# Patient Record
Sex: FEMALE | Race: BLACK OR AFRICAN AMERICAN | NOT HISPANIC OR LATINO | Employment: OTHER | URBAN - METROPOLITAN AREA
[De-identification: names, ages, dates, MRNs, and addresses within clinical notes are randomized per-mention and may not be internally consistent; named-entity substitution may affect disease eponyms.]

---

## 2023-01-23 PROBLEM — H25.813 COMBINED FORM OF AGE-RELATED CATARACT, BOTH EYES: Status: ACTIVE | Noted: 2023-01-23

## 2023-01-23 PROBLEM — E55.9 VITAMIN D DEFICIENCY: Status: ACTIVE | Noted: 2023-01-23

## 2023-01-23 PROBLEM — J30.9 ALLERGIC RHINITIS: Status: ACTIVE | Noted: 2023-01-23

## 2023-01-23 PROBLEM — N17.9 ACUTE KIDNEY INJURY SUPERIMPOSED ON CKD (CMS-HCC): Status: ACTIVE | Noted: 2023-01-23

## 2023-01-23 PROBLEM — R59.0 SUPRACLAVICULAR ADENOPATHY: Status: ACTIVE | Noted: 2023-01-23

## 2023-01-23 PROBLEM — H52.10 MYOPIA WITH PRESBYOPIA: Status: ACTIVE | Noted: 2023-01-23

## 2023-01-23 PROBLEM — N39.0 URINARY TRACT INFECTION: Status: ACTIVE | Noted: 2023-01-23

## 2023-01-23 PROBLEM — C77.0: Status: ACTIVE | Noted: 2023-01-23

## 2023-01-23 PROBLEM — J06.9 VIRAL URI WITH COUGH: Status: ACTIVE | Noted: 2023-01-23

## 2023-01-23 PROBLEM — R05.9 COUGH: Status: ACTIVE | Noted: 2023-01-23

## 2023-01-23 PROBLEM — J40 BRONCHITIS: Status: ACTIVE | Noted: 2023-01-23

## 2023-01-23 PROBLEM — H01.019 BLEPHARITIS, ULCERATIVE: Status: ACTIVE | Noted: 2023-01-23

## 2023-01-23 PROBLEM — F41.9 ANXIETY: Status: ACTIVE | Noted: 2023-01-23

## 2023-01-23 PROBLEM — R60.0 BILATERAL EDEMA OF LOWER EXTREMITY: Status: ACTIVE | Noted: 2023-01-23

## 2023-01-23 PROBLEM — R50.9 FEVER AND CHILLS: Status: ACTIVE | Noted: 2023-01-23

## 2023-01-23 PROBLEM — R42 DIZZINESS: Status: ACTIVE | Noted: 2023-01-23

## 2023-01-23 PROBLEM — R59.0 AXILLARY ADENOPATHY: Status: ACTIVE | Noted: 2023-01-23

## 2023-01-23 PROBLEM — R00.0 TACHYCARDIA: Status: ACTIVE | Noted: 2023-01-23

## 2023-01-23 PROBLEM — H52.4 MYOPIA WITH PRESBYOPIA: Status: ACTIVE | Noted: 2023-01-23

## 2023-01-23 PROBLEM — J11.1 FLU SYNDROME: Status: ACTIVE | Noted: 2023-01-23

## 2023-01-23 PROBLEM — H52.209 ASTIGMATISM: Status: ACTIVE | Noted: 2023-01-23

## 2023-01-23 PROBLEM — I51.89 DIASTOLIC DYSFUNCTION: Status: ACTIVE | Noted: 2023-01-23

## 2023-01-23 PROBLEM — T79.2XXA: Status: ACTIVE | Noted: 2023-01-23

## 2023-01-23 PROBLEM — U07.1 COVID-19: Status: ACTIVE | Noted: 2023-01-23

## 2023-01-23 PROBLEM — Z85.3 PERSONAL HISTORY OF BREAST CANCER: Status: ACTIVE | Noted: 2023-01-23

## 2023-01-23 PROBLEM — E66.09 EXOGENOUS OBESITY: Status: ACTIVE | Noted: 2023-01-23

## 2023-01-23 PROBLEM — H04.129 DRY EYE SYNDROME: Status: ACTIVE | Noted: 2023-01-23

## 2023-01-23 PROBLEM — K21.9 ESOPHAGEAL REFLUX: Status: ACTIVE | Noted: 2023-01-23

## 2023-01-23 PROBLEM — K80.20 GALLBLADDER STONE WITHOUT CHOLECYSTITIS OR OBSTRUCTION: Status: ACTIVE | Noted: 2023-01-23

## 2023-01-23 PROBLEM — N18.9 ACUTE KIDNEY INJURY SUPERIMPOSED ON CKD (CMS-HCC): Status: ACTIVE | Noted: 2023-01-23

## 2023-01-23 PROBLEM — C50.919 BREAST CANCER (MULTI): Status: ACTIVE | Noted: 2023-01-23

## 2023-01-23 PROBLEM — F32.A DEPRESSION: Status: ACTIVE | Noted: 2023-01-23

## 2023-01-23 PROBLEM — G89.29 CHRONIC PAIN: Status: ACTIVE | Noted: 2023-01-23

## 2023-01-23 PROBLEM — R35.0 URINARY FREQUENCY: Status: ACTIVE | Noted: 2023-01-23

## 2023-01-23 PROBLEM — M17.9 OSTEOARTHRITIS, KNEE: Status: ACTIVE | Noted: 2023-01-23

## 2023-01-23 PROBLEM — H35.369 RETINAL DRUSEN: Status: ACTIVE | Noted: 2023-01-23

## 2023-01-23 PROBLEM — M25.569 JOINT PAIN, KNEE: Status: ACTIVE | Noted: 2023-01-23

## 2023-01-23 PROBLEM — I10 HYPERTENSION: Status: ACTIVE | Noted: 2023-01-23

## 2023-01-23 PROBLEM — N18.2 CHRONIC KIDNEY DISEASE (CKD), STAGE II (MILD): Status: ACTIVE | Noted: 2023-01-23

## 2023-01-23 PROBLEM — R53.83 FATIGUE: Status: ACTIVE | Noted: 2023-01-23

## 2023-01-23 PROBLEM — I97.2 POSTMASTECTOMY LYMPHEDEMA SYNDROME: Status: ACTIVE | Noted: 2023-01-23

## 2023-01-23 RX ORDER — ANASTROZOLE 1 MG/1
1 TABLET ORAL DAILY
COMMUNITY
End: 2024-04-30 | Stop reason: SDUPTHER

## 2023-01-23 RX ORDER — ASPIRIN 81 MG/1
81 TABLET ORAL DAILY
COMMUNITY

## 2023-01-24 RX ORDER — CARVEDILOL 12.5 MG/1
1 TABLET ORAL 2 TIMES DAILY
COMMUNITY
Start: 2017-03-14 | End: 2023-08-29 | Stop reason: SDUPTHER

## 2023-01-24 RX ORDER — FLUTICASONE PROPIONATE 50 MCG
1 SPRAY, SUSPENSION (ML) NASAL 2 TIMES DAILY
COMMUNITY
Start: 2018-05-29 | End: 2023-08-29 | Stop reason: SDUPTHER

## 2023-01-24 RX ORDER — FUROSEMIDE 20 MG/1
20 TABLET ORAL DAILY
COMMUNITY
Start: 2020-10-15 | End: 2023-05-23 | Stop reason: SDUPTHER

## 2023-01-24 RX ORDER — GABAPENTIN 300 MG/1
1 CAPSULE ORAL 3 TIMES DAILY
COMMUNITY
Start: 2017-09-05 | End: 2023-08-29 | Stop reason: SDUPTHER

## 2023-01-24 RX ORDER — HYDRALAZINE HYDROCHLORIDE 100 MG/1
1 TABLET, FILM COATED ORAL 2 TIMES DAILY
COMMUNITY
Start: 2019-11-21 | End: 2023-08-29 | Stop reason: SDUPTHER

## 2023-03-07 ENCOUNTER — APPOINTMENT (OUTPATIENT)
Dept: PRIMARY CARE | Facility: CLINIC | Age: 83
End: 2023-03-07
Payer: COMMERCIAL

## 2023-03-07 DIAGNOSIS — I10 BENIGN ESSENTIAL HYPERTENSION: ICD-10-CM

## 2023-03-07 DIAGNOSIS — Z00.00 ENCOUNTER FOR ANNUAL PHYSICAL EXAM: ICD-10-CM

## 2023-03-07 DIAGNOSIS — Z00.00 ENCOUNTER FOR ANNUAL WELLNESS EXAM IN MEDICARE PATIENT: ICD-10-CM

## 2023-03-07 DIAGNOSIS — E03.9 HYPOTHYROIDISM, UNSPECIFIED TYPE: ICD-10-CM

## 2023-03-07 DIAGNOSIS — I10 PRIMARY HYPERTENSION: ICD-10-CM

## 2023-03-07 DIAGNOSIS — D50.9 IRON DEFICIENCY ANEMIA, UNSPECIFIED IRON DEFICIENCY ANEMIA TYPE: ICD-10-CM

## 2023-03-07 DIAGNOSIS — E78.00 ELEVATED LDL CHOLESTEROL LEVEL: ICD-10-CM

## 2023-03-07 LAB
BASOPHILS (10*3/UL) IN BLOOD BY AUTOMATED COUNT: 0.03 X10E9/L (ref 0–0.1)
BASOPHILS/100 LEUKOCYTES IN BLOOD BY AUTOMATED COUNT: 1 % (ref 0–2)
EOSINOPHILS (10*3/UL) IN BLOOD BY AUTOMATED COUNT: 0.02 X10E9/L (ref 0–0.4)
EOSINOPHILS/100 LEUKOCYTES IN BLOOD BY AUTOMATED COUNT: 0.7 % (ref 0–6)
ERYTHROCYTE DISTRIBUTION WIDTH (RATIO) BY AUTOMATED COUNT: 15 % (ref 11.5–14.5)
ERYTHROCYTE MEAN CORPUSCULAR HEMOGLOBIN CONCENTRATION (G/DL) BY AUTOMATED: 32.2 G/DL (ref 32–36)
ERYTHROCYTE MEAN CORPUSCULAR VOLUME (FL) BY AUTOMATED COUNT: 88 FL (ref 80–100)
ERYTHROCYTES (10*6/UL) IN BLOOD BY AUTOMATED COUNT: 3.79 X10E12/L (ref 4–5.2)
HEMATOCRIT (%) IN BLOOD BY AUTOMATED COUNT: 33.2 % (ref 36–46)
HEMOGLOBIN (G/DL) IN BLOOD: 10.7 G/DL (ref 12–16)
IMMATURE GRANULOCYTES/100 LEUKOCYTES IN BLOOD BY AUTOMATED COUNT: 0 % (ref 0–0.9)
LEUKOCYTES (10*3/UL) IN BLOOD BY AUTOMATED COUNT: 2.9 X10E9/L (ref 4.4–11.3)
LYMPHOCYTES (10*3/UL) IN BLOOD BY AUTOMATED COUNT: 1.06 X10E9/L (ref 0.8–3)
LYMPHOCYTES/100 LEUKOCYTES IN BLOOD BY AUTOMATED COUNT: 37.1 % (ref 13–44)
MONOCYTES (10*3/UL) IN BLOOD BY AUTOMATED COUNT: 0.35 X10E9/L (ref 0.05–0.8)
MONOCYTES/100 LEUKOCYTES IN BLOOD BY AUTOMATED COUNT: 12.2 % (ref 2–10)
NEUTROPHILS (10*3/UL) IN BLOOD BY AUTOMATED COUNT: 1.4 X10E9/L (ref 1.6–5.5)
NEUTROPHILS/100 LEUKOCYTES IN BLOOD BY AUTOMATED COUNT: 49 % (ref 40–80)
PLATELETS (10*3/UL) IN BLOOD AUTOMATED COUNT: 135 X10E9/L (ref 150–450)

## 2023-03-07 PROCEDURE — 80053 COMPREHEN METABOLIC PANEL: CPT

## 2023-03-07 PROCEDURE — 85027 COMPLETE CBC AUTOMATED: CPT

## 2023-03-07 PROCEDURE — 84443 ASSAY THYROID STIM HORMONE: CPT

## 2023-03-07 PROCEDURE — 80061 LIPID PANEL: CPT

## 2023-03-07 PROCEDURE — 82306 VITAMIN D 25 HYDROXY: CPT

## 2023-03-07 PROCEDURE — 36415 COLL VENOUS BLD VENIPUNCTURE: CPT | Performed by: INTERNAL MEDICINE

## 2023-03-08 LAB
ALANINE AMINOTRANSFERASE (SGPT) (U/L) IN SER/PLAS: 14 U/L (ref 7–45)
ALBUMIN (G/DL) IN SER/PLAS: 3.7 G/DL (ref 3.4–5)
ALKALINE PHOSPHATASE (U/L) IN SER/PLAS: 83 U/L (ref 33–136)
ANION GAP IN SER/PLAS: 12 MMOL/L (ref 10–20)
ASPARTATE AMINOTRANSFERASE (SGOT) (U/L) IN SER/PLAS: 18 U/L (ref 9–39)
BILIRUBIN TOTAL (MG/DL) IN SER/PLAS: 0.4 MG/DL (ref 0–1.2)
CALCIDIOL (25 OH VITAMIN D3) (NG/ML) IN SER/PLAS: 30 NG/ML
CALCIUM (MG/DL) IN SER/PLAS: 9.1 MG/DL (ref 8.6–10.6)
CARBON DIOXIDE, TOTAL (MMOL/L) IN SER/PLAS: 29 MMOL/L (ref 21–32)
CHLORIDE (MMOL/L) IN SER/PLAS: 106 MMOL/L (ref 98–107)
CHOLESTEROL (MG/DL) IN SER/PLAS: 191 MG/DL (ref 0–199)
CHOLESTEROL IN HDL (MG/DL) IN SER/PLAS: 73 MG/DL
CHOLESTEROL/HDL RATIO: 2.6
CREATININE (MG/DL) IN SER/PLAS: 1.08 MG/DL (ref 0.5–1.05)
ERYTHROCYTE DISTRIBUTION WIDTH (RATIO) BY AUTOMATED COUNT: 15.9 % (ref 11.5–14.5)
ERYTHROCYTE MEAN CORPUSCULAR HEMOGLOBIN CONCENTRATION (G/DL) BY AUTOMATED: 30.8 G/DL (ref 32–36)
ERYTHROCYTE MEAN CORPUSCULAR VOLUME (FL) BY AUTOMATED COUNT: 89 FL (ref 80–100)
ERYTHROCYTES (10*6/UL) IN BLOOD BY AUTOMATED COUNT: 3.9 X10E12/L (ref 4–5.2)
GFR FEMALE: 51 ML/MIN/1.73M2
GLUCOSE (MG/DL) IN SER/PLAS: 98 MG/DL (ref 74–99)
HEMATOCRIT (%) IN BLOOD BY AUTOMATED COUNT: 34.7 % (ref 36–46)
HEMOGLOBIN (G/DL) IN BLOOD: 10.7 G/DL (ref 12–16)
LDL: 104 MG/DL (ref 0–99)
LEUKOCYTES (10*3/UL) IN BLOOD BY AUTOMATED COUNT: 3 X10E9/L (ref 4.4–11.3)
NRBC (PER 100 WBCS) BY AUTOMATED COUNT: 0 /100 WBC (ref 0–0)
PLATELETS (10*3/UL) IN BLOOD AUTOMATED COUNT: 152 X10E9/L (ref 150–450)
POTASSIUM (MMOL/L) IN SER/PLAS: 4.1 MMOL/L (ref 3.5–5.3)
PROTEIN TOTAL: 6.5 G/DL (ref 6.4–8.2)
SODIUM (MMOL/L) IN SER/PLAS: 143 MMOL/L (ref 136–145)
THYROTROPIN (MIU/L) IN SER/PLAS BY DETECTION LIMIT <= 0.05 MIU/L: 3.43 MIU/L (ref 0.44–3.98)
TRIGLYCERIDE (MG/DL) IN SER/PLAS: 70 MG/DL (ref 0–149)
UREA NITROGEN (MG/DL) IN SER/PLAS: 16 MG/DL (ref 6–23)
VLDL: 14 MG/DL (ref 0–40)

## 2023-03-10 ENCOUNTER — OFFICE VISIT (OUTPATIENT)
Dept: PRIMARY CARE | Facility: CLINIC | Age: 83
End: 2023-03-10
Payer: COMMERCIAL

## 2023-03-10 DIAGNOSIS — I10 HYPERTENSION, UNSPECIFIED TYPE: ICD-10-CM

## 2023-03-10 DIAGNOSIS — E55.9 VITAMIN D DEFICIENCY: ICD-10-CM

## 2023-03-10 DIAGNOSIS — Z00.00 ENCOUNTER FOR ANNUAL WELLNESS VISIT (AWV) IN MEDICARE PATIENT: ICD-10-CM

## 2023-03-10 DIAGNOSIS — N18.9 ACUTE KIDNEY INJURY SUPERIMPOSED ON CKD (CMS-HCC): Primary | ICD-10-CM

## 2023-03-10 DIAGNOSIS — N17.9 ACUTE KIDNEY INJURY SUPERIMPOSED ON CKD (CMS-HCC): Primary | ICD-10-CM

## 2023-03-10 DIAGNOSIS — C50.919 MALIGNANT NEOPLASM OF FEMALE BREAST, UNSPECIFIED ESTROGEN RECEPTOR STATUS, UNSPECIFIED LATERALITY, UNSPECIFIED SITE OF BREAST (MULTI): ICD-10-CM

## 2023-03-10 PROBLEM — M17.9 OSTEOARTHRITIS OF KNEE: Status: ACTIVE | Noted: 2023-03-10

## 2023-03-10 PROBLEM — N18.2 CHRONIC KIDNEY DISEASE, STAGE II (MILD): Status: ACTIVE | Noted: 2023-03-10

## 2023-03-10 PROBLEM — D63.8 ANEMIA OF CHRONIC DISEASE: Status: ACTIVE | Noted: 2023-03-10

## 2023-03-10 PROBLEM — N39.0 UTI (URINARY TRACT INFECTION): Status: ACTIVE | Noted: 2023-03-10

## 2023-03-10 PROBLEM — I97.2 POSTMASTECTOMY LYMPHEDEMA SYNDROME OF LEFT UPPER EXTREMITY: Status: ACTIVE | Noted: 2023-03-10

## 2023-03-10 PROCEDURE — 99214 OFFICE O/P EST MOD 30 MIN: CPT | Performed by: INTERNAL MEDICINE

## 2023-03-10 PROCEDURE — 1159F MED LIST DOCD IN RCRD: CPT | Performed by: INTERNAL MEDICINE

## 2023-03-10 PROCEDURE — 93000 ELECTROCARDIOGRAM COMPLETE: CPT | Performed by: INTERNAL MEDICINE

## 2023-03-10 PROCEDURE — G0439 PPPS, SUBSEQ VISIT: HCPCS | Performed by: INTERNAL MEDICINE

## 2023-03-10 ASSESSMENT — ENCOUNTER SYMPTOMS
LOSS OF SENSATION IN FEET: 0
DEPRESSION: 0
OCCASIONAL FEELINGS OF UNSTEADINESS: 0

## 2023-03-10 NOTE — PROGRESS NOTES
Subjective :  Chief Complaint: Elizabeth Buckner is an 82 y.o. female here for an annual wellness visit and general medical care and f/u.     HPI:  HPI  Patient is here for her annual wellness. She is here for EKG and follow up on HTN, CKD, and anemia. Patient does not need refills.    Review of Systems   All other systems reviewed and are negative.    All systems reviewed and negative except for what was mentioned in the HPI    Objective   BP: 120/70    Physical Exam  Vitals reviewed.   Constitutional:       Appearance: Normal appearance.   HENT:      Head: Normocephalic and atraumatic.   Cardiovascular:      Rate and Rhythm: Normal rate and regular rhythm.   Pulmonary:      Effort: Pulmonary effort is normal.      Breath sounds: Normal breath sounds.   Abdominal:      General: Bowel sounds are normal.      Palpations: Abdomen is soft.   Musculoskeletal:      Cervical back: Neck supple.   Skin:     General: Skin is warm and dry.   Neurological:      General: No focal deficit present.      Mental Status: She is alert.   Psychiatric:         Mood and Affect: Mood normal.         Behavior: Behavior is cooperative.       Imaging:  No results found.     Labs reviewed:    Lab Results   Component Value Date    WBC 2.9 (L) 03/07/2023    HGB 10.7 (L) 03/07/2023    HCT 33.2 (L) 03/07/2023     (L) 03/07/2023    CHOL 191 03/07/2023    TRIG 70 03/07/2023    HDL 73.0 03/07/2023    ALT 14 03/07/2023    AST 18 03/07/2023     03/07/2023    K 4.1 03/07/2023     03/07/2023    CREATININE 1.08 (H) 03/07/2023    BUN 16 03/07/2023    CO2 29 03/07/2023    TSH 3.43 03/07/2023    INR 1.0 10/19/2022       Past Medical, Surgical, and Family History reviewed and updated in chart.    I have reviewed and reconciled the medication list with the patient today.   Current Outpatient Medications:     anastrozole (Arimidex) 1 mg tablet, Take 1 tablet (1 mg total) by mouth once daily.  Take 1 tablet once daily. Swallow whole with a drink of  water., Disp: , Rfl:     aspirin 81 mg EC tablet, Take 1 tablet (81 mg) by mouth once daily. Take 1 tablet daily, Disp: , Rfl:     carvedilol (Coreg) 12.5 mg tablet, Take 1 tablet (12.5 mg) by mouth in the morning and 1 tablet (12.5 mg) before bedtime. Take 1 tablet twice daily., Disp: , Rfl:     cholecalciferol, vitamin D3, (VITAMIN D3 ORAL), Take 25 mcg by mouth 1 (one) time each day. Take 1 tablet daily Vitamin D 25MCG (1000 UT) oral tablet, Disp: , Rfl:     FLAXSEED OIL ORAL, Take 1,200 mg by mouth in the morning, at noon, and at bedtime. Take 1 capsule 3 times daily, Disp: , Rfl:     fluticasone (Flonase) 50 mcg/actuation nasal spray, Administer 1 spray into each nostril in the morning and 1 spray before bedtime. Instill 1 spray in each nostril twice daily., Disp: , Rfl:     furosemide (Lasix) 20 mg tablet, Take 1 tablet (20 mg) by mouth once daily. Take 1 tablet by mouth every day, Disp: , Rfl:     gabapentin (Neurontin) 300 mg capsule, Take 1 capsule (300 mg) by mouth in the morning and 1 capsule (300 mg) in the evening and 1 capsule (300 mg) before bedtime. Take one capsule by mouth three times a day., Disp: , Rfl:     hydrALAZINE (Apresoline) 100 mg tablet, Take 1 tablet (100 mg) by mouth in the morning and 1 tablet (100 mg) before bedtime. Take 1 tablet by mouth twice per day., Disp: , Rfl:     palbociclib (Ibrance) 125 mg capsule, Take 1 capsule (125 mg total) by mouth once daily.  Stake 1 tablet daily. wallow whole., Disp: , Rfl:     TURMERIC ORAL, Take 400 mg by mouth 1 (one) time each day. Take 1 capsule daily, Disp: , Rfl:      List of current healthcare providers:  Patient Care Team:  Tova Espitia MD as PCP - General     HRA:                               Assessment/Plan :  Problem List Items Addressed This Visit          Circulatory    Hypertension     Continue medications.  Recheck BP.          Relevant Orders    ECG 12 lead       Genitourinary    Acute kidney injury superimposed on CKD  (CMS/HCC) - Primary     Improved.  Continue to monitor.             Endocrine/Metabolic    Vitamin D deficiency     Continue to monitor.            Other    Breast cancer (CMS/HCC)     Continue to follow up with oncology  Patient will receive testing (CT) as determined by oncology.          Other Visit Diagnoses       Encounter for annual wellness visit (AWV) in Medicare patient        Relevant Orders    ECG 12 lead          The following health maintenance schedule was reviewed with the patient and provided in printed form in the after visit summary:  Health Maintenance   Topic Date Due    Yearly Adult Physical  Never done    Zoster Vaccines (1 of 2) Never done    Pneumococcal Vaccine: 65+ Years (1 - PCV) 05/22/2004    TSH Level  03/07/2024    Lipid Panel  03/07/2028    Influenza Vaccine  Completed    Bone Density Scan  Completed    COVID-19 Vaccine  Completed    HIB Vaccines  Aged Out    Hepatitis B Vaccines  Aged Out    IPV Vaccines  Aged Out    Hepatitis A Vaccines  Aged Out    Meningococcal Vaccine  Aged Out    Rotavirus Vaccines  Aged Out    HPV Vaccines  Aged Out    DTaP/Tdap/Td Vaccines  Discontinued       Advance Care Planning   Discussed with patient. She has a POA and living will.           Orders Placed This Encounter   Procedures    ECG 12 lead       Continue current medications as listed  Follow up in 3 months for CBC, CMP.

## 2023-03-29 ENCOUNTER — OFFICE VISIT (OUTPATIENT)
Dept: PRIMARY CARE | Facility: CLINIC | Age: 83
End: 2023-03-29
Payer: COMMERCIAL

## 2023-03-29 VITALS
WEIGHT: 174 LBS | HEART RATE: 70 BPM | OXYGEN SATURATION: 100 % | SYSTOLIC BLOOD PRESSURE: 154 MMHG | BODY MASS INDEX: 34.16 KG/M2 | DIASTOLIC BLOOD PRESSURE: 78 MMHG | RESPIRATION RATE: 12 BRPM | HEIGHT: 60 IN

## 2023-03-29 DIAGNOSIS — J40 BRONCHITIS: ICD-10-CM

## 2023-03-29 DIAGNOSIS — K21.9 GASTROESOPHAGEAL REFLUX DISEASE WITHOUT ESOPHAGITIS: ICD-10-CM

## 2023-03-29 DIAGNOSIS — R35.0 URINARY FREQUENCY: ICD-10-CM

## 2023-03-29 DIAGNOSIS — N39.0 URINARY TRACT INFECTION WITHOUT HEMATURIA, SITE UNSPECIFIED: ICD-10-CM

## 2023-03-29 DIAGNOSIS — D63.8 ANEMIA OF CHRONIC DISEASE: Primary | ICD-10-CM

## 2023-03-29 DIAGNOSIS — N18.2 CHRONIC KIDNEY DISEASE (CKD), STAGE II (MILD): ICD-10-CM

## 2023-03-29 LAB
POC APPEARANCE, URINE: ABNORMAL
POC BILIRUBIN, URINE: NEGATIVE
POC BLOOD, URINE: ABNORMAL
POC COLOR, URINE: ABNORMAL
POC GLUCOSE, URINE: NEGATIVE MG/DL
POC KETONES, URINE: NEGATIVE MG/DL
POC LEUKOCYTES, URINE: ABNORMAL
POC NITRITE,URINE: NEGATIVE
POC PH, URINE: 6 PH
POC PROTEIN, URINE: NEGATIVE MG/DL
POC SPECIFIC GRAVITY, URINE: >=1.03
POC UROBILINOGEN, URINE: 0.2 EU/DL

## 2023-03-29 PROCEDURE — 3077F SYST BP >= 140 MM HG: CPT | Performed by: INTERNAL MEDICINE

## 2023-03-29 PROCEDURE — 99213 OFFICE O/P EST LOW 20 MIN: CPT | Performed by: INTERNAL MEDICINE

## 2023-03-29 PROCEDURE — 1159F MED LIST DOCD IN RCRD: CPT | Performed by: INTERNAL MEDICINE

## 2023-03-29 PROCEDURE — 3078F DIAST BP <80 MM HG: CPT | Performed by: INTERNAL MEDICINE

## 2023-03-29 PROCEDURE — 81002 URINALYSIS NONAUTO W/O SCOPE: CPT | Performed by: INTERNAL MEDICINE

## 2023-03-29 PROCEDURE — 1036F TOBACCO NON-USER: CPT | Performed by: INTERNAL MEDICINE

## 2023-03-29 RX ORDER — AZELASTINE HYDROCHLORIDE 0.5 MG/ML
2 SOLUTION/ DROPS OPHTHALMIC DAILY
COMMUNITY
Start: 2022-05-10

## 2023-03-29 ASSESSMENT — LIFESTYLE VARIABLES
HOW OFTEN DO YOU HAVE SIX OR MORE DRINKS ON ONE OCCASION: NEVER
SKIP TO QUESTIONS 9-10: 1
HOW MANY STANDARD DRINKS CONTAINING ALCOHOL DO YOU HAVE ON A TYPICAL DAY: PATIENT DOES NOT DRINK
AUDIT-C TOTAL SCORE: 0
HOW OFTEN DO YOU HAVE A DRINK CONTAINING ALCOHOL: NEVER

## 2023-03-29 ASSESSMENT — SOCIAL DETERMINANTS OF HEALTH (SDOH)
WITHIN THE LAST YEAR, HAVE TO BEEN RAPED OR FORCED TO HAVE ANY KIND OF SEXUAL ACTIVITY BY YOUR PARTNER OR EX-PARTNER?: NO
WITHIN THE LAST YEAR, HAVE YOU BEEN HUMILIATED OR EMOTIONALLY ABUSED IN OTHER WAYS BY YOUR PARTNER OR EX-PARTNER?: NO
WITHIN THE LAST YEAR, HAVE YOU BEEN KICKED, HIT, SLAPPED, OR OTHERWISE PHYSICALLY HURT BY YOUR PARTNER OR EX-PARTNER?: NO
WITHIN THE LAST YEAR, HAVE YOU BEEN AFRAID OF YOUR PARTNER OR EX-PARTNER?: NO

## 2023-03-29 ASSESSMENT — ENCOUNTER SYMPTOMS
DEPRESSION: 0
OCCASIONAL FEELINGS OF UNSTEADINESS: 0
LOSS OF SENSATION IN FEET: 0

## 2023-03-29 ASSESSMENT — PATIENT HEALTH QUESTIONNAIRE - PHQ9
SUM OF ALL RESPONSES TO PHQ9 QUESTIONS 1 & 2: 0
2. FEELING DOWN, DEPRESSED OR HOPELESS: NOT AT ALL
1. LITTLE INTEREST OR PLEASURE IN DOING THINGS: NOT AT ALL

## 2023-03-29 ASSESSMENT — PAIN SCALES - GENERAL: PAINLEVEL: 6

## 2023-03-29 NOTE — PROGRESS NOTES
Subjective   Chief complaint: Elizabeth Buckner is a 82 y.o. female who presents for Chills (Pt experiencing chills off and on throughout the day.), Urinary Frequency, and urinary burning.    HPI:  Patient presented with increased urinary frequency urgency in urination and burning in urination for a few days        Objective   /78 (BP Location: Right arm, Patient Position: Sitting, BP Cuff Size: Adult)   Pulse 70   Resp 12   Ht 1.524 m (5')   Wt 78.9 kg (174 lb)   SpO2 100%   BMI 33.98 kg/m²   Physical Exam  Vitals reviewed.   Constitutional:       Appearance: Normal appearance.   HENT:      Head: Normocephalic and atraumatic.   Cardiovascular:      Rate and Rhythm: Normal rate and regular rhythm.   Pulmonary:      Effort: Pulmonary effort is normal.      Breath sounds: Normal breath sounds.   Abdominal:      General: Bowel sounds are normal.      Palpations: Abdomen is soft.   Musculoskeletal:      Cervical back: Neck supple.   Skin:     General: Skin is warm and dry.   Neurological:      General: No focal deficit present.      Mental Status: She is alert.   Psychiatric:         Mood and Affect: Mood normal.         Behavior: Behavior is cooperative.         I have reviewed and reconciled the medication list with the patient today.   Current Outpatient Medications:     anastrozole (Arimidex) 1 mg tablet, Take 1 tablet (1 mg total) by mouth once daily.  Take 1 tablet once daily. Swallow whole with a drink of water., Disp: , Rfl:     aspirin 81 mg EC tablet, Take 1 tablet (81 mg) by mouth once daily. Take 1 tablet daily, Disp: , Rfl:     azelastine (Optivar) 0.05 % ophthalmic solution, Administer 2 drops into both eyes once daily., Disp: , Rfl:     carvedilol (Coreg) 12.5 mg tablet, Take 1 tablet (12.5 mg) by mouth in the morning and 1 tablet (12.5 mg) before bedtime. Take 1 tablet twice daily., Disp: , Rfl:     cholecalciferol, vitamin D3, (VITAMIN D3 ORAL), Take 25 mcg by mouth 1 (one) time each day. Take 1  tablet daily Vitamin D 25MCG (1000 UT) oral tablet, Disp: , Rfl:     FLAXSEED OIL ORAL, Take 1,200 mg by mouth in the morning, at noon, and at bedtime. Take 1 capsule 3 times daily, Disp: , Rfl:     fluticasone (Flonase) 50 mcg/actuation nasal spray, Administer 1 spray into each nostril in the morning and 1 spray before bedtime. Instill 1 spray in each nostril twice daily., Disp: , Rfl:     furosemide (Lasix) 20 mg tablet, Take 1 tablet (20 mg) by mouth once daily. Take 1 tablet by mouth every day, Disp: , Rfl:     gabapentin (Neurontin) 300 mg capsule, Take 1 capsule (300 mg) by mouth in the morning and 1 capsule (300 mg) in the evening and 1 capsule (300 mg) before bedtime. Take one capsule by mouth three times a day., Disp: , Rfl:     hydrALAZINE (Apresoline) 100 mg tablet, Take 1 tablet (100 mg) by mouth in the morning and 1 tablet (100 mg) before bedtime. Take 1 tablet by mouth twice per day., Disp: , Rfl:     palbociclib (Ibrance) 75 mg capsule, Take 1 capsule (75 mg total) by mouth once daily with a meal.  Swallow whole., Disp: , Rfl:     TURMERIC ORAL, Take 400 mg by mouth 1 (one) time each day. Take 1 capsule daily, Disp: , Rfl:     palbociclib (Ibrance) 125 mg capsule, Take 1 capsule (125 mg total) by mouth once daily.  Stake 1 tablet daily. wallow whole., Disp: , Rfl:      Imaging:  CT chest abdomen pelvis wo IV contrast    Result Date: 3/29/2023  Interpreted By:  GREGORIO FRANZ MD and GRETCHEN SUAREZ MD MRN: [id number] Patient Name: STEPHANIE GUTIERREZ  STUDY: CT CHEST ABDOMEN PELVIS WO CONTRAST;  3/27/2023 2:03 pm  INDICATION: F/U Metastatic breast cancer.  Per clinical notes: Patient has a history of bilateral breast cancer diagnosed in 1991 status post breast conserving surgery followed by radiation and tamoxifen. Experience in breast recurrence in 2012, status post completion mastectomy. IDC status post radiation 2018. Had biopsy-proven recurrence in the right supraclavicular lymph node in November 2021.  Initiated on anastrozole and palbociclib status post localized radiation therapy completed in April 2022.  COMPARISON: CT chest abdomen pelvis 10/26/2022, bone scan 10/26/2022, PET CT 01/13/2022, CT abdomen pelvis 02/08/2017, CT chest abdomen pelvis 02/08/2017  ACCESSION NUMBER(S): [id number]  ORDERING CLINICIAN: SORIN SWIFT  TECHNIQUE: Contiguous axial images of the chest , abdomen, and pelvis were obtained without contrast. Coronal and sagittal reformatted images were reconstructed from the axial data.  FINDINGS: CT CHEST:  MEDIASTINUM AND LYMPH NODES: Postsurgical changes of right axillary lymph node dissection. No evidence of new or enlarging intrathoracic lymph nodes identified.  VESSELS: Normal caliber aorta. Moderate aortic atherosclerosis. Dilated main pulmonary artery measuring 33 mm, with increased pulmonary artery to ascending aorta ratio, suggestive of pulmonary hypertension.  HEART:Normal size.No significant coronary artery calcifications. No significant pericardial effusion.  LUNG, AIRWAYS, PLEURA: The trachea and central bronchi are patent. Redemonstration of subpleural reticular opacities in the right middle lobe and left upper lobe anteriorly, most consistent with post radiation changes. Linear chronic scarring changes in the lingula and right middle lobe.  Stable 2 mm left upper lobe nodule on image 42 of series 202 when compared with 10/15/2021, suggesting benign etiology.  No suspicious pulmonary nodules identified. No consolidation, pulmonary edema, pleural effusion or pneumothorax.  CHEST WALL SOFT TISSUES: Postsurgical changes of bilateral total mastectomy.No thyroid parenchyma could be confidently seen within scanned lower neck. No significant abnormalities involving the esophagus. Post radiation changes of right supraclavicular region again noted without evidence of new soft tissue lesions or lymphadenopathy.  OSSEOUS STRUCTURES:No acute osseous abnormality. No suspicious osseous lesions.    CT ABDOMEN/PELVIS:   ABDOMINAL WALL: Small fat containing periumbilical hernia.  LIVER: Scattered calcifications throughout the hepatic parenchyma, likely sequelae of prior granulomatous disease. The liver is normal and unchanged in size.  BILE DUCTS: Note is again made of prominence of the CBD with diffuse distal tapering, most likely physiologic post cholecystectomy. The findings are unchanged compared to prior examination on 10/26/2022.  GALLBLADDER: Surgically absent.  SPLEEN: The spleen is normal and unchanged in size. No focal lesions.  PANCREAS: No significant abnormality.  ADRENALS: No significant abnormality.  KIDNEYS, URETERS numerous bilateral simple renal cysts again noted.No nephroureterolithiasis or hydroureteronephrosis.  REPRODUCTIVE ORGANS: The uterus is surgically absent.  VESSELS: Moderate vascular calcifications. No aneurysm. The IVC is normal in caliber.  RETROPERITONEUM/LYMPH NODES: No evidence of abdominopelvic lymphadenopathy by CT criteria.  BOWEL/MESENTERY/PERITONEUM/ BLADDER:: No significant abnormalities involving the stomach.Redemonstration of sigmoid diverticulosis with associated chronic thickening of the bowel wall. There is evidence of tethering of the distal sigmoid to the posterior bladder wall, with adjacent inflammatory changes of the omental fat, extending to the left ovary (series 201, image 156). The bladder wall is diffusely thickened with associated perivesical stranding. A small focus of air is seen within the anti dependent portion of the bladder (series 201, image 159). The findings are overall similar to prior examinations dating back to PET CT on 01/13/2022. No evidence of paracolic collections identified. The remainder of the bowel loops are normal in course and caliber. The appendix is normal.  No ascites, free air, or fluid collection.  MUSCULOSKELETAL: No acute osseous abnormality. No suspicious osseous lesions identified. Mild degenerative changes of the  thoracolumbar spine noted.      Breast cancer restaging scan. 1. Postsurgical changes of bilateral mastectomy, axillary dissection, and right supraclavicular chest wall radiation. No evidence of metastatic disease in the chest. 2. No evidence of metastatic disease in the abdomen or pelvis. 3. Sigmoid diverticulosis with tethering of the distal sigmoid colon to the posterior bladder wall noted. There is a focus of air within the anti dependent segment of the bladder, with associated diffuse bladder wall thickening and perivesical stranding, extending to the left ovary. 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 No evidence of paracolic fluid collections. 4.  Additional findings as above.   Yellow Alert: Inflammatory changes involving the bladder with tethering of the sigmoid colon to the bladder wall, most consistent with chronic colovesical fistula. The critical information above was relayed directly by me by the [employer] system to SORIN SWIFT on 3/28/2023.  I personally reviewed the images/study and I agree with the findings as stated. This study was interpreted at [place], [place].    ECG 12 lead    Result Date: 3/21/2023  Normal sinus rhythm.  NS ST T changes       Labs reviewed:    Lab Results   Component Value Date    WBC 2.9 (L) 03/07/2023    HGB 10.7 (L) 03/07/2023    HCT 33.2 (L) 03/07/2023     (L) 03/07/2023    CHOL 191 03/07/2023    TRIG 70 03/07/2023    HDL 73.0 03/07/2023    ALT 14 03/07/2023    AST 18 03/07/2023     03/07/2023    K 4.1 03/07/2023     03/07/2023    CREATININE 1.08 (H) 03/07/2023    BUN 16 03/07/2023    CO2 29 03/07/2023    TSH 3.43 03/07/2023    INR 1.0 10/19/2022       Assessment/Plan   Problem List Items Addressed This Visit          Digestive    Esophageal reflux       Genitourinary    Chronic kidney disease (CKD), stage II (mild)     Urinary tract infection     Levaquin 500 once a day for 5 days  Drink enough water.            Hematologic    Anemia of chronic disease - Primary       Other    Urinary frequency    Relevant Orders    POCT UA (nonautomated) manually resulted (Completed)       Continue current medications as listed  Follow up in a week if not better

## 2023-03-30 RX ORDER — LEVOFLOXACIN 500 MG/1
500 TABLET, FILM COATED ORAL DAILY
Qty: 5 TABLET | Refills: 0 | Status: SHIPPED | OUTPATIENT
Start: 2023-03-30 | End: 2023-04-04

## 2023-04-10 ENCOUNTER — OFFICE VISIT (OUTPATIENT)
Dept: PRIMARY CARE | Facility: CLINIC | Age: 83
End: 2023-04-10
Payer: COMMERCIAL

## 2023-04-10 VITALS
OXYGEN SATURATION: 99 % | HEART RATE: 79 BPM | SYSTOLIC BLOOD PRESSURE: 140 MMHG | WEIGHT: 175 LBS | DIASTOLIC BLOOD PRESSURE: 62 MMHG | RESPIRATION RATE: 12 BRPM | BODY MASS INDEX: 34.36 KG/M2 | HEIGHT: 60 IN

## 2023-04-10 DIAGNOSIS — N39.0 URINARY TRACT INFECTION WITHOUT HEMATURIA, SITE UNSPECIFIED: ICD-10-CM

## 2023-04-10 DIAGNOSIS — I15.9 SECONDARY HYPERTENSION: ICD-10-CM

## 2023-04-10 DIAGNOSIS — R53.83 FATIGUE, UNSPECIFIED TYPE: Primary | ICD-10-CM

## 2023-04-10 LAB
POC APPEARANCE, URINE: ABNORMAL
POC BILIRUBIN, URINE: NEGATIVE
POC BLOOD, URINE: ABNORMAL
POC COLOR, URINE: YELLOW
POC GLUCOSE, URINE: NEGATIVE MG/DL
POC KETONES, URINE: NEGATIVE MG/DL
POC LEUKOCYTES, URINE: ABNORMAL
POC NITRITE,URINE: POSITIVE
POC PH, URINE: 5 PH
POC PROTEIN, URINE: ABNORMAL MG/DL
POC SPECIFIC GRAVITY, URINE: 1.01
POC UROBILINOGEN, URINE: 0.2 EU/DL

## 2023-04-10 PROCEDURE — 1159F MED LIST DOCD IN RCRD: CPT | Performed by: INTERNAL MEDICINE

## 2023-04-10 PROCEDURE — 3077F SYST BP >= 140 MM HG: CPT | Performed by: INTERNAL MEDICINE

## 2023-04-10 PROCEDURE — 99214 OFFICE O/P EST MOD 30 MIN: CPT | Performed by: INTERNAL MEDICINE

## 2023-04-10 PROCEDURE — 87186 SC STD MICRODIL/AGAR DIL: CPT

## 2023-04-10 PROCEDURE — 81002 URINALYSIS NONAUTO W/O SCOPE: CPT | Performed by: INTERNAL MEDICINE

## 2023-04-10 PROCEDURE — 1036F TOBACCO NON-USER: CPT | Performed by: INTERNAL MEDICINE

## 2023-04-10 PROCEDURE — 3078F DIAST BP <80 MM HG: CPT | Performed by: INTERNAL MEDICINE

## 2023-04-10 PROCEDURE — 87086 URINE CULTURE/COLONY COUNT: CPT

## 2023-04-10 PROCEDURE — 87077 CULTURE AEROBIC IDENTIFY: CPT

## 2023-04-10 NOTE — ASSESSMENT & PLAN NOTE
Patient failed on the treatment and the urine still positive for blood and nitrate  I will get a urine cultures because patient been off antibiotic for a week  It depends on the culture we might have to have referral to urology for cystoscopy

## 2023-04-10 NOTE — PROGRESS NOTES
Subjective   Chief complaint: Elizabeth Buckner is a 82 y.o. female who presents for uti follow up (Pt is being seen for UTI follow up and weakness. ).    HPI:  Follow-up UTIPatient stopped the antibiotic because it made her feel sick she still having symptoms the UA still positive        Objective   /62   Pulse 79   Resp 12   Ht 1.524 m (5')   Wt 79.4 kg (175 lb)   SpO2 99%   BMI 34.18 kg/m²   Physical Exam  Vitals reviewed.   Constitutional:       Appearance: Normal appearance.   HENT:      Head: Normocephalic and atraumatic.   Cardiovascular:      Rate and Rhythm: Normal rate and regular rhythm.   Pulmonary:      Effort: Pulmonary effort is normal.      Breath sounds: Normal breath sounds.   Abdominal:      General: Bowel sounds are normal.      Palpations: Abdomen is soft.   Musculoskeletal:      Cervical back: Neck supple.   Skin:     General: Skin is warm and dry.   Neurological:      General: No focal deficit present.      Mental Status: She is alert.   Psychiatric:         Mood and Affect: Mood normal.         Behavior: Behavior is cooperative.         I have reviewed and reconciled the medication list with the patient today.   Current Outpatient Medications:     anastrozole (Arimidex) 1 mg tablet, Take 1 tablet (1 mg total) by mouth once daily.  Take 1 tablet once daily. Swallow whole with a drink of water., Disp: , Rfl:     aspirin 81 mg EC tablet, Take 1 tablet (81 mg) by mouth once daily. Take 1 tablet daily, Disp: , Rfl:     azelastine (Optivar) 0.05 % ophthalmic solution, Administer 2 drops into both eyes once daily., Disp: , Rfl:     carvedilol (Coreg) 12.5 mg tablet, Take 1 tablet (12.5 mg) by mouth in the morning and 1 tablet (12.5 mg) before bedtime. Take 1 tablet twice daily., Disp: , Rfl:     cholecalciferol, vitamin D3, (VITAMIN D3 ORAL), Take 25 mcg by mouth 1 (one) time each day. Take 1 tablet daily Vitamin D 25MCG (1000 UT) oral tablet, Disp: , Rfl:     FLAXSEED OIL ORAL, Take 1,200 mg by  mouth in the morning, at noon, and at bedtime. Take 1 capsule 3 times daily, Disp: , Rfl:     fluticasone (Flonase) 50 mcg/actuation nasal spray, Administer 1 spray into each nostril in the morning and 1 spray before bedtime. Instill 1 spray in each nostril twice daily., Disp: , Rfl:     furosemide (Lasix) 20 mg tablet, Take 1 tablet (20 mg) by mouth once daily. Take 1 tablet by mouth every day, Disp: , Rfl:     gabapentin (Neurontin) 300 mg capsule, Take 1 capsule (300 mg) by mouth in the morning and 1 capsule (300 mg) in the evening and 1 capsule (300 mg) before bedtime. Take one capsule by mouth three times a day., Disp: , Rfl:     hydrALAZINE (Apresoline) 100 mg tablet, Take 1 tablet (100 mg) by mouth in the morning and 1 tablet (100 mg) before bedtime. Take 1 tablet by mouth twice per day., Disp: , Rfl:     palbociclib (Ibrance) 125 mg capsule, Take 1 capsule (125 mg total) by mouth once daily.  Stake 1 tablet daily. wallow whole., Disp: , Rfl:     palbociclib (Ibrance) 75 mg capsule, Take 1 capsule (75 mg total) by mouth once daily with a meal.  Swallow whole., Disp: , Rfl:     TURMERIC ORAL, Take 400 mg by mouth 1 (one) time each day. Take 1 capsule daily, Disp: , Rfl:      Imaging:  CT chest abdomen pelvis wo IV contrast    Result Date: 3/29/2023  Interpreted By:  GREGORIO FRANZ MD and GRETCHEN SUAREZ MD MRN: [id number] Patient Name: STEPHANIE GUTIERREZ  STUDY: CT CHEST ABDOMEN PELVIS WO CONTRAST;  3/27/2023 2:03 pm  INDICATION: F/U Metastatic breast cancer.  Per clinical notes: Patient has a history of bilateral breast cancer diagnosed in 1991 status post breast conserving surgery followed by radiation and tamoxifen. Experience in breast recurrence in 2012, status post completion mastectomy. IDC status post radiation 2018. Had biopsy-proven recurrence in the right supraclavicular lymph node in November 2021. Initiated on anastrozole and palbociclib status post localized radiation therapy completed in April 2022.   COMPARISON: CT chest abdomen pelvis 10/26/2022, bone scan 10/26/2022, PET CT 01/13/2022, CT abdomen pelvis 02/08/2017, CT chest abdomen pelvis 02/08/2017  ACCESSION NUMBER(S): [id number]  ORDERING CLINICIAN: SORIN SWIFT  TECHNIQUE: Contiguous axial images of the chest , abdomen, and pelvis were obtained without contrast. Coronal and sagittal reformatted images were reconstructed from the axial data.  FINDINGS: CT CHEST:  MEDIASTINUM AND LYMPH NODES: Postsurgical changes of right axillary lymph node dissection. No evidence of new or enlarging intrathoracic lymph nodes identified.  VESSELS: Normal caliber aorta. Moderate aortic atherosclerosis. Dilated main pulmonary artery measuring 33 mm, with increased pulmonary artery to ascending aorta ratio, suggestive of pulmonary hypertension.  HEART:Normal size.No significant coronary artery calcifications. No significant pericardial effusion.  LUNG, AIRWAYS, PLEURA: The trachea and central bronchi are patent. Redemonstration of subpleural reticular opacities in the right middle lobe and left upper lobe anteriorly, most consistent with post radiation changes. Linear chronic scarring changes in the lingula and right middle lobe.  Stable 2 mm left upper lobe nodule on image 42 of series 202 when compared with 10/15/2021, suggesting benign etiology.  No suspicious pulmonary nodules identified. No consolidation, pulmonary edema, pleural effusion or pneumothorax.  CHEST WALL SOFT TISSUES: Postsurgical changes of bilateral total mastectomy.No thyroid parenchyma could be confidently seen within scanned lower neck. No significant abnormalities involving the esophagus. Post radiation changes of right supraclavicular region again noted without evidence of new soft tissue lesions or lymphadenopathy.  OSSEOUS STRUCTURES:No acute osseous abnormality. No suspicious osseous lesions.   CT ABDOMEN/PELVIS:   ABDOMINAL WALL: Small fat containing periumbilical hernia.  LIVER: Scattered  calcifications throughout the hepatic parenchyma, likely sequelae of prior granulomatous disease. The liver is normal and unchanged in size.  BILE DUCTS: Note is again made of prominence of the CBD with diffuse distal tapering, most likely physiologic post cholecystectomy. The findings are unchanged compared to prior examination on 10/26/2022.  GALLBLADDER: Surgically absent.  SPLEEN: The spleen is normal and unchanged in size. No focal lesions.  PANCREAS: No significant abnormality.  ADRENALS: No significant abnormality.  KIDNEYS, URETERS numerous bilateral simple renal cysts again noted.No nephroureterolithiasis or hydroureteronephrosis.  REPRODUCTIVE ORGANS: The uterus is surgically absent.  VESSELS: Moderate vascular calcifications. No aneurysm. The IVC is normal in caliber.  RETROPERITONEUM/LYMPH NODES: No evidence of abdominopelvic lymphadenopathy by CT criteria.  BOWEL/MESENTERY/PERITONEUM/ BLADDER:: No significant abnormalities involving the stomach.Redemonstration of sigmoid diverticulosis with associated chronic thickening of the bowel wall. There is evidence of tethering of the distal sigmoid to the posterior bladder wall, with adjacent inflammatory changes of the omental fat, extending to the left ovary (series 201, image 156). The bladder wall is diffusely thickened with associated perivesical stranding. A small focus of air is seen within the anti dependent portion of the bladder (series 201, image 159). The findings are overall similar to prior examinations dating back to PET CT on 01/13/2022. No evidence of paracolic collections identified. The remainder of the bowel loops are normal in course and caliber. The appendix is normal.  No ascites, free air, or fluid collection.  MUSCULOSKELETAL: No acute osseous abnormality. No suspicious osseous lesions identified. Mild degenerative changes of the thoracolumbar spine noted.      Breast cancer restaging scan. 1. Postsurgical changes of bilateral  mastectomy, axillary dissection, and right supraclavicular chest wall radiation. No evidence of metastatic disease in the chest. 2. No evidence of metastatic disease in the abdomen or pelvis. 3. Sigmoid diverticulosis with tethering of the distal sigmoid colon to the posterior bladder wall noted. There is a focus of air within the anti dependent segment of the bladder, with associated diffuse bladder wall thickening and perivesical stranding, extending to the left ovary. 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 No evidence of paracolic fluid collections. 4.  Additional findings as above.   Yellow Alert: Inflammatory changes involving the bladder with tethering of the sigmoid colon to the bladder wall, most consistent with chronic colovesical fistula. The critical information above was relayed directly by me by the Notifi system to SORIN SWIFT on 3/28/2023.  I personally reviewed the images/study and I agree with the findings as stated. This study was interpreted at [place], [place].    ECG 12 lead    Result Date: 3/21/2023  Normal sinus rhythm.  NS ST T changes       Labs reviewed:    Lab Results   Component Value Date    WBC 3.9 (L) 04/05/2023    HGB 10.0 (L) 04/05/2023    HCT 30.5 (L) 04/05/2023     04/05/2023    CHOL 191 03/07/2023    TRIG 70 03/07/2023    HDL 73.0 03/07/2023    ALT 11 04/05/2023    AST 18 04/05/2023     04/05/2023    K 3.9 04/05/2023     04/05/2023    CREATININE 1.05 04/05/2023    BUN 19 04/05/2023    CO2 26 04/05/2023    TSH 3.43 03/07/2023    INR 1.0 10/19/2022       Assessment/Plan   Problem List Items Addressed This Visit          Circulatory    Hypertension       Genitourinary    Urinary tract infection    Relevant Orders    POCT UA (nonautomated) manually resulted (Completed)       Other    Fatigue - Primary     B12 injection             Continue current medications as listed  Follow up in 1 week

## 2023-04-13 LAB — URINE CULTURE: ABNORMAL

## 2023-04-14 DIAGNOSIS — R35.0 URINARY FREQUENCY: ICD-10-CM

## 2023-04-15 RX ORDER — SULFAMETHOXAZOLE AND TRIMETHOPRIM 800; 160 MG/1; MG/1
1 TABLET ORAL 2 TIMES DAILY
Qty: 14 TABLET | Refills: 0 | Status: SHIPPED | OUTPATIENT
Start: 2023-04-15 | End: 2023-04-22

## 2023-05-17 ENCOUNTER — HOSPITAL ENCOUNTER (OUTPATIENT)
Dept: DATA CONVERSION | Facility: HOSPITAL | Age: 83
End: 2023-05-17
Attending: INTERNAL MEDICINE | Admitting: INTERNAL MEDICINE
Payer: COMMERCIAL

## 2023-05-17 DIAGNOSIS — Z85.42 PERSONAL HISTORY OF MALIGNANT NEOPLASM OF OTHER PARTS OF UTERUS: ICD-10-CM

## 2023-05-17 DIAGNOSIS — Z88.0 ALLERGY STATUS TO PENICILLIN: ICD-10-CM

## 2023-05-17 DIAGNOSIS — Z86.16 PERSONAL HISTORY OF COVID-19: ICD-10-CM

## 2023-05-17 DIAGNOSIS — E55.9 VITAMIN D DEFICIENCY, UNSPECIFIED: ICD-10-CM

## 2023-05-17 DIAGNOSIS — F32.A DEPRESSION, UNSPECIFIED: ICD-10-CM

## 2023-05-17 DIAGNOSIS — G62.0 DRUG-INDUCED POLYNEUROPATHY (MULTI): ICD-10-CM

## 2023-05-17 DIAGNOSIS — E66.9 OBESITY, UNSPECIFIED: ICD-10-CM

## 2023-05-17 DIAGNOSIS — R93.3 ABNORMAL FINDINGS ON DIAGNOSTIC IMAGING OF OTHER PARTS OF DIGESTIVE TRACT: ICD-10-CM

## 2023-05-17 DIAGNOSIS — M19.90 UNSPECIFIED OSTEOARTHRITIS, UNSPECIFIED SITE: ICD-10-CM

## 2023-05-17 DIAGNOSIS — Z79.82 LONG TERM (CURRENT) USE OF ASPIRIN: ICD-10-CM

## 2023-05-17 DIAGNOSIS — N32.1 VESICOINTESTINAL FISTULA: ICD-10-CM

## 2023-05-17 DIAGNOSIS — Z85.3 PERSONAL HISTORY OF MALIGNANT NEOPLASM OF BREAST: ICD-10-CM

## 2023-05-17 DIAGNOSIS — N18.9 CHRONIC KIDNEY DISEASE, UNSPECIFIED: ICD-10-CM

## 2023-05-17 DIAGNOSIS — K57.30 DIVERTICULOSIS OF LARGE INTESTINE WITHOUT PERFORATION OR ABSCESS WITHOUT BLEEDING: ICD-10-CM

## 2023-05-17 DIAGNOSIS — Z90.13 ACQUIRED ABSENCE OF BILATERAL BREASTS AND NIPPLES: ICD-10-CM

## 2023-05-17 DIAGNOSIS — I12.9 HYPERTENSIVE CHRONIC KIDNEY DISEASE WITH STAGE 1 THROUGH STAGE 4 CHRONIC KIDNEY DISEASE, OR UNSPECIFIED CHRONIC KIDNEY DISEASE: ICD-10-CM

## 2023-05-17 DIAGNOSIS — T45.1X5A ADVERSE EFFECT OF ANTINEOPLASTIC AND IMMUNOSUPPRESSIVE DRUGS, INITIAL ENCOUNTER: ICD-10-CM

## 2023-05-17 DIAGNOSIS — F41.9 ANXIETY DISORDER, UNSPECIFIED: ICD-10-CM

## 2023-05-17 DIAGNOSIS — K64.0 FIRST DEGREE HEMORRHOIDS: ICD-10-CM

## 2023-05-17 DIAGNOSIS — Z90.710 ACQUIRED ABSENCE OF BOTH CERVIX AND UTERUS: ICD-10-CM

## 2023-05-23 ENCOUNTER — TELEPHONE (OUTPATIENT)
Dept: PRIMARY CARE | Facility: CLINIC | Age: 83
End: 2023-05-23

## 2023-05-23 ENCOUNTER — OFFICE VISIT (OUTPATIENT)
Dept: PRIMARY CARE | Facility: CLINIC | Age: 83
End: 2023-05-23
Payer: COMMERCIAL

## 2023-05-23 VITALS
WEIGHT: 173 LBS | OXYGEN SATURATION: 97 % | RESPIRATION RATE: 12 BRPM | BODY MASS INDEX: 31.83 KG/M2 | HEART RATE: 82 BPM | SYSTOLIC BLOOD PRESSURE: 124 MMHG | DIASTOLIC BLOOD PRESSURE: 52 MMHG | HEIGHT: 62 IN

## 2023-05-23 DIAGNOSIS — I10 HYPERTENSION, UNSPECIFIED TYPE: ICD-10-CM

## 2023-05-23 DIAGNOSIS — I89.0 LYMPHEDEMA: Primary | ICD-10-CM

## 2023-05-23 DIAGNOSIS — C50.919 MALIGNANT NEOPLASM OF FEMALE BREAST, UNSPECIFIED ESTROGEN RECEPTOR STATUS, UNSPECIFIED LATERALITY, UNSPECIFIED SITE OF BREAST (MULTI): ICD-10-CM

## 2023-05-23 PROBLEM — R60.0 EDEMA OF RIGHT UPPER ARM: Status: ACTIVE | Noted: 2023-05-23

## 2023-05-23 PROCEDURE — 1036F TOBACCO NON-USER: CPT | Performed by: INTERNAL MEDICINE

## 2023-05-23 PROCEDURE — 99214 OFFICE O/P EST MOD 30 MIN: CPT | Performed by: INTERNAL MEDICINE

## 2023-05-23 PROCEDURE — 3078F DIAST BP <80 MM HG: CPT | Performed by: INTERNAL MEDICINE

## 2023-05-23 PROCEDURE — 3074F SYST BP LT 130 MM HG: CPT | Performed by: INTERNAL MEDICINE

## 2023-05-23 PROCEDURE — 1159F MED LIST DOCD IN RCRD: CPT | Performed by: INTERNAL MEDICINE

## 2023-05-23 RX ORDER — IBUPROFEN 200 MG
CAPSULE ORAL
Qty: 1 EACH | Refills: 0 | Status: SHIPPED | OUTPATIENT
Start: 2023-05-23 | End: 2024-04-30

## 2023-05-23 RX ORDER — FUROSEMIDE 20 MG/1
20 TABLET ORAL DAILY
Qty: 90 TABLET | Refills: 3 | Status: SHIPPED | OUTPATIENT
Start: 2023-05-23

## 2023-05-23 NOTE — ASSESSMENT & PLAN NOTE
Complicated with a lymphedema right arm will refer to the oncologyContinue to follow up with oncology  Patient will receive testing (CT) as determined by oncology.

## 2023-05-23 NOTE — PROGRESS NOTES
"Subjective   Chief complaint: Elizabeth Buckner is a 83 y.o. female who presents for Edema (Pt states for a week her right arm and hand is swollen, denies any pain. ).    HPI:  Patient presents with acute right arm swelling for one week. Denies injury, pain, paresthesias. States she hasn't taken Lasix in 3 weeks since she ran out. History of bilateral breast cancer with bilateral mastectomy. Also removed axillary lymph node.    Edema      Objective   /52   Pulse 82   Resp 12   Ht 1.575 m (5' 2\")   Wt 78.5 kg (173 lb)   SpO2 97%   BMI 31.64 kg/m²   Physical Exam  Vitals reviewed.   Constitutional:       Appearance: Normal appearance.   HENT:      Head: Normocephalic and atraumatic.   Cardiovascular:      Rate and Rhythm: Normal rate and regular rhythm.   Pulmonary:      Effort: Pulmonary effort is normal.      Breath sounds: Normal breath sounds.   Abdominal:      General: Bowel sounds are normal.      Palpations: Abdomen is soft.   Musculoskeletal:      Cervical back: Neck supple.      Right lower leg: Edema present.      Left lower leg: Edema present.      Comments: Non-pitting edema of right elbow and hand   Skin:     General: Skin is warm and dry.   Neurological:      General: No focal deficit present.      Mental Status: She is alert.   Psychiatric:         Mood and Affect: Mood normal.         Behavior: Behavior is cooperative.         I have reviewed and reconciled the medication list with the patient today.   Current Outpatient Medications:     anastrozole (Arimidex) 1 mg tablet, Take 1 tablet (1 mg total) by mouth once daily.  Take 1 tablet once daily. Swallow whole with a drink of water., Disp: , Rfl:     aspirin 81 mg EC tablet, Take 1 tablet (81 mg) by mouth once daily. Take 1 tablet daily, Disp: , Rfl:     azelastine (Optivar) 0.05 % ophthalmic solution, Administer 2 drops into both eyes once daily., Disp: , Rfl:     carvedilol (Coreg) 12.5 mg tablet, Take 1 tablet (12.5 mg) by mouth 2 times a day. " Take 1 tablet twice daily, Disp: , Rfl:     cholecalciferol, vitamin D3, (VITAMIN D3 ORAL), Take 25 mcg by mouth 1 (one) time each day. Take 1 tablet daily Vitamin D 25MCG (1000 UT) oral tablet, Disp: , Rfl:     FLAXSEED OIL ORAL, Take 1,200 mg by mouth in the morning, at noon, and at bedtime. Take 1 capsule 3 times daily, Disp: , Rfl:     fluticasone (Flonase) 50 mcg/actuation nasal spray, Administer 1 spray into each nostril 2 times a day. Instill 1 spray in each nostril twice daily, Disp: , Rfl:     gabapentin (Neurontin) 300 mg capsule, Take 1 capsule (300 mg) by mouth 3 times a day. Take one capsule by mouth three times a day, Disp: , Rfl:     hydrALAZINE (Apresoline) 100 mg tablet, Take 1 tablet (100 mg) by mouth 2 times a day. Take 1 tablet by mouth twice per day, Disp: , Rfl:     TURMERIC ORAL, Take 400 mg by mouth 1 (one) time each day. Take 1 capsule daily, Disp: , Rfl:     furosemide (Lasix) 20 mg tablet, Take 1 tablet (20 mg) by mouth once daily. Take 1 tablet by mouth every day, Disp: , Rfl:     palbociclib (Ibrance) 75 mg capsule, Take 1 capsule (75 mg total) by mouth once daily with a meal.  Swallow whole., Disp: , Rfl:      Imaging:  XR abdomen AP view    Result Date: 5/17/2023  Interpreted By:  SACHI NICOLE MD MRN: [id number] Patient Name: STEPHANIE GUTIERREZ  STUDY: ABDOMEN two views; 5/17/2023 11:29 am  INDICATION: post colonoscopy ; abdominal pain. Concern for perforation for c-arm; she should stay up .  COMPARISON: None.  ACCESSION NUMBER(S): [id number]  ORDERING CLINICIAN: KATHY WOODALL  FINDINGS: 2 AP views of the abdomen were obtained. Normal caliber distended loops of small bowel within right lower quadrant suggestive of an ileus type pattern. There is otherwise a nonobstructive bowel gas pattern. No evidence of free air is noted in these limited images.  Limited evaluation of the lung bases demonstrate no sizeable effusions or pneumothorax. No consolidations.  Degenerative changes of bilateral hip  joints.      1.  No evidence for visible pneumoperitoneum.       Labs reviewed:    Lab Results   Component Value Date    WBC 3.9 (L) 04/05/2023    HGB 10.0 (L) 04/05/2023    HCT 30.5 (L) 04/05/2023     04/05/2023    CHOL 191 03/07/2023    TRIG 70 03/07/2023    HDL 73.0 03/07/2023    ALT 11 04/05/2023    AST 18 04/05/2023     04/05/2023    K 3.9 04/05/2023     04/05/2023    CREATININE 1.05 04/05/2023    BUN 19 04/05/2023    CO2 26 04/05/2023    TSH 3.43 03/07/2023    INR 1.0 10/19/2022       Assessment/Plan   Problem List Items Addressed This Visit       Hypertension    Lymphedema - Primary     Suspect lymphedema due to history of axillary lymph node removal from bilateral mastectomy. Elevate arm when sleeping and wear compression sleeve. Do not let anyone draw blood from arm. Make appointment with oncologist within next 2 weeks.            Continue current medications as listed

## 2023-05-23 NOTE — ASSESSMENT & PLAN NOTE
Suspect lymphedema due to history of axillary lymph node removal from bilateral mastectomy. Elevate arm when sleeping and wear compression sleeve. Do not let anyone draw blood from arm. Make appointment with oncologist within next 2 weeks.

## 2023-06-15 ENCOUNTER — APPOINTMENT (OUTPATIENT)
Dept: PRIMARY CARE | Facility: CLINIC | Age: 83
End: 2023-06-15
Payer: COMMERCIAL

## 2023-06-28 LAB
ALANINE AMINOTRANSFERASE (SGPT) (U/L) IN SER/PLAS: 17 U/L (ref 7–45)
ALBUMIN (G/DL) IN SER/PLAS: 3.4 G/DL (ref 3.4–5)
ALKALINE PHOSPHATASE (U/L) IN SER/PLAS: 86 U/L (ref 33–136)
ANION GAP IN SER/PLAS: 10 MMOL/L (ref 10–20)
ASPARTATE AMINOTRANSFERASE (SGOT) (U/L) IN SER/PLAS: 25 U/L (ref 9–39)
BASOPHILS (10*3/UL) IN BLOOD BY AUTOMATED COUNT: 0.06 X10E9/L (ref 0–0.1)
BASOPHILS/100 LEUKOCYTES IN BLOOD BY AUTOMATED COUNT: 1.2 % (ref 0–2)
BILIRUBIN TOTAL (MG/DL) IN SER/PLAS: 0.3 MG/DL (ref 0–1.2)
CALCIUM (MG/DL) IN SER/PLAS: 8.8 MG/DL (ref 8.6–10.3)
CANCER AG 27-29 (U/ML) IN SER/PLAS: 8.4 U/ML (ref 0–38.6)
CARBON DIOXIDE, TOTAL (MMOL/L) IN SER/PLAS: 29 MMOL/L (ref 21–32)
CHLORIDE (MMOL/L) IN SER/PLAS: 102 MMOL/L (ref 98–107)
CREATININE (MG/DL) IN SER/PLAS: 1 MG/DL (ref 0.5–1.05)
EOSINOPHILS (10*3/UL) IN BLOOD BY AUTOMATED COUNT: 0.14 X10E9/L (ref 0–0.4)
EOSINOPHILS/100 LEUKOCYTES IN BLOOD BY AUTOMATED COUNT: 2.9 % (ref 0–6)
ERYTHROCYTE DISTRIBUTION WIDTH (RATIO) BY AUTOMATED COUNT: 14.1 % (ref 11.5–14.5)
ERYTHROCYTE MEAN CORPUSCULAR HEMOGLOBIN CONCENTRATION (G/DL) BY AUTOMATED: 32.4 G/DL (ref 32–36)
ERYTHROCYTE MEAN CORPUSCULAR VOLUME (FL) BY AUTOMATED COUNT: 92 FL (ref 80–100)
ERYTHROCYTES (10*6/UL) IN BLOOD BY AUTOMATED COUNT: 3.67 X10E12/L (ref 4–5.2)
GFR FEMALE: 56 ML/MIN/1.73M2
GLUCOSE (MG/DL) IN SER/PLAS: 76 MG/DL (ref 74–99)
HEMATOCRIT (%) IN BLOOD BY AUTOMATED COUNT: 33.9 % (ref 36–46)
HEMOGLOBIN (G/DL) IN BLOOD: 11 G/DL (ref 12–16)
IMMATURE GRANULOCYTES/100 LEUKOCYTES IN BLOOD BY AUTOMATED COUNT: 0.4 % (ref 0–0.9)
LEUKOCYTES (10*3/UL) IN BLOOD BY AUTOMATED COUNT: 4.8 X10E9/L (ref 4.4–11.3)
LYMPHOCYTES (10*3/UL) IN BLOOD BY AUTOMATED COUNT: 1.64 X10E9/L (ref 0.8–3)
LYMPHOCYTES/100 LEUKOCYTES IN BLOOD BY AUTOMATED COUNT: 34 % (ref 13–44)
MONOCYTES (10*3/UL) IN BLOOD BY AUTOMATED COUNT: 0.67 X10E9/L (ref 0.05–0.8)
MONOCYTES/100 LEUKOCYTES IN BLOOD BY AUTOMATED COUNT: 13.9 % (ref 2–10)
NEUTROPHILS (10*3/UL) IN BLOOD BY AUTOMATED COUNT: 2.29 X10E9/L (ref 1.6–5.5)
NEUTROPHILS/100 LEUKOCYTES IN BLOOD BY AUTOMATED COUNT: 47.6 % (ref 40–80)
PLATELETS (10*3/UL) IN BLOOD AUTOMATED COUNT: 222 X10E9/L (ref 150–450)
POTASSIUM (MMOL/L) IN SER/PLAS: 3.8 MMOL/L (ref 3.5–5.3)
PROTEIN TOTAL: 6.6 G/DL (ref 6.4–8.2)
SODIUM (MMOL/L) IN SER/PLAS: 137 MMOL/L (ref 136–145)
UREA NITROGEN (MG/DL) IN SER/PLAS: 17 MG/DL (ref 6–23)

## 2023-08-29 ENCOUNTER — OFFICE VISIT (OUTPATIENT)
Dept: PRIMARY CARE | Facility: CLINIC | Age: 83
End: 2023-08-29
Payer: COMMERCIAL

## 2023-08-29 VITALS
SYSTOLIC BLOOD PRESSURE: 204 MMHG | BODY MASS INDEX: 32.92 KG/M2 | DIASTOLIC BLOOD PRESSURE: 80 MMHG | OXYGEN SATURATION: 97 % | WEIGHT: 180 LBS | RESPIRATION RATE: 12 BRPM | HEART RATE: 80 BPM

## 2023-08-29 DIAGNOSIS — I15.9 SECONDARY HYPERTENSION: ICD-10-CM

## 2023-08-29 DIAGNOSIS — N18.9 ACUTE KIDNEY INJURY SUPERIMPOSED ON CKD (CMS-HCC): Primary | ICD-10-CM

## 2023-08-29 DIAGNOSIS — G89.4 CHRONIC PAIN SYNDROME: ICD-10-CM

## 2023-08-29 DIAGNOSIS — M17.9 OSTEOARTHRITIS OF KNEE, UNSPECIFIED LATERALITY, UNSPECIFIED OSTEOARTHRITIS TYPE: ICD-10-CM

## 2023-08-29 DIAGNOSIS — N17.9 ACUTE KIDNEY INJURY SUPERIMPOSED ON CKD (CMS-HCC): Primary | ICD-10-CM

## 2023-08-29 DIAGNOSIS — J30.9 ALLERGIC RHINITIS, UNSPECIFIED SEASONALITY, UNSPECIFIED TRIGGER: ICD-10-CM

## 2023-08-29 PROBLEM — R93.89 IMAGING ABNORMALITY: Status: ACTIVE | Noted: 2023-08-29

## 2023-08-29 PROBLEM — N32.1 COLOVESICAL FISTULA: Status: ACTIVE | Noted: 2023-08-29

## 2023-08-29 PROCEDURE — 1036F TOBACCO NON-USER: CPT | Performed by: INTERNAL MEDICINE

## 2023-08-29 PROCEDURE — 99214 OFFICE O/P EST MOD 30 MIN: CPT | Performed by: INTERNAL MEDICINE

## 2023-08-29 PROCEDURE — 1159F MED LIST DOCD IN RCRD: CPT | Performed by: INTERNAL MEDICINE

## 2023-08-29 PROCEDURE — 3077F SYST BP >= 140 MM HG: CPT | Performed by: INTERNAL MEDICINE

## 2023-08-29 PROCEDURE — 3079F DIAST BP 80-89 MM HG: CPT | Performed by: INTERNAL MEDICINE

## 2023-08-29 PROCEDURE — 1126F AMNT PAIN NOTED NONE PRSNT: CPT | Performed by: INTERNAL MEDICINE

## 2023-08-29 RX ORDER — GABAPENTIN 300 MG/1
300 CAPSULE ORAL 3 TIMES DAILY
Qty: 270 CAPSULE | Refills: 0 | Status: SHIPPED | OUTPATIENT
Start: 2023-08-29 | End: 2023-08-30 | Stop reason: SDUPTHER

## 2023-08-29 RX ORDER — CARVEDILOL 12.5 MG/1
12.5 TABLET ORAL 2 TIMES DAILY
Qty: 90 TABLET | Refills: 3 | Status: SHIPPED | OUTPATIENT
Start: 2023-08-29

## 2023-08-29 RX ORDER — FLUTICASONE PROPIONATE 50 MCG
1 SPRAY, SUSPENSION (ML) NASAL 2 TIMES DAILY
Qty: 16 G | Refills: 1 | Status: SHIPPED | OUTPATIENT
Start: 2023-08-29 | End: 2023-09-27

## 2023-08-29 RX ORDER — HYDRALAZINE HYDROCHLORIDE 100 MG/1
100 TABLET, FILM COATED ORAL 2 TIMES DAILY
Qty: 180 TABLET | Refills: 3 | Status: SHIPPED | OUTPATIENT
Start: 2023-08-29

## 2023-08-29 NOTE — PROGRESS NOTES
Subjective   Chief complaint: Elizabeth Buckner is a 83 y.o. female who presents for Med Refill (Pt is here today for medication refills. ).    HPI:  Follow-up hypertension  Follow-up low back pain patient in need to refill her gabapentin.    Med Refill        Objective   BP (!) 204/80   Pulse 80   Resp 12   Wt 81.6 kg (180 lb)   SpO2 97%   BMI 32.92 kg/m²   Physical Exam  Vitals reviewed.   Constitutional:       Appearance: Normal appearance.   HENT:      Head: Normocephalic and atraumatic.   Cardiovascular:      Rate and Rhythm: Normal rate and regular rhythm.   Pulmonary:      Effort: Pulmonary effort is normal.      Breath sounds: Normal breath sounds.   Abdominal:      General: Bowel sounds are normal.      Palpations: Abdomen is soft.   Musculoskeletal:      Cervical back: Neck supple.   Skin:     General: Skin is warm and dry.   Neurological:      General: No focal deficit present.      Mental Status: She is alert.   Psychiatric:         Mood and Affect: Mood normal.         Behavior: Behavior is cooperative.         I have reviewed and reconciled the medication list with the patient today.   Current Outpatient Medications:     anastrozole (Arimidex) 1 mg tablet, Take 1 tablet (1 mg total) by mouth once daily.  Take 1 tablet once daily. Swallow whole with a drink of water., Disp: , Rfl:     aspirin 81 mg EC tablet, Take 1 tablet (81 mg) by mouth once daily. Take 1 tablet daily, Disp: , Rfl:     azelastine (Optivar) 0.05 % ophthalmic solution, Administer 2 drops into both eyes once daily., Disp: , Rfl:     carvedilol (Coreg) 12.5 mg tablet, Take 1 tablet (12.5 mg) by mouth 2 times a day. Take 1 tablet twice daily, Disp: , Rfl:     cholecalciferol, vitamin D3, (VITAMIN D3 ORAL), Take 25 mcg by mouth 1 (one) time each day. Take 1 tablet daily Vitamin D 25MCG (1000 UT) oral tablet, Disp: , Rfl:     FLAXSEED OIL ORAL, Take 1,200 mg by mouth in the morning, at noon, and at bedtime. Take 1 capsule 3 times daily, Disp:  , Rfl:     fluticasone (Flonase) 50 mcg/actuation nasal spray, Administer 1 spray into each nostril 2 times a day. Instill 1 spray in each nostril twice daily, Disp: , Rfl:     furosemide (Lasix) 20 mg tablet, Take 1 tablet (20 mg) by mouth once daily. Take 1 tablet by mouth every day, Disp: 90 tablet, Rfl: 3    gabapentin (Neurontin) 300 mg capsule, Take 1 capsule (300 mg) by mouth 3 times a day. Take one capsule by mouth three times a day, Disp: , Rfl:     hydrALAZINE (Apresoline) 100 mg tablet, Take 1 tablet (100 mg) by mouth 2 times a day. Take 1 tablet by mouth twice per day, Disp: , Rfl:     palbociclib (Ibrance) 75 mg capsule, Take 1 capsule (75 mg total) by mouth once daily with a meal.  Swallow whole., Disp: , Rfl:     TURMERIC ORAL, Take 400 mg by mouth 1 (one) time each day. Take 1 capsule daily, Disp: , Rfl:     arm brace (Elbow Compression Sleeve) misc, Wear daily for lymphedema, Disp: 1 each, Rfl: 0     Imaging:  No results found.     Labs reviewed:    Lab Results   Component Value Date    WBC 4.8 06/28/2023    HGB 11.0 (L) 06/28/2023    HCT 33.9 (L) 06/28/2023     06/28/2023    CHOL 191 03/07/2023    TRIG 70 03/07/2023    HDL 73.0 03/07/2023    ALT 17 06/28/2023    AST 25 06/28/2023     06/28/2023    K 3.8 06/28/2023     06/28/2023    CREATININE 1.00 06/28/2023    BUN 17 06/28/2023    CO2 29 06/28/2023    TSH 3.43 03/07/2023    INR 1.0 10/19/2022       Assessment/Plan   Problem List Items Addressed This Visit       Acute kidney injury superimposed on CKD (CMS/HCC) - Primary     Improved.  Continue to monitor.          Allergic rhinitis     Flonase         Chronic pain     Refill the gabapentin         Relevant Orders    Disability Placard    Hypertension    Osteoarthritis of knee    Relevant Orders    Disability Placard       Continue current medications as listed  Follow up in 3 months for blood work

## 2023-08-30 DIAGNOSIS — G89.4 CHRONIC PAIN SYNDROME: ICD-10-CM

## 2023-09-05 RX ORDER — GABAPENTIN 300 MG/1
300 CAPSULE ORAL 3 TIMES DAILY
Qty: 270 CAPSULE | Refills: 0 | Status: SHIPPED | OUTPATIENT
Start: 2023-09-05

## 2023-09-07 VITALS
HEART RATE: 87 BPM | SYSTOLIC BLOOD PRESSURE: 173 MMHG | TEMPERATURE: 97.9 F | RESPIRATION RATE: 16 BRPM | DIASTOLIC BLOOD PRESSURE: 86 MMHG

## 2023-09-21 DIAGNOSIS — I97.2 POSTMASTECTOMY LYMPHEDEMA SYNDROME OF LEFT UPPER EXTREMITY: Primary | ICD-10-CM

## 2023-09-22 DIAGNOSIS — J30.9 ALLERGIC RHINITIS, UNSPECIFIED SEASONALITY, UNSPECIFIED TRIGGER: ICD-10-CM

## 2023-09-26 DIAGNOSIS — N18.9 CHRONIC RENAL IMPAIRMENT, UNSPECIFIED CKD STAGE: ICD-10-CM

## 2023-09-26 DIAGNOSIS — C77.0 METASTASIS TO SUPRACLAVICULAR LYMPH NODE (MULTI): Primary | ICD-10-CM

## 2023-09-27 RX ORDER — FLUTICASONE PROPIONATE 50 MCG
1 SPRAY, SUSPENSION (ML) NASAL 2 TIMES DAILY
Qty: 16 ML | Refills: 3 | Status: SHIPPED | OUTPATIENT
Start: 2023-09-27 | End: 2024-01-17

## 2023-09-29 DIAGNOSIS — I89.0 LYMPHEDEMA: Primary | ICD-10-CM

## 2023-09-30 NOTE — H&P
History of Present Illness:   History Present Illness:  Reason for surgery: History of diverticulitis, chronic  colovesical fistula   HPI:    Patient was Seen in GI clinic for further evaluation due to chronic colovesicular fistula    Allergies:        Allergies:  ·  ramipril : Angioedema  ·  penicillins : Facial Swelling    Home Medication Review:   Home Medications Reviewed: yes     Impression/Procedure:   ·  Impression and Planned Procedure: Colonoscopy       ERAS (Enhanced Recovery After Surgery):  ·  ERAS Patient: no       Vital Signs:  Temperature C: 36.6 degrees C   Temperature F: 97.8 degrees F   Heart Rate: 87 beats per minute   Respiratory Rate: 16 breath per minute   Blood Pressure Systolic: 173 mm/Hg   Blood Pressure Diastolic: 86 mm/Hg     Physical Exam by System:    Respiratory/Thorax: Patent airways, CTAB, normal  breath sounds with good chest expansion, thorax symmetric   Cardiovascular: Regular, rate and rhythm, no murmurs,  2+ equal pulses of the extremities, normal S 1and S 2     Consent:   COVID-19 Consent:  ·  COVID-19 Risk Consent Surgeon has reviewed key risks related to the risk of pari COVID-19 and if they contract COVID-19 what the risks are.       Electronic Signatures:  Angela Landin)  (Signed 17-May-2023 08:58)   Authored: History of Present Illness, Allergies, Home  Medication Review, Impression/Procedure, ERAS, Physical Exam, Consent, Note Completion      Last Updated: 17-May-2023 08:58 by Angela Landin)

## 2023-10-13 ENCOUNTER — TREATMENT (OUTPATIENT)
Dept: PHYSICAL THERAPY | Facility: CLINIC | Age: 83
End: 2023-10-13
Payer: COMMERCIAL

## 2023-10-13 DIAGNOSIS — I89.0 LYMPHEDEMA: ICD-10-CM

## 2023-10-13 PROCEDURE — 97140 MANUAL THERAPY 1/> REGIONS: CPT | Mod: GP

## 2023-10-13 PROCEDURE — 97530 THERAPEUTIC ACTIVITIES: CPT | Mod: GP

## 2023-10-13 ASSESSMENT — PAIN SCALES - GENERAL: PAINLEVEL_OUTOF10: 0 - NO PAIN

## 2023-10-13 ASSESSMENT — PAIN - FUNCTIONAL ASSESSMENT: PAIN_FUNCTIONAL_ASSESSMENT: 0-10

## 2023-10-13 NOTE — PROGRESS NOTES
Physical Therapy    Physical Therapy    Physical Therapy Treatment    Patient Name: Elizabeth Buckner  MRN: [id number]  Today's Date: 10/13/2023  Time Calculation  Start Time: 1415  Stop Time: 1510  Time Calculation (min): 55 min      Assessment:  PT Assessment  Assessment Comment: Pt becoming more independent with self-care strategies.  Continues to have mod edema in right UE despite compliance with elevation, exercises and compression over the last 4 weeks.  continue to recommend home pneumatic compression pump.    Plan:   Cont per POC  Current Problem  1. Lymphedema  Follow Up In Physical Therapy          Subjective   General   Pt states that she received sleeve and glove 4 days ago. Is not currently wearing but states that she has been wearing it during the day and it fits well.  Pt reports that she has been doing her exercise program and elevating her arm for at least 4 weeks.     Precautions  Precautions  Precautions Comment: lymphedema     Pain  Pain Assessment: 0-10  Pain Score: 0 - No pain    Objective     Arm Girth           8/7/2023               8/16/2023            8/18/2023       9/12/2023       9/26/2023       10/13/2023                        Right                     Right      Left          Right              Right               Right                   Right      hand         22.0cm                   22.7cm   19.0cm     22.1cm          21.3cm             20.7cm              21.4cm     wrist          22.4cm                   19.5cm   18.6cm     19.3cm          20.2cm             20.5cm              19.3cm     8cm           27.9cm                   26.6cm   25.3cm     26.9cm          28.0cm             26.2cm              26.0cm     16cm         34.2cm                   32.9cm   28.8cm     32.0cm          32.7cm             31.9cm              32.9cm     24cm         32.3cm                   34.5cm   32.3cm     31.9cm          33.5cm             31.8cm              31.5cm     32cm         43.1cm                    41.0cm   37.5cm     40.4cm          41.1cm             39.6cm              40.8cm     40cm         38.5cm                   36.7cm   33.3cm     37.2cm          41.2cm             37.4cm              38.5cm  Treatments:  Manual (40min)    Manual lymph drainage secondary right UE protocol; no axillary to axillary anastomoses due to h/o left breast cancer.      Therapeutic activities: (15min)    Reviewed self-care strategies including wearing sleeve during the day, eddie when using arm a lot. Also recommended that pt bandage at night to prevent increased edema.      Goals:  Active       PT Problem       PT Goal 1       Start:  09/21/23    Expected End:  12/20/23       Decrease girth measurement of right UE by 2cm or until plateau         PT Goal 2       Start:  09/21/23    Expected End:  12/20/23       Improve skin/tissue/decrease pitting of right UE         PT Goal 3       Start:  09/21/23    Expected End:  12/20/23       Pt able to raise R UE to 120deg in sitting for ease of ADLs         PT Goal 4       Start:  09/21/23    Expected End:  12/20/23       Knowledgeable and compliant with home program including lymphedemaanagement and exercises         PT Goal 5       Start:  09/21/23    Expected End:  12/20/23       Improve LLIS to <24/68

## 2023-10-16 ENCOUNTER — TELEPHONE (OUTPATIENT)
Dept: PRIMARY CARE | Facility: CLINIC | Age: 83
End: 2023-10-16

## 2023-10-16 NOTE — LETTER
Elizabeth Buckner  1940    10/16/2023    To Whom This May Concern:    My patient, Elizabeth Buckner, is unable to perform Jury Duty due to a mental or physical condition that renders the prospective juror unfit for jury service for the remainder of the jury year.    Should you have any questions please do not hesitate to call.    Thank you for your cooperation.    Sincerely,    Tova Espitia

## 2023-10-18 ENCOUNTER — TREATMENT (OUTPATIENT)
Dept: PHYSICAL THERAPY | Facility: CLINIC | Age: 83
End: 2023-10-18
Payer: COMMERCIAL

## 2023-10-18 DIAGNOSIS — I89.0 LYMPHEDEMA: ICD-10-CM

## 2023-10-18 PROCEDURE — 97530 THERAPEUTIC ACTIVITIES: CPT | Mod: GP

## 2023-10-18 PROCEDURE — 97110 THERAPEUTIC EXERCISES: CPT | Mod: GP

## 2023-10-18 ASSESSMENT — PAIN SCALES - GENERAL: PAINLEVEL_OUTOF10: 0 - NO PAIN

## 2023-10-18 ASSESSMENT — PAIN - FUNCTIONAL ASSESSMENT: PAIN_FUNCTIONAL_ASSESSMENT: 0-10

## 2023-10-18 NOTE — PROGRESS NOTES
Physical Therapy    Physical Therapy Treatment    Patient Name: Elizabeth Buckner  MRN: [id number]  Today's Date: 10/18/2023  Time Calculation  Start Time: 1100  Stop Time: 1145  Time Calculation (min): 45 min  Visit 17/24    Assessment:  PT Assessment  Rehab Prognosis: Good  Evaluation/Treatment Tolerance: Patient tolerated treatment well  Assessment Comment: Pt shows good understanding of lymphedema self-care strategies.  Has been able to returnn to bowling without increase in symptoms.    Plan:   Cont per POC  Current Problem  1. Lymphedema  Follow Up In Physical Therapy          Subjective   Pt arrives wearing compression sleeve and glove.  States it is comfortable.  Wears it during the day and has been bandaging at night.  Returned to bowling about one week ago; has been using a lighter ball than normal and arm has felt fine. Still waiting for MD to sign order for pump; pt states she may stop at his office today.  Pt declines MLD today but agreeable to there ex.   Precautions  Precautions  Precautions Comment: lymphedema     Pain  Pain Assessment: 0-10  Pain Score: 0 - No pain    Objective    Sleeve and gauntlet appears well-fitting    Outcome Measures:  Other Measures  Other Outcome Measures: LLIS 23/68    Treatments:  Therapeutic activities: (30 min)  Pt performed First Arm Routine and diaphragmatic breathing      OP EDUCATION:(10min)  Education  Individual(s) Educated: Patient  Education Provided: Lymphedema care, reviewed self-care strategies, return to bowling    Goals:  Active       PT Problem       PT Goal 1       Start:  09/21/23    Expected End:  12/20/23       Decrease girth measurement of right UE by 2cm or until plateau         PT Goal 2       Start:  09/21/23    Expected End:  12/20/23       Improve skin/tissue/decrease pitting of right UE         PT Goal 3       Start:  09/21/23    Expected End:  12/20/23       Pt able to raise R UE to 120deg in sitting for ease of ADLs         PT Goal 4       Start:   09/21/23    Expected End:  12/20/23       Knowledgeable and compliant with home program including lymphedemaanagement and exercises         PT Goal 5       Start:  09/21/23    Expected End:  12/20/23       Improve LLIS to <24/68

## 2023-10-20 ENCOUNTER — APPOINTMENT (OUTPATIENT)
Dept: PHYSICAL THERAPY | Facility: CLINIC | Age: 83
End: 2023-10-20
Payer: COMMERCIAL

## 2023-10-25 ENCOUNTER — TREATMENT (OUTPATIENT)
Dept: PHYSICAL THERAPY | Facility: CLINIC | Age: 83
End: 2023-10-25
Payer: COMMERCIAL

## 2023-10-25 DIAGNOSIS — I89.0 LYMPHEDEMA: ICD-10-CM

## 2023-10-25 PROCEDURE — 97530 THERAPEUTIC ACTIVITIES: CPT | Mod: GP

## 2023-10-25 ASSESSMENT — PAIN - FUNCTIONAL ASSESSMENT: PAIN_FUNCTIONAL_ASSESSMENT: 0-10

## 2023-10-25 ASSESSMENT — PAIN SCALES - GENERAL: PAINLEVEL_OUTOF10: 0 - NO PAIN

## 2023-10-25 NOTE — PROGRESS NOTES
Physical Therapy    Physical Therapy Treatment    Patient Name: Elizabeth Buckner  MRN: [id number]  Today's Date: 10/25/2023  Time Calculation  Start Time: 1100  Stop Time: 1155  Time Calculation (min): 55 min  Visit 14/24      Assessment:  PT Assessment  Assessment Comment: Pt demonstrates adequate MLD technique with frequent verbal cues.  .Recommend further instruction and review next visit.    Plan:   Cont per POC    Current Problem  1. Lymphedema  Follow Up In Physical Therapy    Follow Up In Physical Therapy          Subjective   Pt thinks right upper arm is bigger; states she has been wearing her compression sleeve and bandaging at night consistently    Precautions  Precautions Comment: lymphedema    Pain Assessment: 0-10  Pain Score: 0 - No pain    Objective   Arm Girth           8/7/2023               10/25/2023                              Right                      Right       hand         22.0cm                      21.1cm     wrist          22.4cm                      20.0cm     8cm           27.9cm                      27.7cm     16cm         34.2cm                      33.0cm     24cm         32.3cm                      37.1cm     32cm         43.1cm                      42.5cm     40cm         38.5cm                      39.3cm    Treatments:  Therapeutic activities:   (53min)    Pt instructed in manual lymph drainage to right UE; emphasis on proper technique, stroke    Education  Individual(s) Educated: Patient  Education Provided: Lymphedema careGoals:  Active       PT Problem       PT Goal 1       Start:  09/21/23    Expected End:  12/20/23       Decrease girth measurement of right UE by 2cm or until plateau         PT Goal 2       Start:  09/21/23    Expected End:  12/20/23       Improve skin/tissue/decrease pitting of right UE         PT Goal 3       Start:  09/21/23    Expected End:  12/20/23       Pt able to raise R UE to 120deg in sitting for ease of ADLs         PT Goal 4       Start:  09/21/23     Expected End:  12/20/23       Knowledgeable and compliant with home program including lymphedemaanagement and exercises         PT Goal 5       Start:  09/21/23    Expected End:  12/20/23       Improve LLIS to <24/68

## 2023-11-10 ENCOUNTER — HOSPITAL ENCOUNTER (OUTPATIENT)
Dept: RADIATION ONCOLOGY | Facility: CLINIC | Age: 83
Setting detail: RADIATION/ONCOLOGY SERIES
Discharge: HOME | End: 2023-11-10
Payer: COMMERCIAL

## 2023-11-10 VITALS
TEMPERATURE: 98.2 F | HEART RATE: 70 BPM | BODY MASS INDEX: 36.53 KG/M2 | OXYGEN SATURATION: 99 % | DIASTOLIC BLOOD PRESSURE: 80 MMHG | SYSTOLIC BLOOD PRESSURE: 160 MMHG | WEIGHT: 178.57 LBS

## 2023-11-10 DIAGNOSIS — C77.0 METASTASIS TO SUPRACLAVICULAR LYMPH NODE (MULTI): Primary | ICD-10-CM

## 2023-11-10 PROCEDURE — 99213 OFFICE O/P EST LOW 20 MIN: CPT | Performed by: NURSE PRACTITIONER

## 2023-11-10 ASSESSMENT — PAIN SCALES - GENERAL: PAINLEVEL: 8

## 2023-11-10 NOTE — PROGRESS NOTES
Radiation Oncology Follow-Up    Patient Name:  Elizabeth Buckner  MRN:  [id number]  :  1940    Referring Provider: No ref. provider found  Primary Care Provider: Tova Espitia MD  Care Team: Patient Care Team:  Tova Espitia MD as PCP - General    Date of Service: 11/10/2023     Cancer Staging:          Gynecologic - Uterine - Carcinomas         AJCC Edition: 7th, Diagnosis Date: 2008, Stage 1A/grade 2 endometrial cancer,              Breast         AJCC Edition: 7th (AJCC), Diagnosis Date: 2017, IIIA, T2 pN2a M0          Breast         AJCC Edition: 7th (AJCC), Diagnosis Date: 11-Dec-2012, UNK, T2 NX M0      Treatment Synopsis:    84 yo female with bilateral breast cancer.   Right breast cancer diagnosed in , status post breast conserving surgery followed by radiation and tamoxifen. She experienced an in-breast recurrence in , for which she underwent a completion mastectomy.    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 Tumor receptors were ER<1% MD 5% HER2 3+   Left supraclavicular fossa and axilla. Treatment dates: August 3, 2017, through 2017. Radiation treatment to the left supraclavicular fossa and axilla to a dose of 50 Gy/2 Gy per fraction/25  Radiation to the left chest wall and internal mammary jong chain.to a dose of 50 Gy/2 Gy per fraction/25 fractions. Herceptin through 2018.  2021 ER+/MD+/HER2- breast cancer biopsy proven recurrence right supraclavicular LN, Initiated on anastrazole and palbociclib.   Localized radiation therapy to the SCV/axilla consisting of a dose of 60Gy completed 2022    SUBJECTIVE  History of Present Illness:   Elizabeth Buckner is here today for routine radiation follow-up.  She denies any new concerns. She is currently on anastrazole. and Ibrance. She denies any side effects at this  time. She did have a dose reduction. No  swelling of left  arm or difficulty with ROM.  She does have lymphedema of right arm and wears a sleeve prn. No headaches, fever, chills, cough, SOB, chest  pain, N/V or bony pain.  She remains active and independent.     Review of Systems:    Review of Systems - Oncology    Performance Status:   The Karnofsky performance scale today is 90, Able to carry on normal activity; minor signs or symptoms of disease (ECOG equivalent 0).      OBJECTIVE    Current Outpatient Medications:     anastrozole (Arimidex) 1 mg tablet, Take 1 tablet (1 mg total) by mouth once daily.  Take 1 tablet once daily. Swallow whole with a drink of water., Disp: , Rfl:     arm brace (Elbow Compression Sleeve) misc, Wear daily for lymphedema, Disp: 1 each, Rfl: 0    aspirin 81 mg EC tablet, Take 1 tablet (81 mg) by mouth once daily. Take 1 tablet daily, Disp: , Rfl:     azelastine (Optivar) 0.05 % ophthalmic solution, Administer 2 drops into both eyes once daily., Disp: , Rfl:     carvedilol (Coreg) 12.5 mg tablet, Take 1 tablet (12.5 mg) by mouth 2 times a day. Take 1 tablet twice daily, Disp: 90 tablet, Rfl: 3    cholecalciferol, vitamin D3, (VITAMIN D3 ORAL), Take 25 mcg by mouth 1 (one) time each day. Take 1 tablet daily Vitamin D 25MCG (1000 UT) oral tablet, Disp: , Rfl:     FLAXSEED OIL ORAL, Take 1,200 mg by mouth in the morning, at noon, and at bedtime. Take 1 capsule 3 times daily, Disp: , Rfl:     fluticasone (Flonase) 50 mcg/actuation nasal spray, ADMINISTER 1 SPRAY INTO EACH NOSTRIL 2 TIMES A DAY. INSTILL 1 SPRAY IN EACH NOSTRIL TWICE DAILY, Disp: 16 mL, Rfl: 3    furosemide (Lasix) 20 mg tablet, Take 1 tablet (20 mg) by mouth once daily. Take 1 tablet by mouth every day, Disp: 90 tablet, Rfl: 3    gabapentin (Neurontin) 300 mg capsule, Take 1 capsule (300 mg) by mouth 3 times a day. Take one capsule by mouth three times a day, Disp: 270 capsule, Rfl: 0    hydrALAZINE (Apresoline) 100 mg tablet, Take 1 tablet (100 mg) by mouth 2  times a day. Take 1 tablet by mouth twice per day, Disp: 180 tablet, Rfl: 3    palbociclib (Ibrance) 75 mg capsule, Take 1 capsule (75 mg total) by mouth once daily with a meal.  Swallow whole., Disp: , Rfl:     TURMERIC ORAL, Take 400 mg by mouth 1 (one) time each day. Take 1 capsule daily, Disp: , Rfl:    Physical Exam:  Physical Exam  Constitutional:       Appearance: Normal appearance.   HENT:      Head: Normocephalic and atraumatic.      Nose: Nose normal.      Mouth/Throat:      Mouth: Mucous membranes are moist.      Pharynx: Oropharynx is clear.   Eyes:      Conjunctiva/sclera: Conjunctivae normal.      Pupils: Pupils are equal, round, and reactive to light.   Cardiovascular:      Rate and Rhythm: Normal rate.      Heart sounds: Normal heart sounds. No murmur heard.     No gallop.   Pulmonary:      Effort: Pulmonary effort is normal.      Breath sounds: Normal breath sounds.   Chest:   Breasts:     Right: Absent. No mass or skin change.      Left: Absent. No mass or skin change.      Comments: Bilateral surgical incisions well healed.  Abdominal:      Palpations: Abdomen is soft.   Musculoskeletal:         General: Normal range of motion.      Cervical back: Normal range of motion.      Comments: Right arm lymphedema extending to hand. Wearing a sleeve today.    Lymphadenopathy:      Cervical: No cervical adenopathy.      Upper Body:      Right upper body: No supraclavicular or axillary adenopathy.      Left upper body: No supraclavicular or axillary adenopathy.   Skin:     General: Skin is warm and dry.   Neurological:      General: No focal deficit present.      Mental Status: She is alert and oriented to person, place, and time.   Psychiatric:         Mood and Affect: Mood normal.         Behavior: Behavior normal.             RESULTS:  Narrative & Impression   Interpreted By:  GREGORIO FRANZ MD and GRETCHEN SUAREZ MD  MRN: [id number]  Patient Name: STEPHANIE GUTIERREZ     STUDY:  CT CHEST ABDOMEN PELVIS WO  CONTRAST;  3/27/2023 2:03 pm     INDICATION:  F/U Metastatic breast cancer.     Per clinical notes: Patient has a history of bilateral breast cancer  diagnosed in 1991 status post breast conserving surgery followed by  radiation and tamoxifen. Experience in breast recurrence in 2012,  status post completion mastectomy. IDC status post radiation 2018.  Had biopsy-proven recurrence in the right supraclavicular lymph node  in November 2021. Initiated on anastrozole and palbociclib status  post localized radiation therapy completed in April 2022.     COMPARISON:  CT chest abdomen pelvis 10/26/2022, bone scan 10/26/2022, PET CT  01/13/2022, CT abdomen pelvis 02/08/2017, CT chest abdomen pelvis  02/08/2017     ACCESSION NUMBER(S):  [id number]     ORDERING CLINICIAN:  SORIN SWIFT     TECHNIQUE:  Contiguous axial images of the chest , abdomen, and pelvis were  obtained without contrast. Coronal and sagittal reformatted images  were reconstructed from the axial data.     FINDINGS:  CT CHEST:     MEDIASTINUM AND LYMPH NODES: Postsurgical changes of right axillary  lymph node dissection. No evidence of new or enlarging intrathoracic  lymph nodes identified.     VESSELS: Normal caliber aorta. Moderate aortic atherosclerosis.  Dilated main pulmonary artery measuring 33 mm, with increased  pulmonary artery to ascending aorta ratio, suggestive of pulmonary  hypertension.     HEART:Normal size.No significant coronary artery calcifications. No  significant pericardial effusion.     LUNG, AIRWAYS, PLEURA: The trachea and central bronchi are patent.  Redemonstration of subpleural reticular opacities in the right middle  lobe and left upper lobe anteriorly, most consistent with post  radiation changes. Linear chronic scarring changes in the lingula and  right middle lobe.     Stable 2 mm left upper lobe nodule on image 42 of series 202 when  compared with 10/15/2021, suggesting benign etiology.     No suspicious pulmonary nodules  identified. No consolidation,  pulmonary edema, pleural effusion or pneumothorax.     CHEST WALL SOFT TISSUES:  Postsurgical changes of bilateral total mastectomy.No thyroid  parenchyma could be confidently seen within scanned lower neck. No  significant abnormalities involving the esophagus. Post radiation  changes of right supraclavicular region again noted without evidence  of new soft tissue lesions or lymphadenopathy.     OSSEOUS STRUCTURES:No acute osseous abnormality. No suspicious  osseous lesions.        CT ABDOMEN/PELVIS:        ABDOMINAL WALL: Small fat containing periumbilical hernia.     LIVER: Scattered calcifications throughout the hepatic parenchyma,  likely sequelae of prior granulomatous disease. The liver is normal  and unchanged in size.     BILE DUCTS: Note is again made of prominence of the CBD with diffuse  distal tapering, most likely physiologic post cholecystectomy. The  findings are unchanged compared to prior examination on 10/26/2022.     GALLBLADDER: Surgically absent.     SPLEEN: The spleen is normal and unchanged in size. No focal lesions.     PANCREAS: No significant abnormality.     ADRENALS: No significant abnormality.     KIDNEYS, URETERS numerous bilateral simple renal cysts again noted.No  nephroureterolithiasis or hydroureteronephrosis.     REPRODUCTIVE ORGANS: The uterus is surgically absent.     VESSELS: Moderate vascular calcifications. No aneurysm. The IVC is  normal in caliber.     RETROPERITONEUM/LYMPH NODES: No evidence of abdominopelvic  lymphadenopathy by CT criteria.     BOWEL/MESENTERY/PERITONEUM/ BLADDER:: No significant abnormalities  involving the stomach.Redemonstration of sigmoid diverticulosis with  associated chronic thickening of the bowel wall. There is evidence of  tethering of the distal sigmoid to the posterior bladder wall, with  adjacent inflammatory changes of the omental fat, extending to the  left ovary (series 201, image 156). The bladder wall is  diffusely  thickened with associated perivesical stranding. A small focus of air  is seen within the anti dependent portion of the bladder (series 201,  image 159). The findings are overall similar to prior examinations  dating back to PET CT on 01/13/2022. No evidence of paracolic  collections identified. The remainder of the bowel loops are normal  in course and caliber. The appendix is normal.     No ascites, free air, or fluid collection.     MUSCULOSKELETAL: No acute osseous abnormality. No suspicious osseous  lesions identified. Mild degenerative changes of the thoracolumbar  spine noted.     IMPRESSION:  Breast cancer restaging scan.  1. Postsurgical changes of bilateral mastectomy, axillary dissection,  and right supraclavicular chest wall radiation. No evidence of  metastatic disease in the chest.  2. No evidence of metastatic disease in the abdomen or pelvis.  3. Sigmoid diverticulosis with tethering of the distal sigmoid colon  to the posterior bladder wall noted. There is a focus of air within  the anti dependent segment of the bladder, with associated diffuse  bladder wall thickening and perivesical stranding, extending to the  left ovary. 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 No evidence of paracolic fluid collections.  4.  Additional findings as above.          ASSESSMENT/PLAN:  83 y.o. female with metastatic breast cancer right supraclavicular fossa s/p excision followed by radiation.  Doing well. She will continue Ibrance and anastrozole  and follow up with Dr. Dove.  Getting staging CT scan next month. Radiation follow up in 12 mo.  Call with any concerns.     Candace Sanchez CNP  [phone]

## 2023-11-17 ENCOUNTER — TREATMENT (OUTPATIENT)
Dept: PHYSICAL THERAPY | Facility: CLINIC | Age: 83
End: 2023-11-17
Payer: COMMERCIAL

## 2023-11-17 DIAGNOSIS — I89.0 LYMPHEDEMA: ICD-10-CM

## 2023-11-17 PROCEDURE — 97140 MANUAL THERAPY 1/> REGIONS: CPT | Mod: GP

## 2023-11-17 PROCEDURE — 97530 THERAPEUTIC ACTIVITIES: CPT | Mod: GP

## 2023-11-17 ASSESSMENT — PAIN - FUNCTIONAL ASSESSMENT: PAIN_FUNCTIONAL_ASSESSMENT: 0-10

## 2023-11-17 ASSESSMENT — PAIN SCALES - GENERAL: PAINLEVEL_OUTOF10: 0 - NO PAIN

## 2023-11-17 NOTE — PROGRESS NOTES
Physical Therapy    Physical Therapy Treatment    Patient Name: Elizabeth Buckner  MRN: [id number]  Today's Date: 11/17/2023  Time Calculation  Start Time: 1100  Stop Time: 1150  Time Calculation (min): 50 min  Visit 15/24    Assessment:  PT Assessment  Assessment Comment: Pt reports she is compliant with self-care strategies although right UE girth somewhat increased. Will continue to monitor and may return to bandaging if needed  Plan:   Cont per POC; pt to return inn 3-4 weeks    Current Problem  1. Lymphedema  Follow Up In Physical Therapy    Follow Up In Physical Therapy            Subjective   Pt arrives to dept with compression sleeve donned but no glove.  States she usually wears it but didn't have time to put it on this morning.  Pt reports she has been compliant with wearing sleeve and glove during the day and bandaging at night.  Has returned to bowling without issues.  Received her pump but has not used it yet.  Has questions on set-up;     Precautions  Precautions  Precautions Comment: lymphedema     Pain  Pain Assessment  Pain Assessment: 0-10  Pain Score: 0 - No pain    Objective   Arm Girth:  Right     Hand       21.7cm     Wrist       20.5cm     8cm         28.6cm     16cm      33.3cm     24cm      34.0cm     32cm      41.0cm     40cm      36.8cm     Treatments:  Therapeutic activities (15min)   -reviewed self-care strategies focusing on importance of wearing glove and exercise.    -pt brought pump with her but did not bring cord to plug in.  Demonstrated to pt how to don pump sleeve and connect to pump and how to start machine    Manual  (30min)    Manual lymph drainage secondary right UE protocol; no axillary to axillary anastomoses due to h/o left breast cancer.     Outpatient Education  Individual(s) Educated: Patient  Education Provided: Lymphedema care    Goals:  Active       PT Problem       PT Goal 1       Start:  09/21/23    Expected End:  12/20/23       Decrease girth measurement of right UE by 2cm  or until plateau         PT Goal 2       Start:  09/21/23    Expected End:  12/20/23       Improve skin/tissue/decrease pitting of right UE         PT Goal 3       Start:  09/21/23    Expected End:  12/20/23       Pt able to raise R UE to 120deg in sitting for ease of ADLs         PT Goal 4       Start:  09/21/23    Expected End:  12/20/23       Knowledgeable and compliant with home program including lymphedemaanagement and exercises         PT Goal 5       Start:  09/21/23    Expected End:  12/20/23       Improve LLIS to <24/68

## 2023-12-01 ENCOUNTER — LAB (OUTPATIENT)
Dept: LAB | Facility: LAB | Age: 83
End: 2023-12-01
Payer: COMMERCIAL

## 2023-12-01 DIAGNOSIS — E55.9 VITAMIN D DEFICIENCY: ICD-10-CM

## 2023-12-01 DIAGNOSIS — Z00.00 ENCOUNTER FOR ANNUAL WELLNESS VISIT (AWV) IN MEDICARE PATIENT: ICD-10-CM

## 2023-12-01 DIAGNOSIS — I10 BENIGN ESSENTIAL HYPERTENSION: ICD-10-CM

## 2023-12-01 DIAGNOSIS — N18.2 CHRONIC KIDNEY DISEASE (CKD), STAGE II (MILD): ICD-10-CM

## 2023-12-01 DIAGNOSIS — D63.8 ANEMIA OF CHRONIC DISEASE: ICD-10-CM

## 2023-12-01 LAB
25(OH)D3 SERPL-MCNC: 33 NG/ML (ref 30–100)
ALBUMIN SERPL BCP-MCNC: 3.6 G/DL (ref 3.4–5)
ALP SERPL-CCNC: 95 U/L (ref 33–136)
ALT SERPL W P-5'-P-CCNC: 15 U/L (ref 7–45)
ANION GAP SERPL CALC-SCNC: 11 MMOL/L (ref 10–20)
AST SERPL W P-5'-P-CCNC: 16 U/L (ref 9–39)
BILIRUB SERPL-MCNC: 0.4 MG/DL (ref 0–1.2)
BUN SERPL-MCNC: 20 MG/DL (ref 6–23)
CALCIUM SERPL-MCNC: 9.1 MG/DL (ref 8.6–10.6)
CHLORIDE SERPL-SCNC: 107 MMOL/L (ref 98–107)
CHOLEST SERPL-MCNC: 176 MG/DL (ref 0–199)
CHOLESTEROL/HDL RATIO: 2.6
CO2 SERPL-SCNC: 29 MMOL/L (ref 21–32)
CREAT SERPL-MCNC: 1.41 MG/DL (ref 0.5–1.05)
ERYTHROCYTE [DISTWIDTH] IN BLOOD BY AUTOMATED COUNT: 15.6 % (ref 11.5–14.5)
GFR SERPL CREATININE-BSD FRML MDRD: 37 ML/MIN/1.73M*2
GLUCOSE SERPL-MCNC: 95 MG/DL (ref 74–99)
HCT VFR BLD AUTO: 30.2 % (ref 36–46)
HDLC SERPL-MCNC: 66.5 MG/DL
HGB BLD-MCNC: 9.9 G/DL (ref 12–16)
LDLC SERPL CALC-MCNC: 95 MG/DL
MCH RBC QN AUTO: 31.3 PG (ref 26–34)
MCHC RBC AUTO-ENTMCNC: 32.8 G/DL (ref 32–36)
MCV RBC AUTO: 96 FL (ref 80–100)
NON HDL CHOLESTEROL: 110 MG/DL (ref 0–149)
NRBC BLD-RTO: 0 /100 WBCS (ref 0–0)
PLATELET # BLD AUTO: 208 X10*3/UL (ref 150–450)
POTASSIUM SERPL-SCNC: 4.7 MMOL/L (ref 3.5–5.3)
PROT SERPL-MCNC: 6.3 G/DL (ref 6.4–8.2)
RBC # BLD AUTO: 3.16 X10*6/UL (ref 4–5.2)
SODIUM SERPL-SCNC: 142 MMOL/L (ref 136–145)
TRIGL SERPL-MCNC: 75 MG/DL (ref 0–149)
TSH SERPL-ACNC: 3.97 MIU/L (ref 0.44–3.98)
VLDL: 15 MG/DL (ref 0–40)
WBC # BLD AUTO: 3.3 X10*3/UL (ref 4.4–11.3)

## 2023-12-01 PROCEDURE — 36415 COLL VENOUS BLD VENIPUNCTURE: CPT

## 2023-12-01 PROCEDURE — 84443 ASSAY THYROID STIM HORMONE: CPT

## 2023-12-01 PROCEDURE — 85027 COMPLETE CBC AUTOMATED: CPT

## 2023-12-01 PROCEDURE — 80053 COMPREHEN METABOLIC PANEL: CPT

## 2023-12-01 PROCEDURE — 80061 LIPID PANEL: CPT

## 2023-12-01 PROCEDURE — 82306 VITAMIN D 25 HYDROXY: CPT

## 2023-12-07 ENCOUNTER — OFFICE VISIT (OUTPATIENT)
Dept: PRIMARY CARE | Facility: CLINIC | Age: 83
End: 2023-12-07
Payer: COMMERCIAL

## 2023-12-07 VITALS
BODY MASS INDEX: 36.21 KG/M2 | HEART RATE: 95 BPM | SYSTOLIC BLOOD PRESSURE: 147 MMHG | RESPIRATION RATE: 12 BRPM | WEIGHT: 177 LBS | OXYGEN SATURATION: 98 % | DIASTOLIC BLOOD PRESSURE: 72 MMHG

## 2023-12-07 DIAGNOSIS — I89.0 LYMPHEDEMA: ICD-10-CM

## 2023-12-07 DIAGNOSIS — I10 PRIMARY HYPERTENSION: ICD-10-CM

## 2023-12-07 DIAGNOSIS — E55.9 VITAMIN D DEFICIENCY: Primary | ICD-10-CM

## 2023-12-07 DIAGNOSIS — N18.2 CHRONIC KIDNEY DISEASE (CKD), STAGE II (MILD): ICD-10-CM

## 2023-12-07 DIAGNOSIS — Z85.3 PERSONAL HISTORY OF BREAST CANCER: ICD-10-CM

## 2023-12-07 DIAGNOSIS — M17.0 PRIMARY OSTEOARTHRITIS OF BOTH KNEES: ICD-10-CM

## 2023-12-07 DIAGNOSIS — K21.00 GASTROESOPHAGEAL REFLUX DISEASE WITH ESOPHAGITIS WITHOUT HEMORRHAGE: ICD-10-CM

## 2023-12-07 DIAGNOSIS — Z00.00 ENCOUNTER FOR ANNUAL WELLNESS VISIT (AWV) IN MEDICARE PATIENT: ICD-10-CM

## 2023-12-07 DIAGNOSIS — E78.49 OTHER HYPERLIPIDEMIA: ICD-10-CM

## 2023-12-07 LAB
POC APPEARANCE, URINE: CLEAR
POC BILIRUBIN, URINE: NEGATIVE
POC BLOOD, URINE: NEGATIVE
POC COLOR, URINE: YELLOW
POC GLUCOSE, URINE: NEGATIVE MG/DL
POC KETONES, URINE: NEGATIVE MG/DL
POC LEUKOCYTES, URINE: NEGATIVE
POC NITRITE,URINE: NEGATIVE
POC PH, URINE: 6 PH
POC PROTEIN, URINE: NEGATIVE MG/DL
POC SPECIFIC GRAVITY, URINE: 1.01
POC UROBILINOGEN, URINE: 0.2 EU/DL

## 2023-12-07 PROCEDURE — 81002 URINALYSIS NONAUTO W/O SCOPE: CPT | Performed by: INTERNAL MEDICINE

## 2023-12-07 PROCEDURE — 1157F ADVNC CARE PLAN IN RCRD: CPT | Performed by: INTERNAL MEDICINE

## 2023-12-07 PROCEDURE — 1170F FXNL STATUS ASSESSED: CPT | Performed by: INTERNAL MEDICINE

## 2023-12-07 PROCEDURE — 93000 ELECTROCARDIOGRAM COMPLETE: CPT | Performed by: INTERNAL MEDICINE

## 2023-12-07 PROCEDURE — 1125F AMNT PAIN NOTED PAIN PRSNT: CPT | Performed by: INTERNAL MEDICINE

## 2023-12-07 PROCEDURE — 1159F MED LIST DOCD IN RCRD: CPT | Performed by: INTERNAL MEDICINE

## 2023-12-07 PROCEDURE — 1036F TOBACCO NON-USER: CPT | Performed by: INTERNAL MEDICINE

## 2023-12-07 PROCEDURE — 99214 OFFICE O/P EST MOD 30 MIN: CPT | Performed by: INTERNAL MEDICINE

## 2023-12-07 PROCEDURE — 3077F SYST BP >= 140 MM HG: CPT | Performed by: INTERNAL MEDICINE

## 2023-12-07 PROCEDURE — 3078F DIAST BP <80 MM HG: CPT | Performed by: INTERNAL MEDICINE

## 2023-12-07 ASSESSMENT — PATIENT HEALTH QUESTIONNAIRE - PHQ9
SUM OF ALL RESPONSES TO PHQ9 QUESTIONS 1 AND 2: 0
1. LITTLE INTEREST OR PLEASURE IN DOING THINGS: NOT AT ALL
2. FEELING DOWN, DEPRESSED OR HOPELESS: NOT AT ALL

## 2023-12-07 ASSESSMENT — ACTIVITIES OF DAILY LIVING (ADL)
DRESSING: INDEPENDENT
BATHING: INDEPENDENT

## 2023-12-07 ASSESSMENT — ENCOUNTER SYMPTOMS
DEPRESSION: 0
LOSS OF SENSATION IN FEET: 0
OCCASIONAL FEELINGS OF UNSTEADINESS: 0

## 2023-12-07 NOTE — PROGRESS NOTES
ANNUAL WELLNESS VISIT    Subjective :  Chief Complaint: Elizabeth Buckner is an 83 y.o. female here for an annual wellness visit and general medical care and f/u.     HPI:  Annual wellness exam  Hypertension  Hyperlipidemia  Breast cancer  Anemia  Chronic kidney disease patient has been doing well no complaints        Objective   /72   Pulse 95   Resp 12   Wt 80.3 kg (177 lb)   SpO2 98%   BMI 36.21 kg/m²     Physical Exam  Vitals reviewed.   Constitutional:       Appearance: Normal appearance.   HENT:      Head: Normocephalic and atraumatic.   Cardiovascular:      Rate and Rhythm: Normal rate and regular rhythm.   Pulmonary:      Effort: Pulmonary effort is normal.      Breath sounds: Normal breath sounds.   Abdominal:      General: Bowel sounds are normal.      Palpations: Abdomen is soft.   Musculoskeletal:      Cervical back: Neck supple.   Skin:     General: Skin is warm and dry.   Neurological:      General: No focal deficit present.      Mental Status: She is alert.   Psychiatric:         Mood and Affect: Mood normal.         Behavior: Behavior is cooperative.         Imaging:  No results found.     Labs reviewed:    Lab Results   Component Value Date    WBC 3.3 (L) 12/01/2023    HGB 9.9 (L) 12/01/2023    HCT 30.2 (L) 12/01/2023     12/01/2023    CHOL 176 12/01/2023    TRIG 75 12/01/2023    HDL 66.5 12/01/2023    ALT 15 12/01/2023    AST 16 12/01/2023     12/01/2023    K 4.7 12/01/2023     12/01/2023    CREATININE 1.41 (H) 12/01/2023    BUN 20 12/01/2023    CO2 29 12/01/2023    TSH 3.97 12/01/2023    INR 1.0 10/19/2022       Past Medical, Surgical, and Family History reviewed and updated in chart.    I have reviewed and reconciled the medication list with the patient today.   Current Outpatient Medications:     anastrozole (Arimidex) 1 mg tablet, Take 1 tablet (1 mg total) by mouth once daily.  Take 1 tablet once daily. Swallow whole with a drink of water., Disp: , Rfl:     arm brace  (Elbow Compression Sleeve) misc, Wear daily for lymphedema, Disp: 1 each, Rfl: 0    aspirin 81 mg EC tablet, Take 1 tablet (81 mg) by mouth once daily. Take 1 tablet daily, Disp: , Rfl:     azelastine (Optivar) 0.05 % ophthalmic solution, Administer 2 drops into both eyes once daily., Disp: , Rfl:     carvedilol (Coreg) 12.5 mg tablet, Take 1 tablet (12.5 mg) by mouth 2 times a day. Take 1 tablet twice daily, Disp: 90 tablet, Rfl: 3    cholecalciferol, vitamin D3, (VITAMIN D3 ORAL), Take 25 mcg by mouth 1 (one) time each day. Take 1 tablet daily Vitamin D 25MCG (1000 UT) oral tablet, Disp: , Rfl:     FLAXSEED OIL ORAL, Take 1,200 mg by mouth in the morning, at noon, and at bedtime. Take 1 capsule 3 times daily, Disp: , Rfl:     fluticasone (Flonase) 50 mcg/actuation nasal spray, ADMINISTER 1 SPRAY INTO EACH NOSTRIL 2 TIMES A DAY. INSTILL 1 SPRAY IN EACH NOSTRIL TWICE DAILY, Disp: 16 mL, Rfl: 3    furosemide (Lasix) 20 mg tablet, Take 1 tablet (20 mg) by mouth once daily. Take 1 tablet by mouth every day, Disp: 90 tablet, Rfl: 3    gabapentin (Neurontin) 300 mg capsule, Take 1 capsule (300 mg) by mouth 3 times a day. Take one capsule by mouth three times a day, Disp: 270 capsule, Rfl: 0    hydrALAZINE (Apresoline) 100 mg tablet, Take 1 tablet (100 mg) by mouth 2 times a day. Take 1 tablet by mouth twice per day, Disp: 180 tablet, Rfl: 3    palbociclib (Ibrance) 75 mg capsule, Take 1 capsule (75 mg total) by mouth once daily with a meal.  Swallow whole., Disp: , Rfl:     TURMERIC ORAL, Take 400 mg by mouth 1 (one) time each day. Take 1 capsule daily, Disp: , Rfl:      List of current healthcare providers:  Patient Care Team:  Tova Espitia MD as PCP - General     HRA:  Over the past 2 weeks, how often have you been bothered by any of the following problems?  Little interest or pleasure in doing things: Not at all  Feeling down, depressed, or hopeless: Not at all  Patient Health Questionnaire-2 Score: 0    Guru  Fall Risk  One or more falls in the last year? No  How many Times?    Was the patient injured in the fall?    Has trouble stepping onto curb? No  Advised to use a cane or walker to get around safely? No  Often has to rush to toilet? No  Feels unsteady when walking? No  Has lost some feeling in feet? No  Often feels sad or depressed? No  Steadies self on furniture while walking at home? No  Takes medicine that makes them feel lightheaded or more tired than usual? No  Worried about Falling? No  Takes medicine to sleep or improve mood? No  Needs to push with hands when rising from a chair? No            Falls Home Safety Risk Factors  Home Safety Risk Factors: None    Functional Ability/Level of Safety  Cognitive Impairment Observed: No cognitive impairment observed    Patient Self Assessment of Health Status  Patient Self Assessment: Good    Nutrition and Exercise  Current Diet: Well Balanced Diet  Adequate Fluid Intake: Yes  Caffeine: Yes  Exercise Frequency: Regularly    ADL Screening  Hearing - Right Ear: Functional  Hearing - Left Ear: Functional  Bathing: Independent  Dressing: Independent  Walks in Home: Independent         Assessment/Plan :  Problem List Items Addressed This Visit       Chronic kidney disease (CKD), stage II (mild)     Stable we will continue to monitor         Esophageal reflux    Hypertension     Continue hydralazine and Coreg blood pressure seems under control         Osteoarthritis, knee    Personal history of breast cancer     Up with oncology         Vitamin D deficiency - Primary     Continue to monitor.  Vitamin D supplement         Lymphedema     Impression sleeve         Encounter for annual wellness visit (AWV) in Medicare patient    Relevant Orders    POCT UA (nonautomated) manually resulted (Completed)    ECG 12 lead (Clinic Performed)    Other hyperlipidemia     Low-fat diet  Flaxseed          The following health maintenance schedule was reviewed with the patient and provided in  printed form in the after visit summary:  Health Maintenance   Topic Date Due    Influenza Vaccine (1) 06/30/2024 (Originally 9/1/2023)    Yearly Adult Physical  03/24/2024    TSH Level  12/01/2024    Lipid Panel  12/01/2028    Bone Density Scan  Completed    HIB Vaccines  Aged Out    Hepatitis B Vaccines  Aged Out    IPV Vaccines  Aged Out    Hepatitis A Vaccines  Aged Out    Meningococcal Vaccine  Aged Out    Rotavirus Vaccines  Aged Out    HPV Vaccines  Aged Out    DTaP/Tdap/Td Vaccines  Discontinued    Pneumococcal Vaccine: 65+ Years  Discontinued    Zoster Vaccines  Discontinued    COVID-19 Vaccine  Discontinued    Irritable Bowel Syndrome  Discontinued       Advance Care Planning   Living well             Orders Placed This Encounter   Procedures    POCT UA (nonautomated) manually resulted     Order Specific Question:   Release result to [facility]     Answer:   Immediate [1]    ECG 12 lead (Clinic Performed)       Continue current medications as listed  Follow up in 4 months check a CBC and a CMP and a lipid

## 2023-12-12 ENCOUNTER — HOSPITAL ENCOUNTER (OUTPATIENT)
Dept: RADIOLOGY | Facility: HOSPITAL | Age: 83
Discharge: HOME | End: 2023-12-12
Payer: COMMERCIAL

## 2023-12-12 DIAGNOSIS — N18.9 CHRONIC RENAL IMPAIRMENT, UNSPECIFIED CKD STAGE: ICD-10-CM

## 2023-12-12 DIAGNOSIS — C77.0 METASTASIS TO SUPRACLAVICULAR LYMPH NODE (MULTI): ICD-10-CM

## 2023-12-12 PROCEDURE — 71250 CT THORAX DX C-: CPT | Performed by: RADIOLOGY

## 2023-12-12 PROCEDURE — 74176 CT ABD & PELVIS W/O CONTRAST: CPT | Performed by: RADIOLOGY

## 2023-12-12 PROCEDURE — 71250 CT THORAX DX C-: CPT

## 2023-12-20 ENCOUNTER — TELEPHONE (OUTPATIENT)
Dept: HEMATOLOGY/ONCOLOGY | Facility: CLINIC | Age: 83
End: 2023-12-20

## 2023-12-20 ENCOUNTER — TELEPHONE (OUTPATIENT)
Dept: ADMISSION | Facility: HOSPITAL | Age: 83
End: 2023-12-20
Payer: COMMERCIAL

## 2023-12-20 DIAGNOSIS — C50.919 MALIGNANT NEOPLASM OF BREAST IN FEMALE, ESTROGEN RECEPTOR POSITIVE, UNSPECIFIED LATERALITY, UNSPECIFIED SITE OF BREAST (MULTI): Primary | ICD-10-CM

## 2023-12-20 DIAGNOSIS — Z17.0 MALIGNANT NEOPLASM OF BREAST IN FEMALE, ESTROGEN RECEPTOR POSITIVE, UNSPECIFIED LATERALITY, UNSPECIFIED SITE OF BREAST (MULTI): Primary | ICD-10-CM

## 2023-12-20 NOTE — TELEPHONE ENCOUNTER
I left a VM for the pt letting her know the refill was sent. I advised that she should call [facility] to schedule delivery.

## 2023-12-20 NOTE — TELEPHONE ENCOUNTER
Pt called stating that [facility] won't refill her Ibrance until they hear from our office. It looks like it just needs refilled. She said she is out of meds. Message sent to the team.

## 2023-12-21 ENCOUNTER — TELEPHONE (OUTPATIENT)
Dept: ADMISSION | Facility: HOSPITAL | Age: 83
End: 2023-12-21
Payer: COMMERCIAL

## 2023-12-21 ENCOUNTER — TELEPHONE (OUTPATIENT)
Dept: HEMATOLOGY/ONCOLOGY | Facility: CLINIC | Age: 83
End: 2023-12-21

## 2023-12-21 DIAGNOSIS — Z17.0 MALIGNANT NEOPLASM OF BREAST IN FEMALE, ESTROGEN RECEPTOR POSITIVE, UNSPECIFIED LATERALITY, UNSPECIFIED SITE OF BREAST (MULTI): ICD-10-CM

## 2023-12-21 DIAGNOSIS — C50.919 MALIGNANT NEOPLASM OF BREAST IN FEMALE, ESTROGEN RECEPTOR POSITIVE, UNSPECIFIED LATERALITY, UNSPECIFIED SITE OF BREAST (MULTI): ICD-10-CM

## 2023-12-22 ENCOUNTER — TREATMENT (OUTPATIENT)
Dept: PHYSICAL THERAPY | Facility: CLINIC | Age: 83
End: 2023-12-22
Payer: COMMERCIAL

## 2023-12-22 DIAGNOSIS — I89.0 LYMPHEDEMA: ICD-10-CM

## 2023-12-22 DIAGNOSIS — C50.919 MALIGNANT NEOPLASM OF BREAST IN FEMALE, ESTROGEN RECEPTOR POSITIVE, UNSPECIFIED LATERALITY, UNSPECIFIED SITE OF BREAST (MULTI): ICD-10-CM

## 2023-12-22 DIAGNOSIS — Z17.0 MALIGNANT NEOPLASM OF BREAST IN FEMALE, ESTROGEN RECEPTOR POSITIVE, UNSPECIFIED LATERALITY, UNSPECIFIED SITE OF BREAST (MULTI): ICD-10-CM

## 2023-12-22 PROCEDURE — 97530 THERAPEUTIC ACTIVITIES: CPT | Mod: GP

## 2023-12-22 ASSESSMENT — PAIN SCALES - GENERAL: PAINLEVEL_OUTOF10: 0 - NO PAIN

## 2023-12-22 ASSESSMENT — PAIN - FUNCTIONAL ASSESSMENT: PAIN_FUNCTIONAL_ASSESSMENT: 0-10

## 2023-12-22 NOTE — PROGRESS NOTES
Physical Therapy    Physical Therapy Treatment/Discharge Summary    Patient Name: Elizabeth Buckner  MRN: [id number]  Today's Date: 12/22/2023  Time Calculation  Start Time: 1145  Stop Time: 1210  Time Calculation (min): 25 min      Assessment:  PT Assessment  Assessment Comment: Pt demonstrates independence in lymphedema self-management strategies and is comfortable with discharge at this time  Plan:   Discharge to self-management phase    Current Problem  1. Lymphedema  Follow Up In Physical Therapy        Subjective    Pt states that she wear compression sleeve and glove daily.  At night, she uses the pump for an hour and then bandages arm; can bandage arm independently now.  Pt has returned to all household and recreational activities without diffculty.    Precautions  Precautions Comment: lymphedema    Pain Assessment  Pain Assessment: 0-10  Pain Score: 0 - No pain    Objective   Right Arm Girth:      Hand    21.9cm    Wrist    19.0cm    8cm      26.6cm    16cm    32.9cm    24cm    33.0cm    32cm    39.9cm    40cm    37.1cm    Treatments:  Therapeutic activities (25min)  -objective measures  -reviewed self-care/self-management strategies    OP EDUCATION:  Outpatient Education  Individual(s) Educated: Patient  Education Provided: Lymphedema care, POC    Goals:  Resolved       PT Problem       PT Goal 1 (Adequate for Discharge)       Start:  09/21/23    Expected End:  12/20/23    Resolved:  12/22/23    Decrease girth measurement of right UE by 2cm or until plateau         PT Goal 2 (Adequate for Discharge)       Start:  09/21/23    Expected End:  12/20/23    Resolved:  12/22/23    Improve skin/tissue/decrease pitting of right UE         PT Goal 3 (Adequate for Discharge)       Start:  09/21/23    Expected End:  12/20/23    Resolved:  12/22/23    Pt able to raise R UE to 120deg in sitting for ease of ADLs         PT Goal 4 (Adequate for Discharge)       Start:  09/21/23    Expected End:  12/20/23    Resolved:  12/22/23     Knowledgeable and compliant with home program including lymphedemaanagement and exercises         PT Goal 5 (Adequate for Discharge)       Start:  09/21/23    Expected End:  12/20/23    Resolved:  12/22/23    Improve LLIS to <24/68

## 2024-01-12 ENCOUNTER — TELEPHONE (OUTPATIENT)
Dept: ADMISSION | Facility: HOSPITAL | Age: 84
End: 2024-01-12
Payer: COMMERCIAL

## 2024-01-12 DIAGNOSIS — J30.9 ALLERGIC RHINITIS, UNSPECIFIED SEASONALITY, UNSPECIFIED TRIGGER: ICD-10-CM

## 2024-01-12 DIAGNOSIS — C50.919 MALIGNANT NEOPLASM OF BREAST IN FEMALE, ESTROGEN RECEPTOR POSITIVE, UNSPECIFIED LATERALITY, UNSPECIFIED SITE OF BREAST (MULTI): ICD-10-CM

## 2024-01-12 DIAGNOSIS — Z17.0 MALIGNANT NEOPLASM OF BREAST IN FEMALE, ESTROGEN RECEPTOR POSITIVE, UNSPECIFIED LATERALITY, UNSPECIFIED SITE OF BREAST (MULTI): ICD-10-CM

## 2024-01-12 NOTE — TELEPHONE ENCOUNTER
Elizabeth received a letter from [employer] stating that her Ibrance may no longer be covered without a preferred alternate medication being trialed first; the preferred medication is Kisqali. She is asking what she needs to do in regards to this letter.   Including social work.

## 2024-01-17 RX ORDER — FLUTICASONE PROPIONATE 50 MCG
1 SPRAY, SUSPENSION (ML) NASAL 2 TIMES DAILY
Qty: 48 ML | Refills: 1 | Status: SHIPPED | OUTPATIENT
Start: 2024-01-17

## 2024-01-24 ENCOUNTER — APPOINTMENT (OUTPATIENT)
Dept: HEMATOLOGY/ONCOLOGY | Facility: CLINIC | Age: 84
End: 2024-01-24
Payer: COMMERCIAL

## 2024-01-31 ENCOUNTER — OFFICE VISIT (OUTPATIENT)
Dept: HEMATOLOGY/ONCOLOGY | Facility: CLINIC | Age: 84
End: 2024-01-31
Payer: COMMERCIAL

## 2024-01-31 VITALS
WEIGHT: 174.94 LBS | BODY MASS INDEX: 35.79 KG/M2 | DIASTOLIC BLOOD PRESSURE: 79 MMHG | SYSTOLIC BLOOD PRESSURE: 162 MMHG | TEMPERATURE: 98.8 F | HEART RATE: 78 BPM

## 2024-01-31 DIAGNOSIS — G89.3 NEOPLASM RELATED PAIN: ICD-10-CM

## 2024-01-31 DIAGNOSIS — C77.0 METASTASIS TO SUPRACLAVICULAR LYMPH NODE (MULTI): Primary | ICD-10-CM

## 2024-01-31 PROCEDURE — 3077F SYST BP >= 140 MM HG: CPT | Performed by: INTERNAL MEDICINE

## 2024-01-31 PROCEDURE — 1159F MED LIST DOCD IN RCRD: CPT | Performed by: INTERNAL MEDICINE

## 2024-01-31 PROCEDURE — 1157F ADVNC CARE PLAN IN RCRD: CPT | Performed by: INTERNAL MEDICINE

## 2024-01-31 PROCEDURE — 99213 OFFICE O/P EST LOW 20 MIN: CPT | Performed by: INTERNAL MEDICINE

## 2024-01-31 PROCEDURE — 1036F TOBACCO NON-USER: CPT | Performed by: INTERNAL MEDICINE

## 2024-01-31 PROCEDURE — 1126F AMNT PAIN NOTED NONE PRSNT: CPT | Performed by: INTERNAL MEDICINE

## 2024-01-31 PROCEDURE — 1160F RVW MEDS BY RX/DR IN RCRD: CPT | Performed by: INTERNAL MEDICINE

## 2024-01-31 PROCEDURE — 3078F DIAST BP <80 MM HG: CPT | Performed by: INTERNAL MEDICINE

## 2024-01-31 RX ORDER — TRAMADOL HYDROCHLORIDE 50 MG/1
50 TABLET ORAL EVERY 6 HOURS PRN
Qty: 60 TABLET | Refills: 0 | Status: SHIPPED | OUTPATIENT
Start: 2024-01-31 | End: 2024-03-31

## 2024-01-31 ASSESSMENT — PAIN SCALES - GENERAL: PAINLEVEL: 0-NO PAIN

## 2024-02-01 ASSESSMENT — ENCOUNTER SYMPTOMS
ABDOMINAL DISTENTION: 0
SLEEP DISTURBANCE: 0
CHILLS: 0
ADENOPATHY: 0
BACK PAIN: 0
CONSTIPATION: 0
NUMBNESS: 0
RESPIRATORY NEGATIVE: 1
FATIGUE: 0
DIZZINESS: 0
DIARRHEA: 0
FEVER: 0
EYE PROBLEMS: 0
NAUSEA: 0
HEADACHES: 0
MYALGIAS: 0
SCLERAL ICTERUS: 0
HEMATURIA: 0
SEIZURES: 0
EXTREMITY WEAKNESS: 0
CONSTITUTIONAL NEGATIVE: 1
HEMOPTYSIS: 0
NERVOUS/ANXIOUS: 0
PALPITATIONS: 0
SHORTNESS OF BREATH: 0
DEPRESSION: 0
DIAPHORESIS: 0
DYSURIA: 0
BLOOD IN STOOL: 0
FREQUENCY: 0
LEG SWELLING: 0
CHEST TIGHTNESS: 0
DIFFICULTY URINATING: 0
BRUISES/BLEEDS EASILY: 0
ABDOMINAL PAIN: 0
COUGH: 0
CONFUSION: 0
WHEEZING: 0
EYES NEGATIVE: 1
HOT FLASHES: 0
APPETITE CHANGE: 0
CARDIOVASCULAR NEGATIVE: 1
ARTHRALGIAS: 0

## 2024-02-01 NOTE — PROGRESS NOTES
Breast Medical Oncology Clinic  Location: [facility]      BREAST CANCER DIAGNOSIS  Breast cancer (CMS/HCC), Clinical: Stage IV (cN1, cM1, ER+, NM+, HER2+)       ONCOLOGIC HISTORY  Right breast cancer diagnosed in 1991, status post breast conserving surgery followed by radiation and tamoxifen. She experienced an in-breast recurrence in 2012, for which she underwent a completion mastectomy.    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 Tumor receptors were ER<1% NM 5% HER2 3+   Left supraclavicular fossa and axilla. Treatment dates: August 3, 2017, through September 7, 2017. Radiation treatment to the left supraclavicular fossa and axilla to a dose of 50 Gy/2 Gy per fraction/25  Radiation to the left chest wall and internal mammary jong chain.to a dose of 50 Gy/2 Gy per fraction/25 fractions. Herceptin through February 2018.  November 2021 ER+/NM+/HER2- breast cancer biopsy proven recurrence right supraclavicular LN, Initiated on anastrazole and palbociclib. Localized radiation therapy completion 4/2022       CURRENT THERAPY  Anastrazole- palbociclib since ~2021     HISTORY OF PRESENT ILLNESS    Elizabeth Buckner is a 83 y.o. woman here for follow-up              Review of Systems   Constitutional: Negative.  Negative for appetite change, chills, diaphoresis, fatigue and fever.   HENT:  Negative.  Negative for hearing loss and lump/mass.    Eyes: Negative.  Negative for eye problems and icterus.   Respiratory: Negative.  Negative for chest tightness, cough, hemoptysis, shortness of breath and wheezing.    Cardiovascular: Negative.  Negative for chest pain, leg swelling and palpitations.   Gastrointestinal:  Negative for abdominal distention, abdominal pain, blood in stool, constipation, diarrhea and nausea.   Endocrine: Negative for hot flashes.   Genitourinary:  Negative for bladder incontinence, difficulty urinating,  dyspareunia, dysuria, frequency and hematuria.    Musculoskeletal:  Negative for arthralgias, back pain, gait problem and myalgias.   Neurological:  Negative for dizziness, extremity weakness, gait problem, headaches, numbness and seizures.   Hematological:  Negative for adenopathy. Does not bruise/bleed easily.   Psychiatric/Behavioral:  Negative for confusion, depression and sleep disturbance. The patient is not nervous/anxious.    All other systems reviewed and are negative.        Past Medical History:  has a past medical history of Acute frontal sinusitis, unspecified (11/21/2018), Chronic kidney disease, stage 2 (mild) (12/20/2018), Combined forms of age-related cataract, left eye (04/12/2019), Combined forms of age-related cataract, right eye (04/12/2019), 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 (04/12/2019), Dry eye syndrome of right lacrimal gland (04/12/2019), Encounter for allergy testing, Encounter for general adult medical examination without abnormal findings (07/24/2020), Encounter for general adult medical examination without abnormal findings (07/19/2021), Encounter for general adult medical examination without abnormal findings (03/06/2018), Other obesity due to excess calories (10/15/2020), Pain in unspecified knee (03/09/2018), Personal history of other diseases of the digestive system (07/10/2018), Personal history of other diseases of the female genital tract (09/08/2016), Personal history of other diseases of the female genital tract, Personal history of other diseases of the nervous system and sense organs (04/12/2019), Personal history of other diseases of the respiratory system (01/02/2020), and Personal history of other diseases of urinary system (01/22/2021).  Surgical History:   has a past surgical history that includes Hysterectomy (03/29/2013); Breast surgery (03/29/2013); and US guided mediastinum percutaneous biopsy (9/22/2021).  Social History:   reports that she has quit smoking.  Her smoking use included cigarettes. She has never used smokeless tobacco. She reports that she does not drink alcohol and does not use drugs.  Family History:    Family History   Problem Relation Name Age of Onset    Cancer Other          Family History    Lung disease Other          Family History     Family Oncology History:  Cancer-related family history includes Cancer in an other family member.      OBJECTIVE    VS / Pain:  /79 (BP Location: Left arm, Patient Position: Sitting, BP Cuff Size: Large adult)   Pulse 78   Temp 37.1 °C (98.8 °F) (Skin)   Wt 79.3 kg (174 lb 15 oz)   BMI 35.79 kg/m²   BSA: 1.81 meters squared   Pain Scale: 3    Performance Status:  The ECOG performance scale today is ECO- Ambulatory and  capable of all selfcare; unable to carry out work activities.  Up and about > 50% of waking hrs.    Physical Exam  Constitutional:       General: She is not in acute distress.     Appearance: She is not ill-appearing, toxic-appearing or diaphoretic.   HENT:      Nose: No congestion or rhinorrhea.      Mouth/Throat:      Pharynx: No posterior oropharyngeal erythema.   Cardiovascular:      Rate and Rhythm: Normal rate and regular rhythm.      Pulses: Normal pulses.      Heart sounds: Normal heart sounds. No murmur heard.     No gallop.   Pulmonary:      Breath sounds: Normal breath sounds.   Abdominal:      General: There is no distension.      Palpations: There is no mass.      Tenderness: There is no abdominal tenderness.   Musculoskeletal:         General: No swelling.      Cervical back: No rigidity.      Right lower leg: No edema.      Left lower leg: No edema.   Lymphadenopathy:      Cervical: No cervical adenopathy.   Skin:     General: Skin is warm.      Coloration: Skin is not cyanotic.      Findings: No bruising, ecchymosis or erythema.   Neurological:      General: No focal deficit present.      Mental Status: She is oriented to person, place, and time.      Cranial Nerves:  "Cranial nerves 2-12 are intact.      Motor: Motor function is intact.   Psychiatric:         Attention and Perception: Attention and perception normal.         Mood and Affect: Mood and affect normal.         Behavior: Behavior normal.         Thought Content: Thought content normal.         Judgment: Judgment normal.             Diagnostic Results   === 12/12/23 ===    CT CHEST ABDOMEN PELVIS WO CONTRAST    - Impression -  No significant change in the appearance of the chest, abdomen, and  pelvis when compared to the previous study of 03/27/2023. There are  unchanged findings as detailed above including evidence of a chronic  colovesical fistula.    MACRO:  None.    Signed by: Jose Antonio Baum 12/13/2023 10:38 AM  Dictation workstation:   [id number]   No results found for this or any previous visit from the past 365 days.     No images are attached to the encounter.    LABORATORY/PATHOLOGY DATA    No visits with results within 6 Week(s) from this visit.   Latest known visit with results is:   Office Visit on 12/07/2023   Component Date Value Ref Range Status    POC Color, Urine 12/07/2023 Yellow  Straw, Yellow, Light-Yellow Final    POC Appearance, Urine 12/07/2023 Clear  Clear Final    POC Glucose, Urine 12/07/2023 NEGATIVE  NEGATIVE mg/dl Final    POC Bilirubin, Urine 12/07/2023 NEGATIVE  NEGATIVE Final    POC Ketones, Urine 12/07/2023 NEGATIVE  NEGATIVE mg/dl Final    POC Specific Gravity, Urine 12/07/2023 1.010  1.005 - 1.035 Final    POC Blood, Urine 12/07/2023 NEGATIVE  NEGATIVE Final    POC PH, Urine 12/07/2023 6.0  No Reference Range Established PH Final    POC Protein, Urine 12/07/2023 NEGATIVE  NEGATIVE, 30 (1+) mg/dl Final    POC Urobilinogen, Urine 12/07/2023 0.2  0.2, 1.0 EU/DL Final    Poc Nitrite, Urine 12/07/2023 NEGATIVE  NEGATIVE Final    POC Leukocytes, Urine 12/07/2023 NEGATIVE  NEGATIVE Final      No results found for: \"LABCA2\"          IMPRESSION/PLAN      1. Recurrent ER+/HER2- breast cancer with " isolated supraclav recurrence.  Continue present therapy. Performing CT scans on an every 9-12 month basis.     2. Bone health.    DEXA 5/17/22 with NORMAL bone density. Monitor. Repeat May 2024-ish      We discussed the clinical significance of diagnosis, goals of care and treatment plan in detail.     I personally spent over half of a total 30  minutes face to face with the patient in counseling and discussion and/or coordination of care as described above.         -------------------------------------------------------------------------------------------------------------------------------  William Dove MD  Director of Breast Cancer Medical Oncology Research Program   [facility]  Professor of Medicine  [facility] [address]  [place] [id number]  Phone: [phone]  [email or web address]

## 2024-03-10 DIAGNOSIS — Z17.0 MALIGNANT NEOPLASM OF BREAST IN FEMALE, ESTROGEN RECEPTOR POSITIVE, UNSPECIFIED LATERALITY, UNSPECIFIED SITE OF BREAST (MULTI): ICD-10-CM

## 2024-03-10 DIAGNOSIS — C50.919 MALIGNANT NEOPLASM OF BREAST IN FEMALE, ESTROGEN RECEPTOR POSITIVE, UNSPECIFIED LATERALITY, UNSPECIFIED SITE OF BREAST (MULTI): ICD-10-CM

## 2024-03-15 ENCOUNTER — TELEPHONE (OUTPATIENT)
Dept: HEMATOLOGY/ONCOLOGY | Facility: HOSPITAL | Age: 84
End: 2024-03-15
Payer: COMMERCIAL

## 2024-03-15 NOTE — TELEPHONE ENCOUNTER
[facility] called to clarify whether pt's Ibrance prescription should now be filled as capsules?  Pt's previous prescriptions have been written for tablets.

## 2024-03-21 ENCOUNTER — OFFICE VISIT (OUTPATIENT)
Dept: PRIMARY CARE | Facility: CLINIC | Age: 84
End: 2024-03-21
Payer: COMMERCIAL

## 2024-03-21 VITALS
DIASTOLIC BLOOD PRESSURE: 81 MMHG | HEART RATE: 73 BPM | OXYGEN SATURATION: 99 % | BODY MASS INDEX: 34.78 KG/M2 | RESPIRATION RATE: 12 BRPM | WEIGHT: 170 LBS | SYSTOLIC BLOOD PRESSURE: 150 MMHG

## 2024-03-21 DIAGNOSIS — E78.49 OTHER HYPERLIPIDEMIA: ICD-10-CM

## 2024-03-21 DIAGNOSIS — G89.29 OTHER CHRONIC PAIN: Primary | ICD-10-CM

## 2024-03-21 PROBLEM — Z86.16 HISTORY OF SEVERE ACUTE RESPIRATORY SYNDROME CORONAVIRUS 2 (SARS-COV-2) DISEASE: Status: ACTIVE | Noted: 2024-03-21

## 2024-03-21 PROBLEM — G62.0 DRUG-INDUCED POLYNEUROPATHY (MULTI): Status: ACTIVE | Noted: 2024-03-21

## 2024-03-21 PROBLEM — H25.819 COMBINED FORMS OF AGE-RELATED CATARACT: Status: ACTIVE | Noted: 2019-04-12

## 2024-03-21 PROBLEM — K64.0 GRADE I HEMORRHOIDS: Status: ACTIVE | Noted: 2024-03-21

## 2024-03-21 PROBLEM — T50.905A ADVERSE EFFECT OF DRUG: Status: ACTIVE | Noted: 2024-03-21

## 2024-03-21 PROBLEM — K57.30 DIVERTICULOSIS OF LARGE INTESTINE: Status: ACTIVE | Noted: 2022-10-26

## 2024-03-21 PROBLEM — Z90.13 ACQUIRED ABSENCE OF BOTH BREASTS: Status: ACTIVE | Noted: 2022-10-26

## 2024-03-21 PROCEDURE — 99214 OFFICE O/P EST MOD 30 MIN: CPT | Performed by: INTERNAL MEDICINE

## 2024-03-21 PROCEDURE — 1160F RVW MEDS BY RX/DR IN RCRD: CPT | Performed by: INTERNAL MEDICINE

## 2024-03-21 PROCEDURE — 1159F MED LIST DOCD IN RCRD: CPT | Performed by: INTERNAL MEDICINE

## 2024-03-21 PROCEDURE — 1036F TOBACCO NON-USER: CPT | Performed by: INTERNAL MEDICINE

## 2024-03-21 PROCEDURE — 1157F ADVNC CARE PLAN IN RCRD: CPT | Performed by: INTERNAL MEDICINE

## 2024-03-21 PROCEDURE — 3079F DIAST BP 80-89 MM HG: CPT | Performed by: INTERNAL MEDICINE

## 2024-03-21 PROCEDURE — 3077F SYST BP >= 140 MM HG: CPT | Performed by: INTERNAL MEDICINE

## 2024-03-21 RX ORDER — GABAPENTIN 300 MG/1
300 CAPSULE ORAL 3 TIMES DAILY
Qty: 90 CAPSULE | Refills: 2 | Status: SHIPPED | OUTPATIENT
Start: 2024-03-21 | End: 2024-09-17

## 2024-03-21 NOTE — PROGRESS NOTES
Subjective   Chief complaint: Elizabeth Buckner is a 83 y.o. female who presents for Med Refill (Pt is here for medication refills. ).    HPI:  Med Refill      The patient is requesting refill of her gabapentin. The patient is doing well and no complaints.    Objective   /81   Pulse 73   Resp 12   Wt 77.1 kg (170 lb)   SpO2 99%   BMI 34.78 kg/m²   Physical Exam  Constitutional:       General: She is not in acute distress.     Appearance: Normal appearance. She is not diaphoretic.   Cardiovascular:      Rate and Rhythm: Normal rate and regular rhythm.      Pulses: Normal pulses.      Heart sounds: Normal heart sounds. No murmur heard.  Pulmonary:      Effort: Pulmonary effort is normal. No respiratory distress.      Breath sounds: Normal breath sounds. No wheezing, rhonchi or rales.   Abdominal:      General: Bowel sounds are normal.      Palpations: Abdomen is soft.      Tenderness: There is no abdominal tenderness.   Musculoskeletal:         General: Normal range of motion.      Right lower leg: No edema.      Left lower leg: No edema.   Skin:     General: Skin is warm and dry.      Capillary Refill: Capillary refill takes less than 2 seconds.   Neurological:      General: No focal deficit present.      Mental Status: She is alert and oriented to person, place, and time. Mental status is at baseline.         I have reviewed and reconciled the medication list with the patient today.   Current Outpatient Medications:     anastrozole (Arimidex) 1 mg tablet, Take 1 tablet (1 mg total) by mouth once daily.  Take 1 tablet once daily. Swallow whole with a drink of water., Disp: , Rfl:     arm brace (Elbow Compression Sleeve) misc, Wear daily for lymphedema, Disp: 1 each, Rfl: 0    aspirin 81 mg EC tablet, Take 1 tablet (81 mg) by mouth once daily. Take 1 tablet daily, Disp: , Rfl:     azelastine (Optivar) 0.05 % ophthalmic solution, Administer 2 drops into both eyes once daily., Disp: , Rfl:     carvedilol (Coreg) 12.5  mg tablet, Take 1 tablet (12.5 mg) by mouth 2 times a day. Take 1 tablet twice daily, Disp: 90 tablet, Rfl: 3    cholecalciferol, vitamin D3, (VITAMIN D3 ORAL), Take 25 mcg by mouth 1 (one) time each day. Take 1 tablet daily Vitamin D 25MCG (1000 UT) oral tablet, Disp: , Rfl:     FLAXSEED OIL ORAL, Take 1,200 mg by mouth in the morning, at noon, and at bedtime. Take 1 capsule 3 times daily, Disp: , Rfl:     fluticasone (Flonase) 50 mcg/actuation nasal spray, ADMINISTER 1 SPRAY INTO EACH NOSTRIL 2 TIMES A DAY. INSTILL 1 SPRAY IN EACH NOSTRIL TWICE DAILY, Disp: 48 mL, Rfl: 1    furosemide (Lasix) 20 mg tablet, Take 1 tablet (20 mg) by mouth once daily. Take 1 tablet by mouth every day, Disp: 90 tablet, Rfl: 3    gabapentin (Neurontin) 300 mg capsule, Take 1 capsule (300 mg) by mouth 3 times a day. Take one capsule by mouth three times a day, Disp: 270 capsule, Rfl: 0    hydrALAZINE (Apresoline) 100 mg tablet, Take 1 tablet (100 mg) by mouth 2 times a day. Take 1 tablet by mouth twice per day, Disp: 180 tablet, Rfl: 3    palbociclib (Ibrance) 75 mg capsule, Take 1 capsule (75 mg total) by mouth once daily with breakfast.  Take for 21 days then take 7 days off. Swallow whole., Disp: 21 capsule, Rfl: 5    traMADol (Ultram) 50 mg tablet, Take 1 tablet (50 mg) by mouth every 6 hours if needed for severe pain (7 - 10)., Disp: 60 tablet, Rfl: 0    TURMERIC ORAL, Take 400 mg by mouth 1 (one) time each day. Take 1 capsule daily, Disp: , Rfl:      Imaging:  No results found.     Labs reviewed:    Lab Results   Component Value Date    WBC 3.3 (L) 12/01/2023    HGB 9.9 (L) 12/01/2023    HCT 30.2 (L) 12/01/2023     12/01/2023    CHOL 176 12/01/2023    TRIG 75 12/01/2023    HDL 66.5 12/01/2023    ALT 15 12/01/2023    AST 16 12/01/2023     12/01/2023    K 4.7 12/01/2023     12/01/2023    CREATININE 1.41 (H) 12/01/2023    BUN 20 12/01/2023    CO2 29 12/01/2023    TSH 3.97 12/01/2023    INR 1.0 10/19/2022        Assessment/Plan   Problem List Items Addressed This Visit          Cardiac and Vasculature    Other hyperlipidemia     Will order lipid panel to be drawn with next set of bloodwork April 30th.             Neuro    Chronic pain - Primary     Refill gabapentin 300 mg TID.             Continue current medications as listed  Follow up in May to discuss bloodwork. The patient has scheduled bloodwork with her oncologist on April 30. Will add lipid panel to her orders to discuss lipid, CBC, and CMP.

## 2024-04-30 ENCOUNTER — LAB (OUTPATIENT)
Dept: LAB | Facility: LAB | Age: 84
End: 2024-04-30
Payer: COMMERCIAL

## 2024-04-30 ENCOUNTER — OFFICE VISIT (OUTPATIENT)
Dept: HEMATOLOGY/ONCOLOGY | Facility: CLINIC | Age: 84
End: 2024-04-30
Payer: COMMERCIAL

## 2024-04-30 VITALS
BODY MASS INDEX: 34.23 KG/M2 | HEART RATE: 80 BPM | WEIGHT: 167.33 LBS | DIASTOLIC BLOOD PRESSURE: 52 MMHG | OXYGEN SATURATION: 97 % | SYSTOLIC BLOOD PRESSURE: 164 MMHG | TEMPERATURE: 98.1 F

## 2024-04-30 DIAGNOSIS — Z79.811 AROMATASE INHIBITOR USE: ICD-10-CM

## 2024-04-30 DIAGNOSIS — Z51.11 ENCOUNTER FOR ANTINEOPLASTIC CHEMOTHERAPY: ICD-10-CM

## 2024-04-30 DIAGNOSIS — C77.0 METASTASIS TO SUPRACLAVICULAR LYMPH NODE (MULTI): ICD-10-CM

## 2024-04-30 DIAGNOSIS — C50.919 MALIGNANT NEOPLASM OF FEMALE BREAST, UNSPECIFIED ESTROGEN RECEPTOR STATUS, UNSPECIFIED LATERALITY, UNSPECIFIED SITE OF BREAST (MULTI): Primary | ICD-10-CM

## 2024-04-30 LAB
ALBUMIN SERPL BCP-MCNC: 3.6 G/DL (ref 3.4–5)
ALP SERPL-CCNC: 84 U/L (ref 33–136)
ALT SERPL W P-5'-P-CCNC: 16 U/L (ref 7–45)
ANION GAP SERPL CALC-SCNC: 12 MMOL/L (ref 10–20)
AST SERPL W P-5'-P-CCNC: 24 U/L (ref 9–39)
BASOPHILS # BLD AUTO: 0.04 X10*3/UL (ref 0–0.1)
BASOPHILS NFR BLD AUTO: 1.1 %
BILIRUB SERPL-MCNC: 0.5 MG/DL (ref 0–1.2)
BUN SERPL-MCNC: 26 MG/DL (ref 6–23)
CALCIUM SERPL-MCNC: 8.9 MG/DL (ref 8.6–10.3)
CANCER AG27-29 SERPL-ACNC: 18.3 U/ML (ref 0–38.6)
CHLORIDE SERPL-SCNC: 103 MMOL/L (ref 98–107)
CO2 SERPL-SCNC: 29 MMOL/L (ref 21–32)
CREAT SERPL-MCNC: 1.37 MG/DL (ref 0.5–1.05)
EGFRCR SERPLBLD CKD-EPI 2021: 38 ML/MIN/1.73M*2
EOSINOPHIL # BLD AUTO: 0.1 X10*3/UL (ref 0–0.4)
EOSINOPHIL NFR BLD AUTO: 2.8 %
ERYTHROCYTE [DISTWIDTH] IN BLOOD BY AUTOMATED COUNT: 14.9 % (ref 11.5–14.5)
GLUCOSE SERPL-MCNC: 81 MG/DL (ref 74–99)
HCT VFR BLD AUTO: 31.9 % (ref 36–46)
HGB BLD-MCNC: 10.8 G/DL (ref 12–16)
IMM GRANULOCYTES # BLD AUTO: 0.01 X10*3/UL (ref 0–0.5)
IMM GRANULOCYTES NFR BLD AUTO: 0.3 % (ref 0–0.9)
LYMPHOCYTES # BLD AUTO: 1.52 X10*3/UL (ref 0.8–3)
LYMPHOCYTES NFR BLD AUTO: 42.8 %
MCH RBC QN AUTO: 31.2 PG (ref 26–34)
MCHC RBC AUTO-ENTMCNC: 33.9 G/DL (ref 32–36)
MCV RBC AUTO: 92 FL (ref 80–100)
MONOCYTES # BLD AUTO: 0.39 X10*3/UL (ref 0.05–0.8)
MONOCYTES NFR BLD AUTO: 11 %
NEUTROPHILS # BLD AUTO: 1.49 X10*3/UL (ref 1.6–5.5)
NEUTROPHILS NFR BLD AUTO: 42 %
NRBC BLD-RTO: 0 /100 WBCS (ref 0–0)
PLATELET # BLD AUTO: 218 X10*3/UL (ref 150–450)
POTASSIUM SERPL-SCNC: 4.4 MMOL/L (ref 3.5–5.3)
PROT SERPL-MCNC: 6.5 G/DL (ref 6.4–8.2)
RBC # BLD AUTO: 3.46 X10*6/UL (ref 4–5.2)
SODIUM SERPL-SCNC: 140 MMOL/L (ref 136–145)
WBC # BLD AUTO: 3.6 X10*3/UL (ref 4.4–11.3)

## 2024-04-30 PROCEDURE — 1160F RVW MEDS BY RX/DR IN RCRD: CPT | Performed by: NURSE PRACTITIONER

## 2024-04-30 PROCEDURE — 1126F AMNT PAIN NOTED NONE PRSNT: CPT | Performed by: NURSE PRACTITIONER

## 2024-04-30 PROCEDURE — 85025 COMPLETE CBC W/AUTO DIFF WBC: CPT

## 2024-04-30 PROCEDURE — 99214 OFFICE O/P EST MOD 30 MIN: CPT | Performed by: NURSE PRACTITIONER

## 2024-04-30 PROCEDURE — 86300 IMMUNOASSAY TUMOR CA 15-3: CPT

## 2024-04-30 PROCEDURE — 80053 COMPREHEN METABOLIC PANEL: CPT

## 2024-04-30 PROCEDURE — 36415 COLL VENOUS BLD VENIPUNCTURE: CPT

## 2024-04-30 PROCEDURE — 3077F SYST BP >= 140 MM HG: CPT | Performed by: NURSE PRACTITIONER

## 2024-04-30 PROCEDURE — 3078F DIAST BP <80 MM HG: CPT | Performed by: NURSE PRACTITIONER

## 2024-04-30 PROCEDURE — 1159F MED LIST DOCD IN RCRD: CPT | Performed by: NURSE PRACTITIONER

## 2024-04-30 PROCEDURE — 1157F ADVNC CARE PLAN IN RCRD: CPT | Performed by: NURSE PRACTITIONER

## 2024-04-30 RX ORDER — ANASTROZOLE 1 MG/1
1 TABLET ORAL DAILY
Qty: 90 TABLET | Refills: 3 | Status: SHIPPED | OUTPATIENT
Start: 2024-04-30 | End: 2025-04-30

## 2024-04-30 RX ORDER — IBUPROFEN 200 MG
CAPSULE ORAL
Qty: 5 EACH | Refills: 2 | Status: SHIPPED | OUTPATIENT
Start: 2024-04-30

## 2024-04-30 ASSESSMENT — PAIN SCALES - GENERAL: PAINLEVEL: 0-NO PAIN

## 2024-04-30 NOTE — PATIENT INSTRUCTIONS
Dr William Dove / Nayeli Alcaraz APRN, CNP follow up in 3 months. Please continue daily anastrozole and 21 day cycle of ibrance. Labs prior to next apt.     Repeat bone density testing Summer 2024- please schedule    Planned repeat Restaging CT for IV breast cancer between Sept- Dec 2024.    Please call [phone] with any questions or concerns prior to your next office visit.

## 2024-04-30 NOTE — PROGRESS NOTES
Breast Medical Oncology Clinic  Location: [facility]      BREAST CANCER DIAGNOSIS  Breast cancer (Multi), Clinical: Stage IV (cN1, cM1, ER+, SC+, HER2+)       ONCOLOGIC HISTORY  Right breast cancer diagnosed in 1991, status post breast conserving surgery followed by radiation and tamoxifen. She experienced an in-breast recurrence in 2012, for which she underwent a completion mastectomy.    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 Tumor receptors were ER<1% SC 5% HER2 3+   Left supraclavicular fossa and axilla. Treatment dates: August 3, 2017, through September 7, 2017. Radiation treatment to the left supraclavicular fossa and axilla to a dose of 50 Gy/2 Gy per fraction/25  Radiation to the left chest wall and internal mammary jong chain.to a dose of 50 Gy/2 Gy per fraction/25 fractions. Herceptin through February 2018.  November 2021 ER+/SC+/HER2- breast cancer biopsy proven recurrence right supraclavicular LN, Initiated on anastrazole and palbociclib. Localized radiation therapy completion 4/2022       CURRENT THERAPY  Anastrazole- palbociclib since ~2021     HISTORY OF PRESENT ILLNESS    Elizabeth Buckner is a 83 y.o. woman here for IV breast cancer follow up. She continues compliant on daily anastrozole and 21 day cycle Ibrance- she is starting a new cycle of ibrance on 5/2/24. She has no new progression concerns. Reports the sudden loss of her son to MI in Feb 2024.     She reports increase in arthritic pains in her knees. She is taking gabapentin and tylenol as needed.     She has lost some weight - about 4-5 lbs. She reports continued crummy appetite. She continues with baseline issues with constipation.    She reports baseline right arm lymphedema that is stable. She is requesting a Prescription for a new sleeve.     She denies any chest pain or breathing issues, sob or cough.      She denies any vision changes or  headache issues, dizziness or loss of balance, no new neuropathy or falls     She denies any skin lesions or masses, oral sores lesions or infections    She denies any issues with sleep or fatigue.       Review of Systems   ROS 14 points performed, See HPI for exceptions        Past Medical History:  has a past medical history of Acute frontal sinusitis, unspecified (11/21/2018), Chronic kidney disease, stage 2 (mild) (12/20/2018), Combined forms of age-related cataract, left eye (04/12/2019), Combined forms of age-related cataract, right eye (04/12/2019), 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 (04/12/2019), Dry eye syndrome of right lacrimal gland (04/12/2019), Encounter for allergy testing, Encounter for general adult medical examination without abnormal findings (07/24/2020), Encounter for general adult medical examination without abnormal findings (07/19/2021), Encounter for general adult medical examination without abnormal findings (03/06/2018), Other obesity due to excess calories (10/15/2020), Pain in unspecified knee (03/09/2018), Personal history of other diseases of the digestive system (07/10/2018), Personal history of other diseases of the female genital tract (09/08/2016), Personal history of other diseases of the female genital tract, Personal history of other diseases of the nervous system and sense organs (04/12/2019), Personal history of other diseases of the respiratory system (01/02/2020), and Personal history of other diseases of urinary system (01/22/2021).  Surgical History:   has a past surgical history that includes Hysterectomy (03/29/2013); Breast surgery (03/29/2013); and US guided mediastinum percutaneous biopsy (9/22/2021).  Social History:   reports that she has quit smoking. Her smoking use included cigarettes. She has never used smokeless tobacco. She reports that she does not drink alcohol and does not use drugs.  Family History:    Family History   Problem Relation Name Age of  Onset    Cancer Other          Family History    Lung disease Other          Family History     Family Oncology History:  Cancer-related family history includes Cancer in an other family member.      OBJECTIVE    VS / Pain:  /52   Pulse 80   Temp 36.7 °C (98.1 °F)   Wt 75.9 kg (167 lb 5.3 oz)   SpO2 97%   BMI 34.23 kg/m²   BSA: 1.77 meters squared   Pain Scale: 7    Performance Status:  The ECOG performance scale today is ECO- Ambulatory and  capable of all selfcare; unable to carry out work activities.  Up and about > 50% of waking hrs.      Physical Exam   Constitutional: Well developed, awake/alert/oriented x4, no distress, alert and cooperative  EYES: Sclera clear  ENMT: mucous membranes moist, no apparent injury, no lesions seen  Head/Neck: Neck supple, no apparent injury, thyroid without mass or tenderness, No JVD, trachea midline, no bruits  Respiratory / Thoracic: Patent airways, clear to all lobes, normal breath sounds with good chest expansion, thorax symmetric.  Cardiovascular: Regular, rate and rhythm, no murmurs, 2+ equal pulses of the extremities, normal auscultated S 1and S 2  GI: Nondistended, soft, non-tender, no rebound tenderness or guarding, no masses palpable, no organomegaly, +BS, no bruits  Musculoskeletal: ROM intact, no joint swelling, normal strength, no spinal tenderness  Extremities: normal extremities, no cyanosis edema, contusions or wounds, no clubbing  Neurological: alert and oriented x4, intact senses, motor, response and reflexes, normal strength  Breast: Right mastectomy with right latissimus dorsi reconstruction, Left mastectomy. No palpable masses or lesions   Lymphatic: No cervical, supraclavicular, infraclavicular or axillary lymphadenopathy  Psychological: Appropriate and talkative mood and behavior  Skin: Warm and dry, no lesions, no rashes, no jaundice          Diagnostic Results   === 23 ===    CT CHEST ABDOMEN PELVIS WO CONTRAST    - Impression -  No  significant change in the appearance of the chest, abdomen, and  pelvis when compared to the previous study of 03/27/2023. There are  unchanged findings as detailed above including evidence of a chronic  colovesical fistula.    MACRO:  None.    Signed by: Jose Antonio Baum 12/13/2023 10:38 AM  Dictation workstation:   [id number]   No results found for this or any previous visit from the past 365 days.     No images are attached to the encounter.    LABORATORY/PATHOLOGY DATA- pending CBC, CMP and Ca 27.29 today  Lab Results   Component Value Date    WBC 3.3 (L) 12/01/2023    HGB 9.9 (L) 12/01/2023    HCT 30.2 (L) 12/01/2023    MCV 96 12/01/2023     12/01/2023          Chemistry    Lab Results   Component Value Date/Time     12/01/2023 0843    K 4.7 12/01/2023 0843     12/01/2023 0843    CO2 29 12/01/2023 0843    BUN 20 12/01/2023 0843    CREATININE 1.41 (H) 12/01/2023 0843    Lab Results   Component Value Date/Time    CALCIUM 9.1 12/01/2023 0843    ALKPHOS 95 12/01/2023 0843    AST 16 12/01/2023 0843    ALT 15 12/01/2023 0843    BILITOT 0.4 12/01/2023 0843             IMPRESSION/PLAN      1. Recurrent ER+/HER2- breast cancer with isolated supraclav recurrence.  Continue present therapy. Performing CT scans on an every 9-12 month recall without new concerning sx between Sept- Dec 2204.      2. Bone health.    DEXA 5/17/22 with NORMAL bone density. Monitor. Repeat Summer 2204- ordered today.        We discussed the clinical significance of diagnosis, goals of care and treatment plan in detail.     At least 25 minutes of direct consultation was spent with the patient today reviewing her cancer care plan, educating and answering questions regarding ongoing follow up, greater than 50% in counseling and coordination of care          -------------------------------------------------------------------------------------------------------------------------------  Nayeli BIGGS, JOSELO, HARVEYP-C  [facility]  Division of Medical Oncology- Breast   Collaborating Physician Dr. William Dove   Team Nurse Partners Gilda Ruiz [place] [address]  Phone: [phone]  Fax: [phone]  Available via Secure Chat    Confidential Peer Review Document-  Privilege  Privileged Pursuant to [place] Revised Code Section 2305.24, .25, .251 & .252

## 2024-05-07 ENCOUNTER — APPOINTMENT (OUTPATIENT)
Dept: PRIMARY CARE | Facility: CLINIC | Age: 84
End: 2024-05-07
Payer: COMMERCIAL

## 2024-06-13 ENCOUNTER — APPOINTMENT (OUTPATIENT)
Dept: PRIMARY CARE | Facility: CLINIC | Age: 84
End: 2024-06-13
Payer: COMMERCIAL

## 2024-06-13 DIAGNOSIS — E03.9 HYPOTHYROIDISM, UNSPECIFIED TYPE: ICD-10-CM

## 2024-06-13 DIAGNOSIS — N18.2 CHRONIC KIDNEY DISEASE (CKD), STAGE II (MILD): ICD-10-CM

## 2024-06-13 DIAGNOSIS — E78.00 ELEVATED LDL CHOLESTEROL LEVEL: ICD-10-CM

## 2024-06-13 DIAGNOSIS — E78.49 OTHER HYPERLIPIDEMIA: ICD-10-CM

## 2024-06-13 DIAGNOSIS — I10 PRIMARY HYPERTENSION: ICD-10-CM

## 2024-06-13 DIAGNOSIS — I10 BENIGN ESSENTIAL HYPERTENSION: ICD-10-CM

## 2024-06-13 DIAGNOSIS — D50.9 IRON DEFICIENCY ANEMIA, UNSPECIFIED IRON DEFICIENCY ANEMIA TYPE: ICD-10-CM

## 2024-06-13 LAB
ALBUMIN SERPL BCP-MCNC: 3.6 G/DL (ref 3.4–5)
ALP SERPL-CCNC: 86 U/L (ref 33–136)
ALT SERPL W P-5'-P-CCNC: 16 U/L (ref 7–45)
ANION GAP SERPL CALC-SCNC: 12 MMOL/L (ref 10–20)
AST SERPL W P-5'-P-CCNC: 19 U/L (ref 9–39)
BILIRUB SERPL-MCNC: 0.4 MG/DL (ref 0–1.2)
BUN SERPL-MCNC: 14 MG/DL (ref 6–23)
CALCIUM SERPL-MCNC: 9.2 MG/DL (ref 8.6–10.6)
CHLORIDE SERPL-SCNC: 105 MMOL/L (ref 98–107)
CHOLEST SERPL-MCNC: 174 MG/DL (ref 0–199)
CHOLESTEROL/HDL RATIO: 2.3
CO2 SERPL-SCNC: 28 MMOL/L (ref 21–32)
CREAT SERPL-MCNC: 1.23 MG/DL (ref 0.5–1.05)
EGFRCR SERPLBLD CKD-EPI 2021: 43 ML/MIN/1.73M*2
ERYTHROCYTE [DISTWIDTH] IN BLOOD BY AUTOMATED COUNT: 15.6 % (ref 11.5–14.5)
GLUCOSE SERPL-MCNC: 91 MG/DL (ref 74–99)
HCT VFR BLD AUTO: 31.7 % (ref 36–46)
HDLC SERPL-MCNC: 75.7 MG/DL
HGB BLD-MCNC: 10.1 G/DL (ref 12–16)
LDLC SERPL CALC-MCNC: 87 MG/DL
MCH RBC QN AUTO: 30.9 PG (ref 26–34)
MCHC RBC AUTO-ENTMCNC: 31.9 G/DL (ref 32–36)
MCV RBC AUTO: 97 FL (ref 80–100)
NON HDL CHOLESTEROL: 98 MG/DL (ref 0–149)
NRBC BLD-RTO: 0 /100 WBCS (ref 0–0)
PLATELET # BLD AUTO: 146 X10*3/UL (ref 150–450)
POTASSIUM SERPL-SCNC: 4.3 MMOL/L (ref 3.5–5.3)
PROT SERPL-MCNC: 6.1 G/DL (ref 6.4–8.2)
RBC # BLD AUTO: 3.27 X10*6/UL (ref 4–5.2)
SODIUM SERPL-SCNC: 141 MMOL/L (ref 136–145)
TRIGL SERPL-MCNC: 55 MG/DL (ref 0–149)
VLDL: 11 MG/DL (ref 0–40)
WBC # BLD AUTO: 2.6 X10*3/UL (ref 4.4–11.3)

## 2024-06-13 PROCEDURE — 80061 LIPID PANEL: CPT

## 2024-06-13 PROCEDURE — 80053 COMPREHEN METABOLIC PANEL: CPT

## 2024-06-13 PROCEDURE — 85027 COMPLETE CBC AUTOMATED: CPT

## 2024-06-13 PROCEDURE — 36415 COLL VENOUS BLD VENIPUNCTURE: CPT

## 2024-06-18 ENCOUNTER — APPOINTMENT (OUTPATIENT)
Dept: PRIMARY CARE | Facility: CLINIC | Age: 84
End: 2024-06-18
Payer: COMMERCIAL

## 2024-06-18 VITALS
SYSTOLIC BLOOD PRESSURE: 140 MMHG | WEIGHT: 170 LBS | HEART RATE: 86 BPM | DIASTOLIC BLOOD PRESSURE: 64 MMHG | RESPIRATION RATE: 12 BRPM | BODY MASS INDEX: 34.78 KG/M2 | OXYGEN SATURATION: 98 %

## 2024-06-18 DIAGNOSIS — M25.569 ARTHRALGIA OF KNEE, UNSPECIFIED LATERALITY: ICD-10-CM

## 2024-06-18 DIAGNOSIS — I97.2 POSTMASTECTOMY LYMPHEDEMA SYNDROME: ICD-10-CM

## 2024-06-18 DIAGNOSIS — Z85.3 PERSONAL HISTORY OF BREAST CANCER: Primary | ICD-10-CM

## 2024-06-18 PROCEDURE — 1157F ADVNC CARE PLAN IN RCRD: CPT | Performed by: INTERNAL MEDICINE

## 2024-06-18 PROCEDURE — 99214 OFFICE O/P EST MOD 30 MIN: CPT | Performed by: INTERNAL MEDICINE

## 2024-06-18 PROCEDURE — 3078F DIAST BP <80 MM HG: CPT | Performed by: INTERNAL MEDICINE

## 2024-06-18 PROCEDURE — 3077F SYST BP >= 140 MM HG: CPT | Performed by: INTERNAL MEDICINE

## 2024-06-18 PROCEDURE — 1159F MED LIST DOCD IN RCRD: CPT | Performed by: INTERNAL MEDICINE

## 2024-06-18 PROCEDURE — 1036F TOBACCO NON-USER: CPT | Performed by: INTERNAL MEDICINE

## 2024-06-18 RX ORDER — DICLOFENAC SODIUM 10 MG/G
4 GEL TOPICAL 4 TIMES DAILY
Qty: 100 G | Refills: 2 | Status: SHIPPED | OUTPATIENT
Start: 2024-06-18

## 2024-06-18 NOTE — PROGRESS NOTES
Subjective   Chief complaint: Elizabeth Buckner is a 84 y.o. female who presents for Follow-up (Pt is here for blood work follow up. ).    HPI:  HPI  Patient comes to the clinic today to review and discuss lab results.    Objective   /64   Pulse 86   Resp 12   Wt 77.1 kg (170 lb)   SpO2 98%   BMI 34.78 kg/m²   Physical Exam  Vitals reviewed.   Constitutional:       General: She is not in acute distress.     Appearance: Normal appearance. She is not toxic-appearing.   Cardiovascular:      Rate and Rhythm: Normal rate and regular rhythm.      Pulses: Normal pulses.      Heart sounds: Normal heart sounds.   Pulmonary:      Effort: Pulmonary effort is normal.      Breath sounds: Normal breath sounds.   Musculoskeletal:      Cervical back: Normal range of motion.   Skin:     General: Skin is warm and dry.   Neurological:      General: No focal deficit present.      Mental Status: She is alert and oriented to person, place, and time.   Psychiatric:         Mood and Affect: Mood normal.         Behavior: Behavior normal.         Thought Content: Thought content normal.         I have reviewed and reconciled the medication list with the patient today.   Current Outpatient Medications:     anastrozole (Arimidex) 1 mg tablet, Take 1 tablet (1 mg total) by mouth once daily.  Take 1 tablet once daily. Swallow whole with a drink of water., Disp: 90 tablet, Rfl: 3    arm brace (Elbow Compression Sleeve) misc, Lymphedema Sleeve  and Gauntlet eval and fit 20-30 mmHG for right  arm, Disp: 5 each, Rfl: 2    aspirin 81 mg EC tablet, Take 1 tablet (81 mg) by mouth once daily. Take 1 tablet daily, Disp: , Rfl:     azelastine (Optivar) 0.05 % ophthalmic solution, Administer 2 drops into both eyes once daily., Disp: , Rfl:     carvedilol (Coreg) 12.5 mg tablet, Take 1 tablet (12.5 mg) by mouth 2 times a day. Take 1 tablet twice daily, Disp: 90 tablet, Rfl: 3    cholecalciferol, vitamin D3, (VITAMIN D3 ORAL), Take 25 mcg by mouth 1  (one) time each day. Take 1 tablet daily Vitamin D 25MCG (1000 UT) oral tablet, Disp: , Rfl:     FLAXSEED OIL ORAL, Take 1,200 mg by mouth in the morning, at noon, and at bedtime. Take 1 capsule 3 times daily, Disp: , Rfl:     fluticasone (Flonase) 50 mcg/actuation nasal spray, ADMINISTER 1 SPRAY INTO EACH NOSTRIL 2 TIMES A DAY. INSTILL 1 SPRAY IN EACH NOSTRIL TWICE DAILY, Disp: 48 mL, Rfl: 1    furosemide (Lasix) 20 mg tablet, Take 1 tablet (20 mg) by mouth once daily. Take 1 tablet by mouth every day, Disp: 90 tablet, Rfl: 3    gabapentin (Neurontin) 300 mg capsule, Take 1 capsule (300 mg) by mouth 3 times a day. Take one capsule by mouth three times a day, Disp: 270 capsule, Rfl: 0    gabapentin (Neurontin) 300 mg capsule, Take 1 capsule (300 mg) by mouth 3 times a day., Disp: 90 capsule, Rfl: 2    hydrALAZINE (Apresoline) 100 mg tablet, Take 1 tablet (100 mg) by mouth 2 times a day. Take 1 tablet by mouth twice per day, Disp: 180 tablet, Rfl: 3    palbociclib (Ibrance) 75 mg capsule, Take 1 capsule (75 mg total) by mouth once daily with breakfast.  Take for 21 days then take 7 days off. Swallow whole., Disp: 21 capsule, Rfl: 5    TURMERIC ORAL, Take 400 mg by mouth 1 (one) time each day. Take 1 capsule daily, Disp: , Rfl:      Imaging:  No results found.     Labs reviewed:    Lab Results   Component Value Date    WBC 2.6 (L) 06/13/2024    HGB 10.1 (L) 06/13/2024    HCT 31.7 (L) 06/13/2024     (L) 06/13/2024    CHOL 174 06/13/2024    TRIG 55 06/13/2024    HDL 75.7 06/13/2024    ALT 16 06/13/2024    AST 19 06/13/2024     06/13/2024    K 4.3 06/13/2024     06/13/2024    CREATININE 1.23 (H) 06/13/2024    BUN 14 06/13/2024    CO2 28 06/13/2024    TSH 3.97 12/01/2023    INR 1.0 10/19/2022       Assessment/Plan   Problem List Items Addressed This Visit       Joint pain, knee     Voltaren gel as needed for pain  Discussed possible Kenalog knee injections in future visit         Personal history of  breast cancer - Primary     Continue to follow up with oncology  Anastrozole   Ibrance         Postmastectomy lymphedema syndrome     Well controlled            Continue current medications as listed  Follow up in 3mo

## 2024-06-19 DIAGNOSIS — C77.0 METASTASIS TO SUPRACLAVICULAR LYMPH NODE (MULTI): ICD-10-CM

## 2024-06-19 DIAGNOSIS — C50.919 MALIGNANT NEOPLASM OF FEMALE BREAST, UNSPECIFIED ESTROGEN RECEPTOR STATUS, UNSPECIFIED LATERALITY, UNSPECIFIED SITE OF BREAST (MULTI): ICD-10-CM

## 2024-06-19 DIAGNOSIS — G89.29 OTHER CHRONIC PAIN: Primary | ICD-10-CM

## 2024-06-19 RX ORDER — TRAMADOL HYDROCHLORIDE 50 MG/1
50 TABLET ORAL EVERY 6 HOURS PRN
Qty: 60 TABLET | Refills: 0 | Status: SHIPPED | OUTPATIENT
Start: 2024-06-19 | End: 2024-08-18

## 2024-07-17 ENCOUNTER — HOSPITAL ENCOUNTER (OUTPATIENT)
Dept: RADIOLOGY | Facility: CLINIC | Age: 84
Discharge: HOME | End: 2024-07-17
Payer: COMMERCIAL

## 2024-07-17 DIAGNOSIS — C50.919 MALIGNANT NEOPLASM OF FEMALE BREAST, UNSPECIFIED ESTROGEN RECEPTOR STATUS, UNSPECIFIED LATERALITY, UNSPECIFIED SITE OF BREAST (MULTI): ICD-10-CM

## 2024-07-17 DIAGNOSIS — Z51.11 ENCOUNTER FOR ANTINEOPLASTIC CHEMOTHERAPY: ICD-10-CM

## 2024-07-17 DIAGNOSIS — Z79.811 AROMATASE INHIBITOR USE: ICD-10-CM

## 2024-07-17 DIAGNOSIS — C77.0 METASTASIS TO SUPRACLAVICULAR LYMPH NODE (MULTI): ICD-10-CM

## 2024-07-17 PROCEDURE — 77080 DXA BONE DENSITY AXIAL: CPT

## 2024-07-17 PROCEDURE — 77080 DXA BONE DENSITY AXIAL: CPT | Performed by: RADIOLOGY

## 2024-07-17 ASSESSMENT — LIFESTYLE VARIABLES
CURRENT_SMOKER: N
3_OR_MORE_DRINKS_PER_DAY: N

## 2024-07-18 ENCOUNTER — TELEPHONE (OUTPATIENT)
Dept: HEMATOLOGY/ONCOLOGY | Facility: HOSPITAL | Age: 84
End: 2024-07-18
Payer: COMMERCIAL

## 2024-07-30 ENCOUNTER — OFFICE VISIT (OUTPATIENT)
Dept: HEMATOLOGY/ONCOLOGY | Facility: CLINIC | Age: 84
End: 2024-07-30
Payer: COMMERCIAL

## 2024-07-30 VITALS
RESPIRATION RATE: 18 BRPM | HEART RATE: 86 BPM | TEMPERATURE: 98.1 F | DIASTOLIC BLOOD PRESSURE: 82 MMHG | BODY MASS INDEX: 34.5 KG/M2 | SYSTOLIC BLOOD PRESSURE: 178 MMHG | OXYGEN SATURATION: 97 % | WEIGHT: 168.65 LBS

## 2024-07-30 DIAGNOSIS — I97.2 POSTMASTECTOMY LYMPHEDEMA SYNDROME: ICD-10-CM

## 2024-07-30 DIAGNOSIS — N18.9 CHRONIC KIDNEY DISEASE, UNSPECIFIED CKD STAGE: ICD-10-CM

## 2024-07-30 DIAGNOSIS — Z17.0 MALIGNANT NEOPLASM OF BREAST IN FEMALE, ESTROGEN RECEPTOR POSITIVE, UNSPECIFIED LATERALITY, UNSPECIFIED SITE OF BREAST (MULTI): ICD-10-CM

## 2024-07-30 DIAGNOSIS — G89.29 OTHER CHRONIC PAIN: ICD-10-CM

## 2024-07-30 DIAGNOSIS — C50.919 MALIGNANT NEOPLASM OF FEMALE BREAST, UNSPECIFIED ESTROGEN RECEPTOR STATUS, UNSPECIFIED LATERALITY, UNSPECIFIED SITE OF BREAST (MULTI): Primary | ICD-10-CM

## 2024-07-30 DIAGNOSIS — C77.0 METASTASIS TO SUPRACLAVICULAR LYMPH NODE (MULTI): ICD-10-CM

## 2024-07-30 DIAGNOSIS — Z79.811 AROMATASE INHIBITOR USE: ICD-10-CM

## 2024-07-30 DIAGNOSIS — Z51.11 ENCOUNTER FOR ANTINEOPLASTIC CHEMOTHERAPY: ICD-10-CM

## 2024-07-30 DIAGNOSIS — C50.919 MALIGNANT NEOPLASM OF BREAST IN FEMALE, ESTROGEN RECEPTOR POSITIVE, UNSPECIFIED LATERALITY, UNSPECIFIED SITE OF BREAST (MULTI): ICD-10-CM

## 2024-07-30 PROCEDURE — 99214 OFFICE O/P EST MOD 30 MIN: CPT | Performed by: NURSE PRACTITIONER

## 2024-07-30 PROCEDURE — 1125F AMNT PAIN NOTED PAIN PRSNT: CPT | Performed by: NURSE PRACTITIONER

## 2024-07-30 PROCEDURE — 3079F DIAST BP 80-89 MM HG: CPT | Performed by: NURSE PRACTITIONER

## 2024-07-30 PROCEDURE — 1159F MED LIST DOCD IN RCRD: CPT | Performed by: NURSE PRACTITIONER

## 2024-07-30 PROCEDURE — 1160F RVW MEDS BY RX/DR IN RCRD: CPT | Performed by: NURSE PRACTITIONER

## 2024-07-30 PROCEDURE — 1157F ADVNC CARE PLAN IN RCRD: CPT | Performed by: NURSE PRACTITIONER

## 2024-07-30 PROCEDURE — 1036F TOBACCO NON-USER: CPT | Performed by: NURSE PRACTITIONER

## 2024-07-30 PROCEDURE — 3077F SYST BP >= 140 MM HG: CPT | Performed by: NURSE PRACTITIONER

## 2024-07-30 RX ORDER — TRAMADOL HYDROCHLORIDE 50 MG/1
50 TABLET ORAL EVERY 6 HOURS PRN
Qty: 60 TABLET | Refills: 1 | Status: SHIPPED | OUTPATIENT
Start: 2024-07-30 | End: 2024-09-28

## 2024-07-30 ASSESSMENT — PAIN SCALES - GENERAL: PAINLEVEL: 5

## 2024-07-30 ASSESSMENT — ENCOUNTER SYMPTOMS
LOSS OF SENSATION IN FEET: 0
OCCASIONAL FEELINGS OF UNSTEADINESS: 0
DEPRESSION: 0

## 2024-07-30 NOTE — PROGRESS NOTES
Breast Medical Oncology Clinic  Location: [facility]      BREAST CANCER DIAGNOSIS  Breast cancer (Multi), Clinical: Stage IV (cN1, cM1, ER+, AZ+, HER2+)       ONCOLOGIC HISTORY  Right breast cancer diagnosed in 1991, status post breast conserving surgery followed by radiation and tamoxifen. She experienced an in-breast recurrence in 2012, for which she underwent a completion mastectomy.    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 Tumor receptors were ER<1% AZ 5% HER2 3+   Left supraclavicular fossa and axilla. Treatment dates: August 3, 2017, through September 7, 2017. Radiation treatment to the left supraclavicular fossa and axilla to a dose of 50 Gy/2 Gy per fraction/25  Radiation to the left chest wall and internal mammary jong chain.to a dose of 50 Gy/2 Gy per fraction/25 fractions. Herceptin through February 2018.  November 2021 ER+/AZ+/HER2- breast cancer biopsy proven recurrence right supraclavicular LN, Initiated on anastrazole and palbociclib. Localized radiation therapy completion 4/2022       CURRENT THERAPY  Anastrazole- palbociclib since ~2021     HISTORY OF PRESENT ILLNESS    Elizabeth Buckner is a 84 y.o. woman here for IV breast cancer follow up. She continues compliant on daily anastrozole and 21 day cycle Ibrance- she has started a new cycle on 7/26. She has no new progression concerns.    She reports stable arthritic pains in her knees. She is taking gabapentin and tylenol as needed and has added pineapple juice which she has read will help with inflammation.     She reports appetite is stable. She is using daily MVI.  She continues with baseline issues with constipation. She reports normal urination.    She reports baseline right arm lymphedema that is stable. She continues to use the lymphedema pump a few days per week    She denies any chest pain or breathing issues, sob or cough.      She denies any  vision changes or headache issues, dizziness or loss of balance, no new neuropathy or falls     She denies any skin lesions or masses, oral sores lesions or infections    She denies any issues with sleep, baseline fatigue.       Review of Systems   ROS 14 points performed, See HPI for exceptions        Past Medical History:  has a past medical history of Acute frontal sinusitis, unspecified (11/21/2018), Chronic kidney disease, stage 2 (mild) (12/20/2018), Combined forms of age-related cataract, left eye (04/12/2019), Combined forms of age-related cataract, right eye (04/12/2019), 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 (04/12/2019), Dry eye syndrome of right lacrimal gland (04/12/2019), Encounter for allergy testing, Encounter for general adult medical examination without abnormal findings (07/24/2020), Encounter for general adult medical examination without abnormal findings (07/19/2021), Encounter for general adult medical examination without abnormal findings (03/06/2018), Other obesity due to excess calories (10/15/2020), Pain in unspecified knee (03/09/2018), Personal history of other diseases of the digestive system (07/10/2018), Personal history of other diseases of the female genital tract (09/08/2016), Personal history of other diseases of the female genital tract, Personal history of other diseases of the nervous system and sense organs (04/12/2019), Personal history of other diseases of the respiratory system (01/02/2020), and Personal history of other diseases of urinary system (01/22/2021).  Surgical History:   has a past surgical history that includes Hysterectomy (03/29/2013); Breast surgery (03/29/2013); and US guided mediastinum percutaneous biopsy (9/22/2021).  Social History:   reports that she has quit smoking. Her smoking use included cigarettes. She has never used smokeless tobacco. She reports that she does not drink alcohol and does not use drugs.  Family History:    Family History   Problem  Relation Name Age of Onset    Cancer Other          Family History    Lung disease Other          Family History     Family Oncology History:  Cancer-related family history includes Cancer in an other family member.      OBJECTIVE    VS / Pain:  /82   Pulse 86   Temp 36.7 °C (98.1 °F)   Resp 18   Wt 76.5 kg (168 lb 10.4 oz)   SpO2 97%   BMI 34.50 kg/m²   BSA: There is no height or weight on file to calculate BSA.   Pain Scale: 5    Performance Status:  The ECOG performance scale today is ECO      Physical Exam   Constitutional: Well developed, awake/alert/oriented x4, no distress, alert and cooperative  EYES: Sclera clear  ENMT: mucous membranes moist, no apparent injury, no lesions seen  Head/Neck: Neck supple, no apparent injury, thyroid without mass or tenderness, No JVD, trachea midline, no bruits  Respiratory / Thoracic: Patent airways, clear to all lobes, normal breath sounds with good chest expansion, thorax symmetric.  Cardiovascular: Regular, rate and rhythm, no murmurs, 2+ equal pulses of the extremities, normal auscultated S 1and S 2  GI: Nondistended, soft, non-tender, no rebound tenderness or guarding, no masses palpable, no organomegaly, +BS, no bruits  Musculoskeletal: ROM intact, no joint swelling, normal strength, no spinal tenderness  Extremities: normal extremities, no cyanosis edema, contusions or wounds, no clubbing  Neurological: alert and oriented x4, intact senses, motor, response and reflexes, normal strength  Breast: Right mastectomy with right latissimus dorsi reconstruction, Left mastectomy. No palpable masses or lesions   Lymphatic: No cervical, supraclavicular, infraclavicular or axillary lymphadenopathy  Psychological: Appropriate and talkative mood and behavior  Skin: Warm and dry, no lesions, no rashes, no jaundice      Diagnostic Results   === 23 ===    CT CHEST ABDOMEN PELVIS WO CONTRAST    - Impression -  No significant change in the appearance of the chest,  abdomen, and  pelvis when compared to the previous study of 03/27/2023. There are  unchanged findings as detailed above including evidence of a chronic  colovesical fistula.    MACRO:  None.    Signed by: Jose Antonio Baum 12/13/2023 10:38 AM  Dictation workstation:   [id number]   No results found for this or any previous visit from the past 365 days.     No images are attached to the encounter.    LABORATORY/PATHOLOGY DATA-   Lab Results   Component Value Date    WBC 2.6 (L) 06/13/2024    HGB 10.1 (L) 06/13/2024    HCT 31.7 (L) 06/13/2024    MCV 97 06/13/2024     (L) 06/13/2024          Chemistry    Lab Results   Component Value Date/Time     06/13/2024 0842    K 4.3 06/13/2024 0842     06/13/2024 0842    CO2 28 06/13/2024 0842    BUN 14 06/13/2024 0842    CREATININE 1.23 (H) 06/13/2024 0842    Lab Results   Component Value Date/Time    CALCIUM 9.2 06/13/2024 0842    ALKPHOS 86 06/13/2024 0842    AST 19 06/13/2024 0842    ALT 16 06/13/2024 0842    BILITOT 0.4 06/13/2024 0842          Latest Reference Range & Units 04/30/24 14:21   CA 27.29 0.0 - 38.6 U/mL 18.3         IMPRESSION/PLAN      1. Recurrent ER+/HER2- breast cancer with isolated supraclav recurrence.  Continue present therapy. Performing CT scans non contrast given CKD 11/1/24 with 9-12 month recall. We would do scan sooner with increased symptoms      2. Bone health.    DEXA 5/17/22 with NORMAL bone density, repeated June 2024 with continued normal results.  .        We discussed the clinical significance of diagnosis, goals of care and treatment plan in detail.     At least 25 minutes of direct consultation was spent with the patient today reviewing her cancer care plan, educating and answering questions regarding ongoing follow up, greater than 50% in counseling and coordination of care          -------------------------------------------------------------------------------------------------------------------------------  Nayeli R  Kieran MSN, APRN, FNP-C  [facility]  Division of Medical Oncology- Breast   Collaborating Physician Dr. William Dove   Team Nurse Partners Gilda Ruiz, Kaylyn Gregory and Maria Elena Prieto  [place] [id number]  Phone: [phone]  Fax: [phone]  Available via Secure Chat    Confidential Peer Review Document-  Privilege  Privileged Pursuant to [place] Revised Code Section 2305.24, .25, .251 & .252

## 2024-07-30 NOTE — PATIENT INSTRUCTIONS
Dr William Dove follow up 11/6/24 with CT scan Please continue daily anastrozole and 21 day cycle of ibrance.      Repeat bone density testing July 2024 with normal results     Planned repeat [facility] CT for IV breast cancer and labs on 11/1/24     Please call [phone] with any questions or concerns prior to your next office visit.

## 2024-09-03 ENCOUNTER — TELEPHONE (OUTPATIENT)
Dept: HEMATOLOGY/ONCOLOGY | Facility: CLINIC | Age: 84
End: 2024-09-03
Payer: COMMERCIAL

## 2024-09-04 DIAGNOSIS — C50.919 MALIGNANT NEOPLASM OF BREAST IN FEMALE, ESTROGEN RECEPTOR POSITIVE, UNSPECIFIED LATERALITY, UNSPECIFIED SITE OF BREAST (MULTI): ICD-10-CM

## 2024-09-04 DIAGNOSIS — Z17.0 MALIGNANT NEOPLASM OF BREAST IN FEMALE, ESTROGEN RECEPTOR POSITIVE, UNSPECIFIED LATERALITY, UNSPECIFIED SITE OF BREAST (MULTI): ICD-10-CM

## 2024-09-04 NOTE — TELEPHONE ENCOUNTER
New rx needed for Ibrance. Please indicate if [facility] or substitutes allow with a signature. [facility] has to be specified. Also states that previous rx was missing a date on fax sent. Please resubmit for new rx. Fax or electronic. Fax# [phone] or # [phone].

## 2024-09-10 DIAGNOSIS — C77.0 METASTASIS TO SUPRACLAVICULAR LYMPH NODE (MULTI): ICD-10-CM

## 2024-09-10 DIAGNOSIS — C50.919 MALIGNANT NEOPLASM OF FEMALE BREAST, UNSPECIFIED ESTROGEN RECEPTOR STATUS, UNSPECIFIED LATERALITY, UNSPECIFIED SITE OF BREAST (MULTI): Primary | ICD-10-CM

## 2024-09-10 NOTE — TELEPHONE ENCOUNTER
Elizabeth calls today to state that [facility] Ibrance [facility] needs to be resent as it was sent in as capsules and should be pills.    Message sent to team.

## 2024-09-17 ENCOUNTER — APPOINTMENT (OUTPATIENT)
Dept: PRIMARY CARE | Facility: CLINIC | Age: 84
End: 2024-09-17
Payer: COMMERCIAL

## 2024-09-17 ENCOUNTER — TELEPHONE (OUTPATIENT)
Dept: ADMISSION | Facility: HOSPITAL | Age: 84
End: 2024-09-17

## 2024-09-17 NOTE — TELEPHONE ENCOUNTER
"[facility] pharmacy requesting clarification of ibrance script.   Script reads \"swallow whole\", no cycle/dosing directions.   Please return call to clarify:   [phone] with reference # [id number]  "

## 2024-09-19 ENCOUNTER — APPOINTMENT (OUTPATIENT)
Dept: PRIMARY CARE | Facility: CLINIC | Age: 84
End: 2024-09-19
Payer: COMMERCIAL

## 2024-11-01 ENCOUNTER — HOSPITAL ENCOUNTER (OUTPATIENT)
Dept: RADIOLOGY | Facility: HOSPITAL | Age: 84
Discharge: HOME | End: 2024-11-01
Payer: COMMERCIAL

## 2024-11-01 ENCOUNTER — LAB (OUTPATIENT)
Dept: LAB | Facility: LAB | Age: 84
End: 2024-11-01
Payer: COMMERCIAL

## 2024-11-01 DIAGNOSIS — C50.919 MALIGNANT NEOPLASM OF BREAST IN FEMALE, ESTROGEN RECEPTOR POSITIVE, UNSPECIFIED LATERALITY, UNSPECIFIED SITE OF BREAST: ICD-10-CM

## 2024-11-01 DIAGNOSIS — C50.919 MALIGNANT NEOPLASM OF FEMALE BREAST, UNSPECIFIED ESTROGEN RECEPTOR STATUS, UNSPECIFIED LATERALITY, UNSPECIFIED SITE OF BREAST: ICD-10-CM

## 2024-11-01 DIAGNOSIS — Z79.811 AROMATASE INHIBITOR USE: ICD-10-CM

## 2024-11-01 DIAGNOSIS — I97.2 POSTMASTECTOMY LYMPHEDEMA SYNDROME: ICD-10-CM

## 2024-11-01 DIAGNOSIS — C77.0 METASTASIS TO SUPRACLAVICULAR LYMPH NODE (MULTI): ICD-10-CM

## 2024-11-01 DIAGNOSIS — Z51.11 ENCOUNTER FOR ANTINEOPLASTIC CHEMOTHERAPY: ICD-10-CM

## 2024-11-01 DIAGNOSIS — Z17.0 MALIGNANT NEOPLASM OF BREAST IN FEMALE, ESTROGEN RECEPTOR POSITIVE, UNSPECIFIED LATERALITY, UNSPECIFIED SITE OF BREAST: ICD-10-CM

## 2024-11-01 DIAGNOSIS — N18.9 CHRONIC KIDNEY DISEASE, UNSPECIFIED CKD STAGE: ICD-10-CM

## 2024-11-01 LAB
ALBUMIN SERPL BCP-MCNC: 3.7 G/DL (ref 3.4–5)
ALP SERPL-CCNC: 98 U/L (ref 33–136)
ALT SERPL W P-5'-P-CCNC: 22 U/L (ref 7–45)
ANION GAP SERPL CALC-SCNC: 9 MMOL/L (ref 10–20)
AST SERPL W P-5'-P-CCNC: 28 U/L (ref 9–39)
BASOPHILS # BLD AUTO: 0.07 X10*3/UL (ref 0–0.1)
BASOPHILS NFR BLD AUTO: 1.7 %
BILIRUB SERPL-MCNC: 0.6 MG/DL (ref 0–1.2)
BUN SERPL-MCNC: 22 MG/DL (ref 6–23)
CALCIUM SERPL-MCNC: 9.2 MG/DL (ref 8.6–10.3)
CANCER AG27-29 SERPL-ACNC: 10.5 U/ML (ref 0–38.6)
CHLORIDE SERPL-SCNC: 104 MMOL/L (ref 98–107)
CO2 SERPL-SCNC: 29 MMOL/L (ref 21–32)
CREAT SERPL-MCNC: 1.15 MG/DL (ref 0.5–1.05)
EGFRCR SERPLBLD CKD-EPI 2021: 47 ML/MIN/1.73M*2
EOSINOPHIL # BLD AUTO: 0.1 X10*3/UL (ref 0–0.4)
EOSINOPHIL NFR BLD AUTO: 2.5 %
ERYTHROCYTE [DISTWIDTH] IN BLOOD BY AUTOMATED COUNT: 15.3 % (ref 11.5–14.5)
GLUCOSE SERPL-MCNC: 91 MG/DL (ref 74–99)
HCT VFR BLD AUTO: 33.9 % (ref 36–46)
HGB BLD-MCNC: 11.2 G/DL (ref 12–16)
IMM GRANULOCYTES # BLD AUTO: 0.01 X10*3/UL (ref 0–0.5)
IMM GRANULOCYTES NFR BLD AUTO: 0.2 % (ref 0–0.9)
LYMPHOCYTES # BLD AUTO: 1.64 X10*3/UL (ref 0.8–3)
LYMPHOCYTES NFR BLD AUTO: 40.5 %
MCH RBC QN AUTO: 31 PG (ref 26–34)
MCHC RBC AUTO-ENTMCNC: 33 G/DL (ref 32–36)
MCV RBC AUTO: 94 FL (ref 80–100)
MONOCYTES # BLD AUTO: 0.38 X10*3/UL (ref 0.05–0.8)
MONOCYTES NFR BLD AUTO: 9.4 %
NEUTROPHILS # BLD AUTO: 1.85 X10*3/UL (ref 1.6–5.5)
NEUTROPHILS NFR BLD AUTO: 45.7 %
NRBC BLD-RTO: 0 /100 WBCS (ref 0–0)
PLATELET # BLD AUTO: 254 X10*3/UL (ref 150–450)
POTASSIUM SERPL-SCNC: 4.4 MMOL/L (ref 3.5–5.3)
PROT SERPL-MCNC: 6.4 G/DL (ref 6.4–8.2)
RBC # BLD AUTO: 3.61 X10*6/UL (ref 4–5.2)
SODIUM SERPL-SCNC: 138 MMOL/L (ref 136–145)
WBC # BLD AUTO: 4.1 X10*3/UL (ref 4.4–11.3)

## 2024-11-01 PROCEDURE — 36415 COLL VENOUS BLD VENIPUNCTURE: CPT

## 2024-11-01 PROCEDURE — 86300 IMMUNOASSAY TUMOR CA 15-3: CPT

## 2024-11-01 PROCEDURE — 74176 CT ABD & PELVIS W/O CONTRAST: CPT

## 2024-11-01 PROCEDURE — 85025 COMPLETE CBC W/AUTO DIFF WBC: CPT

## 2024-11-01 PROCEDURE — 80053 COMPREHEN METABOLIC PANEL: CPT

## 2024-11-06 ENCOUNTER — OFFICE VISIT (OUTPATIENT)
Dept: HEMATOLOGY/ONCOLOGY | Facility: CLINIC | Age: 84
End: 2024-11-06
Payer: COMMERCIAL

## 2024-11-06 ENCOUNTER — TELEPHONE (OUTPATIENT)
Dept: HEMATOLOGY/ONCOLOGY | Facility: HOSPITAL | Age: 84
End: 2024-11-06
Payer: COMMERCIAL

## 2024-11-06 DIAGNOSIS — C50.919 MALIGNANT NEOPLASM OF FEMALE BREAST, UNSPECIFIED ESTROGEN RECEPTOR STATUS, UNSPECIFIED LATERALITY, UNSPECIFIED SITE OF BREAST: Primary | ICD-10-CM

## 2024-11-06 NOTE — TELEPHONE ENCOUNTER
Attempted to call patient to go over CT scan results but phone kept ringing and unable to leave voicemail message

## 2024-11-08 ENCOUNTER — LAB (OUTPATIENT)
Dept: LAB | Facility: CLINIC | Age: 84
End: 2024-11-08
Payer: COMMERCIAL

## 2024-11-08 ENCOUNTER — HOSPITAL ENCOUNTER (OUTPATIENT)
Dept: RADIATION ONCOLOGY | Facility: CLINIC | Age: 84
Setting detail: RADIATION/ONCOLOGY SERIES
Discharge: HOME | End: 2024-11-08
Payer: COMMERCIAL

## 2024-11-08 ENCOUNTER — TELEPHONE (OUTPATIENT)
Dept: HEMATOLOGY/ONCOLOGY | Facility: HOSPITAL | Age: 84
End: 2024-11-08
Payer: COMMERCIAL

## 2024-11-08 VITALS
BODY MASS INDEX: 34.23 KG/M2 | OXYGEN SATURATION: 97 % | HEART RATE: 82 BPM | TEMPERATURE: 97.5 F | WEIGHT: 167.33 LBS | RESPIRATION RATE: 18 BRPM | DIASTOLIC BLOOD PRESSURE: 77 MMHG | SYSTOLIC BLOOD PRESSURE: 144 MMHG

## 2024-11-08 DIAGNOSIS — Z17.0 MALIGNANT NEOPLASM OF BREAST IN FEMALE, ESTROGEN RECEPTOR POSITIVE, UNSPECIFIED LATERALITY, UNSPECIFIED SITE OF BREAST: ICD-10-CM

## 2024-11-08 DIAGNOSIS — Z51.11 ENCOUNTER FOR ANTINEOPLASTIC CHEMOTHERAPY: ICD-10-CM

## 2024-11-08 DIAGNOSIS — I97.2 POSTMASTECTOMY LYMPHEDEMA SYNDROME: ICD-10-CM

## 2024-11-08 DIAGNOSIS — C50.919 MALIGNANT NEOPLASM OF BREAST IN FEMALE, ESTROGEN RECEPTOR POSITIVE, UNSPECIFIED LATERALITY, UNSPECIFIED SITE OF BREAST: ICD-10-CM

## 2024-11-08 DIAGNOSIS — C77.0 METASTASIS TO SUPRACLAVICULAR LYMPH NODE (MULTI): ICD-10-CM

## 2024-11-08 DIAGNOSIS — Z79.811 AROMATASE INHIBITOR USE: ICD-10-CM

## 2024-11-08 DIAGNOSIS — C50.919 MALIGNANT NEOPLASM OF FEMALE BREAST, UNSPECIFIED ESTROGEN RECEPTOR STATUS, UNSPECIFIED LATERALITY, UNSPECIFIED SITE OF BREAST: ICD-10-CM

## 2024-11-08 LAB
CREAT SERPL-MCNC: 1.5 MG/DL (ref 0.6–1.3)
GFR SERPL CREATININE-BSD FRML MDRD: 34 ML/MIN/1.73M*2

## 2024-11-08 PROCEDURE — 99213 OFFICE O/P EST LOW 20 MIN: CPT | Performed by: NURSE PRACTITIONER

## 2024-11-08 PROCEDURE — 36415 COLL VENOUS BLD VENIPUNCTURE: CPT

## 2024-11-08 PROCEDURE — 82565 ASSAY OF CREATININE: CPT

## 2024-11-08 ASSESSMENT — PAIN SCALES - GENERAL: PAINLEVEL_OUTOF10: 8

## 2024-11-08 NOTE — PROGRESS NOTES
Radiation Oncology Follow-Up    Patient Name:  Elizabeth Buckner  MRN:  [id number]  :  1940    Referring Provider: Huma Sanchez, APR*  Primary Care Provider: Tova Espitia MD  Care Team: Patient Care Team:  Tova Espitia MD as PCP - General  [facility] PCP - [facility]  William Dove MD as Consulting Physician (Hematology and Oncology)    Date of Service: 2024     Cancer Staging:          Gynecologic - Uterine - Carcinomas         AJCC Edition: 7th, Diagnosis Date: 2008, Stage 1A/grade 2 endometrial cancer,              Breast         AJCC Edition: 7th (AJCC), Diagnosis Date: 2017, IIIA, T2 pN2a M0          Breast         AJCC Edition: 7th (AJCC), Diagnosis Date: 11-Dec-2012, UNK, T2 NX M0      Treatment Synopsis:    84 yo female with bilateral breast cancer.   Right breast cancer diagnosed in , status post breast conserving surgery followed by radiation and tamoxifen. She experienced an in-breast recurrence in , for which she underwent a completion mastectomy.    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 Tumor receptors were ER<1% [place] 5% HER2 3+   Left supraclavicular fossa and axilla. Treatment dates: August 3, 2017, through 2017. Radiation treatment to the left supraclavicular fossa and axilla to a dose of 50 Gy/2 Gy per fraction/25  Radiation to the left chest wall and internal mammary jong chain.to a dose of 50 Gy/2 Gy per fraction/25 fractions. Herceptin through 2018.  2021 ER+/[place]+/HER2- breast cancer biopsy proven recurrence right supraclavicular LN, Initiated on anastrazole and palbociclib.   Localized radiation therapy to the SCV/axilla consisting of a dose of 60Gy completed 2022    SUBJECTIVE  History of Present Illness:   Elizabeth Buckner is here today for routine radiation follow-up.  She denies any new concerns. She is  currently on anastrazole. and Ibrance. She denies any side effects at this  time. She did have a dose reduction. No swelling of left  arm or difficulty with ROM.  She does have lymphedema of right arm and wears a sleeve/gauntlet and uses lymphedema pump a few times a week.  Her  is recovering from a recent stroke so she has been busy with his care. No headaches, fever, chills, cough, SOB, chest  pain, N/V or bony pain.  CT of CAP done on 11/1 with new and worsening right iliac chain lymphadenopathy. 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 Has spoken with Dr. oDve and getting [facility] blood work.     Review of Systems:    Review of Systems   All other systems reviewed and are negative.    Performance Status:   The Karnofsky performance scale today is 90, Able to carry on normal activity; minor signs or symptoms of disease (ECOG equivalent 0).      OBJECTIVE    Current Outpatient Medications:     anastrozole (Arimidex) 1 mg tablet, Take 1 tablet (1 mg total) by mouth once daily.  Take 1 tablet once daily. Swallow whole with a drink of water., Disp: 90 tablet, Rfl: 3    arm brace (Elbow Compression Sleeve) misc, Lymphedema Sleeve  and Gauntlet eval and fit 20-30 mmHG for right  arm, Disp: 5 each, Rfl: 2    aspirin 81 mg EC tablet, Take 1 tablet (81 mg) by mouth once daily. Take 1 tablet daily, Disp: , Rfl:     azelastine (Optivar) 0.05 % ophthalmic solution, Administer 2 drops into both eyes once daily., Disp: , Rfl:     carvedilol (Coreg) 12.5 mg tablet, Take 1 tablet (12.5 mg) by mouth 2 times a day. Take 1 tablet twice daily, Disp: 90 tablet, Rfl: 3    cholecalciferol, vitamin D3, (VITAMIN D3 ORAL), Take 25 mcg by mouth 1 (one) time each day. Take 1 tablet daily Vitamin D 25MCG (1000 UT) oral tablet, Disp: , Rfl:     diclofenac sodium (Voltaren Arthritis Pain) 1 % gel, Apply 4.5 inches (4 g) topically 4 times a day., Disp: 100 g, Rfl: 2    FLAXSEED OIL ORAL, Take 1,200 mg by mouth in the morning, at noon,  and at bedtime. Take 1 capsule 3 times daily, Disp: , Rfl:     fluticasone (Flonase) 50 mcg/actuation nasal spray, ADMINISTER 1 SPRAY INTO EACH NOSTRIL 2 TIMES A DAY. INSTILL 1 SPRAY IN EACH NOSTRIL TWICE DAILY, Disp: 48 mL, Rfl: 1    furosemide (Lasix) 20 mg tablet, Take 1 tablet (20 mg) by mouth once daily. Take 1 tablet by mouth every day, Disp: 90 tablet, Rfl: 3    gabapentin (Neurontin) 300 mg capsule, Take 1 capsule (300 mg) by mouth 3 times a day. Take one capsule by mouth three times a day, Disp: 270 capsule, Rfl: 0    gabapentin (Neurontin) 300 mg capsule, Take 1 capsule (300 mg) by mouth 3 times a day., Disp: 90 capsule, Rfl: 2    hydrALAZINE (Apresoline) 100 mg tablet, Take 1 tablet (100 mg) by mouth 2 times a day. Take 1 tablet by mouth twice per day, Disp: 180 tablet, Rfl: 3    palbociclib (Ibrance) 75 mg tablet, Swallow whole., Disp: 21 tablet, Rfl: 5    TURMERIC ORAL, Take 400 mg by mouth 1 (one) time each day. Take 1 capsule daily, Disp: , Rfl:      Physical Exam  Constitutional:       Appearance: Normal appearance.   HENT:      Head: Normocephalic and atraumatic.      Nose: Nose normal.      Mouth/Throat:      Mouth: Mucous membranes are moist.      Pharynx: Oropharynx is clear.   Eyes:      Conjunctiva/sclera: Conjunctivae normal.      Pupils: Pupils are equal, round, and reactive to light.   Cardiovascular:      Rate and Rhythm: Normal rate.      Heart sounds: Normal heart sounds. No murmur heard.     No gallop.   Pulmonary:      Effort: Pulmonary effort is normal.      Breath sounds: Normal breath sounds.   Chest:   Breasts:     Right: Absent. No mass or skin change.      Left: Absent. No mass or skin change.      Comments: Bilateral surgical incisions well healed.  Abdominal:      Palpations: Abdomen is soft.   Musculoskeletal:         General: Normal range of motion.      Cervical back: Normal range of motion.      Comments: Right arm lymphedema extending to hand. Wearing a sleeve today.     Lymphadenopathy:      Cervical: No cervical adenopathy.      Upper Body:      Right upper body: No supraclavicular or axillary adenopathy.      Left upper body: No supraclavicular or axillary adenopathy.   Skin:     General: Skin is warm and dry.   Neurological:      General: No focal deficit present.      Mental Status: She is alert and oriented to person, place, and time.   Psychiatric:         Mood and Affect: Mood normal.         Behavior: Behavior normal.           RESULTS:  CT chest abdomen pelvis wo IV contrast    Result Date: 11/4/2024  Interpreted By:  Humberto Whitaker and Maltbie Grace STUDY: CT CHEST ABDOMEN PELVIS WO CONTRAST;  11/1/2024 1:46 pm   INDICATION: Signs/Symptoms:IV breast cancer restaging scans, non contrast due to CKD.   ,C50.919 Malignant neoplasm of unspecified site of unspecified female breast,Z51.11 Encounter for antineoplastic chemotherapy,Z79.811 Long term (current) use of aromatase inhibitors,C77.0 Secondary and unspecified malignant neoplasm of lymph nodes of head, face and neck,C50.919 Malignant neoplasm of unspecified site of unspecified female breast,Z17.0 Estrogen receptor positive status (ER+),N18.9 Chronic kidney disease, unspecified     COMPARISON: CT chest abdomen pelvis 12/12/2023   ACCESSION NUMBER(S): [id number]   ORDERING CLINICIAN: SORIN SWIFT   TECHNIQUE: CT of the chest, abdomen and pelvis was performed. Contiguous axial images were obtained at 3 mm slice thickness through the chest, abdomen and pelvis. Coronal and sagittal reconstructions at 3 mm slice thickness were performed.  No intravenous or oral contrast agents were administered.   FINDINGS: Please note that the study is limited without intravenous contrast.   CHEST:   LUNG/PLEURA/LARGE AIRWAYS: No consolidation, pleural effusion or pneumothorax. Stable subpleural reticulations along the anterior bilateral upper lobes, consistent with postradiation changes. Stable right lower lobe granuloma. No  concerning lung nodule. Trachea and right and left main bronchi are patent.   VESSELS: Aorta and pulmonary arteries are normal caliber. Moderate atherosclerotic changes of the aorta are identified. Mild coronary artery calcifications are present.   HEART: The heart is normal in size. No pericardial effusion   MEDIASTINUM AND CHAO: No mediastinal, hilar or axillary lymphadenopathy is present. 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 The esophagus is normal.   CHEST WALL AND LOWER NECK: Stable postsurgical changes of bilateral mastectomy. The visualized thyroid gland appears within normal limits.   ABDOMEN:   LIVER: The liver is normal in size without evidence of focal liver lesions. Scattered granulomas noted.   BILE DUCTS: No biliary dilatation.   GALLBLADDER: Cholecystectomy   PANCREAS: The pancreas appears unremarkable without evidence of ductal dilatation or masses.   SPLEEN: The spleen is normal in size.   ADRENAL GLANDS: No adrenal nodule or thickening.   KIDNEYS AND URETERS: The kidneys are normal in size. Stable subcentimeter hypoattenuating renal lesions, incompletely characterized without intravenous contrast. No hydroureteronephrosis or nephroureterolithiasis is identified.   PELVIS:   BLADDER: Air is again noted within the nondependent portion of the bladder. Bladder wall thickening and stranding is again seen. There is no definite fat plane between the lateral wall of the bladder and sigmoid colon as seen on series 201, image 156, similar to prior exam, raising concern for a chronic colovesicular fistula.   REPRODUCTIVE ORGANS: No pelvic masses.   BOWEL: The stomach is unremarkable. The small and large bowel are normal in caliber and demonstrate no wall thickening. Normal appendix. Prominent sigmoid diverticulosis. There is no definite fat plane between the lateral wall of the bladder and sigmoid colon as seen on series 201, image 156, this is similar in appearance as compared to  prior exam.   VESSELS: Atherosclerotic calcifications of the aortoiliac arteries. The IVC appears normal.   PERITONEUM/RETROPERITONEUM/LYMPH NODES: New 34 x 25 mm right common iliac lymph node on series 201, image 134. Interval increase in size of a 31 x 27 mm right pelvic sidewall/right internal iliac lymph node on series 201, image 149, previously 22 x 18 mm. New right external iliac 23 x 12 mm lymph node on series 201, image 154. Additional subcentimeter aortocaval and para-aortic lymph nodes are noted, which bears attention on future examinations.   No ascites or fluid collection.   BONE AND SOFT TISSUE: No suspicious osseous lesions are identified. No soft tissue masses in the abdominal wall.       IDC breast cancer restaging scan, in comparison to prior CT from December 2023, within limitations of a noncontrast exam: 1. New and worsening right iliac chain lymphadenopathy. Further evaluation with PET-CT and tissue diagnosis is recommended to rule out 2nd malignancy. 2. 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 3. Additional stable chronic incidental findings as detailed above.   I personally reviewed the images/study and I agree with the findings as stated by June Goldberg MD. This study was interpreted at [place], [place].   MACRO: None   Signed by: Humberto Whitaker 11/4/2024 8:11 PM Dictation workstation:   [id number]      ASSESSMENT/PLAN:  84 y.o. female with metastatic breast cancer right supraclavicular fossa s/p excision followed by radiation.  Recent staging CT with likely progression.  [facility] labs today and will follow up with Dr. Dove. Radiation follow up now on prn basis.  Call with any concerns.     Candace Sanchez CNP  [phone]

## 2024-11-08 NOTE — TELEPHONE ENCOUNTER
I called patient and we went over CT scan results. Plan is for her to go to lab today at [facility] to get blood tempus done

## 2024-11-13 ENCOUNTER — ONCOLOGY MEDICATION OUTREACH (OUTPATIENT)
Dept: HEMATOLOGY/ONCOLOGY | Facility: CLINIC | Age: 84
End: 2024-11-13
Payer: COMMERCIAL

## 2024-11-13 ENCOUNTER — OFFICE VISIT (OUTPATIENT)
Dept: HEMATOLOGY/ONCOLOGY | Facility: CLINIC | Age: 84
End: 2024-11-13
Payer: COMMERCIAL

## 2024-11-13 VITALS
HEART RATE: 80 BPM | TEMPERATURE: 98.1 F | BODY MASS INDEX: 33.83 KG/M2 | DIASTOLIC BLOOD PRESSURE: 80 MMHG | SYSTOLIC BLOOD PRESSURE: 180 MMHG | WEIGHT: 165.34 LBS

## 2024-11-13 DIAGNOSIS — Z51.11 ENCOUNTER FOR ANTINEOPLASTIC CHEMOTHERAPY: ICD-10-CM

## 2024-11-13 DIAGNOSIS — Z17.0 MALIGNANT NEOPLASM OF BREAST IN FEMALE, ESTROGEN RECEPTOR POSITIVE, UNSPECIFIED LATERALITY, UNSPECIFIED SITE OF BREAST: ICD-10-CM

## 2024-11-13 DIAGNOSIS — Z79.811 AROMATASE INHIBITOR USE: ICD-10-CM

## 2024-11-13 DIAGNOSIS — C77.0 METASTASIS TO SUPRACLAVICULAR LYMPH NODE (MULTI): ICD-10-CM

## 2024-11-13 DIAGNOSIS — I97.2 POSTMASTECTOMY LYMPHEDEMA SYNDROME: ICD-10-CM

## 2024-11-13 DIAGNOSIS — C50.919 MALIGNANT NEOPLASM OF BREAST IN FEMALE, ESTROGEN RECEPTOR POSITIVE, UNSPECIFIED LATERALITY, UNSPECIFIED SITE OF BREAST: ICD-10-CM

## 2024-11-13 DIAGNOSIS — C50.919 MALIGNANT NEOPLASM OF FEMALE BREAST, UNSPECIFIED ESTROGEN RECEPTOR STATUS, UNSPECIFIED LATERALITY, UNSPECIFIED SITE OF BREAST: ICD-10-CM

## 2024-11-13 PROCEDURE — 99417 PROLNG OP E/M EACH 15 MIN: CPT | Performed by: INTERNAL MEDICINE

## 2024-11-13 PROCEDURE — 1126F AMNT PAIN NOTED NONE PRSNT: CPT | Performed by: INTERNAL MEDICINE

## 2024-11-13 PROCEDURE — 1159F MED LIST DOCD IN RCRD: CPT | Performed by: INTERNAL MEDICINE

## 2024-11-13 PROCEDURE — 3079F DIAST BP 80-89 MM HG: CPT | Performed by: INTERNAL MEDICINE

## 2024-11-13 PROCEDURE — 99214 OFFICE O/P EST MOD 30 MIN: CPT | Performed by: INTERNAL MEDICINE

## 2024-11-13 PROCEDURE — 1160F RVW MEDS BY RX/DR IN RCRD: CPT | Performed by: INTERNAL MEDICINE

## 2024-11-13 PROCEDURE — 3077F SYST BP >= 140 MM HG: CPT | Performed by: INTERNAL MEDICINE

## 2024-11-13 PROCEDURE — 1157F ADVNC CARE PLAN IN RCRD: CPT | Performed by: INTERNAL MEDICINE

## 2024-11-13 ASSESSMENT — PAIN SCALES - GENERAL: PAINLEVEL_OUTOF10: 0-NO PAIN

## 2024-11-13 NOTE — PATIENT INSTRUCTIONS
Please call us at [phone] option 5 then option 2 with any questions or concerns       Follow-up with Dr Dove in about 8 weeks     Dr Dove will call you with the blood mutation test results. If they are negative, we might need to get a biopsy.

## 2024-11-14 ENCOUNTER — OFFICE VISIT (OUTPATIENT)
Dept: PRIMARY CARE | Facility: CLINIC | Age: 84
End: 2024-11-14
Payer: COMMERCIAL

## 2024-11-14 VITALS
RESPIRATION RATE: 12 BRPM | BODY MASS INDEX: 33.35 KG/M2 | DIASTOLIC BLOOD PRESSURE: 79 MMHG | SYSTOLIC BLOOD PRESSURE: 157 MMHG | OXYGEN SATURATION: 93 % | HEART RATE: 88 BPM | WEIGHT: 163 LBS

## 2024-11-14 DIAGNOSIS — R30.0 BURNING WITH URINATION: ICD-10-CM

## 2024-11-14 DIAGNOSIS — N30.00 ACUTE CYSTITIS WITHOUT HEMATURIA: Primary | ICD-10-CM

## 2024-11-14 LAB
POC APPEARANCE, URINE: CLEAR
POC BILIRUBIN, URINE: NEGATIVE
POC BLOOD, URINE: ABNORMAL
POC COLOR, URINE: ABNORMAL
POC GLUCOSE, URINE: NEGATIVE MG/DL
POC KETONES, URINE: NEGATIVE MG/DL
POC LEUKOCYTES, URINE: ABNORMAL
POC NITRITE,URINE: NEGATIVE
POC PH, URINE: 5 PH
POC PROTEIN, URINE: NEGATIVE MG/DL
POC SPECIFIC GRAVITY, URINE: 1.01
POC UROBILINOGEN, URINE: 0.2 EU/DL

## 2024-11-14 PROCEDURE — 3078F DIAST BP <80 MM HG: CPT | Performed by: INTERNAL MEDICINE

## 2024-11-14 PROCEDURE — 99214 OFFICE O/P EST MOD 30 MIN: CPT | Performed by: INTERNAL MEDICINE

## 2024-11-14 PROCEDURE — 3077F SYST BP >= 140 MM HG: CPT | Performed by: INTERNAL MEDICINE

## 2024-11-14 PROCEDURE — 81002 URINALYSIS NONAUTO W/O SCOPE: CPT | Performed by: INTERNAL MEDICINE

## 2024-11-14 PROCEDURE — 1157F ADVNC CARE PLAN IN RCRD: CPT | Performed by: INTERNAL MEDICINE

## 2024-11-14 RX ORDER — LEVOFLOXACIN 500 MG/1
500 TABLET, FILM COATED ORAL DAILY
Qty: 7 TABLET | Refills: 0 | Status: SHIPPED | OUTPATIENT
Start: 2024-11-14 | End: 2024-11-21

## 2024-11-14 ASSESSMENT — ENCOUNTER SYMPTOMS
NUMBNESS: 0
HEMATURIA: 0
BRUISES/BLEEDS EASILY: 0
CONSTIPATION: 0
HEADACHES: 0
DIAPHORESIS: 0
ABDOMINAL PAIN: 0
SEIZURES: 0
FEVER: 0
CHILLS: 0
FATIGUE: 0
DIFFICULTY URINATING: 0
NERVOUS/ANXIOUS: 0
RESPIRATORY NEGATIVE: 1
CARDIOVASCULAR NEGATIVE: 1
EYES NEGATIVE: 1
HOT FLASHES: 0
WHEEZING: 0
NAUSEA: 0
SLEEP DISTURBANCE: 0
PALPITATIONS: 0
MYALGIAS: 0
APPETITE CHANGE: 0
DYSURIA: 0
ADENOPATHY: 0
ABDOMINAL DISTENTION: 0
EYE PROBLEMS: 0
COUGH: 0
DIARRHEA: 0
SHORTNESS OF BREATH: 0
CHEST TIGHTNESS: 0
BLOOD IN STOOL: 0
CONSTITUTIONAL NEGATIVE: 1
DIZZINESS: 0
SCLERAL ICTERUS: 0
EXTREMITY WEAKNESS: 0
CONFUSION: 0
BACK PAIN: 0
ARTHRALGIAS: 0
HEMOPTYSIS: 0
LEG SWELLING: 0
FREQUENCY: 0
DEPRESSION: 0

## 2024-11-14 NOTE — ASSESSMENT & PLAN NOTE
Frequency, burning with urination for a few days  Trace blood and leukocytes on UA    Levaquin 500 once a day for 7 days  Encouraged pt to drink enough water, start vitamin C supplement, cranberry tablet

## 2024-11-14 NOTE — PROGRESS NOTES
Breast Medical Oncology Clinic  Location: [facility]      BREAST CANCER DIAGNOSIS  Breast cancer (Multi), Clinical: Stage IV (cN1, cM1, ER+, AL+, HER2+)       ONCOLOGIC HISTORY  Right breast cancer diagnosed in 1991, status post breast conserving surgery followed by radiation and tamoxifen. She experienced an in-breast recurrence in 2012, for which she underwent a completion mastectomy.    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 Tumor receptors were ER<1% [place] 5% HER2 3+   Left supraclavicular fossa and axilla. Treatment dates: August 3, 2017, through September 7, 2017. Radiation treatment to the left supraclavicular fossa and axilla to a dose of 50 Gy/2 Gy per fraction/25  Radiation to the left chest wall and internal mammary jong chain.to a dose of 50 Gy/2 Gy per fraction/25 fractions. Herceptin through February 2018.  November 2021 ER+/AL+/HER2- breast cancer biopsy proven recurrence right supraclavicular LN, Initiated on anastrazole and palbociclib. Localized radiation therapy completion 4/2022       CURRENT THERAPY  Anastrazole- palbociclib since ~2021     HISTORY OF PRESENT ILLNESS    Elizabeth Buckner is a 84 y.o. woman here for follow-up. Recent CT shows some potential progression with increased pelvic nodes. But she is asymptomatic.              Review of Systems   Constitutional: Negative.  Negative for appetite change, chills, diaphoresis, fatigue and fever.   HENT:  Negative.  Negative for hearing loss and lump/mass.    Eyes: Negative.  Negative for eye problems and icterus.   Respiratory: Negative.  Negative for chest tightness, cough, hemoptysis, shortness of breath and wheezing.    Cardiovascular: Negative.  Negative for chest pain, leg swelling and palpitations.   Gastrointestinal:  Negative for abdominal distention, abdominal pain, blood in stool, constipation, diarrhea and nausea.   Endocrine: Negative  for hot flashes.   Genitourinary:  Negative for bladder incontinence, difficulty urinating, dyspareunia, dysuria, frequency and hematuria.    Musculoskeletal:  Negative for arthralgias, back pain, gait problem and myalgias.   Neurological:  Negative for dizziness, extremity weakness, gait problem, headaches, numbness and seizures.   Hematological:  Negative for adenopathy. Does not bruise/bleed easily.   Psychiatric/Behavioral:  Negative for confusion, depression and sleep disturbance. The patient is not nervous/anxious.    All other systems reviewed and are negative.        Past Medical History:  has a past medical history of Acute frontal sinusitis, unspecified (11/21/2018), Chronic kidney disease, stage 2 (mild) (12/20/2018), Combined forms of age-related cataract, left eye (04/12/2019), Combined forms of age-related cataract, right eye (04/12/2019), 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 (04/12/2019), Dry eye syndrome of right lacrimal gland (04/12/2019), Encounter for allergy testing, Encounter for general adult medical examination without abnormal findings (07/24/2020), Encounter for general adult medical examination without abnormal findings (07/19/2021), Encounter for general adult medical examination without abnormal findings (03/06/2018), Other obesity due to excess calories (10/15/2020), Pain in unspecified knee (03/09/2018), Personal history of other diseases of the digestive system (07/10/2018), Personal history of other diseases of the female genital tract (09/08/2016), Personal history of other diseases of the female genital tract, Personal history of other diseases of the nervous system and sense organs (04/12/2019), Personal history of other diseases of the respiratory system (01/02/2020), and Personal history of other diseases of urinary system (01/22/2021).  Surgical History:   has a past surgical history that includes Hysterectomy (03/29/2013); Breast surgery (03/29/2013); and US guided  mediastinum percutaneous biopsy (2021).  Social History:   reports that she has quit smoking. Her smoking use included cigarettes. She has never used smokeless tobacco. She reports that she does not drink alcohol and does not use drugs.  Family History:    Family History   Problem Relation Name Age of Onset    Cancer Other          Family History    Lung disease Other          Family History     Family Oncology History:  Cancer-related family history includes Cancer in an other family member.      OBJECTIVE    VS / Pain:  /80 (BP Location: Left arm, Patient Position: Sitting, BP Cuff Size: Small adult)   Pulse 80   Temp 36.7 °C (98.1 °F) (Temporal)   Wt 75 kg (165 lb 5.5 oz)   BMI 33.83 kg/m²   BSA: 1.76 meters squared   Pain Scale: 3    Performance Status:  The ECOG performance scale today is ECO- Ambulatory and  capable of all selfcare; unable to carry out work activities.  Up and about > 50% of waking hrs.    Physical Exam  Constitutional:       General: She is not in acute distress.     Appearance: She is not ill-appearing, toxic-appearing or diaphoretic.   HENT:      Nose: No congestion or rhinorrhea.      Mouth/Throat:      Pharynx: No posterior oropharyngeal erythema.   Cardiovascular:      Rate and Rhythm: Normal rate and regular rhythm.      Pulses: Normal pulses.      Heart sounds: Normal heart sounds. No murmur heard.     No gallop.   Pulmonary:      Breath sounds: Normal breath sounds.   Abdominal:      General: There is no distension.      Palpations: There is no mass.      Tenderness: There is no abdominal tenderness.   Musculoskeletal:         General: No swelling.      Cervical back: No rigidity.      Right lower leg: No edema.      Left lower leg: No edema.   Lymphadenopathy:      Cervical: No cervical adenopathy.   Skin:     General: Skin is warm.      Coloration: Skin is not cyanotic.      Findings: No bruising, ecchymosis or erythema.   Neurological:      General: No focal  deficit present.      Mental Status: She is oriented to person, place, and time.      Cranial Nerves: Cranial nerves 2-12 are intact.      Motor: Motor function is intact.   Psychiatric:         Attention and Perception: Attention and perception normal.         Mood and Affect: Mood and affect normal.         Behavior: Behavior normal.         Thought Content: Thought content normal.         Judgment: Judgment normal.             Diagnostic Results   === 11/01/24 ===    CT CHEST ABDOMEN PELVIS WO CONTRAST    - Impression -  IDC breast cancer restaging scan, in comparison to prior CT from  December 2023, within limitations of a noncontrast exam:  1. New and worsening right iliac chain lymphadenopathy. Further  evaluation with PET-CT and tissue diagnosis is recommended to rule  out 2nd malignancy.  2. 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  3. Additional stable chronic incidental findings as detailed above.    I personally reviewed the images/study and I agree with the findings  as stated by June Goldberg MD. This study was interpreted at  [place], [place].    MACRO:  None    Signed by: Humberto Whitaker 11/4/2024 8:11 PM  Dictation workstation:   [id number]       LABORATORY/PATHOLOGY DATA    Lab on 11/08/2024   Component Date Value Ref Range Status    POCT Creatinine 11/08/2024 1.50 (H)  0.60 - 1.30 mg/dL Final    POCT eGFR 11/08/2024 34 (L)  >=60 mL/min/1.73m*2 Final   Lab on 11/01/2024   Component Date Value Ref Range Status    Glucose 11/01/2024 91  74 - 99 mg/dL Final    Sodium 11/01/2024 138  136 - 145 mmol/L Final    Potassium 11/01/2024 4.4  3.5 - 5.3 mmol/L Final    Chloride 11/01/2024 104  98 - 107 mmol/L Final    Bicarbonate 11/01/2024 29  21 - 32 mmol/L Final    Anion Gap 11/01/2024 9 (L)  10 - 20 mmol/L Final    Urea Nitrogen 11/01/2024 22  6 - 23 mg/dL Final    Creatinine 11/01/2024 1.15 (H)  0.50 - 1.05 mg/dL Final    eGFR 11/01/2024 47  (L)  >60 mL/min/1.73m*2 Final    Calcium 11/01/2024 9.2  8.6 - 10.3 mg/dL Final    Albumin 11/01/2024 3.7  3.4 - 5.0 g/dL Final    Alkaline Phosphatase 11/01/2024 98  33 - 136 U/L Final    Total Protein 11/01/2024 6.4  6.4 - 8.2 g/dL Final    AST 11/01/2024 28  9 - 39 U/L Final    Bilirubin, Total 11/01/2024 0.6  0.0 - 1.2 mg/dL Final    ALT 11/01/2024 22  7 - 45 U/L Final    WBC 11/01/2024 4.1 (L)  4.4 - 11.3 x10*3/uL Final    nRBC 11/01/2024 0.0  0.0 - 0.0 /100 WBCs Final    RBC 11/01/2024 3.61 (L)  4.00 - 5.20 x10*6/uL Final    Hemoglobin 11/01/2024 11.2 (L)  12.0 - 16.0 g/dL Final    Hematocrit 11/01/2024 33.9 (L)  36.0 - 46.0 % Final    MCV 11/01/2024 94  80 - 100 fL Final    MCH 11/01/2024 31.0  26.0 - 34.0 pg Final    MCHC 11/01/2024 33.0  32.0 - 36.0 g/dL Final    RDW 11/01/2024 15.3 (H)  11.5 - 14.5 % Final    Platelets 11/01/2024 254  150 - 450 x10*3/uL Final    Neutrophils % 11/01/2024 45.7  40.0 - 80.0 % Final    Immature Granulocytes %, Automated 11/01/2024 0.2  0.0 - 0.9 % Final    Lymphocytes % 11/01/2024 40.5  13.0 - 44.0 % Final    Monocytes % 11/01/2024 9.4  2.0 - 10.0 % Final    Eosinophils % 11/01/2024 2.5  0.0 - 6.0 % Final    Basophils % 11/01/2024 1.7  0.0 - 2.0 % Final    Neutrophils Absolute 11/01/2024 1.85  1.60 - 5.50 x10*3/uL Final    Immature Granulocytes Absolute, Au* 11/01/2024 0.01  0.00 - 0.50 x10*3/uL Final    Lymphocytes Absolute 11/01/2024 1.64  0.80 - 3.00 x10*3/uL Final    Monocytes Absolute 11/01/2024 0.38  0.05 - 0.80 x10*3/uL Final    Eosinophils Absolute 11/01/2024 0.10  0.00 - 0.40 x10*3/uL Final    Basophils Absolute 11/01/2024 0.07  0.00 - 0.10 x10*3/uL Final    CA 27.29 11/01/2024 10.5  0.0 - 38.6 U/mL Final      Lab Results   Component Value Date    LABCA2 10.5 11/01/2024    LABCA2 18.3 04/30/2024             IMPRESSION/PLAN      1. Recurrent ER+/HER2- breast cancer with isolated supraclav recurrence.  Continue present therapy. For now. She is asymptomatic.  Nodes are  too difficult to biopsy so plan will likely be consider estradiol PET to see if these nodes are ER+ and active. We have ordered blood tempus. If it shows ESR1 mutation will change therapy likely to elacestrant.  Follow-up in about 2-3 months.     2. Bone health.    DEXA 5/17/22 with NORMAL bone density. Monitor. Repeat May 2024-bambi      We discussed the clinical significance of diagnosis, goals of care and treatment plan in detail.     I personally spent over half of a total 30  minutes face to face with the patient in counseling and discussion and/or coordination of care as described above.         -------------------------------------------------------------------------------------------------------------------------------  William Dove MD  Director of Breast Cancer Medical Oncology Research Program   [facility] [place]  Professor of Medicine  [facility] [address]  [place] [id number]  Phone: [phone]  [email or web address]

## 2024-11-14 NOTE — PROGRESS NOTES
Subjective   Chief complaint: Elizabeth Buckner is a 84 y.o. female who presents for Dizziness (.  Patient complains of having burning with urination, dizziness).    HPI:  85 yo female presents for suspected UTI  Pmhx: HLD, HTN, vit D deficiency    Objective   /79   Pulse 88   Resp 12   Wt 73.9 kg (163 lb)   SpO2 93%   BMI 33.35 kg/m²   Physical Exam  Constitutional:       Appearance: Normal appearance.   HENT:      Head: Normocephalic and atraumatic.   Cardiovascular:      Rate and Rhythm: Normal rate and regular rhythm.      Heart sounds: Normal heart sounds.   Pulmonary:      Effort: Pulmonary effort is normal.      Breath sounds: Normal breath sounds.   Musculoskeletal:      Cervical back: Normal range of motion and neck supple.   Skin:     General: Skin is warm and dry.   Neurological:      General: No focal deficit present.      Mental Status: She is alert and oriented to person, place, and time.   Psychiatric:         Mood and Affect: Mood normal.         Behavior: Behavior normal.         Thought Content: Thought content normal.         Judgment: Judgment normal.         I have reviewed and reconciled the medication list with the patient today.   Current Outpatient Medications:     anastrozole (Arimidex) 1 mg tablet, Take 1 tablet (1 mg total) by mouth once daily.  Take 1 tablet once daily. Swallow whole with a drink of water., Disp: 90 tablet, Rfl: 3    arm brace (Elbow Compression Sleeve) misc, Lymphedema Sleeve  and Gauntlet eval and fit 20-30 mmHG for right  arm, Disp: 5 each, Rfl: 2    aspirin 81 mg EC tablet, Take 1 tablet (81 mg) by mouth once daily. Take 1 tablet daily, Disp: , Rfl:     azelastine (Optivar) 0.05 % ophthalmic solution, Administer 2 drops into both eyes once daily., Disp: , Rfl:     carvedilol (Coreg) 12.5 mg tablet, Take 1 tablet (12.5 mg) by mouth 2 times a day. Take 1 tablet twice daily, Disp: 90 tablet, Rfl: 3    cholecalciferol, vitamin D3, (VITAMIN D3 ORAL), Take 25 mcg by  mouth 1 (one) time each day. Take 1 tablet daily Vitamin D 25MCG (1000 UT) oral tablet, Disp: , Rfl:     diclofenac sodium (Voltaren Arthritis Pain) 1 % gel, Apply 4.5 inches (4 g) topically 4 times a day., Disp: 100 g, Rfl: 2    FLAXSEED OIL ORAL, Take 1,200 mg by mouth in the morning, at noon, and at bedtime. Take 1 capsule 3 times daily, Disp: , Rfl:     fluticasone (Flonase) 50 mcg/actuation nasal spray, ADMINISTER 1 SPRAY INTO EACH NOSTRIL 2 TIMES A DAY. INSTILL 1 SPRAY IN EACH NOSTRIL TWICE DAILY, Disp: 48 mL, Rfl: 1    furosemide (Lasix) 20 mg tablet, Take 1 tablet (20 mg) by mouth once daily. Take 1 tablet by mouth every day, Disp: 90 tablet, Rfl: 3    gabapentin (Neurontin) 300 mg capsule, Take 1 capsule (300 mg) by mouth 3 times a day. Take one capsule by mouth three times a day, Disp: 270 capsule, Rfl: 0    gabapentin (Neurontin) 300 mg capsule, Take 1 capsule (300 mg) by mouth 3 times a day., Disp: 90 capsule, Rfl: 2    hydrALAZINE (Apresoline) 100 mg tablet, Take 1 tablet (100 mg) by mouth 2 times a day. Take 1 tablet by mouth twice per day, Disp: 180 tablet, Rfl: 3    palbociclib (Ibrance) 75 mg tablet, Swallow whole., Disp: 21 tablet, Rfl: 5    TURMERIC ORAL, Take 400 mg by mouth 1 (one) time each day. Take 1 capsule daily, Disp: , Rfl:      Imaging:  CT chest abdomen pelvis wo IV contrast    Result Date: 11/4/2024  Interpreted By:  Humberto Whitaker and Maltbie Grace STUDY: CT CHEST ABDOMEN PELVIS WO CONTRAST;  11/1/2024 1:46 pm   INDICATION: Signs/Symptoms:IV breast cancer restaging scans, non contrast due to CKD.   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 Contiguous axial images were obtained at 3 mm slice thickness through the chest, abdomen and pelvis. Coronal and sagittal reconstructions at 3 mm slice thickness were performed.  No intravenous or oral contrast agents were administered.   FINDINGS: Please note that the study is limited without intravenous contrast.   CHEST:   LUNG/PLEURA/LARGE AIRWAYS: No consolidation, pleural effusion or pneumothorax. Stable subpleural reticulations along the anterior bilateral upper lobes, consistent with postradiation changes. Stable right lower lobe granuloma. No concerning lung nodule. Trachea and right and left main bronchi are patent.   VESSELS: Aorta and pulmonary arteries are normal caliber. Moderate atherosclerotic changes of the aorta are identified. Mild coronary artery calcifications are present.   HEART: The heart is normal in size. No pericardial effusion   MEDIASTINUM AND CHAO: No mediastinal, hilar or axillary lymphadenopathy is present. 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 The esophagus is normal.   CHEST WALL AND LOWER NECK: Stable postsurgical changes of bilateral mastectomy. The visualized thyroid gland appears within normal limits.   ABDOMEN:   LIVER: The liver is normal in size without evidence of focal liver lesions. Scattered granulomas noted.   BILE DUCTS: No biliary dilatation.   GALLBLADDER: Cholecystectomy   PANCREAS: The pancreas appears unremarkable without evidence of ductal dilatation or masses.   SPLEEN: The spleen is normal in size.   ADRENAL GLANDS: No adrenal nodule or thickening.   KIDNEYS AND URETERS: The kidneys are normal in size. Stable subcentimeter hypoattenuating renal lesions, incompletely characterized without intravenous  contrast. No hydroureteronephrosis or nephroureterolithiasis is identified.   PELVIS:   BLADDER: Air is again noted within the nondependent portion of the bladder. Bladder wall thickening and stranding is again seen. There is no definite fat plane between the lateral wall of the bladder and sigmoid colon as seen on series 201, image 156, similar to prior exam, raising concern for a chronic colovesicular fistula.   REPRODUCTIVE ORGANS: No pelvic masses.   BOWEL: The stomach is unremarkable. The small and large bowel are normal in caliber and demonstrate no wall thickening. Normal appendix. Prominent sigmoid diverticulosis. There is no definite fat plane between the lateral wall of the bladder and sigmoid colon as seen on series 201, image 156, this is similar in appearance as compared to prior exam.   VESSELS: Atherosclerotic calcifications of the aortoiliac arteries. The IVC appears normal.   PERITONEUM/RETROPERITONEUM/LYMPH NODES: New 34 x 25 mm right common iliac lymph node on series 201, image 134. Interval increase in size of a 31 x 27 mm right pelvic sidewall/right internal iliac lymph node on series 201, image 149, previously 22 x 18 mm. New right external iliac 23 x 12 mm lymph node on series 201, image 154. Additional subcentimeter aortocaval and para-aortic lymph nodes are noted, which bears attention on future examinations.   No ascites or fluid collection.   BONE AND SOFT TISSUE: No suspicious osseous lesions are identified. No soft tissue masses in the abdominal wall.       IDC breast cancer restaging scan, in comparison to prior CT from December 2023, within limitations of a noncontrast exam: 1. New and worsening right iliac chain lymphadenopathy. Further evaluation with PET-CT and tissue diagnosis is recommended to rule out 2nd malignancy. 2. 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 3. Additional stable chronic incidental findings as detailed above.   I personally reviewed the  images/study and I agree with the findings as stated by June Goldberg MD. This study was interpreted at [facility], [place], [place].   MACRO: None   Signed by: Humberto Whitaker 11/4/2024 8:11 PM Dictation workstation:   [id number]       Labs reviewed:    Lab Results   Component Value Date    WBC 4.1 (L) 11/01/2024    HGB 11.2 (L) 11/01/2024    HCT 33.9 (L) 11/01/2024     11/01/2024    CHOL 174 06/13/2024    TRIG 55 06/13/2024    HDL 75.7 06/13/2024    ALT 22 11/01/2024    AST 28 11/01/2024     11/01/2024    K 4.4 11/01/2024     11/01/2024    CREATININE 1.15 (H) 11/01/2024    BUN 22 11/01/2024    CO2 29 11/01/2024    TSH 3.97 12/01/2023    INR 1.0 10/19/2022       Assessment/Plan   Problem List Items Addressed This Visit       Urinary tract infection - Primary     Frequency, burning with urination for a few days  Trace blood and leukocytes on UA    Levaquin 500 once a day for 7 days  Encouraged pt to drink enough water, start vitamin C supplement, cranberry tablet          Other Visit Diagnoses       Burning with urination        Relevant Orders    POCT UA (nonautomated) manually resulted (Completed)            Continue current medications as listed  Follow up in December for BROOKE

## 2024-11-19 LAB
DNA RANGE(S) EXAMINED NAR: NORMAL
GENE DIS ANL INTERP-IMP: NORMAL
GENE DIS ASSESSED: NORMAL
REASON FOR STUDY: NORMAL
TEMPUS BLOOD TUMOR MUTATIONAL BURDEN NOTE: NORMAL
TEMPUS GENOMIC NOTE: NORMAL
TEMPUS LCA: NORMAL
TEMPUS MSI NOTE: NORMAL
TEMPUS PORTAL: NORMAL
TEMPUS TREATMENT IMPLICATIONS NOTE: NORMAL

## 2024-11-22 ENCOUNTER — APPOINTMENT (OUTPATIENT)
Dept: PRIMARY CARE | Facility: CLINIC | Age: 84
End: 2024-11-22
Payer: COMMERCIAL

## 2024-11-22 DIAGNOSIS — D50.9 IRON DEFICIENCY ANEMIA, UNSPECIFIED IRON DEFICIENCY ANEMIA TYPE: ICD-10-CM

## 2024-11-22 DIAGNOSIS — N18.2 CHRONIC KIDNEY DISEASE (CKD), STAGE II (MILD): ICD-10-CM

## 2024-11-22 DIAGNOSIS — I10 BENIGN ESSENTIAL HYPERTENSION: ICD-10-CM

## 2024-11-22 DIAGNOSIS — E03.9 HYPOTHYROIDISM, UNSPECIFIED TYPE: ICD-10-CM

## 2024-11-22 DIAGNOSIS — Z00.00 ENCOUNTER FOR ANNUAL WELLNESS VISIT (AWV) IN MEDICARE PATIENT: ICD-10-CM

## 2024-11-22 DIAGNOSIS — I10 PRIMARY HYPERTENSION: ICD-10-CM

## 2024-11-22 DIAGNOSIS — E78.00 ELEVATED LDL CHOLESTEROL LEVEL: ICD-10-CM

## 2024-11-22 DIAGNOSIS — E78.49 OTHER HYPERLIPIDEMIA: ICD-10-CM

## 2024-11-22 DIAGNOSIS — E55.9 VITAMIN D DEFICIENCY: ICD-10-CM

## 2024-11-22 PROCEDURE — 82306 VITAMIN D 25 HYDROXY: CPT

## 2024-11-22 PROCEDURE — 80053 COMPREHEN METABOLIC PANEL: CPT

## 2024-11-22 PROCEDURE — 85027 COMPLETE CBC AUTOMATED: CPT

## 2024-11-22 PROCEDURE — 80061 LIPID PANEL: CPT

## 2024-11-22 PROCEDURE — 84443 ASSAY THYROID STIM HORMONE: CPT

## 2024-11-23 LAB
25(OH)D3 SERPL-MCNC: 31 NG/ML (ref 30–100)
ALBUMIN SERPL BCP-MCNC: 3.6 G/DL (ref 3.4–5)
ALP SERPL-CCNC: 81 U/L (ref 33–136)
ALT SERPL W P-5'-P-CCNC: 15 U/L (ref 7–45)
ANION GAP SERPL CALC-SCNC: 10 MMOL/L (ref 10–20)
AST SERPL W P-5'-P-CCNC: 20 U/L (ref 9–39)
BILIRUB SERPL-MCNC: 0.4 MG/DL (ref 0–1.2)
BUN SERPL-MCNC: 23 MG/DL (ref 6–23)
CALCIUM SERPL-MCNC: 8.8 MG/DL (ref 8.6–10.6)
CHLORIDE SERPL-SCNC: 106 MMOL/L (ref 98–107)
CHOLEST SERPL-MCNC: 173 MG/DL (ref 0–199)
CHOLESTEROL/HDL RATIO: 2.6
CO2 SERPL-SCNC: 30 MMOL/L (ref 21–32)
CREAT SERPL-MCNC: 1.18 MG/DL (ref 0.5–1.05)
EGFRCR SERPLBLD CKD-EPI 2021: 45 ML/MIN/1.73M*2
ERYTHROCYTE [DISTWIDTH] IN BLOOD BY AUTOMATED COUNT: 15.6 % (ref 11.5–14.5)
GLUCOSE SERPL-MCNC: 71 MG/DL (ref 74–99)
HCT VFR BLD AUTO: 29.7 % (ref 36–46)
HDLC SERPL-MCNC: 66.1 MG/DL
HGB BLD-MCNC: 9.6 G/DL (ref 12–16)
LDLC SERPL CALC-MCNC: 91 MG/DL
MCH RBC QN AUTO: 31.9 PG (ref 26–34)
MCHC RBC AUTO-ENTMCNC: 32.3 G/DL (ref 32–36)
MCV RBC AUTO: 99 FL (ref 80–100)
NON HDL CHOLESTEROL: 107 MG/DL (ref 0–149)
NRBC BLD-RTO: 0 /100 WBCS (ref 0–0)
PLATELET # BLD AUTO: 146 X10*3/UL (ref 150–450)
POTASSIUM SERPL-SCNC: 4.6 MMOL/L (ref 3.5–5.3)
PROT SERPL-MCNC: 5.9 G/DL (ref 6.4–8.2)
RBC # BLD AUTO: 3.01 X10*6/UL (ref 4–5.2)
SODIUM SERPL-SCNC: 141 MMOL/L (ref 136–145)
TRIGL SERPL-MCNC: 78 MG/DL (ref 0–149)
TSH SERPL-ACNC: 3.15 MIU/L (ref 0.44–3.98)
VLDL: 16 MG/DL (ref 0–40)
WBC # BLD AUTO: 2.2 X10*3/UL (ref 4.4–11.3)

## 2024-11-25 DIAGNOSIS — I15.9 SECONDARY HYPERTENSION: ICD-10-CM

## 2024-11-25 RX ORDER — CARVEDILOL 12.5 MG/1
12.5 TABLET ORAL 2 TIMES DAILY
Qty: 90 TABLET | Refills: 3 | Status: SHIPPED | OUTPATIENT
Start: 2024-11-25

## 2024-11-25 RX ORDER — HYDRALAZINE HYDROCHLORIDE 100 MG/1
100 TABLET, FILM COATED ORAL 2 TIMES DAILY
Qty: 180 TABLET | Refills: 3 | Status: SHIPPED | OUTPATIENT
Start: 2024-11-25

## 2024-11-29 ENCOUNTER — APPOINTMENT (OUTPATIENT)
Dept: PRIMARY CARE | Facility: CLINIC | Age: 84
End: 2024-11-29
Payer: COMMERCIAL

## 2024-12-06 ENCOUNTER — APPOINTMENT (OUTPATIENT)
Dept: PRIMARY CARE | Facility: CLINIC | Age: 84
End: 2024-12-06
Payer: COMMERCIAL

## 2024-12-06 ENCOUNTER — TELEPHONE (OUTPATIENT)
Dept: PRIMARY CARE | Facility: CLINIC | Age: 84
End: 2024-12-06

## 2024-12-06 VITALS
HEART RATE: 73 BPM | RESPIRATION RATE: 12 BRPM | SYSTOLIC BLOOD PRESSURE: 141 MMHG | BODY MASS INDEX: 33.35 KG/M2 | DIASTOLIC BLOOD PRESSURE: 68 MMHG | OXYGEN SATURATION: 97 % | WEIGHT: 163 LBS

## 2024-12-06 DIAGNOSIS — I10 HYPERTENSION: Primary | ICD-10-CM

## 2024-12-06 DIAGNOSIS — J30.9 ALLERGIC RHINITIS, UNSPECIFIED SEASONALITY, UNSPECIFIED TRIGGER: ICD-10-CM

## 2024-12-06 DIAGNOSIS — G89.29 OTHER CHRONIC PAIN: ICD-10-CM

## 2024-12-06 DIAGNOSIS — C77.0 METASTASIS TO SUPRACLAVICULAR LYMPH NODE (MULTI): ICD-10-CM

## 2024-12-06 DIAGNOSIS — Z00.00 ENCOUNTER FOR ANNUAL WELLNESS VISIT (AWV) IN MEDICARE PATIENT: ICD-10-CM

## 2024-12-06 DIAGNOSIS — D63.8 ANEMIA OF CHRONIC DISEASE: Primary | ICD-10-CM

## 2024-12-06 DIAGNOSIS — M17.0 PRIMARY OSTEOARTHRITIS OF BOTH KNEES: ICD-10-CM

## 2024-12-06 DIAGNOSIS — C50.919 MALIGNANT NEOPLASM OF FEMALE BREAST, UNSPECIFIED ESTROGEN RECEPTOR STATUS, UNSPECIFIED LATERALITY, UNSPECIFIED SITE OF BREAST: ICD-10-CM

## 2024-12-06 DIAGNOSIS — I10 HYPERTENSION, UNSPECIFIED TYPE: ICD-10-CM

## 2024-12-06 LAB
POC APPEARANCE, URINE: CLEAR
POC BILIRUBIN, URINE: NEGATIVE
POC BLOOD, URINE: NEGATIVE
POC COLOR, URINE: YELLOW
POC GLUCOSE, URINE: NEGATIVE MG/DL
POC KETONES, URINE: NEGATIVE MG/DL
POC LEUKOCYTES, URINE: NEGATIVE
POC NITRITE,URINE: NEGATIVE
POC PH, URINE: 5 PH
POC PROTEIN, URINE: NEGATIVE MG/DL
POC SPECIFIC GRAVITY, URINE: 1.02
POC UROBILINOGEN, URINE: 0.2 EU/DL

## 2024-12-06 RX ORDER — ANASTROZOLE 1 MG/1
1 TABLET ORAL DAILY
Qty: 90 TABLET | Refills: 3 | Status: SHIPPED | OUTPATIENT
Start: 2024-12-06 | End: 2025-12-06

## 2024-12-06 RX ORDER — FUROSEMIDE 20 MG/1
20 TABLET ORAL DAILY
Qty: 90 TABLET | Refills: 3 | Status: SHIPPED | OUTPATIENT
Start: 2024-12-06

## 2024-12-06 RX ORDER — FLUTICASONE PROPIONATE 50 MCG
1 SPRAY, SUSPENSION (ML) NASAL 2 TIMES DAILY
Qty: 48 ML | Refills: 1 | Status: SHIPPED | OUTPATIENT
Start: 2024-12-06

## 2024-12-06 RX ORDER — GABAPENTIN 300 MG/1
300 CAPSULE ORAL 3 TIMES DAILY
Qty: 90 CAPSULE | Refills: 2 | Status: SHIPPED | OUTPATIENT
Start: 2024-12-06 | End: 2025-06-04

## 2024-12-06 ASSESSMENT — PATIENT HEALTH QUESTIONNAIRE - PHQ9
1. LITTLE INTEREST OR PLEASURE IN DOING THINGS: NOT AT ALL
2. FEELING DOWN, DEPRESSED OR HOPELESS: NOT AT ALL
SUM OF ALL RESPONSES TO PHQ9 QUESTIONS 1 AND 2: 0

## 2024-12-06 ASSESSMENT — ENCOUNTER SYMPTOMS
LOSS OF SENSATION IN FEET: 0
OCCASIONAL FEELINGS OF UNSTEADINESS: 0
DEPRESSION: 0

## 2024-12-06 NOTE — PROGRESS NOTES
ANNUAL WELLNESS VISIT    Subjective :  Chief Complaint: Elizabeth Buckner is an 84 y.o. female here for an annual wellness visit and general medical care and f/u.     HPI:  Patient presents today for an annual physical and bilateral knee pain. Patient has no other complaints at this time and says she is doing well.        Objective   /68   Pulse 73   Resp 12   Wt 73.9 kg (163 lb)   SpO2 97%   BMI 33.35 kg/m²     Physical Exam  Constitutional:       Appearance: Normal appearance.   HENT:      Head: Normocephalic and atraumatic.      Mouth/Throat:      Mouth: Mucous membranes are moist.      Pharynx: Oropharynx is clear.   Eyes:      Extraocular Movements: Extraocular movements intact.      Pupils: Pupils are equal, round, and reactive to light.   Cardiovascular:      Rate and Rhythm: Normal rate and regular rhythm.   Pulmonary:      Effort: Pulmonary effort is normal.      Breath sounds: Normal breath sounds.   Abdominal:      General: Abdomen is flat.      Palpations: Abdomen is soft.   Musculoskeletal:         General: Normal range of motion.      Cervical back: Normal range of motion and neck supple.      Comments: Bilateral knee pain   Skin:     General: Skin is warm and dry.      Capillary Refill: Capillary refill takes less than 2 seconds.   Neurological:      General: No focal deficit present.      Mental Status: She is alert and oriented to person, place, and time.         Imaging:  No results found.     Labs reviewed:    Lab Results   Component Value Date    WBC 2.2 (L) 11/22/2024    HGB 9.6 (L) 11/22/2024    HCT 29.7 (L) 11/22/2024     (L) 11/22/2024    CHOL 173 11/22/2024    TRIG 78 11/22/2024    HDL 66.1 11/22/2024    ALT 15 11/22/2024    AST 20 11/22/2024     11/22/2024    K 4.6 11/22/2024     11/22/2024    CREATININE 1.18 (H) 11/22/2024    BUN 23 11/22/2024    CO2 30 11/22/2024    TSH 3.15 11/22/2024    INR 1.0 10/19/2022       Past Medical, Surgical, and Family History reviewed  and updated in chart.    I have reviewed and reconciled the medication list with the patient today.   Current Outpatient Medications:     anastrozole (Arimidex) 1 mg tablet, Take 1 tablet (1 mg total) by mouth once daily.  Take 1 tablet once daily. Swallow whole with a drink of water., Disp: 90 tablet, Rfl: 3    arm brace (Elbow Compression Sleeve) misc, Lymphedema Sleeve  and Gauntlet eval and fit 20-30 mmHG for right  arm, Disp: 5 each, Rfl: 2    aspirin 81 mg EC tablet, Take 1 tablet (81 mg) by mouth once daily. Take 1 tablet daily, Disp: , Rfl:     azelastine (Optivar) 0.05 % ophthalmic solution, Administer 2 drops into both eyes once daily., Disp: , Rfl:     carvedilol (Coreg) 12.5 mg tablet, Take 1 tablet (12.5 mg) by mouth 2 times a day. Take 1 tablet twice daily, Disp: 90 tablet, Rfl: 3    cholecalciferol, vitamin D3, (VITAMIN D3 ORAL), Take 25 mcg by mouth 1 (one) time each day. Take 1 tablet daily Vitamin D 25MCG (1000 UT) oral tablet, Disp: , Rfl:     diclofenac sodium (Voltaren Arthritis Pain) 1 % gel, Apply 4.5 inches (4 g) topically 4 times a day., Disp: 100 g, Rfl: 2    FLAXSEED OIL ORAL, Take 1,200 mg by mouth in the morning, at noon, and at bedtime. Take 1 capsule 3 times daily, Disp: , Rfl:     fluticasone (Flonase) 50 mcg/actuation nasal spray, ADMINISTER 1 SPRAY INTO EACH NOSTRIL 2 TIMES A DAY. INSTILL 1 SPRAY IN EACH NOSTRIL TWICE DAILY, Disp: 48 mL, Rfl: 1    furosemide (Lasix) 20 mg tablet, Take 1 tablet (20 mg) by mouth once daily. Take 1 tablet by mouth every day, Disp: 90 tablet, Rfl: 3    gabapentin (Neurontin) 300 mg capsule, Take 1 capsule (300 mg) by mouth 3 times a day. Take one capsule by mouth three times a day, Disp: 270 capsule, Rfl: 0    gabapentin (Neurontin) 300 mg capsule, Take 1 capsule (300 mg) by mouth 3 times a day., Disp: 90 capsule, Rfl: 2    hydrALAZINE (Apresoline) 100 mg tablet, Take 1 tablet (100 mg) by mouth 2 times a day. Take 1 tablet by mouth twice per day, Disp:  180 tablet, Rfl: 3    palbociclib (Ibrance) 75 mg tablet, Swallow whole., Disp: 21 tablet, Rfl: 5    TURMERIC ORAL, Take 400 mg by mouth 1 (one) time each day. Take 1 capsule daily, Disp: , Rfl:      List of current healthcare providers:  Patient Care Team:  Tova Espitia MD as PCP - General  [facility]  William Dove MD as Consulting Physician (Hematology and Oncology)     HRA:  Over the past 2 weeks, how often have you been bothered by any of the following problems?  Little interest or pleasure in doing things: Not at all  Feeling down, depressed, or hopeless: Not at all  Patient Health Questionnaire-2 Score: 0    Steadi Fall Risk  One or more falls in the last year? No  How many Times?    Was the patient injured in the fall?    Has trouble stepping onto curb? No  Advised to use a cane or walker to get around safely? No  Often has to rush to toilet? No  Feels unsteady when walking? No  Has lost some feeling in feet? No  Often feels sad or depressed? No  Steadies self on furniture while walking at home? No  Takes medicine that makes them feel lightheaded or more tired than usual? No  Worried about Falling? No  Takes medicine to sleep or improve mood? No  Needs to push with hands when rising from a chair? No                                          Assessment/Plan :  Problem List Items Addressed This Visit       Allergic rhinitis    Breast cancer (Multi)     Complicated with a lymphedema right arm. Patient to continue normal course of medication. Will follow up with radiology per oncology.         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  Will continue to follow with oncology.  Patient is stable.         Encounter for  annual wellness visit (AWV) in Medicare patient    Relevant Orders    POCT UA (nonautomated) manually resulted (Completed)    ECG 12 lead (Clinic Performed)     The following health maintenance schedule was reviewed with the patient and provided in printed form in the after visit summary:  Health Maintenance   Topic Date Due    CKD: Urine Protein Screening  Never done    RSV High Risk: (Elderly (60+) or Pregnant Population) (1 - 1-dose 75+ series) Never done    Influenza Vaccine (1) 09/01/2024    TSH Level  11/22/2025    Yearly Adult Physical  12/07/2025    Lipid Panel  11/22/2029    Bone Density Scan  Completed    HIB Vaccines  Aged Out    Hepatitis B Vaccines  Aged Out    IPV Vaccines  Aged Out    Hepatitis A Vaccines  Aged Out    Meningococcal Vaccine  Aged Out    Rotavirus Vaccines  Aged Out    HPV Vaccines  Aged Out    DTaP/Tdap/Td Vaccines  Discontinued    Pneumococcal Vaccine: 65+ Years  Discontinued    Zoster Vaccines  Discontinued    COVID-19 Vaccine  Discontinued    Irritable Bowel Syndrome  Discontinued       Advance Care Planning   Patient reports that she currently has a advanced care plan.             Orders Placed This Encounter   Procedures    POCT UA (nonautomated) manually resulted     Order Specific Question:   Release result to [employer]     Answer:   Immediate [1]    ECG 12 lead (Clinic Performed)     Order Specific Question:   Reason for Exam:     Answer:   physical       Continue current medications as listed  Follow up in 3 months.

## 2024-12-06 NOTE — ASSESSMENT & PLAN NOTE
Complicated with a lymphedema right arm. Patient to continue normal course of medication. Will follow up with radiology per oncology.

## 2024-12-10 DIAGNOSIS — C50.919 MALIGNANT NEOPLASM OF FEMALE BREAST, UNSPECIFIED ESTROGEN RECEPTOR STATUS, UNSPECIFIED LATERALITY, UNSPECIFIED SITE OF BREAST: ICD-10-CM

## 2024-12-10 DIAGNOSIS — I10 HYPERTENSION, UNSPECIFIED TYPE: ICD-10-CM

## 2024-12-10 DIAGNOSIS — G89.29 OTHER CHRONIC PAIN: ICD-10-CM

## 2024-12-10 DIAGNOSIS — J30.9 ALLERGIC RHINITIS, UNSPECIFIED SEASONALITY, UNSPECIFIED TRIGGER: ICD-10-CM

## 2024-12-10 DIAGNOSIS — C77.0 METASTASIS TO SUPRACLAVICULAR LYMPH NODE (MULTI): ICD-10-CM

## 2024-12-10 RX ORDER — FLUTICASONE PROPIONATE 50 MCG
1 SPRAY, SUSPENSION (ML) NASAL 2 TIMES DAILY
Qty: 48 ML | Refills: 1 | Status: SHIPPED | OUTPATIENT
Start: 2024-12-10

## 2024-12-10 RX ORDER — ANASTROZOLE 1 MG/1
1 TABLET ORAL DAILY
Qty: 90 TABLET | Refills: 3 | Status: SHIPPED | OUTPATIENT
Start: 2024-12-10 | End: 2025-12-10

## 2024-12-10 RX ORDER — FUROSEMIDE 20 MG/1
20 TABLET ORAL DAILY
Qty: 90 TABLET | Refills: 3 | Status: SHIPPED | OUTPATIENT
Start: 2024-12-10

## 2024-12-10 RX ORDER — GABAPENTIN 300 MG/1
300 CAPSULE ORAL 3 TIMES DAILY
Qty: 90 CAPSULE | Refills: 2 | Status: SHIPPED | OUTPATIENT
Start: 2024-12-10 | End: 2025-06-08

## 2025-01-21 ENCOUNTER — PATIENT OUTREACH (OUTPATIENT)
Dept: HEMATOLOGY/ONCOLOGY | Facility: CLINIC | Age: 85
End: 2025-01-21
Payer: COMMERCIAL

## 2025-01-22 ENCOUNTER — OFFICE VISIT (OUTPATIENT)
Dept: HEMATOLOGY/ONCOLOGY | Facility: CLINIC | Age: 85
End: 2025-01-22
Payer: COMMERCIAL

## 2025-01-22 VITALS
HEART RATE: 82 BPM | TEMPERATURE: 97.6 F | DIASTOLIC BLOOD PRESSURE: 62 MMHG | SYSTOLIC BLOOD PRESSURE: 140 MMHG | WEIGHT: 162.26 LBS | BODY MASS INDEX: 33.2 KG/M2 | OXYGEN SATURATION: 100 %

## 2025-01-22 DIAGNOSIS — C50.919 MALIGNANT NEOPLASM OF BREAST IN FEMALE, ESTROGEN RECEPTOR POSITIVE, UNSPECIFIED LATERALITY, UNSPECIFIED SITE OF BREAST: ICD-10-CM

## 2025-01-22 DIAGNOSIS — Z79.811 AROMATASE INHIBITOR USE: ICD-10-CM

## 2025-01-22 DIAGNOSIS — Z17.0 MALIGNANT NEOPLASM OF BREAST IN FEMALE, ESTROGEN RECEPTOR POSITIVE, UNSPECIFIED LATERALITY, UNSPECIFIED SITE OF BREAST: ICD-10-CM

## 2025-01-22 DIAGNOSIS — Z51.11 ENCOUNTER FOR ANTINEOPLASTIC CHEMOTHERAPY: ICD-10-CM

## 2025-01-22 DIAGNOSIS — C50.919 MALIGNANT NEOPLASM OF FEMALE BREAST, UNSPECIFIED ESTROGEN RECEPTOR STATUS, UNSPECIFIED LATERALITY, UNSPECIFIED SITE OF BREAST: ICD-10-CM

## 2025-01-22 DIAGNOSIS — I97.2 POSTMASTECTOMY LYMPHEDEMA SYNDROME: ICD-10-CM

## 2025-01-22 DIAGNOSIS — C77.0 METASTASIS TO SUPRACLAVICULAR LYMPH NODE (MULTI): Primary | ICD-10-CM

## 2025-01-22 PROCEDURE — 99214 OFFICE O/P EST MOD 30 MIN: CPT | Performed by: INTERNAL MEDICINE

## 2025-01-22 PROCEDURE — 3078F DIAST BP <80 MM HG: CPT | Performed by: INTERNAL MEDICINE

## 2025-01-22 PROCEDURE — 1126F AMNT PAIN NOTED NONE PRSNT: CPT | Performed by: INTERNAL MEDICINE

## 2025-01-22 PROCEDURE — 1159F MED LIST DOCD IN RCRD: CPT | Performed by: INTERNAL MEDICINE

## 2025-01-22 PROCEDURE — 3077F SYST BP >= 140 MM HG: CPT | Performed by: INTERNAL MEDICINE

## 2025-01-22 PROCEDURE — 1157F ADVNC CARE PLAN IN RCRD: CPT | Performed by: INTERNAL MEDICINE

## 2025-01-22 PROCEDURE — 1160F RVW MEDS BY RX/DR IN RCRD: CPT | Performed by: INTERNAL MEDICINE

## 2025-01-22 ASSESSMENT — PAIN SCALES - GENERAL: PAINLEVEL_OUTOF10: 0-NO PAIN

## 2025-01-22 NOTE — PATIENT INSTRUCTIONS
Please call us at [phone] option 5 then option 2 with any questions or concerns     Will organize a PET/CT scan if ok with you  Take a look at the report  see what you would like to do  Would consider faslodex injections and stop the pills you are currently on for breast cancer

## 2025-01-25 ASSESSMENT — ENCOUNTER SYMPTOMS
CARDIOVASCULAR NEGATIVE: 1
NERVOUS/ANXIOUS: 0
HEADACHES: 0
SLEEP DISTURBANCE: 0
BRUISES/BLEEDS EASILY: 0
MYALGIAS: 0
ADENOPATHY: 0
DYSURIA: 0
ABDOMINAL DISTENTION: 0
EYES NEGATIVE: 1
CHEST TIGHTNESS: 0
FEVER: 0
BLOOD IN STOOL: 0
CHILLS: 0
SHORTNESS OF BREATH: 0
COUGH: 0
APPETITE CHANGE: 0
WHEEZING: 0
BACK PAIN: 0
CONSTIPATION: 0
HEMATURIA: 0
CONFUSION: 0
ARTHRALGIAS: 0
DEPRESSION: 0
FREQUENCY: 0
RESPIRATORY NEGATIVE: 1
SCLERAL ICTERUS: 0
CONSTITUTIONAL NEGATIVE: 1
SEIZURES: 0
FATIGUE: 0
ABDOMINAL PAIN: 0
EYE PROBLEMS: 0
DIARRHEA: 0
LEG SWELLING: 0
HEMOPTYSIS: 0
NUMBNESS: 0
NAUSEA: 0
DIFFICULTY URINATING: 0
DIAPHORESIS: 0
EXTREMITY WEAKNESS: 0
PALPITATIONS: 0
HOT FLASHES: 0
DIZZINESS: 0

## 2025-01-25 NOTE — PROGRESS NOTES
Breast Medical Oncology Clinic  Location: [facility]      BREAST CANCER DIAGNOSIS  Breast cancer (Multi), Clinical: Stage IV (cN1, cM1, ER+, OH+, HER2+)       ONCOLOGIC HISTORY  Right breast cancer diagnosed in 1991, status post breast conserving surgery followed by radiation and tamoxifen. She experienced an in-breast recurrence in 2012, for which she underwent a completion mastectomy.    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 Tumor receptors were ER<1% OH 5% HER2 3+   Left supraclavicular fossa and axilla. Treatment dates: August 3, 2017, through September 7, 2017. Radiation treatment to the left supraclavicular fossa and axilla to a dose of 50 Gy/2 Gy per fraction/25  Radiation to the left chest wall and internal mammary jong chain.to a dose of 50 Gy/2 Gy per fraction/25 fractions. Herceptin through February 2018.  November 2021 ER+/OH+/HER2- breast cancer biopsy proven recurrence right supraclavicular LN, Initiated on anastrazole and palbociclib. Localized radiation therapy completion 4/2022       CURRENT THERAPY  Anastrazole- palbociclib since ~2021     HISTORY OF PRESENT ILLNESS    Elizabeth Buckner is a 84 y.o. woman here for follow-up. Last CT showed some potential progression with increased pelvic nodes. But she remains asymptomatic. Pelvic nodes are interior and would be difficult to biopsy.               Review of Systems   Constitutional: Negative.  Negative for appetite change, chills, diaphoresis, fatigue and fever.   HENT:  Negative.  Negative for hearing loss and lump/mass.    Eyes: Negative.  Negative for eye problems and icterus.   Respiratory: Negative.  Negative for chest tightness, cough, hemoptysis, shortness of breath and wheezing.    Cardiovascular: Negative.  Negative for chest pain, leg swelling and palpitations.   Gastrointestinal:  Negative for abdominal distention, abdominal pain, blood in  stool, constipation, diarrhea and nausea.   Endocrine: Negative for hot flashes.   Genitourinary:  Negative for bladder incontinence, difficulty urinating, dyspareunia, dysuria, frequency and hematuria.    Musculoskeletal:  Negative for arthralgias, back pain, gait problem and myalgias.   Neurological:  Negative for dizziness, extremity weakness, gait problem, headaches, numbness and seizures.   Hematological:  Negative for adenopathy. Does not bruise/bleed easily.   Psychiatric/Behavioral:  Negative for confusion, depression and sleep disturbance. The patient is not nervous/anxious.    All other systems reviewed and are negative.        Past Medical History:  has a past medical history of Acute frontal sinusitis, unspecified (11/21/2018), Chronic kidney disease, stage 2 (mild) (12/20/2018), Combined forms of age-related cataract, left eye (04/12/2019), Combined forms of age-related cataract, right eye (04/12/2019), 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 (04/12/2019), Dry eye syndrome of right lacrimal gland (04/12/2019), Encounter for allergy testing, Encounter for general adult medical examination without abnormal findings (07/24/2020), Encounter for general adult medical examination without abnormal findings (07/19/2021), Encounter for general adult medical examination without abnormal findings (03/06/2018), Other obesity due to excess calories (10/15/2020), Pain in unspecified knee (03/09/2018), Personal history of other diseases of the digestive system (07/10/2018), Personal history of other diseases of the female genital tract (09/08/2016), Personal history of other diseases of the female genital tract, Personal history of other diseases of the nervous system and sense organs (04/12/2019), Personal history of other diseases of the respiratory system (01/02/2020), and Personal history of other diseases of urinary system (01/22/2021).  Surgical History:   has a past surgical history that includes Hysterectomy  (2013); Breast surgery (2013); and US guided mediastinum percutaneous biopsy (2021).  Social History:   reports that she has quit smoking. Her smoking use included cigarettes. She has never used smokeless tobacco. She reports that she does not drink alcohol and does not use drugs.  Family History:    Family History   Problem Relation Name Age of Onset    Cancer Other          Family History    Lung disease Other          Family History     Family Oncology History:  Cancer-related family history includes Cancer in an other family member.      OBJECTIVE    VS / Pain:  /62 Comment: MANUAL  Pulse 82   Temp 36.4 °C (97.6 °F) (Temporal)   Wt 73.6 kg (162 lb 4.1 oz)   SpO2 100%   BMI 33.20 kg/m²   BSA: 1.74 meters squared   Pain Scale: 3    Performance Status:  The ECOG performance scale today is ECO- Ambulatory and  capable of all selfcare; unable to carry out work activities.  Up and about > 50% of waking hrs.    Physical Exam  Constitutional:       General: She is not in acute distress.     Appearance: She is not ill-appearing, toxic-appearing or diaphoretic.   HENT:      Nose: No congestion or rhinorrhea.      Mouth/Throat:      Pharynx: No posterior oropharyngeal erythema.   Cardiovascular:      Rate and Rhythm: Normal rate and regular rhythm.      Pulses: Normal pulses.      Heart sounds: Normal heart sounds. No murmur heard.     No gallop.   Pulmonary:      Breath sounds: Normal breath sounds.   Abdominal:      General: There is no distension.      Palpations: There is no mass.      Tenderness: There is no abdominal tenderness.   Musculoskeletal:         General: No swelling.      Cervical back: No rigidity.      Right lower leg: No edema.      Left lower leg: No edema.   Lymphadenopathy:      Cervical: No cervical adenopathy.   Skin:     General: Skin is warm.      Coloration: Skin is not cyanotic.      Findings: No bruising, ecchymosis or erythema.   Neurological:      General: No  focal deficit present.      Mental Status: She is oriented to person, place, and time.      Cranial Nerves: Cranial nerves 2-12 are intact.      Motor: Motor function is intact.   Psychiatric:         Attention and Perception: Attention and perception normal.         Mood and Affect: Mood and affect normal.         Behavior: Behavior normal.         Thought Content: Thought content normal.         Judgment: Judgment normal.             Diagnostic Results   === 11/01/24 ===    CT CHEST ABDOMEN PELVIS WO CONTRAST    - Impression -  [facility] breast cancer restaging scan, in comparison to prior CT from  December 2023, within limitations of a noncontrast exam:  1. New and worsening right iliac chain lymphadenopathy. Further  evaluation with PET-CT and tissue diagnosis is recommended to rule  out 2nd malignancy.  2. 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  3. Additional stable chronic incidental findings as detailed above.    I personally reviewed the images/study and I agree with the findings  as stated by June Goldberg MD. This study was interpreted at  [place], [place].    MACRO:  None    Signed by: Humberto Whitaker 11/4/2024 8:11 PM  Dictation workstation:   [id number]       LABORATORY/PATHOLOGY DATA    No visits with results within 6 Week(s) from this visit.   Latest known visit with results is:   Office Visit on 12/06/2024   Component Date Value Ref Range Status    POC Color, Urine 12/06/2024 Yellow  Straw, Yellow, Light-Yellow Final    POC Appearance, Urine 12/06/2024 Clear  Clear Final    POC Glucose, Urine 12/06/2024 NEGATIVE  NEGATIVE mg/dl Final    POC Bilirubin, Urine 12/06/2024 NEGATIVE  NEGATIVE Final    POC Ketones, Urine 12/06/2024 NEGATIVE  NEGATIVE mg/dl Final    POC Specific Gravity, Urine 12/06/2024 1.020  1.005 - 1.035 Final    POC Blood, Urine 12/06/2024 NEGATIVE  NEGATIVE Final    POC PH, Urine 12/06/2024 5.0  No Reference Range  Established PH Final    POC Protein, Urine 12/06/2024 NEGATIVE  NEGATIVE, 30 (1+) mg/dl Final    POC Urobilinogen, Urine 12/06/2024 0.2  0.2, 1.0 EU/DL Final    Poc Nitrite, Urine 12/06/2024 NEGATIVE  NEGATIVE Final    POC Leukocytes, Urine 12/06/2024 NEGATIVE  NEGATIVE Final      Lab Results   Component Value Date    LABCA2 10.5 11/01/2024    LABCA2 18.3 04/30/2024         ORDERING CLINICIAN:  SORIN SWIFT      TECHNIQUE:  DEXA BONE DENSITY      FINDINGS:  SPINE L1-L4  Bone Mineral Density: 1.148  T-Score 0.9  Z-Score Not reported  Bone Mineral Density change vs baseline:  2.4  Bone Mineral Density change vs previous: 2.4      LEFT FEMUR -TOTAL  Bone Mineral Density: 1.019  T-Score 0.6   Z-Score  Not reported  Bone Mineral Density change vs baseline: -8.8  Bone Mineral Density change vs previous: -8.8      LEFT FEMUR -NECK  Bone Mineral Density: 0.948  T-Score 0.9  Z-Score Not reported  Bone Mineral Density change vs baseline: -10.4  Bone Mineral Density change vs previous: -10.4          World Health Organization (WHO) criteria for post-menopausal,   Women:  Normal:         T-score at or above -1 SD  Osteopenia:   T-score between -1 and -2.5 SD  Osteoporosis: T-score at or below -2.5 SD          10-year Fracture Risk:  Major Osteoporotic Fracture  Not reported  Hip Fracture                        Not reported      Note:  If no FRAX score is reported, it is because:  Some T-score for Spine Total or Hip Total or Femoral Neck at or below  -2.5      This exam was performed at [facility] on a [employer] [facility] Dexa Unit.      IMPRESSION:  DEXA:  According to World Health Organization criteria,  classification is normal.      Followup recommended in 2 years or sooner as clinically warranted.      All images and detailed analysis are available on the  Radiology  PACS.    IMPRESSION/PLAN      1. Recurrent ER+/HER2- breast cancer with isolated supraclav recurrence.  Continue present therapy  for now. She is asymptomatic.  Nodes are too difficult to biopsy so plan is an estradiol PET to see if these nodes are ER+ and active. We have ordered blood tempus. If it shows ESR1 mutation will change therapy likely to elacestrant.  Patient expresses frustration in changing her current regimen and because she feels well, she isn't inclined to get a PET/CT scan. She's concerned about the cost, which I understand, but we discussed that this should be covered by her insurance and if there is a signifcant copay we could see if social work could help. But without scheduling we won't know what out of pocket cost would be.     2. Bone health.    DEXA  2024 remains NORMAL bone density. Monitor. Repeat in 2 yrs.      We discussed the clinical significance of diagnosis, goals of care and treatment plan in detail. She will let us know what she wants to do.     I personally spent over half of a total 30  minutes face to face with the patient in counseling and discussion and/or coordination of care as described above.         -------------------------------------------------------------------------------------------------------------------------------  William Dove MD  Director of Breast Cancer Medical Oncology Research Program   [facility]  Professor of Medicine  [facility]  [address] [place] [address] [address], [address]  [place] [id number]  Phone: [phone]  [email or web address]\"".[facility]       No

## 2025-02-18 ENCOUNTER — HOSPITAL ENCOUNTER (OUTPATIENT)
Dept: RADIOLOGY | Facility: HOSPITAL | Age: 85
Discharge: HOME | End: 2025-02-18
Payer: COMMERCIAL

## 2025-02-18 DIAGNOSIS — R59.1 GENERALIZED ENLARGED LYMPH NODES: ICD-10-CM

## 2025-02-18 PROCEDURE — 71250 CT THORAX DX C-: CPT

## 2025-02-18 PROCEDURE — 70490 CT SOFT TISSUE NECK W/O DYE: CPT

## 2025-02-18 PROCEDURE — 70450 CT HEAD/BRAIN W/O DYE: CPT

## 2025-03-01 ENCOUNTER — OFFICE VISIT (OUTPATIENT)
Dept: URGENT CARE | Age: 85
End: 2025-03-01
Payer: COMMERCIAL

## 2025-03-01 VITALS
TEMPERATURE: 98.4 F | SYSTOLIC BLOOD PRESSURE: 165 MMHG | OXYGEN SATURATION: 98 % | DIASTOLIC BLOOD PRESSURE: 69 MMHG | HEART RATE: 90 BPM | RESPIRATION RATE: 16 BRPM

## 2025-03-01 DIAGNOSIS — N39.0 URINARY TRACT INFECTION WITHOUT HEMATURIA, SITE UNSPECIFIED: Primary | ICD-10-CM

## 2025-03-01 LAB
POC APPEARANCE, URINE: ABNORMAL
POC BILIRUBIN, URINE: NEGATIVE
POC BLOOD, URINE: NEGATIVE
POC COLOR, URINE: YELLOW
POC GLUCOSE, URINE: NEGATIVE MG/DL
POC KETONES, URINE: ABNORMAL MG/DL
POC LEUKOCYTES, URINE: ABNORMAL
POC NITRITE,URINE: NEGATIVE
POC PH, URINE: 6.5 PH
POC PROTEIN, URINE: ABNORMAL MG/DL
POC SPECIFIC GRAVITY, URINE: 1.01
POC UROBILINOGEN, URINE: 1 EU/DL

## 2025-03-01 RX ORDER — NITROFURANTOIN 25; 75 MG/1; MG/1
100 CAPSULE ORAL 2 TIMES DAILY
Qty: 14 CAPSULE | Refills: 0 | Status: SHIPPED | OUTPATIENT
Start: 2025-03-01 | End: 2025-03-08

## 2025-03-01 ASSESSMENT — ENCOUNTER SYMPTOMS
CHILLS: 0
FREQUENCY: 1
FEVER: 0
DYSURIA: 1

## 2025-03-01 NOTE — PROGRESS NOTES
Subjective   Patient ID: Elizabeth Buckner is a 84 y.o. female. They present today with a chief complaint of Urinary Problem, UTI, and Urinary Frequency (Pt presents with what she believes is a UTI symptoms include urinary frequency, chills and burning, Started a couple of days ago she said. ).    History of Present Illness  present today with a chief complaint of Urinary Problem, UTI, and Urinary Frequency (Pt presents with what she believes is a UTI symptoms include urinary frequency and burning, Started a couple of days ago . Denies nausea, vomiting, back pain, fever or chills    Past Medical History  Allergies as of 03/01/2025 - Reviewed 03/01/2025   Allergen Reaction Noted    Penicillins Swelling 01/23/2023    Ramipril Angioedema 01/23/2023       (Not in a hospital admission)       Past Medical History:   Diagnosis Date    Acute frontal sinusitis, unspecified 11/21/2018    Acute frontal sinusitis    Chronic kidney disease, stage 2 (mild) 12/20/2018    Chronic kidney disease, stage II (mild)    Combined forms of age-related cataract, left eye 04/12/2019    Combined forms of age-related cataract of left eye    Combined forms of age-related cataract, right eye 04/12/2019    Combined forms of age-related cataract of right eye    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 04/12/2019    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    Dry eye syndrome of right lacrimal gland 04/12/2019    Dry eye syndrome of right lacrimal gland    Encounter for allergy testing     Encounter for allergy testing    Encounter for general adult medical examination without abnormal findings 07/24/2020    Encounter for annual health examination    Encounter for general adult medical examination without abnormal findings 07/19/2021    Encounter for annual health examination    Encounter for general adult medical examination without abnormal findings 03/06/2018    Encounter for annual health examination    Other obesity due to excess calories 10/15/2020     Exogenous obesity    Pain in unspecified knee 03/09/2018    Joint pain, knee    Personal history of other diseases of the digestive system 07/10/2018    History of gastroesophageal reflux (GERD)    Personal history of other diseases of the female genital tract 09/08/2016    History of vaginitis    Personal history of other diseases of the female genital tract     History of pelvic inflammatory disease    Personal history of other diseases of the nervous system and sense organs 04/12/2019    History of myopia    Personal history of other diseases of the respiratory system 01/02/2020    History of acute bronchitis    Personal history of other diseases of urinary system 01/22/2021    History of chronic kidney disease       Past Surgical History:   Procedure Laterality Date    BREAST SURGERY  03/29/2013    Breast Surgery    HYSTERECTOMY  03/29/2013    Hysterectomy    US GUIDED MEDIASTINUM PERCUTANEOUS BIOPSY  9/22/2021    US GUIDED MEDIASTINUM PERCUTANEOUS BIOPSY 9/22/2021 [facility]        reports that she has quit smoking. Her smoking use included cigarettes. She has never used smokeless tobacco. She reports that she does not drink alcohol and does not use drugs.    Review of Systems  Review of Systems   Constitutional:  Negative for chills and fever.   Genitourinary:  Positive for dysuria, frequency and urgency.   All other systems reviewed and are negative.                                 Objective    Vitals:    03/01/25 1307   BP: 165/69   BP Location: Left arm   Patient Position: Sitting   Pulse: 90   Resp: 16   Temp: 36.9 °C (98.4 °F)   SpO2: 98%     No LMP recorded (lmp unknown). Patient is postmenopausal.      Physical Exam  Vitals reviewed.   General: Vitals Noted. No distress. Normocephalic.  Cardiovascular:     Heart sounds: Normal heart sounds, S1 normal and S2 normal. No murmur heard.     No friction rub.   Pulmonary:      Effort: Pulmonary effort is normal.      Breath sounds: Normal breath  sounds and air entry. Lungs clear to auscultation bilaterally    Pharynx and tonsils are not hyperemic, and without exudate.   Neck: Supple with no adenopathy.  Lymphadenopathy:   No cervical adenopathy on palpation  Lower Body: No right inguinal adenopathy. No left inguinal adenopathy.   Abdominal:      Palpations: There is no hepatomegaly, splenomegaly or mass. Abdomen is soft, non-tender, and non-distended. No suprapubic tenderness. No CVA tenderness.   Skin:     Comments: no rash   Neurological:      Cranial Nerves: Cranial nerves 2-12 are intact.      Sensory: No sensory deficit.      Motor: Motor function is intact.      Deep Tendon Reflexes: Reflexes are normal and symmetric.         Procedures    Point of Care Test & Imaging Results from this visit  Results for orders placed or performed in visit on 03/01/25   POCT UA Automated manually resulted   Result Value Ref Range    POC Color, Urine Yellow Straw, Yellow, Light-Yellow    POC Appearance, Urine Cloudy (A) Clear    POC Glucose, Urine NEGATIVE NEGATIVE mg/dl    POC Bilirubin, Urine NEGATIVE NEGATIVE    POC Ketones, Urine 15 (1+) (A) NEGATIVE mg/dl    POC Specific Gravity, Urine 1.015 1.005 - 1.035    POC Blood, Urine NEGATIVE NEGATIVE    POC PH, Urine 6.5 No Reference Range Established PH    POC Protein, Urine TRACE (A) NEGATIVE mg/dl    POC Urobilinogen, Urine 1.0 0.2, 1.0 EU/DL    Poc Nitrite, Urine NEGATIVE NEGATIVE    POC Leukocytes, Urine TRACE (A) NEGATIVE      No results found.    Diagnostic study results (if any) were reviewed by DANETTE Larios.    Assessment/Plan   Allergies, medications, history, and pertinent labs/EKGs/Imaging reviewed by DANETTE Larios.     Medical Decision Making  On exam patient is non toxic, in no distress. Testing for UA in the office is positive(low count of leuk,  UC sent out. On examination as above, no concerns for appendicitis given presentation of  symptoms and examination. Normal bowel sounds, Lungs  are clean on auscultation, normal heart sounds.   Macrobid as prescribed today,  Will adjust treatment once cultures return, as needed.  Will be discharged with instructions to increase fluid intake, use of OTC for symptoms relief, avoid NSIADS. Advised to follow up with PCP for further management. Advised to return if symptoms worsen and needs to be reevaluated. Reviewed with patient signs and symptoms significant to present to ED. Patient verbalized understanding and agreed with the plan.       Orders and Diagnoses  Diagnoses and all orders for this visit:  Urinary tract infection without hematuria, site unspecified  -     POCT UA Automated manually resulted  -     Urine Culture  -     nitrofurantoin, macrocrystal-monohydrate, (Macrobid) 100 mg capsule; Take 1 capsule (100 mg) by mouth 2 times a day for 7 days.      Medical Admin Record      Patient disposition: Home    Electronically signed by DANETTE Larios  1:36 PM

## 2025-03-04 DIAGNOSIS — C50.919 MALIGNANT NEOPLASM OF FEMALE BREAST, UNSPECIFIED ESTROGEN RECEPTOR STATUS, UNSPECIFIED LATERALITY, UNSPECIFIED SITE OF BREAST: ICD-10-CM

## 2025-03-04 DIAGNOSIS — C77.0 METASTASIS TO SUPRACLAVICULAR LYMPH NODE (MULTI): ICD-10-CM

## 2025-03-04 LAB — BACTERIA UR CULT: ABNORMAL

## 2025-03-05 ENCOUNTER — OFFICE VISIT (OUTPATIENT)
Dept: HEMATOLOGY/ONCOLOGY | Facility: CLINIC | Age: 85
End: 2025-03-05
Payer: COMMERCIAL

## 2025-03-05 ENCOUNTER — LAB (OUTPATIENT)
Dept: LAB | Facility: CLINIC | Age: 85
End: 2025-03-05
Payer: COMMERCIAL

## 2025-03-05 VITALS
TEMPERATURE: 97 F | DIASTOLIC BLOOD PRESSURE: 70 MMHG | HEART RATE: 69 BPM | BODY MASS INDEX: 31.1 KG/M2 | WEIGHT: 152 LBS | SYSTOLIC BLOOD PRESSURE: 159 MMHG | OXYGEN SATURATION: 98 %

## 2025-03-05 DIAGNOSIS — R59.0 CERVICAL LYMPHADENOPATHY: ICD-10-CM

## 2025-03-05 DIAGNOSIS — C77.0 METASTASIS TO SUPRACLAVICULAR LYMPH NODE (MULTI): ICD-10-CM

## 2025-03-05 DIAGNOSIS — N18.2 CHRONIC KIDNEY DISEASE (CKD), STAGE II (MILD): ICD-10-CM

## 2025-03-05 DIAGNOSIS — Z79.811 AROMATASE INHIBITOR USE: ICD-10-CM

## 2025-03-05 DIAGNOSIS — C50.919 MALIGNANT NEOPLASM OF FEMALE BREAST, UNSPECIFIED ESTROGEN RECEPTOR STATUS, UNSPECIFIED LATERALITY, UNSPECIFIED SITE OF BREAST: ICD-10-CM

## 2025-03-05 DIAGNOSIS — R59.0 SUPRACLAVICULAR ADENOPATHY: ICD-10-CM

## 2025-03-05 DIAGNOSIS — I97.2 POSTMASTECTOMY LYMPHEDEMA SYNDROME OF LEFT UPPER EXTREMITY: ICD-10-CM

## 2025-03-05 DIAGNOSIS — Z51.11 ENCOUNTER FOR ANTINEOPLASTIC CHEMOTHERAPY: ICD-10-CM

## 2025-03-05 LAB
ALBUMIN SERPL BCP-MCNC: 3.7 G/DL (ref 3.4–5)
ALP SERPL-CCNC: 60 U/L (ref 33–136)
ALT SERPL W P-5'-P-CCNC: 14 U/L (ref 7–45)
ANION GAP SERPL CALC-SCNC: 15 MMOL/L (ref 10–20)
AST SERPL W P-5'-P-CCNC: 31 U/L (ref 9–39)
BASOPHILS # BLD AUTO: 0.04 X10*3/UL (ref 0–0.1)
BASOPHILS NFR BLD AUTO: 0.5 %
BILIRUB SERPL-MCNC: 1 MG/DL (ref 0–1.2)
BUN SERPL-MCNC: 24 MG/DL (ref 6–23)
CALCIUM SERPL-MCNC: 12.3 MG/DL (ref 8.6–10.6)
CANCER AG27-29 SERPL-ACNC: 14.1 U/ML (ref 0–38.6)
CHLORIDE SERPL-SCNC: 97 MMOL/L (ref 98–107)
CO2 SERPL-SCNC: 27 MMOL/L (ref 21–32)
CREAT SERPL-MCNC: 1.28 MG/DL (ref 0.5–1.05)
EGFRCR SERPLBLD CKD-EPI 2021: 41 ML/MIN/1.73M*2
EOSINOPHIL # BLD AUTO: 0.17 X10*3/UL (ref 0–0.4)
EOSINOPHIL NFR BLD AUTO: 2.3 %
ERYTHROCYTE [DISTWIDTH] IN BLOOD BY AUTOMATED COUNT: 13.3 % (ref 11.5–14.5)
GLUCOSE SERPL-MCNC: 88 MG/DL (ref 74–99)
HCT VFR BLD AUTO: 34 % (ref 36–46)
HGB BLD-MCNC: 11.2 G/DL (ref 12–16)
IMM GRANULOCYTES # BLD AUTO: 0.02 X10*3/UL (ref 0–0.5)
IMM GRANULOCYTES NFR BLD AUTO: 0.3 % (ref 0–0.9)
LYMPHOCYTES # BLD AUTO: 1.26 X10*3/UL (ref 0.8–3)
LYMPHOCYTES NFR BLD AUTO: 17.1 %
MCH RBC QN AUTO: 30.4 PG (ref 26–34)
MCHC RBC AUTO-ENTMCNC: 32.9 G/DL (ref 32–36)
MCV RBC AUTO: 92 FL (ref 80–100)
MONOCYTES # BLD AUTO: 0.92 X10*3/UL (ref 0.05–0.8)
MONOCYTES NFR BLD AUTO: 12.4 %
NEUTROPHILS # BLD AUTO: 4.98 X10*3/UL (ref 1.6–5.5)
NEUTROPHILS NFR BLD AUTO: 67.4 %
NRBC BLD-RTO: ABNORMAL /100{WBCS}
PLATELET # BLD AUTO: 181 X10*3/UL (ref 150–450)
POTASSIUM SERPL-SCNC: 3.7 MMOL/L (ref 3.5–5.3)
PROT SERPL-MCNC: 7.2 G/DL (ref 6.4–8.2)
RBC # BLD AUTO: 3.69 X10*6/UL (ref 4–5.2)
SODIUM SERPL-SCNC: 135 MMOL/L (ref 136–145)
WBC # BLD AUTO: 7.4 X10*3/UL (ref 4.4–11.3)

## 2025-03-05 PROCEDURE — 1160F RVW MEDS BY RX/DR IN RCRD: CPT | Performed by: NURSE PRACTITIONER

## 2025-03-05 PROCEDURE — 36415 COLL VENOUS BLD VENIPUNCTURE: CPT

## 2025-03-05 PROCEDURE — 1036F TOBACCO NON-USER: CPT | Performed by: NURSE PRACTITIONER

## 2025-03-05 PROCEDURE — 99215 OFFICE O/P EST HI 40 MIN: CPT | Performed by: NURSE PRACTITIONER

## 2025-03-05 PROCEDURE — 1159F MED LIST DOCD IN RCRD: CPT | Performed by: NURSE PRACTITIONER

## 2025-03-05 PROCEDURE — 86300 IMMUNOASSAY TUMOR CA 15-3: CPT

## 2025-03-05 PROCEDURE — 1157F ADVNC CARE PLAN IN RCRD: CPT | Performed by: NURSE PRACTITIONER

## 2025-03-05 PROCEDURE — 3078F DIAST BP <80 MM HG: CPT | Performed by: NURSE PRACTITIONER

## 2025-03-05 PROCEDURE — 3077F SYST BP >= 140 MM HG: CPT | Performed by: NURSE PRACTITIONER

## 2025-03-05 PROCEDURE — 85025 COMPLETE CBC W/AUTO DIFF WBC: CPT

## 2025-03-05 PROCEDURE — 1126F AMNT PAIN NOTED NONE PRSNT: CPT | Performed by: NURSE PRACTITIONER

## 2025-03-05 PROCEDURE — 80053 COMPREHEN METABOLIC PANEL: CPT

## 2025-03-05 ASSESSMENT — PAIN SCALES - GENERAL: PAINLEVEL_OUTOF10: 0-NO PAIN

## 2025-03-05 ASSESSMENT — PATIENT HEALTH QUESTIONNAIRE - PHQ9
2. FEELING DOWN, DEPRESSED OR HOPELESS: NOT AT ALL
1. LITTLE INTEREST OR PLEASURE IN DOING THINGS: NOT AT ALL
SUM OF ALL RESPONSES TO PHQ9 QUESTIONS 1 & 2: 0

## 2025-03-05 NOTE — PATIENT INSTRUCTIONS
Please completed blood work that was ordered in Jan 2025 by Dr Dove. Complete blood counts, metabolic panel and tumor marker + genomic testing which will help determine best next therapy as previous scan Nov 2024 is showing worsening of metastatic disease.     Please complete estrogen sensitive PET scan- I will get  involved to help with potential costs from this test.    Follow up in 4-6 weeks     Please complete Antibiotics for UTI with adequate hydration and greek yogurt to help with diarrhea     Please call [phone] with any questions or concerns prior to your next office visit.

## 2025-03-05 NOTE — PROGRESS NOTES
Breast Medical Oncology Clinic  Location: [facility]      BREAST CANCER DIAGNOSIS  Breast cancer (Multi), Clinical: Stage IV (cN1, cM1, ER+, MT+, HER2+)       ONCOLOGIC HISTORY  Right breast cancer diagnosed in 1991, status post breast conserving surgery followed by radiation and tamoxifen. She experienced an in-breast recurrence in 2012, for which she underwent a completion mastectomy.    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 Tumor receptors were ER<1% MT 5% HER2 3+   Left supraclavicular fossa and axilla. Treatment dates: August 3, 2017, through September 7, 2017. Radiation treatment to the left supraclavicular fossa and axilla to a dose of 50 Gy/2 Gy per fraction/25  Radiation to the left chest wall and internal mammary jong chain.to a dose of 50 Gy/2 Gy per fraction/25 fractions. Herceptin through February 2018.  November 2021 ER+/MT+/HER2- breast cancer biopsy proven recurrence right supraclavicular LN, Initiated on anastrazole and palbociclib. Localized radiation therapy completion 4/2022       CURRENT THERAPY  Anastrazole- palbociclib since ~2021     HISTORY OF PRESENT ILLNESS    Elizabeth Buckner is a 84 y.o. woman here for IV breast cancer follow-up. Today I have reviewed with the patient I will be conducting a clinical physical breast exam. Patient has declined a second medical professional today during the exam as a chapperone. She is accompanied today by her 2 nieces.      She is concerned as she blacked out at home and sustained a fall in her bathroom - she was diagnosed prior to the fall on 2/28/25 with a UTI and was started on a 7 day course of antibiotics. After her fall she stopped the antibiotics as she was afraid the antibiotics made her feel worse. She has some bruises on her face but denies any other pain or headaches issues. She did not seek medical attention during the time of the fall. She  denies any vision changes or broken teeth.    She has been compliant on daily anastrozole and finished her ibrance this week and is now on off week.     She has been feeling poorly. She is scared her cancer has gotten worse. She is concerned about swollen lymph nodes along the left side of her neck and by her left clavicle     Last CT Nov 2024 showed some potential progression with increased pelvic nodes however she has not completed ordered PET scan as requested by Dr William Dove.      She reports stable arthritic pains in her knees. She is taking gabapentin and tylenol as needed and has added pineapple juice which she has read will help with inflammation.      She reports appetite is stable. She has had about 10 lbs of unprovoked weight loss. She is intermittently using daily MVI.  She continues with baseline issues with constipation, however UTI abx have caused loosed stools. With recent UTI,  she was experiencing burning prior to starting UTI.     She reports baseline right arm lymphedema that is stable. She has not been using lymphedema pump.      She denies any chest pain or breathing issues, sob or cough. She reports some increase in ankle swelling Right > than Left     She denies any vision changes or headache issues, no new neuropathy.      She denies any skin lesions or masses, oral sores lesions or infections     She denies any issues with sleep but increase fatigue.     Review of Systems  ROS 14 points performed, See HPI for exceptions      Past Medical History:  has a past medical history of Acute frontal sinusitis, unspecified (11/21/2018), Chronic kidney disease, stage 2 (mild) (12/20/2018), Combined forms of age-related cataract, left eye (04/12/2019), Combined forms of age-related cataract, right eye (04/12/2019), 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 (04/12/2019), Dry eye syndrome of right lacrimal gland (04/12/2019), Encounter for allergy testing, Encounter for general adult medical  examination without abnormal findings (2020), Encounter for general adult medical examination without abnormal findings (2021), Encounter for general adult medical examination without abnormal findings (2018), Other obesity due to excess calories (10/15/2020), Pain in unspecified knee (2018), Personal history of other diseases of the digestive system (07/10/2018), Personal history of other diseases of the female genital tract (2016), Personal history of other diseases of the female genital tract, Personal history of other diseases of the nervous system and sense organs (2019), Personal history of other diseases of the respiratory system (2020), and Personal history of other diseases of urinary system (2021).  Surgical History:   has a past surgical history that includes Hysterectomy (2013); Breast surgery (2013); and US guided mediastinum percutaneous biopsy (2021).  Social History:   reports that she has quit smoking. Her smoking use included cigarettes. She has never used smokeless tobacco. She reports that she does not drink alcohol and does not use drugs.  Family History:    Family History   Problem Relation Name Age of Onset    Cancer Other          Family History    Lung disease Other          Family History     Family Oncology History:  Cancer-related family history includes Cancer in an other family member.      OBJECTIVE    VS / Pain:  /70 (BP Location: Right arm, Patient Position: Sitting, BP Cuff Size: Small adult)   Pulse 69   Temp 36.1 °C (97 °F) (Temporal)   Wt 68.9 kg (152 lb)   LMP  (LMP Unknown)   SpO2 98%   BMI 31.10 kg/m²   BSA: 1.69 meters squared   Pain Scale: 3    Performance Status:  The ECOG performance scale today is ECO- Ambulatory and  capable of all selfcare; unable to carry out work activities.  Up and about > 50% of waking hrs.      Physical Exam  Constitutional: bruises left side of face,  awake/alert/oriented x4, no distress, alert and cooperative  EYES: Sclera clear  ENMT: mucous membranes moist, no apparent injury, no lesions seen  Head/Neck: Neck supple, no apparent injury, thyroid without mass or tenderness, No JVD, trachea midline, no bruits  Respiratory / Thoracic: Patent airways, clear to all lobes, normal breath sounds with good chest expansion, thorax symmetric.  Cardiovascular: Regular, rate and rhythm, no murmurs, 2+ equal pulses of the extremities, normal auscultated S 1and S 2  GI: Nondistended, soft, non-tender, no rebound tenderness or guarding, no masses palpable, no organomegaly, +BS, no bruits  Musculoskeletal: ROM intact, no joint swelling, normal strength, no spinal tenderness. No evidence of left orbital fracture  Extremities: normal extremities, no cyanosis edema, contusions or wounds, no clubbing  Neurological: alert and oriented x4, intact senses, motor, response and reflexes, normal strength  Breast: Right mastectomy with right latissimus dorsi reconstruction, Left mastectomy. No palpable masses or lesions   Lymphatic: palpable left cervical and supraclavicular lymph nodes on exam. No palpable right cervical, supraclavicular, infraclavicular or  bilateral axillary lymphadenopathy  Psychological: Appropriate and talkative mood and behavior  Skin: Warm and dry, no lesions, no rashes, no jaundice          Diagnostic Results   === 11/01/24 ===    CT CHEST ABDOMEN PELVIS WO CONTRAST    - Impression -  [facility] breast cancer restaging scan, in comparison to prior CT from  December 2023, within limitations of a noncontrast exam:  1. New and worsening right iliac chain lymphadenopathy. Further  evaluation with PET-CT and tissue diagnosis is recommended to rule  out 2nd malignancy.  2. 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  3. Additional stable chronic incidental findings as detailed above.    I personally reviewed the images/study and I agree with the findings  as  stated by June Goldberg MD. This study was interpreted at  [facility], [place], [place].    MACRO:  None    Signed by: Humberto Whitaker 11/4/2024 8:11 PM  Dictation workstation:   [id number]       === 02/18/25 ===    CT SOFT TISSUE NECK WO IV CONTRAST    - Impression -  There are surgical clips noted within the right supraclavicular  region. There is nonspecific ill-defined intermediate attenuation  noted surrounding the surgical clips with adjacent skin thickening  which while nonspecific may be postsurgical in etiology.    There are scattered nonspecific abnormally enlarged lymph nodes noted  within the neck. Specifically, there are enlarged level 1B  submandibular nodes bilaterally with the largest on the left  measuring approximately 23 mm in greatest axial dimension and the  largest on the right measuring approximately 20 mm in greatest axial  dimension. There is an enlarged left superior clavicular lymph node  measuring approximately 18 mm in greatest coronal dimension. There is  an adjacent smaller but abnormally enlarged 13 mm left  supraclavicular lymph node.    There are nonspecific prominent nodular foci of intermediate  attenuation noted within and/or along the margins of the parotid  glands bilaterally which while nonspecific raises the possibility of  additional scattered prominent parotid and/or periparotid lymph nodes  with the largest on the left measuring approximately 17 mm in  greatest axial dimension and the largest on the right measuring  approximately 13 mm in greatest axial dimension.    There is multilevel cervical spondylosis with the most pronounced  posterior osteophytic spurring noted at the C5/6 level.    Please see the dedicated report of the CT of the chest for details of  findings within the chest.    MACRO:  None.    Signed by: Jefferson Rosales 2/18/2025 12:59 PM  Dictation workstation:   [id number]      Narrative & Impression   Interpreted By:  Barney  Carroll,   STUDY:  CT CHEST WO IV CONTRAST;  2/18/2025 12:38 pm      INDICATION:  83 y/o   F with  Signs/Symptoms:ENLARGED LYMPH NODES.  ,R59.1 Generalized enlarged lymph nodes      LIMITATIONS:  Imaging of the mediastinum and chao is limited without the use of IV  contrast..      ACCESSION NUMBER(S):  [id number]      ORDERING CLINICIAN:  DANE VALE      TECHNIQUE:  Noncontrast Thin-section images were obtained  from the thoracic  inlet down through the diaphragm. Sagittal and coronal reconstruction  images were generated. Mediastinal, lung, bone, and liver windows  were reviewed.      COMPARISON:  Most recent prior is from 11/01/2024.      FINDINGS:  CHEST WALL/BASE OF THE NECK:  Bilateral mastectomy.  No thyromegaly or thyroid mass.      LUNGS/ PLEURA/ AND TRACHEA:  There is presumed post radiation pleural and parenchymal scarring in  the mid anterior left upper lobe and mid anterolateral right upper  lobe. There are multiple new pleural and also parenchymal nodular  lesions in both lungs. Examples include a right apical kim mm  nodule on lung window image 24, a pleural-based anterior left upper  lobe nodule measuring 16 x 14 mm on image 38, a 16 x 12 mm  pleural-based nodule in the anterolateral right upper lobe on image  44, and multiple ground-glass nodules throughout the right lung  measuring 10 mm or less (see image 59), and a 9 mm posterior left  lower lobe ground-glass nodule on axial image 75. No pleural effusion.  No pneumothorax.  The trachea was grossly intact.      MEDIASTINUM/CHAO:  No suspicious mediastinal, hilar, or axillary mass or adenopathy in  this unenhanced exam. Stable surgical clips throughout the right  axilla. Mild cardiomegaly.  No pericardial effusion.  No thoracic aortic aneurysm. Mild mural calcifications throughout the  thoracic aorta.      BONES:  No destructive lytic or blastic bone lesion. Mild scattered  multilevel midthoracic spine disc space narrowing with  endplate  osteophytosis.      UPPER ABDOMEN:  Multiple scattered calcified hepatic granulomas. Previous  cholecystectomy. Small bilateral renal cysts in the upper pole of  each kidney. Mild-to-moderate stool in the imaged upper colon.  Scattered hepatic flexure colonic diverticulosis. Otherwise, the  imaged upper abdomen was grossly intact.      IMPRESSION:  Previous bilateral mastectomy. Presumed mild post radiation scarring  anteriorly in each upper lobe subjacent to each mastectomy site.      Development of multiple scattered bilateral lung pleural and  parenchymal densities consistent with new metastatic lesions.      Stable right axillary surgical clips. No suspicious thoracic  adenopathy in this unenhanced exam.      Mild cardiomegaly.      Calcified hepatic granulomas. Previous cholecystectomy.      Bilateral upper pole renal cysts.      Hepatic flexure colonic diverticulosis..      MACRO:  None      Signed by: Carroll Corley 2/18/2025 2:05 PM       LABORATORY/PATHOLOGY DATA  === 07/17/24 ===    DEXA BONE DENSITY    - Impression -  DEXA:  According to World Health Organization criteria,  classification is normal.    FINDINGS:  SPINE L1-L4  Bone Mineral Density: 1.148  T-Score 0.9  Z-Score Not reported  Bone Mineral Density change vs baseline:  2.4  Bone Mineral Density change vs previous: 2.4      LEFT FEMUR -TOTAL  Bone Mineral Density: 1.019  T-Score 0.6   Z-Score  Not reported  Bone Mineral Density change vs baseline: -8.8  Bone Mineral Density change vs previous: -8.8      LEFT FEMUR -NECK  Bone Mineral Density: 0.948  T-Score 0.9  Z-Score Not reported  Bone Mineral Density change vs baseline: -10.4  Bone Mineral Density change vs previous: -10.4    Followup recommended in 2 years or sooner as clinically warranted.    All images and detailed analysis are available on the  Radiology  PACS.    MACRO:  None    Signed by: Carroll Corley 7/17/2024 2:59 PM  Dictation workstation:   [id number]  Lab Results    Component Value Date    WBC 2.2 (L) 11/22/2024    HGB 9.6 (L) 11/22/2024    HCT 29.7 (L) 11/22/2024    MCV 99 11/22/2024     (L) 11/22/2024       Chemistry    Lab Results   Component Value Date/Time     11/22/2024 0910    K 4.6 11/22/2024 0910     11/22/2024 0910    CO2 30 11/22/2024 0910    BUN 23 11/22/2024 0910    CREATININE 1.18 (H) 11/22/2024 0910    Lab Results   Component Value Date/Time    CALCIUM 8.8 11/22/2024 0910    ALKPHOS 81 11/22/2024 0910    AST 20 11/22/2024 0910    ALT 15 11/22/2024 0910    BILITOT 0.4 11/22/2024 0910             Lab Results   Component Value Date    LABCA2 10.5 11/01/2024    LABCA2 18.3 04/30/2024     IMPRESSION/PLAN      1. Recurrent ER+/HER2- breast cancer with isolated supraclav recurrence.    Continue present therapy for now however multiple palpable Lymph Nodes along left side of neck and left supraclavicular. Is accompanied today by family and pt is now agreeable to complete updated labs with repeat Tempus genomic testing + [facility] PET. Lengthy discussion regarding scans Nov 2024 showing PD and with additional work up will determine best next therapy as previously discussed with Dr William Dove. With blood tempus, if it shows ESR1 mutation will change therapy likely to elacestrant. Also previously discussed faslodex th last Dr Dove apt.  Continued frustration in changing her current regimen and additional testing due to cost but is agreeable today to proceed with testing as advised by myself and Dr Dove.  She understands we will loop in SW with cost issues.    RTC in 4-6 weeks, she understands we will be in touch with testing results.      2. Bone health.    DEXA  2024 remains NORMAL bone density. Monitor. Repeat in 2 yrs.      At least 45 minutes of direct consultation was spent with the patient today reviewing her cancer care plan, educating and answering questions regarding ongoing follow up, greater than 50% in counseling and coordination  of care      Thank you for the opportunity to be involved your care.   We discussed the clinical significance of diagnosis, goals of care and treatment plan in detail.   Please do not hesitate to reach out with any questions.       Nayeli Alcaraz MSN, APRN, FNP-C  [facility]  Division of Medical Oncology- Breast   Collaborating Physician Dr. William Dove   [facility] Breast Disease Team   [place] [id number]  Phone: [phone]  Fax: [phone]  Available via Secure Chat    Confidential Peer Review Document-  Privilege  Privileged Pursuant to [place] Revised Code Section 2305.24, .25, .251 & .252       -------------------------------------------------------------------------------------------------------------------------------  William Dove MD  Director of Breast Cancer Medical Oncology Research Program   [facility]  Professor of Medicine  [facility]  [address] [place] [address] [address], [address]  [place] [address]  Phone: [phone]  [email or web address]

## 2025-03-06 ENCOUNTER — TELEPHONE (OUTPATIENT)
Dept: HEMATOLOGY/ONCOLOGY | Facility: HOSPITAL | Age: 85
End: 2025-03-06
Payer: COMMERCIAL

## 2025-03-06 DIAGNOSIS — E83.52 HYPERCALCEMIA OF MALIGNANCY: ICD-10-CM

## 2025-03-06 DIAGNOSIS — C50.919 MALIGNANT NEOPLASM OF FEMALE BREAST, UNSPECIFIED ESTROGEN RECEPTOR STATUS, UNSPECIFIED LATERALITY, UNSPECIFIED SITE OF BREAST: Primary | ICD-10-CM

## 2025-03-06 RX ORDER — EPINEPHRINE 0.3 MG/.3ML
0.3 INJECTION SUBCUTANEOUS EVERY 5 MIN PRN
OUTPATIENT
Start: 2025-03-09

## 2025-03-06 RX ORDER — ALBUTEROL SULFATE 0.83 MG/ML
3 SOLUTION RESPIRATORY (INHALATION) AS NEEDED
OUTPATIENT
Start: 2025-03-09

## 2025-03-06 RX ORDER — DIPHENHYDRAMINE HYDROCHLORIDE 50 MG/ML
50 INJECTION INTRAMUSCULAR; INTRAVENOUS AS NEEDED
OUTPATIENT
Start: 2025-03-09

## 2025-03-06 RX ORDER — FAMOTIDINE 10 MG/ML
20 INJECTION, SOLUTION INTRAVENOUS ONCE AS NEEDED
OUTPATIENT
Start: 2025-03-09

## 2025-03-06 NOTE — TELEPHONE ENCOUNTER
Call to patient to review labs- she did complete labs and Tempus yesterday but did not schedule the PET scan.    We have reviewed elevated calcium- per my discussion with Dr Dove will have patient start Xgeva every 12 weeks- will have her complete first shot next week with IVF's as well.    Additionally we will have scheduling help with PET being scheduled.     She denies any new or concerning dental issues.     Questions answered

## 2025-03-07 DIAGNOSIS — C50.919 MALIGNANT NEOPLASM OF FEMALE BREAST, UNSPECIFIED ESTROGEN RECEPTOR STATUS, UNSPECIFIED LATERALITY, UNSPECIFIED SITE OF BREAST: ICD-10-CM

## 2025-03-07 DIAGNOSIS — C50.919 MALIGNANT NEOPLASM OF FEMALE BREAST, UNSPECIFIED ESTROGEN RECEPTOR STATUS, UNSPECIFIED LATERALITY, UNSPECIFIED SITE OF BREAST: Primary | ICD-10-CM

## 2025-03-07 DIAGNOSIS — E83.52 HYPERCALCEMIA: ICD-10-CM

## 2025-03-07 DIAGNOSIS — N18.9 ACUTE KIDNEY INJURY SUPERIMPOSED ON CKD (CMS-HCC): ICD-10-CM

## 2025-03-07 DIAGNOSIS — N17.9 ACUTE KIDNEY INJURY SUPERIMPOSED ON CKD (CMS-HCC): ICD-10-CM

## 2025-03-07 NOTE — TELEPHONE ENCOUNTER
Call to patient to review apts and adequate water intake and reasons to go to the ED- severe fatigue, confusion and severe weakness. She is agreeable and will call with questions

## 2025-03-07 NOTE — PROGRESS NOTES
Call to patient Cousin Luh to review plan of care. Per patient and family I have added 2 cousins in her chart as emergency contact Crow Davies and Cherelle Cosme

## 2025-03-09 ENCOUNTER — HOSPITAL ENCOUNTER (EMERGENCY)
Facility: HOSPITAL | Age: 85
Discharge: LEFT BEFORE DISCHARGE | End: 2025-03-09
Payer: COMMERCIAL

## 2025-03-09 VITALS
BODY MASS INDEX: 29.45 KG/M2 | SYSTOLIC BLOOD PRESSURE: 163 MMHG | OXYGEN SATURATION: 100 % | WEIGHT: 150 LBS | RESPIRATION RATE: 19 BRPM | HEIGHT: 60 IN | TEMPERATURE: 99.7 F | HEART RATE: 102 BPM | DIASTOLIC BLOOD PRESSURE: 75 MMHG

## 2025-03-09 PROCEDURE — 4500999001 HC ED NO CHARGE

## 2025-03-09 PROCEDURE — 99281 EMR DPT VST MAYX REQ PHY/QHP: CPT

## 2025-03-09 ASSESSMENT — PAIN DESCRIPTION - LOCATION: LOCATION: GROIN

## 2025-03-09 ASSESSMENT — PAIN DESCRIPTION - PROGRESSION: CLINICAL_PROGRESSION: NOT CHANGED

## 2025-03-09 ASSESSMENT — COLUMBIA-SUICIDE SEVERITY RATING SCALE - C-SSRS
2. HAVE YOU ACTUALLY HAD ANY THOUGHTS OF KILLING YOURSELF?: NO
1. IN THE PAST MONTH, HAVE YOU WISHED YOU WERE DEAD OR WISHED YOU COULD GO TO SLEEP AND NOT WAKE UP?: NO
6. HAVE YOU EVER DONE ANYTHING, STARTED TO DO ANYTHING, OR PREPARED TO DO ANYTHING TO END YOUR LIFE?: NO

## 2025-03-09 ASSESSMENT — PAIN DESCRIPTION - DESCRIPTORS: DESCRIPTORS: NAGGING

## 2025-03-09 ASSESSMENT — PAIN - FUNCTIONAL ASSESSMENT: PAIN_FUNCTIONAL_ASSESSMENT: 0-10

## 2025-03-09 ASSESSMENT — PAIN SCALES - GENERAL: PAINLEVEL_OUTOF10: 3

## 2025-03-09 ASSESSMENT — PAIN DESCRIPTION - FREQUENCY: FREQUENCY: INTERMITTENT

## 2025-03-09 NOTE — ED TRIAGE NOTES
Pt came in for groin pain and fatigue. Pt was diagnosed with a UTI a few days ago, but has been unable to keep up with her antibiotics. Pt has AMS, fatigue, and decreased appetite.

## 2025-03-10 ENCOUNTER — APPOINTMENT (OUTPATIENT)
Dept: CARDIOLOGY | Facility: HOSPITAL | Age: 85
DRG: 299 | End: 2025-03-10
Payer: MEDICARE

## 2025-03-10 ENCOUNTER — INFUSION (OUTPATIENT)
Dept: HEMATOLOGY/ONCOLOGY | Facility: CLINIC | Age: 85
End: 2025-03-10
Payer: COMMERCIAL

## 2025-03-10 ENCOUNTER — APPOINTMENT (OUTPATIENT)
Dept: RADIOLOGY | Facility: HOSPITAL | Age: 85
DRG: 299 | End: 2025-03-10
Payer: MEDICARE

## 2025-03-10 ENCOUNTER — HOSPITAL ENCOUNTER (INPATIENT)
Facility: HOSPITAL | Age: 85
Discharge: HOME | DRG: 299 | End: 2025-03-10
Attending: EMERGENCY MEDICINE | Admitting: HOSPITALIST
Payer: MEDICARE

## 2025-03-10 ENCOUNTER — TELEPHONE (OUTPATIENT)
Dept: HEMATOLOGY/ONCOLOGY | Facility: HOSPITAL | Age: 85
End: 2025-03-10
Payer: COMMERCIAL

## 2025-03-10 VITALS
BODY MASS INDEX: 27.81 KG/M2 | SYSTOLIC BLOOD PRESSURE: 152 MMHG | DIASTOLIC BLOOD PRESSURE: 84 MMHG | HEART RATE: 106 BPM | TEMPERATURE: 99.1 F | OXYGEN SATURATION: 98 % | RESPIRATION RATE: 18 BRPM | WEIGHT: 142.42 LBS

## 2025-03-10 DIAGNOSIS — K92.0 HEMATEMESIS, UNSPECIFIED WHETHER NAUSEA PRESENT: ICD-10-CM

## 2025-03-10 DIAGNOSIS — I82.4Z9 DEEP VEIN THROMBOSIS (DVT) OF DISTAL VEIN OF LOWER EXTREMITY, UNSPECIFIED CHRONICITY, UNSPECIFIED LATERALITY: ICD-10-CM

## 2025-03-10 DIAGNOSIS — C77.0 METASTASIS TO SUPRACLAVICULAR LYMPH NODE (MULTI): Primary | ICD-10-CM

## 2025-03-10 DIAGNOSIS — E83.52 HYPERCALCEMIA: ICD-10-CM

## 2025-03-10 DIAGNOSIS — M79.89 SWELLING OF RIGHT UPPER EXTREMITY: ICD-10-CM

## 2025-03-10 DIAGNOSIS — R57.8 HEMORRHAGIC SHOCK (MULTI): ICD-10-CM

## 2025-03-10 DIAGNOSIS — E83.52 HYPERCALCEMIA OF MALIGNANCY: ICD-10-CM

## 2025-03-10 DIAGNOSIS — K27.9 PUD (PEPTIC ULCER DISEASE): ICD-10-CM

## 2025-03-10 DIAGNOSIS — K92.0 HEMATEMESIS OF FRESH BLOOD: ICD-10-CM

## 2025-03-10 DIAGNOSIS — M79.601 PAIN IN RIGHT ARM: ICD-10-CM

## 2025-03-10 DIAGNOSIS — C78.7 METASTASIS TO LIVER (MULTI): ICD-10-CM

## 2025-03-10 DIAGNOSIS — K92.1 MELENA: ICD-10-CM

## 2025-03-10 DIAGNOSIS — C50.919 MALIGNANT NEOPLASM OF FEMALE BREAST, UNSPECIFIED ESTROGEN RECEPTOR STATUS, UNSPECIFIED LATERALITY, UNSPECIFIED SITE OF BREAST: ICD-10-CM

## 2025-03-10 DIAGNOSIS — R53.1 GENERAL WEAKNESS: Primary | ICD-10-CM

## 2025-03-10 DIAGNOSIS — R41.82 ALTERED MENTAL STATUS, UNSPECIFIED ALTERED MENTAL STATUS TYPE: ICD-10-CM

## 2025-03-10 DIAGNOSIS — I82.411 ACUTE DEEP VEIN THROMBOSIS (DVT) OF FEMORAL VEIN OF RIGHT LOWER EXTREMITY: ICD-10-CM

## 2025-03-10 DIAGNOSIS — C77.0 METASTASIS TO SUPRACLAVICULAR LYMPH NODE (MULTI): ICD-10-CM

## 2025-03-10 DIAGNOSIS — Z95.828 S/P INSERTION OF IVC (INFERIOR VENA CAVAL) FILTER: ICD-10-CM

## 2025-03-10 DIAGNOSIS — K25.4 GASTROINTESTINAL HEMORRHAGE ASSOCIATED WITH GASTRIC ULCER: ICD-10-CM

## 2025-03-10 DIAGNOSIS — G89.29 OTHER CHRONIC PAIN: ICD-10-CM

## 2025-03-10 LAB
ALBUMIN SERPL BCP-MCNC: 3.3 G/DL (ref 3.4–5)
ALBUMIN SERPL BCP-MCNC: 3.5 G/DL (ref 3.4–5)
ALP SERPL-CCNC: 53 U/L (ref 33–136)
ALP SERPL-CCNC: 54 U/L (ref 33–136)
ALT SERPL W P-5'-P-CCNC: 9 U/L (ref 7–45)
ALT SERPL W P-5'-P-CCNC: 9 U/L (ref 7–45)
ANION GAP SERPL CALC-SCNC: 13 MMOL/L (ref 10–20)
ANION GAP SERPL CALC-SCNC: 14 MMOL/L (ref 10–20)
ANION GAP SERPL CALC-SCNC: 9 MMOL/L (ref 10–20)
AST SERPL W P-5'-P-CCNC: 18 U/L (ref 9–39)
AST SERPL W P-5'-P-CCNC: 20 U/L (ref 9–39)
BASOPHILS # BLD AUTO: 0.01 X10*3/UL (ref 0–0.1)
BASOPHILS # BLD AUTO: 0.03 X10*3/UL (ref 0–0.1)
BASOPHILS NFR BLD AUTO: 0.1 %
BASOPHILS NFR BLD AUTO: 0.2 %
BILIRUB DIRECT SERPL-MCNC: 0.4 MG/DL (ref 0–0.3)
BILIRUB SERPL-MCNC: 1 MG/DL (ref 0–1.2)
BILIRUB SERPL-MCNC: 1 MG/DL (ref 0–1.2)
BUN SERPL-MCNC: 31 MG/DL (ref 6–23)
BUN SERPL-MCNC: 32 MG/DL (ref 6–23)
BUN SERPL-MCNC: 32 MG/DL (ref 6–23)
CA-I BLD-SCNC: 1.64 MMOL/L (ref 1.1–1.33)
CA-I BLD-SCNC: 1.67 MMOL/L (ref 1.1–1.33)
CALCIUM SERPL-MCNC: 12.6 MG/DL (ref 8.6–10.6)
CALCIUM SERPL-MCNC: 12.8 MG/DL (ref 8.6–10.3)
CHLORIDE SERPL-SCNC: 96 MMOL/L (ref 98–107)
CHLORIDE SERPL-SCNC: 99 MMOL/L (ref 98–107)
CHLORIDE SERPL-SCNC: 99 MMOL/L (ref 98–107)
CO2 SERPL-SCNC: 25 MMOL/L (ref 21–32)
CO2 SERPL-SCNC: 27 MMOL/L (ref 21–32)
CO2 SERPL-SCNC: 27 MMOL/L (ref 21–32)
CREAT SERPL-MCNC: 1.29 MG/DL (ref 0.5–1.05)
CREAT SERPL-MCNC: 1.38 MG/DL (ref 0.5–1.05)
CREAT SERPL-MCNC: 1.6 MG/DL (ref 0.6–1.3)
EGFRCR SERPLBLD CKD-EPI 2021: 38 ML/MIN/1.73M*2
EGFRCR SERPLBLD CKD-EPI 2021: 41 ML/MIN/1.73M*2
EOSINOPHIL # BLD AUTO: 0 X10*3/UL (ref 0–0.4)
EOSINOPHIL # BLD AUTO: 0 X10*3/UL (ref 0–0.4)
EOSINOPHIL NFR BLD AUTO: 0 %
EOSINOPHIL NFR BLD AUTO: 0 %
ERYTHROCYTE [DISTWIDTH] IN BLOOD BY AUTOMATED COUNT: 12.6 % (ref 11.5–14.5)
ERYTHROCYTE [DISTWIDTH] IN BLOOD BY AUTOMATED COUNT: 13.2 % (ref 11.5–14.5)
FLUAV RNA RESP QL NAA+PROBE: NOT DETECTED
FLUBV RNA RESP QL NAA+PROBE: NOT DETECTED
GFR SERPL CREATININE-BSD FRML MDRD: 31 ML/MIN/1.73M*2
GLUCOSE SERPL-MCNC: 79 MG/DL (ref 74–99)
GLUCOSE SERPL-MCNC: 85 MG/DL (ref 74–99)
GLUCOSE SERPL-MCNC: 95 MG/DL (ref 74–99)
HCT VFR BLD AUTO: 31.3 % (ref 36–46)
HCT VFR BLD AUTO: 32.5 % (ref 36–46)
HGB BLD-MCNC: 10.6 G/DL (ref 12–16)
HGB BLD-MCNC: 10.7 G/DL (ref 12–16)
IMM GRANULOCYTES # BLD AUTO: 0.08 X10*3/UL (ref 0–0.5)
IMM GRANULOCYTES # BLD AUTO: 0.18 X10*3/UL (ref 0–0.5)
IMM GRANULOCYTES NFR BLD AUTO: 0.5 % (ref 0–0.9)
IMM GRANULOCYTES NFR BLD AUTO: 1.1 % (ref 0–0.9)
LYMPHOCYTES # BLD AUTO: 0.97 X10*3/UL (ref 0.8–3)
LYMPHOCYTES # BLD AUTO: 1.26 X10*3/UL (ref 0.8–3)
LYMPHOCYTES NFR BLD AUTO: 6.2 %
LYMPHOCYTES NFR BLD AUTO: 7.8 %
MAGNESIUM SERPL-MCNC: 1.41 MG/DL (ref 1.6–2.4)
MCH RBC QN AUTO: 29.8 PG (ref 26–34)
MCH RBC QN AUTO: 30.1 PG (ref 26–34)
MCHC RBC AUTO-ENTMCNC: 32.9 G/DL (ref 32–36)
MCHC RBC AUTO-ENTMCNC: 33.9 G/DL (ref 32–36)
MCV RBC AUTO: 89 FL (ref 80–100)
MCV RBC AUTO: 91 FL (ref 80–100)
MONOCYTES # BLD AUTO: 1.71 X10*3/UL (ref 0.05–0.8)
MONOCYTES # BLD AUTO: 1.87 X10*3/UL (ref 0.05–0.8)
MONOCYTES NFR BLD AUTO: 11 %
MONOCYTES NFR BLD AUTO: 11.6 %
NEUTROPHILS # BLD AUTO: 12.75 X10*3/UL (ref 1.6–5.5)
NEUTROPHILS # BLD AUTO: 12.8 X10*3/UL (ref 1.6–5.5)
NEUTROPHILS NFR BLD AUTO: 79.3 %
NEUTROPHILS NFR BLD AUTO: 82.2 %
NRBC BLD-RTO: 0 /100 WBCS (ref 0–0)
NRBC BLD-RTO: ABNORMAL /100{WBCS}
PLATELET # BLD AUTO: 172 X10*3/UL (ref 150–450)
PLATELET # BLD AUTO: 174 X10*3/UL (ref 150–450)
POTASSIUM SERPL-SCNC: 4 MMOL/L (ref 3.5–5.3)
POTASSIUM SERPL-SCNC: 4.1 MMOL/L (ref 3.5–5.3)
POTASSIUM SERPL-SCNC: 4.2 MMOL/L (ref 3.5–5.3)
PROT SERPL-MCNC: 6.7 G/DL (ref 6.4–8.2)
PROT SERPL-MCNC: 7.4 G/DL (ref 6.4–8.2)
RBC # BLD AUTO: 3.52 X10*6/UL (ref 4–5.2)
RBC # BLD AUTO: 3.59 X10*6/UL (ref 4–5.2)
SARS-COV-2 RNA RESP QL NAA+PROBE: NOT DETECTED
SODIUM SERPL-SCNC: 131 MMOL/L (ref 136–145)
SODIUM SERPL-SCNC: 133 MMOL/L (ref 136–145)
SODIUM SERPL-SCNC: 133 MMOL/L (ref 136–145)
TSH SERPL-ACNC: 2.1 MIU/L (ref 0.44–3.98)
WBC # BLD AUTO: 15.6 X10*3/UL (ref 4.4–11.3)
WBC # BLD AUTO: 16.1 X10*3/UL (ref 4.4–11.3)

## 2025-03-10 PROCEDURE — 36415 COLL VENOUS BLD VENIPUNCTURE: CPT | Performed by: EMERGENCY MEDICINE

## 2025-03-10 PROCEDURE — 2500000004 HC RX 250 GENERAL PHARMACY W/ HCPCS (ALT 636 FOR OP/ED): Performed by: NURSE PRACTITIONER

## 2025-03-10 PROCEDURE — 2500000004 HC RX 250 GENERAL PHARMACY W/ HCPCS (ALT 636 FOR OP/ED): Mod: JZ | Performed by: NURSE PRACTITIONER

## 2025-03-10 PROCEDURE — 93005 ELECTROCARDIOGRAM TRACING: CPT

## 2025-03-10 PROCEDURE — 84075 ASSAY ALKALINE PHOSPHATASE: CPT

## 2025-03-10 PROCEDURE — 96366 THER/PROPH/DIAG IV INF ADDON: CPT

## 2025-03-10 PROCEDURE — 80053 COMPREHEN METABOLIC PANEL: CPT | Performed by: EMERGENCY MEDICINE

## 2025-03-10 PROCEDURE — 96365 THER/PROPH/DIAG IV INF INIT: CPT

## 2025-03-10 PROCEDURE — 74176 CT ABD & PELVIS W/O CONTRAST: CPT

## 2025-03-10 PROCEDURE — 71046 X-RAY EXAM CHEST 2 VIEWS: CPT

## 2025-03-10 PROCEDURE — 70450 CT HEAD/BRAIN W/O DYE: CPT

## 2025-03-10 PROCEDURE — 96361 HYDRATE IV INFUSION ADD-ON: CPT

## 2025-03-10 PROCEDURE — 87636 SARSCOV2 & INF A&B AMP PRB: CPT | Performed by: EMERGENCY MEDICINE

## 2025-03-10 PROCEDURE — 83735 ASSAY OF MAGNESIUM: CPT | Performed by: EMERGENCY MEDICINE

## 2025-03-10 PROCEDURE — 85730 THROMBOPLASTIN TIME PARTIAL: CPT | Performed by: EMERGENCY MEDICINE

## 2025-03-10 PROCEDURE — 96360 HYDRATION IV INFUSION INIT: CPT | Mod: INF

## 2025-03-10 PROCEDURE — 93970 EXTREMITY STUDY: CPT

## 2025-03-10 PROCEDURE — 82330 ASSAY OF CALCIUM: CPT | Performed by: EMERGENCY MEDICINE

## 2025-03-10 PROCEDURE — 84443 ASSAY THYROID STIM HORMONE: CPT | Performed by: EMERGENCY MEDICINE

## 2025-03-10 PROCEDURE — 93971 EXTREMITY STUDY: CPT | Performed by: RADIOLOGY

## 2025-03-10 PROCEDURE — 99285 EMERGENCY DEPT VISIT HI MDM: CPT | Mod: 25 | Performed by: EMERGENCY MEDICINE

## 2025-03-10 PROCEDURE — 85025 COMPLETE CBC W/AUTO DIFF WBC: CPT

## 2025-03-10 PROCEDURE — 96361 HYDRATE IV INFUSION ADD-ON: CPT | Mod: INF

## 2025-03-10 PROCEDURE — 71046 X-RAY EXAM CHEST 2 VIEWS: CPT | Mod: FOREIGN READ | Performed by: RADIOLOGY

## 2025-03-10 PROCEDURE — 74176 CT ABD & PELVIS W/O CONTRAST: CPT | Performed by: RADIOLOGY

## 2025-03-10 PROCEDURE — 80047 BASIC METABLC PNL IONIZED CA: CPT

## 2025-03-10 PROCEDURE — 2500000004 HC RX 250 GENERAL PHARMACY W/ HCPCS (ALT 636 FOR OP/ED): Performed by: EMERGENCY MEDICINE

## 2025-03-10 PROCEDURE — 82248 BILIRUBIN DIRECT: CPT

## 2025-03-10 PROCEDURE — 96372 THER/PROPH/DIAG INJ SC/IM: CPT | Mod: 59

## 2025-03-10 PROCEDURE — 93971 EXTREMITY STUDY: CPT

## 2025-03-10 PROCEDURE — 70450 CT HEAD/BRAIN W/O DYE: CPT | Performed by: RADIOLOGY

## 2025-03-10 PROCEDURE — 85025 COMPLETE CBC W/AUTO DIFF WBC: CPT | Performed by: EMERGENCY MEDICINE

## 2025-03-10 PROCEDURE — 85610 PROTHROMBIN TIME: CPT | Performed by: EMERGENCY MEDICINE

## 2025-03-10 RX ORDER — DIPHENHYDRAMINE HYDROCHLORIDE 50 MG/ML
50 INJECTION INTRAMUSCULAR; INTRAVENOUS AS NEEDED
OUTPATIENT
Start: 2025-06-01

## 2025-03-10 RX ORDER — FAMOTIDINE 10 MG/ML
20 INJECTION, SOLUTION INTRAVENOUS ONCE AS NEEDED
Status: DISCONTINUED | OUTPATIENT
Start: 2025-03-10 | End: 2025-03-10 | Stop reason: HOSPADM

## 2025-03-10 RX ORDER — MAGNESIUM SULFATE HEPTAHYDRATE 40 MG/ML
2 INJECTION, SOLUTION INTRAVENOUS ONCE
Status: COMPLETED | OUTPATIENT
Start: 2025-03-10 | End: 2025-03-11

## 2025-03-10 RX ORDER — ALBUTEROL SULFATE 0.83 MG/ML
3 SOLUTION RESPIRATORY (INHALATION) AS NEEDED
OUTPATIENT
Start: 2025-06-01

## 2025-03-10 RX ORDER — VANCOMYCIN HYDROCHLORIDE 1 G/20ML
INJECTION, POWDER, LYOPHILIZED, FOR SOLUTION INTRAVENOUS DAILY PRN
Status: DISCONTINUED | OUTPATIENT
Start: 2025-03-10 | End: 2025-03-11

## 2025-03-10 RX ORDER — ALBUTEROL SULFATE 0.83 MG/ML
3 SOLUTION RESPIRATORY (INHALATION) AS NEEDED
Status: DISCONTINUED | OUTPATIENT
Start: 2025-03-10 | End: 2025-03-10 | Stop reason: HOSPADM

## 2025-03-10 RX ORDER — FAMOTIDINE 10 MG/ML
20 INJECTION, SOLUTION INTRAVENOUS ONCE AS NEEDED
OUTPATIENT
Start: 2025-06-01

## 2025-03-10 RX ORDER — EPINEPHRINE 0.3 MG/.3ML
0.3 INJECTION SUBCUTANEOUS EVERY 5 MIN PRN
OUTPATIENT
Start: 2025-06-01

## 2025-03-10 RX ORDER — EPINEPHRINE 0.3 MG/.3ML
0.3 INJECTION SUBCUTANEOUS EVERY 5 MIN PRN
Status: DISCONTINUED | OUTPATIENT
Start: 2025-03-10 | End: 2025-03-10 | Stop reason: HOSPADM

## 2025-03-10 RX ORDER — DIPHENHYDRAMINE HYDROCHLORIDE 50 MG/ML
50 INJECTION INTRAMUSCULAR; INTRAVENOUS AS NEEDED
Status: DISCONTINUED | OUTPATIENT
Start: 2025-03-10 | End: 2025-03-10 | Stop reason: HOSPADM

## 2025-03-10 RX ADMIN — SODIUM CHLORIDE, POTASSIUM CHLORIDE, SODIUM LACTATE AND CALCIUM CHLORIDE 500 ML: 600; 310; 30; 20 INJECTION, SOLUTION INTRAVENOUS at 20:04

## 2025-03-10 RX ADMIN — SODIUM CHLORIDE 500 ML: 9 INJECTION, SOLUTION INTRAVENOUS at 14:47

## 2025-03-10 RX ADMIN — MAGNESIUM SULFATE HEPTAHYDRATE 2 G: 40 INJECTION, SOLUTION INTRAVENOUS at 23:35

## 2025-03-10 RX ADMIN — DENOSUMAB 120 MG: 120 INJECTION SUBCUTANEOUS at 15:06

## 2025-03-10 ASSESSMENT — PAIN SCALES - GENERAL
PAINLEVEL_OUTOF10: 8
PAINLEVEL_OUTOF10: 0 - NO PAIN
PAINLEVEL_OUTOF10: 7

## 2025-03-10 ASSESSMENT — PAIN - FUNCTIONAL ASSESSMENT: PAIN_FUNCTIONAL_ASSESSMENT: 0-10

## 2025-03-10 NOTE — Clinical Note
Sheath was inserted in the right internal jugular vein. Ultrasound guidance was used. sheath from kit

## 2025-03-10 NOTE — PROGRESS NOTES
Work on diet and lifestyle changes. Continue to monitor A1c.    Orders:    Hemoglobin A1C; Future     Spoke with mark becker regarding patient presentation, recent ed visit that the patient left after waiting without being seen by a dr, and also the patients current labs. Carmen requested for patient to go to the er following fluids and xgeva shot. Patient was agreeable and requested for paramedics to be called. Patient discharged under the care of paramedics.

## 2025-03-10 NOTE — TELEPHONE ENCOUNTER
Received call from cousin Cherelle - Elizabeth did become progressively weaker over the weekend. Her cousins Gila and Cris tried to take her to ER on 3/9/25 for progressive weakness and waiting in triage 2 hrs and then left. She is unsure if Elizabeth is taking her antibiotics for her UTI and is quite certain she is not.     Communication to infusion nursing Jerry MCKEON I have added a Stat POCT Basic with ionized, CBC with Diff and Hepatic plus a UA with reflex to cx.

## 2025-03-10 NOTE — ED PROVIDER NOTES
HPI   Chief Complaint   Patient presents with    Dizziness    abnormal labs    Urinary Frequency       84-year-old female with history of CKD, prior breast cancer who is presenting for altered mental status, generalized weakness.  Reportedly had been diagnosed and treated for recent UTI but not taking her antibiotics.  She has become more weak and more confused over the past few days per friends.  She states she has some mild b/l quadrant abdominal pain, has had poor p.o. intake and is noting bilateral lower quadrant pain. No falls. Denies fever, headache, chest pain, SOB. Right leg is swollen, unsure how long it's been swollen for per patient and friend at bedside.       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 04/12/2019    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    Dry eye syndrome of right lacrimal gland 04/12/2019    Dry eye syndrome of right lacrimal gland    Encounter for allergy testing     Encounter for allergy testing    Encounter for general adult medical examination without abnormal findings 07/24/2020    Encounter for annual health examination    Encounter for general adult medical examination without abnormal findings 07/19/2021    Encounter for annual health examination    Encounter for general adult medical examination without abnormal findings 03/06/2018    Encounter for annual health examination    Other obesity due to excess calories 10/15/2020    Exogenous  obesity    Pain in unspecified knee 03/09/2018    Joint pain, knee    Personal history of other diseases of the digestive system 07/10/2018    History of gastroesophageal reflux (GERD)    Personal history of other diseases of the female genital tract 09/08/2016    History of vaginitis    Personal history of other diseases of the female genital tract     History of pelvic inflammatory disease    Personal history of other diseases of the nervous system and sense organs 04/12/2019    History of myopia    Personal history of other diseases of the respiratory system 01/02/2020    History of acute bronchitis    Personal history of other diseases of urinary system 01/22/2021    History of chronic kidney disease     Past Surgical History:   Procedure Laterality Date    BREAST SURGERY  03/29/2013    Breast Surgery    HYSTERECTOMY  03/29/2013    Hysterectomy    US GUIDED MEDIASTINUM PERCUTANEOUS BIOPSY  9/22/2021    US GUIDED MEDIASTINUM PERCUTANEOUS BIOPSY 9/22/2021 [facility]     Family History   Problem Relation Name Age of Onset    Cancer Other          Family History    Lung disease Other          Family History     Social History     Tobacco Use    Smoking status: Former     Types: Cigarettes    Smokeless tobacco: Never   Substance Use Topics    Alcohol use: Never    Drug use: Never       Physical Exam   ED Triage Vitals   Temperature Heart Rate Respirations BP   03/10/25 1707 03/10/25 1707 03/10/25 1707 03/10/25 1707   37.6 °C (99.6 °F) 90 18 155/75      Pulse Ox Temp Source Heart Rate Source Patient Position   03/10/25 1740 03/10/25 1707 03/10/25 1934 --   98 % Oral Monitor       BP Location FiO2 (%)     -- --             Physical Exam  Vitals and nursing note reviewed.   Constitutional:       Appearance: Normal appearance.   HENT:      Head: Normocephalic and atraumatic.      Right Ear: Tympanic membrane normal.      Left Ear: Tympanic membrane normal.      Nose: Nose normal.   Eyes:      Extraocular  Movements: Extraocular movements intact.      Pupils: Pupils are equal, round, and reactive to light.   Cardiovascular:      Rate and Rhythm: Normal rate and regular rhythm.      Pulses: Normal pulses.   Pulmonary:      Effort: Pulmonary effort is normal. No respiratory distress.   Abdominal:      General: Abdomen is flat.      Palpations: Abdomen is soft.      Tenderness: There is abdominal tenderness in the right lower quadrant, suprapubic area and left lower quadrant.      Hernia: No hernia is present.   Musculoskeletal:      Cervical back: Normal range of motion and neck supple. No rigidity or tenderness.      Right lower leg: 3+ Edema present.      Left lower leg: Normal.   Skin:     General: Skin is warm.      Capillary Refill: Capillary refill takes less than 2 seconds.      Coloration: Skin is not jaundiced.      Findings: No bruising.   Neurological:      Mental Status: She is alert. She is disoriented.      Cranial Nerves: Cranial nerves 2-12 are intact.      Sensory: Sensation is intact.      Comments: Strength intact in upper and lower extremities, intact cerebellar function   Psychiatric:         Mood and Affect: Mood normal.           ED Course & MDM   Diagnoses as of 03/10/25 2134   General weakness   Altered mental status, unspecified altered mental status type   Metastasis to liver (Multi)   Hypercalcemia   Acute deep vein thrombosis (DVT) of femoral vein of right lower extremity (Multi)                 No data recorded                                 Medical Decision Making  84-year-old female presenting with generalized weakness, worsening confusion.  Differential is broad including intracranial abnormality, toxic-metabolic encephalopathy, infection. Low suspicion for acute stroke given chronicity of symptoms and non focal exam. Will obtain CT of the head, CT of the abdomen and pelvis, UA, labs.     CT shows potential mass effect of unclear etiology, radiology recommending MRI to look for  malignancy which given the diffuse metastasis in the liver is reasonable. CT also shows a right lower extremity DVT, getting Doppler and will defer on heparin until we get the MRI. Labs show hypercalcemia to 12.8, probably malignancy related and will give fluids to help correct. Potential cause of AMS. Signed out to Dr. Laurent with urine, US, admission pending.     Amount and/or Complexity of Data Reviewed  External Data Reviewed: ECG.     Details: ECG interpreted by me NSR no ADRIAN or STD, no TWI, qtc 423        Procedure  Procedures     Jose Boyd MD  03/12/25 123

## 2025-03-10 NOTE — ED TRIAGE NOTES
Pt to ed via ems with c/o multiple medical complaints. Pt states she went to dr for uti follow up. Per cousin pt is non compliant with meds for uti tx. Pt states she became dizzy. Endorses having bloodwork coming back abnormal as well. EKG obatined

## 2025-03-11 ENCOUNTER — TELEPHONE (OUTPATIENT)
Dept: ADMISSION | Facility: HOSPITAL | Age: 85
End: 2025-03-11

## 2025-03-11 ENCOUNTER — APPOINTMENT (OUTPATIENT)
Dept: RADIOLOGY | Facility: HOSPITAL | Age: 85
DRG: 299 | End: 2025-03-11
Payer: MEDICARE

## 2025-03-11 ENCOUNTER — TELEPHONE (OUTPATIENT)
Dept: HEMATOLOGY/ONCOLOGY | Facility: HOSPITAL | Age: 85
End: 2025-03-11

## 2025-03-11 PROBLEM — R53.1 GENERAL WEAKNESS: Status: ACTIVE | Noted: 2025-03-11

## 2025-03-11 LAB
ANION GAP SERPL CALC-SCNC: 11 MMOL/L (ref 10–20)
APPEARANCE UR: CLEAR
APTT PPP: 21 SECONDS (ref 26–36)
ATRIAL RATE: 99 BPM
BACTERIA #/AREA URNS AUTO: ABNORMAL /HPF
BASOPHILS # BLD AUTO: 0.03 X10*3/UL (ref 0–0.1)
BASOPHILS NFR BLD AUTO: 0.2 %
BILIRUB UR STRIP.AUTO-MCNC: NEGATIVE MG/DL
BUN SERPL-MCNC: 27 MG/DL (ref 6–23)
CALCIUM SERPL-MCNC: 10.8 MG/DL (ref 8.6–10.3)
CHLORIDE SERPL-SCNC: 103 MMOL/L (ref 98–107)
CO2 SERPL-SCNC: 24 MMOL/L (ref 21–32)
COLOR UR: YELLOW
CREAT SERPL-MCNC: 1.04 MG/DL (ref 0.5–1.05)
EGFRCR SERPLBLD CKD-EPI 2021: 53 ML/MIN/1.73M*2
EOSINOPHIL # BLD AUTO: 0 X10*3/UL (ref 0–0.4)
EOSINOPHIL NFR BLD AUTO: 0 %
ERYTHROCYTE [DISTWIDTH] IN BLOOD BY AUTOMATED COUNT: 12.8 % (ref 11.5–14.5)
GLUCOSE SERPL-MCNC: 88 MG/DL (ref 74–99)
GLUCOSE UR STRIP.AUTO-MCNC: NORMAL MG/DL
HCT VFR BLD AUTO: 29.8 % (ref 36–46)
HGB BLD-MCNC: 10 G/DL (ref 12–16)
HOLD SPECIMEN: NORMAL
IMM GRANULOCYTES # BLD AUTO: 0.12 X10*3/UL (ref 0–0.5)
IMM GRANULOCYTES NFR BLD AUTO: 0.8 % (ref 0–0.9)
INR PPP: 1 (ref 0.9–1.1)
INR PPP: 1.1 (ref 0.9–1.1)
KETONES UR STRIP.AUTO-MCNC: ABNORMAL MG/DL
LEUKOCYTE ESTERASE UR QL STRIP.AUTO: ABNORMAL
LYMPHOCYTES # BLD AUTO: 1 X10*3/UL (ref 0.8–3)
LYMPHOCYTES NFR BLD AUTO: 6.7 %
MAGNESIUM SERPL-MCNC: 1.74 MG/DL (ref 1.6–2.4)
MCH RBC QN AUTO: 30.1 PG (ref 26–34)
MCHC RBC AUTO-ENTMCNC: 33.6 G/DL (ref 32–36)
MCV RBC AUTO: 90 FL (ref 80–100)
MONOCYTES # BLD AUTO: 2.06 X10*3/UL (ref 0.05–0.8)
MONOCYTES NFR BLD AUTO: 13.8 %
MUCOUS THREADS #/AREA URNS AUTO: ABNORMAL /LPF
NEUTROPHILS # BLD AUTO: 11.74 X10*3/UL (ref 1.6–5.5)
NEUTROPHILS NFR BLD AUTO: 78.5 %
NITRITE UR QL STRIP.AUTO: ABNORMAL
NRBC BLD-RTO: 0 /100 WBCS (ref 0–0)
P AXIS: 43 DEGREES
P OFFSET: 201 MS
P ONSET: 150 MS
PH UR STRIP.AUTO: 5.5 [PH]
PLATELET # BLD AUTO: 158 X10*3/UL (ref 150–450)
POTASSIUM SERPL-SCNC: 3.7 MMOL/L (ref 3.5–5.3)
PR INTERVAL: 148 MS
PROT UR STRIP.AUTO-MCNC: ABNORMAL MG/DL
PROTHROMBIN TIME: 11.3 SECONDS (ref 9.8–12.4)
PROTHROMBIN TIME: 12 SECONDS (ref 9.8–12.4)
Q ONSET: 224 MS
QRS COUNT: 16 BEATS
QRS DURATION: 78 MS
QT INTERVAL: 330 MS
QTC CALCULATION(BAZETT): 423 MS
QTC FREDERICIA: 389 MS
R AXIS: -21 DEGREES
RBC # BLD AUTO: 3.32 X10*6/UL (ref 4–5.2)
RBC # UR STRIP.AUTO: ABNORMAL MG/DL
RBC #/AREA URNS AUTO: ABNORMAL /HPF
SODIUM SERPL-SCNC: 134 MMOL/L (ref 136–145)
SP GR UR STRIP.AUTO: 1.02
T AXIS: 32 DEGREES
T OFFSET: 389 MS
UROBILINOGEN UR STRIP.AUTO-MCNC: ABNORMAL MG/DL
VENTRICULAR RATE: 99 BPM
WBC # BLD AUTO: 15 X10*3/UL (ref 4.4–11.3)
WBC #/AREA URNS AUTO: ABNORMAL /HPF

## 2025-03-11 PROCEDURE — 99223 1ST HOSP IP/OBS HIGH 75: CPT | Performed by: INTERNAL MEDICINE

## 2025-03-11 PROCEDURE — A9575 INJ GADOTERATE MEGLUMI 0.1ML: HCPCS | Performed by: STUDENT IN AN ORGANIZED HEALTH CARE EDUCATION/TRAINING PROGRAM

## 2025-03-11 PROCEDURE — 85610 PROTHROMBIN TIME: CPT | Performed by: HOSPITALIST

## 2025-03-11 PROCEDURE — 76700 US EXAM ABDOM COMPLETE: CPT | Performed by: RADIOLOGY

## 2025-03-11 PROCEDURE — 83735 ASSAY OF MAGNESIUM: CPT | Performed by: HOSPITALIST

## 2025-03-11 PROCEDURE — 80048 BASIC METABOLIC PNL TOTAL CA: CPT | Performed by: HOSPITALIST

## 2025-03-11 PROCEDURE — 70553 MRI BRAIN STEM W/O & W/DYE: CPT

## 2025-03-11 PROCEDURE — 99223 1ST HOSP IP/OBS HIGH 75: CPT | Performed by: HOSPITALIST

## 2025-03-11 PROCEDURE — 2500000001 HC RX 250 WO HCPCS SELF ADMINISTERED DRUGS (ALT 637 FOR MEDICARE OP): Performed by: HOSPITALIST

## 2025-03-11 PROCEDURE — 2500000004 HC RX 250 GENERAL PHARMACY W/ HCPCS (ALT 636 FOR OP/ED)

## 2025-03-11 PROCEDURE — 2500000001 HC RX 250 WO HCPCS SELF ADMINISTERED DRUGS (ALT 637 FOR MEDICARE OP): Performed by: INTERNAL MEDICINE

## 2025-03-11 PROCEDURE — 2500000004 HC RX 250 GENERAL PHARMACY W/ HCPCS (ALT 636 FOR OP/ED): Performed by: GENERAL PRACTICE

## 2025-03-11 PROCEDURE — 99418 PROLNG IP/OBS E/M EA 15 MIN: CPT | Performed by: INTERNAL MEDICINE

## 2025-03-11 PROCEDURE — 2550000001 HC RX 255 CONTRASTS: Performed by: STUDENT IN AN ORGANIZED HEALTH CARE EDUCATION/TRAINING PROGRAM

## 2025-03-11 PROCEDURE — 76700 US EXAM ABDOM COMPLETE: CPT

## 2025-03-11 PROCEDURE — 1210000001 HC SEMI-PRIVATE ROOM DAILY

## 2025-03-11 PROCEDURE — 2500000005 HC RX 250 GENERAL PHARMACY W/O HCPCS: Performed by: HOSPITALIST

## 2025-03-11 PROCEDURE — 85025 COMPLETE CBC W/AUTO DIFF WBC: CPT | Performed by: HOSPITALIST

## 2025-03-11 PROCEDURE — 81001 URINALYSIS AUTO W/SCOPE: CPT | Performed by: EMERGENCY MEDICINE

## 2025-03-11 PROCEDURE — 2500000001 HC RX 250 WO HCPCS SELF ADMINISTERED DRUGS (ALT 637 FOR MEDICARE OP): Performed by: STUDENT IN AN ORGANIZED HEALTH CARE EDUCATION/TRAINING PROGRAM

## 2025-03-11 PROCEDURE — 2500000004 HC RX 250 GENERAL PHARMACY W/ HCPCS (ALT 636 FOR OP/ED): Performed by: HOSPITALIST

## 2025-03-11 PROCEDURE — 36415 COLL VENOUS BLD VENIPUNCTURE: CPT | Performed by: HOSPITALIST

## 2025-03-11 PROCEDURE — 70553 MRI BRAIN STEM W/O & W/DYE: CPT | Performed by: RADIOLOGY

## 2025-03-11 PROCEDURE — 2500000004 HC RX 250 GENERAL PHARMACY W/ HCPCS (ALT 636 FOR OP/ED): Performed by: STUDENT IN AN ORGANIZED HEALTH CARE EDUCATION/TRAINING PROGRAM

## 2025-03-11 PROCEDURE — 1100000001 HC PRIVATE ROOM DAILY

## 2025-03-11 PROCEDURE — 87086 URINE CULTURE/COLONY COUNT: CPT | Mod: AHULAB | Performed by: EMERGENCY MEDICINE

## 2025-03-11 PROCEDURE — 96365 THER/PROPH/DIAG IV INF INIT: CPT

## 2025-03-11 RX ORDER — HYDRALAZINE HYDROCHLORIDE 50 MG/1
100 TABLET, FILM COATED ORAL 2 TIMES DAILY
Status: DISCONTINUED | OUTPATIENT
Start: 2025-03-11 | End: 2025-03-31 | Stop reason: HOSPADM

## 2025-03-11 RX ORDER — ACETAMINOPHEN 325 MG/1
650 TABLET ORAL EVERY 4 HOURS PRN
Status: DISCONTINUED | OUTPATIENT
Start: 2025-03-11 | End: 2025-03-11

## 2025-03-11 RX ORDER — SENNOSIDES 8.6 MG/1
2 TABLET ORAL 2 TIMES DAILY
Status: DISCONTINUED | OUTPATIENT
Start: 2025-03-11 | End: 2025-03-31 | Stop reason: HOSPADM

## 2025-03-11 RX ORDER — CIPROFLOXACIN 250 MG/1
250 TABLET, FILM COATED ORAL 2 TIMES DAILY
COMMUNITY
Start: 2025-03-07 | End: 2025-03-31 | Stop reason: HOSPADM

## 2025-03-11 RX ORDER — TALC
3 POWDER (GRAM) TOPICAL NIGHTLY PRN
Status: DISCONTINUED | OUTPATIENT
Start: 2025-03-11 | End: 2025-03-31 | Stop reason: HOSPADM

## 2025-03-11 RX ORDER — SODIUM CHLORIDE 9 MG/ML
100 INJECTION, SOLUTION INTRAVENOUS CONTINUOUS
Status: ACTIVE | OUTPATIENT
Start: 2025-03-11 | End: 2025-03-12

## 2025-03-11 RX ORDER — ANASTROZOLE 1 MG/1
1 TABLET ORAL DAILY
Status: DISCONTINUED | OUTPATIENT
Start: 2025-03-11 | End: 2025-03-31 | Stop reason: HOSPADM

## 2025-03-11 RX ORDER — GADOTERATE MEGLUMINE 376.9 MG/ML
13 INJECTION INTRAVENOUS
Status: COMPLETED | OUTPATIENT
Start: 2025-03-11 | End: 2025-03-11

## 2025-03-11 RX ORDER — ACETAMINOPHEN 325 MG/1
975 TABLET ORAL 3 TIMES DAILY
Status: DISCONTINUED | OUTPATIENT
Start: 2025-03-11 | End: 2025-03-16

## 2025-03-11 RX ORDER — ONDANSETRON 4 MG/1
4 TABLET, ORALLY DISINTEGRATING ORAL EVERY 8 HOURS PRN
Status: DISCONTINUED | OUTPATIENT
Start: 2025-03-11 | End: 2025-03-31 | Stop reason: HOSPADM

## 2025-03-11 RX ORDER — GABAPENTIN 300 MG/1
300 CAPSULE ORAL 3 TIMES DAILY
Status: DISCONTINUED | OUTPATIENT
Start: 2025-03-11 | End: 2025-03-11

## 2025-03-11 RX ORDER — CARVEDILOL 12.5 MG/1
12.5 TABLET ORAL 2 TIMES DAILY
Status: DISCONTINUED | OUTPATIENT
Start: 2025-03-11 | End: 2025-03-16

## 2025-03-11 RX ORDER — CEFTRIAXONE 1 G/50ML
1 INJECTION, SOLUTION INTRAVENOUS EVERY 24 HOURS
Status: COMPLETED | OUTPATIENT
Start: 2025-03-11 | End: 2025-03-16

## 2025-03-11 RX ORDER — OXYCODONE HYDROCHLORIDE 5 MG/1
2.5 TABLET ORAL EVERY 4 HOURS PRN
Status: DISCONTINUED | OUTPATIENT
Start: 2025-03-11 | End: 2025-03-16

## 2025-03-11 RX ORDER — ASPIRIN 81 MG/1
81 TABLET ORAL DAILY
Status: DISCONTINUED | OUTPATIENT
Start: 2025-03-11 | End: 2025-03-21

## 2025-03-11 RX ORDER — ONDANSETRON HYDROCHLORIDE 2 MG/ML
4 INJECTION, SOLUTION INTRAVENOUS EVERY 8 HOURS PRN
Status: DISCONTINUED | OUTPATIENT
Start: 2025-03-11 | End: 2025-03-31 | Stop reason: HOSPADM

## 2025-03-11 RX ORDER — GABAPENTIN 300 MG/1
300 CAPSULE ORAL 2 TIMES DAILY
Status: DISCONTINUED | OUTPATIENT
Start: 2025-03-11 | End: 2025-03-31 | Stop reason: HOSPADM

## 2025-03-11 RX ADMIN — SENNOSIDES 17.2 MG: 8.6 TABLET ORAL at 08:23

## 2025-03-11 RX ADMIN — APIXABAN 10 MG: 5 TABLET, FILM COATED ORAL at 17:14

## 2025-03-11 RX ADMIN — SODIUM CHLORIDE 100 ML/HR: 9 INJECTION, SOLUTION INTRAVENOUS at 05:07

## 2025-03-11 RX ADMIN — CEFTRIAXONE SODIUM 1 G: 1 INJECTION, SOLUTION INTRAVENOUS at 17:13

## 2025-03-11 RX ADMIN — ACETAMINOPHEN 650 MG: 325 TABLET, FILM COATED ORAL at 11:09

## 2025-03-11 RX ADMIN — HYDRALAZINE HYDROCHLORIDE 100 MG: 50 TABLET ORAL at 08:22

## 2025-03-11 RX ADMIN — ACETAMINOPHEN 325MG 975 MG: 325 TABLET ORAL at 20:17

## 2025-03-11 RX ADMIN — SENNOSIDES 17.2 MG: 8.6 TABLET ORAL at 20:17

## 2025-03-11 RX ADMIN — CARVEDILOL 12.5 MG: 12.5 TABLET, FILM COATED ORAL at 08:23

## 2025-03-11 RX ADMIN — Medication 3 MG: at 20:16

## 2025-03-11 RX ADMIN — VANCOMYCIN HYDROCHLORIDE 1250 MG: 1.25 INJECTION, POWDER, LYOPHILIZED, FOR SOLUTION INTRAVENOUS at 01:36

## 2025-03-11 RX ADMIN — ONDANSETRON 4 MG: 2 INJECTION, SOLUTION INTRAMUSCULAR; INTRAVENOUS at 18:22

## 2025-03-11 RX ADMIN — ACETAMINOPHEN 325MG 975 MG: 325 TABLET ORAL at 17:14

## 2025-03-11 RX ADMIN — ANASTROZOLE 1 MG: 1 TABLET, COATED ORAL at 08:23

## 2025-03-11 RX ADMIN — GADOTERATE MEGLUMINE 13 ML: 376.9 INJECTION INTRAVENOUS at 09:37

## 2025-03-11 RX ADMIN — GABAPENTIN 300 MG: 300 CAPSULE ORAL at 20:16

## 2025-03-11 RX ADMIN — GABAPENTIN 300 MG: 300 CAPSULE ORAL at 08:23

## 2025-03-11 RX ADMIN — CEFEPIME 1 G: 1 INJECTION, POWDER, FOR SOLUTION INTRAMUSCULAR; INTRAVENOUS at 00:43

## 2025-03-11 ASSESSMENT — COGNITIVE AND FUNCTIONAL STATUS - GENERAL
DRESSING REGULAR LOWER BODY CLOTHING: A LOT
TOILETING: A LITTLE
MOVING TO AND FROM BED TO CHAIR: A LITTLE
DRESSING REGULAR UPPER BODY CLOTHING: A LITTLE
DRESSING REGULAR LOWER BODY CLOTHING: A LOT
DAILY ACTIVITIY SCORE: 19
TOILETING: A LITTLE
WALKING IN HOSPITAL ROOM: A LITTLE
DRESSING REGULAR UPPER BODY CLOTHING: A LITTLE
MOVING TO AND FROM BED TO CHAIR: A LITTLE
CLIMB 3 TO 5 STEPS WITH RAILING: A LOT
HELP NEEDED FOR BATHING: A LITTLE
HELP NEEDED FOR BATHING: A LITTLE
STANDING UP FROM CHAIR USING ARMS: A LITTLE
STANDING UP FROM CHAIR USING ARMS: A LITTLE
MOBILITY SCORE: 19
WALKING IN HOSPITAL ROOM: A LITTLE
MOBILITY SCORE: 19
DAILY ACTIVITIY SCORE: 19
CLIMB 3 TO 5 STEPS WITH RAILING: A LOT

## 2025-03-11 ASSESSMENT — ENCOUNTER SYMPTOMS
HEMATOLOGIC/LYMPHATIC NEGATIVE: 1
FREQUENCY: 0
COUGH: 0
GASTROINTESTINAL NEGATIVE: 1
NEUROLOGICAL NEGATIVE: 1
APPETITE CHANGE: 0
RESPIRATORY NEGATIVE: 1
EYES NEGATIVE: 1
CONSTITUTIONAL NEGATIVE: 1
PSYCHIATRIC NEGATIVE: 1
VOMITING: 0
FEVER: 0
DIARRHEA: 0
CHILLS: 0
ALLERGIC/IMMUNOLOGIC NEGATIVE: 1
MUSCULOSKELETAL NEGATIVE: 1
NAUSEA: 0
SHORTNESS OF BREATH: 0
DYSURIA: 0

## 2025-03-11 ASSESSMENT — PAIN - FUNCTIONAL ASSESSMENT
PAIN_FUNCTIONAL_ASSESSMENT: 0-10

## 2025-03-11 ASSESSMENT — PAIN SCALES - GENERAL
PAINLEVEL_OUTOF10: 3
PAINLEVEL_OUTOF10: 1
PAINLEVEL_OUTOF10: 0 - NO PAIN
PAINLEVEL_OUTOF10: 5 - MODERATE PAIN

## 2025-03-11 ASSESSMENT — ACTIVITIES OF DAILY LIVING (ADL): LACK_OF_TRANSPORTATION: PATIENT UNABLE TO ANSWER

## 2025-03-11 NOTE — PROGRESS NOTES
Pharmacy Medication History     Source of Information: Patient with  at bedside    Additional concerns with the patient's PTA list.   N/a  The following updates were made to the Prior to Admission medication list:     Medications ADDED:   ciprofloxacin  Medications CHANGED:  N/a  Medications REMOVED:   N/a  Medications NOT TAKING:   N/a    Allergy reviewed : Yes    Meds 2 Beds : No    Outpatient pharmacy confirmed and updated in chart : Yes    Pharmacy name: [facility]    The list below reflectives the updated PTA list. Please review each medication in order reconciliation for additional clarification and justification.    Prior to Admission Medications   Prescriptions Last Dose Informant   FLAXSEED OIL ORAL Unknown Self   Sig: Take 1,200 mg by mouth in the morning, at noon, and at bedtime. Take 1 capsule 3 times daily   TURMERIC ORAL Unknown Self   Sig: Take 400 mg by mouth 1 (one) time each day. Take 1 capsule daily   anastrozole (Arimidex) 1 mg tablet Unknown Self   Sig: Take 1 tablet (1 mg total) by mouth once daily.  Take 1 tablet once daily. Swallow whole with a drink of water.   arm brace (Elbow Compression Sleeve) misc Unknown Self   Sig: Lymphedema Sleeve  and Gauntlet eval and fit 20-30 mmHG for right  arm   aspirin 81 mg EC tablet Unknown Self   Sig: Take 1 tablet (81 mg) by mouth once daily. Take 1 tablet daily   azelastine (Optivar) 0.05 % ophthalmic solution Unknown Self   Sig: Administer 2 drops into both eyes once daily.   carvedilol (Coreg) 12.5 mg tablet Unknown Self   Sig: Take 1 tablet (12.5 mg) by mouth 2 times a day. Take 1 tablet twice daily   cholecalciferol, vitamin D3, (VITAMIN D3 ORAL) Unknown Self   Sig: Take 25 mcg by mouth 1 (one) time each day. Take 1 tablet daily Vitamin D 25MCG (1000 UT) oral tablet   ciprofloxacin (Cipro) 250 mg tablet Unknown Self   Sig: Take 1 tablet (250 mg) by mouth twice a day.   diclofenac sodium (Voltaren Arthritis Pain) 1 % gel Unknown Self    Sig: Apply 4.5 inches (4 g) topically 4 times a day.   fluticasone (Flonase) 50 mcg/actuation nasal spray Unknown Self   Sig: Administer 1 spray into each nostril 2 times a day. Instill 1 spray in each nostril twice daily   furosemide (Lasix) 20 mg tablet Unknown Self   Sig: Take 1 tablet (20 mg) by mouth once daily. Take 1 tablet by mouth every day   gabapentin (Neurontin) 300 mg capsule Unknown Self   Sig: Take 1 capsule (300 mg) by mouth 3 times a day. Take one capsule by mouth three times a day   gabapentin (Neurontin) 300 mg capsule Unknown Self   Sig: Take 1 capsule (300 mg) by mouth 3 times a day.   hydrALAZINE (Apresoline) 100 mg tablet Unknown Self   Sig: Take 1 tablet (100 mg) by mouth 2 times a day. Take 1 tablet by mouth twice per day   nitrofurantoin, macrocrystal-monohydrate, (Macrobid) 100 mg capsule Past Week Self   Sig: Take 1 capsule (100 mg) by mouth 2 times a day for 7 days.   palbociclib (Ibrance) 75 mg tablet Unknown Self   Sig: Swallow whole.      Facility-Administered Medications: None       The list below reflectives the updated allergy list. Please review each documented allergy for additional clarification and justification.    Allergies   Allergen Reactions    Penicillins Swelling    Ramipril Angioedema          03/11/25 at 11:08 AM - Cristopher Pichardo

## 2025-03-11 NOTE — TELEPHONE ENCOUNTER
Ange from [facility] called regarding prior auth for Xgeva.  She was approved for short term approval which patient received yesterday 3/10.  For upcoming injections, options are:     White bagging  Single Case Agreement  Home injection or in-network infusion center.     Ange requesting call back at [phone], ext: [phone].

## 2025-03-11 NOTE — ED PROVIDER NOTES
Patient signed out to me by the outgoing provider pending results of ultrasound.  Ultrasound does confirm an extensive DVT in the right lower extremity.  The patient is not reporting shortness of breath.  There is mass effect on the fourth ventricle on the CT head which may represent a brain mass.  I did speak to MRI but they were leaving for the night and refused to do the MRI at approximately 9 PM.  I do not believe this meets criteria to emergently call an MRI for.  There are evidence of metastases in the abdomen and pelvis.  I conducted shared decision making with the hospitalist, Dr. Cameron, and we will defer anticoagulation right now for concern of possible brain mass and the risk of intracranial hemorrhage.  She will require an MRI in the morning and possible neurosurgery evaluation if the MRI is positive for brain mass.  Patient was admitted to the hospitalist service     Leander Laurent DO  03/11/25 0009

## 2025-03-11 NOTE — TELEPHONE ENCOUNTER
Call to emergency contact John barbosa will keep PET for 3/20. Encourage continued course in hospital for UTI. As previously discussed will discuss therapy change when PET is resulted. Questions answered

## 2025-03-11 NOTE — H&P
"History Of Present Illness  Elizabeth Buckner is a 84 y.o. female with stage IV breast cancer s/p bilateral mastectomy and lymph node dissection presenting with confusion and RLE edema.  Pt is accompanied by her  in the ED, however neither can provide a detailed history.   Ms Buckner states she is here for \"A problem with my legs and arms\".   When asked specifically about her RLE edema, she thinks it has been present for 6 months.   She is very slow to respond.   She denies CP, SOB, HA, cough, N/V/D.   Recently treated by Dr Espitia for a UTI.     According to the ED, she is here for confusion, however, pt is oriented x 3, and her  denies any recent confusion.   In the ED, she is afebrile, P90s. BP slightly elevated.   WBC ct 16.1  K 1.29  Calcium 12.8  CT head shows mass effect on the 4th ventricle centrally.   CT A/P:  - extensive retroperitoneal and b/l pelvic LAD  - DVT right common femoral v and right external iliac V  - numerous liver masses  - bilateral renal lesions  - air in the bladder    Past Medical History  Past Medical History:   Diagnosis Date    Acute frontal sinusitis, unspecified 11/21/2018    Acute frontal sinusitis    Chronic kidney disease, stage 2 (mild) 12/20/2018    Chronic kidney disease, stage II (mild)    Combined forms of age-related cataract, left eye 04/12/2019    Combined forms of age-related cataract of left eye    Combined forms of age-related cataract, right eye 04/12/2019    Combined forms of age-related cataract of right eye    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 04/12/2019    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    Dry eye syndrome of right lacrimal gland 04/12/2019    Dry eye syndrome of right lacrimal gland    Encounter for allergy testing     Encounter for allergy testing    Encounter for general adult medical examination without abnormal findings 07/24/2020    Encounter for annual health examination    Encounter for general adult medical examination without abnormal " findings 07/19/2021    Encounter for annual health examination    Encounter for general adult medical examination without abnormal findings 03/06/2018    Encounter for annual health examination    Other obesity due to excess calories 10/15/2020    Exogenous obesity    Pain in unspecified knee 03/09/2018    Joint pain, knee    Personal history of other diseases of the digestive system 07/10/2018    History of gastroesophageal reflux (GERD)    Personal history of other diseases of the female genital tract 09/08/2016    History of vaginitis    Personal history of other diseases of the female genital tract     History of pelvic inflammatory disease    Personal history of other diseases of the nervous system and sense organs 04/12/2019    History of myopia    Personal history of other diseases of the respiratory system 01/02/2020    History of acute bronchitis    Personal history of other diseases of urinary system 01/22/2021    History of chronic kidney disease       Surgical History  Past Surgical History:   Procedure Laterality Date    BREAST SURGERY  03/29/2013    Breast Surgery    HYSTERECTOMY  03/29/2013    Hysterectomy    US GUIDED MEDIASTINUM PERCUTANEOUS BIOPSY  9/22/2021    US GUIDED MEDIASTINUM PERCUTANEOUS BIOPSY 9/22/2021 [facility] [place] [facility]        Social History  She reports that she has quit smoking. Her smoking use included cigarettes. She has never used smokeless tobacco. She reports that she does not drink alcohol and does not use drugs.    Family History  Family History   Problem Relation Name Age of Onset    Cancer Other          Family History    Lung disease Other          Family History        Allergies  Penicillins and Ramipril    Review of Systems   Constitutional: Negative.  Negative for appetite change, chills and fever.   HENT: Negative.     Eyes: Negative.    Respiratory: Negative.  Negative for cough and shortness of breath.    Cardiovascular:  Positive for leg swelling. Negative for chest  "pain.   Gastrointestinal: Negative.  Negative for diarrhea, nausea and vomiting.   Genitourinary: Negative.  Negative for dysuria and frequency.   Musculoskeletal: Negative.    Skin: Negative.    Allergic/Immunologic: Negative.    Neurological: Negative.    Hematological: Negative.    Psychiatric/Behavioral: Negative.          Physical Exam  Vitals reviewed.   Constitutional:       Appearance: Normal appearance.      Comments: Elderly chronically ill appearing AA female. She is oriented x 3. Alert, however, takes an extraordinarily long time to answer questions. Often have to repeat them, however, when asked about this her  states that she is \"just thinking about the question\".      HENT:      Head: Normocephalic and atraumatic.      Mouth/Throat:      Mouth: Mucous membranes are moist.   Eyes:      Extraocular Movements: Extraocular movements intact.      Conjunctiva/sclera: Conjunctivae normal.      Pupils: Pupils are equal, round, and reactive to light.   Cardiovascular:      Rate and Rhythm: Normal rate and regular rhythm.      Pulses: Normal pulses.      Heart sounds: Murmur heard.      Comments: Systolic murmur right 2nd intercostal space.   Pulmonary:      Effort: Pulmonary effort is normal.      Breath sounds: Normal breath sounds.   Abdominal:      General: Abdomen is flat. Bowel sounds are normal.      Palpations: Abdomen is soft.      Comments: Pt is s/p bilateral mastectomy   Musculoskeletal:         General: Swelling present. Normal range of motion.      Right lower leg: Edema present.      Comments: Both legs edematous, however RLE much larger than the left. +DP pulses bilaterally.   Chronically edematous bilateral upper extremities, secondary to LN resection.    Skin:     General: Skin is warm and dry.   Neurological:      General: No focal deficit present.      Mental Status: She is alert and oriented to person, place, and time.   Psychiatric:         Mood and Affect: Mood normal.         " Behavior: Behavior normal.         Thought Content: Thought content normal.         Judgment: Judgment normal.          Last Recorded Vitals  Blood pressure (!) 131/11, pulse 99, temperature 37.6 °C (99.6 °F), temperature source Oral, resp. rate 17, SpO2 98%.    Relevant Results        Results for orders placed or performed during the hospital encounter of 03/10/25 (from the past 24 hours)   CBC and Auto Differential   Result Value Ref Range    WBC 16.1 (H) 4.4 - 11.3 x10*3/uL    nRBC 0.0 0.0 - 0.0 /100 WBCs    RBC 3.59 (L) 4.00 - 5.20 x10*6/uL    Hemoglobin 10.7 (L) 12.0 - 16.0 g/dL    Hematocrit 32.5 (L) 36.0 - 46.0 %    MCV 91 80 - 100 fL    MCH 29.8 26.0 - 34.0 pg    MCHC 32.9 32.0 - 36.0 g/dL    RDW 13.2 11.5 - 14.5 %    Platelets 174 150 - 450 x10*3/uL    Neutrophils % 79.3 40.0 - 80.0 %    Immature Granulocytes %, Automated 1.1 (H) 0.0 - 0.9 %    Lymphocytes % 7.8 13.0 - 44.0 %    Monocytes % 11.6 2.0 - 10.0 %    Eosinophils % 0.0 0.0 - 6.0 %    Basophils % 0.2 0.0 - 2.0 %    Neutrophils Absolute 12.75 (H) 1.60 - 5.50 x10*3/uL    Immature Granulocytes Absolute, Automated 0.18 0.00 - 0.50 x10*3/uL    Lymphocytes Absolute 1.26 0.80 - 3.00 x10*3/uL    Monocytes Absolute 1.87 (H) 0.05 - 0.80 x10*3/uL    Eosinophils Absolute 0.00 0.00 - 0.40 x10*3/uL    Basophils Absolute 0.03 0.00 - 0.10 x10*3/uL   TSH with reflex to Free T4 if abnormal   Result Value Ref Range    Thyroid Stimulating Hormone 2.10 0.44 - 3.98 mIU/L   Sars-CoV-2 and Influenza A/B PCR   Result Value Ref Range    Flu A Result Not Detected Not Detected    Flu B Result Not Detected Not Detected    Coronavirus 2019, PCR Not Detected Not Detected   Comprehensive metabolic panel   Result Value Ref Range    Glucose 79 74 - 99 mg/dL    Sodium 133 (L) 136 - 145 mmol/L    Potassium 4.0 3.5 - 5.3 mmol/L    Chloride 99 98 - 107 mmol/L    Bicarbonate 25 21 - 32 mmol/L    Anion Gap 13 10 - 20 mmol/L    Urea Nitrogen 31 (H) 6 - 23 mg/dL    Creatinine 1.29 (H) 0.50  - 1.05 mg/dL    eGFR 41 (L) >60 mL/min/1.73m*2    Calcium 12.8 (H) 8.6 - 10.3 mg/dL    Albumin 3.5 3.4 - 5.0 g/dL    Alkaline Phosphatase 54 33 - 136 U/L    Total Protein 7.4 6.4 - 8.2 g/dL    AST 20 9 - 39 U/L    Bilirubin, Total 1.0 0.0 - 1.2 mg/dL    ALT 9 7 - 45 U/L   Magnesium   Result Value Ref Range    Magnesium 1.41 (L) 1.60 - 2.40 mg/dL   Vascular US lower extremity venous duplex bilateral   Result Value Ref Range    BSA 1.65 m2   Calcium, Ionized   Result Value Ref Range    POCT Calcium, Ionized 1.67 (H) 1.1 - 1.33 mmol/L   Coagulation Screen   Result Value Ref Range    Protime 12.0 9.8 - 12.4 seconds    INR 1.1 0.9 - 1.1    aPTT 21 (L) 26 - 36 seconds   Green Top   Result Value Ref Range    Extra Tube Hold for add-ons.    Urinalysis with Reflex Culture and Microscopic   Result Value Ref Range    Color, Urine Yellow Light-Yellow, Yellow, Dark-Yellow    Appearance, Urine Clear Clear    Specific Gravity, Urine 1.024 1.005 - 1.035    pH, Urine 5.5 5.0, 5.5, 6.0, 6.5, 7.0, 7.5, 8.0    Protein, Urine 20 (TRACE) NEGATIVE, 10 (TRACE), 20 (TRACE) mg/dL    Glucose, Urine Normal Normal mg/dL    Blood, Urine 0.2 (2+) (A) NEGATIVE mg/dL    Ketones, Urine 10 (1+) (A) NEGATIVE mg/dL    Bilirubin, Urine NEGATIVE NEGATIVE mg/dL    Urobilinogen, Urine 2 (1+) (A) Normal mg/dL    Nitrite, Urine 2+ (A) NEGATIVE    Leukocyte Esterase, Urine 250 Jeannette/uL (A) NEGATIVE   Microscopic Only, Urine   Result Value Ref Range    WBC, Urine 21-50 (A) 1-5, NONE /HPF    RBC, Urine 11-20 (A) NONE, 1-2, 3-5 /HPF    Bacteria, Urine 4+ (A) NONE SEEN /HPF    Mucus, Urine FEW Reference range not established. /LPF     *Note: Due to a large number of results and/or encounters for the requested time period, some results have not been displayed. A complete set of results can be found in Results Review.     Lower extremity venous duplex right    Result Date: 3/10/2025  Interpreted By:  Noa Adamson, STUDY: VASC  LOWER EXTREMITY VENOUS DUPLEX  RIGHT;  3/10/2025 11:32 pm   INDICATION: Signs/Symptoms:Right lower extremity pain and swelling.   COMPARISON: CT abdomen and pelvis 03/10/2025   ACCESSION NUMBER(S): [id number]   ORDERING CLINICIAN: CYNTHIA HOWARD   TECHNIQUE: Vascular ultrasound of the  right lower extremity was performed. Real time compression views as well as Gray scale, and color Doppler analysis was performed.   FINDINGS: Evaluation of the visualized portions of the  right common femoral vein, proximal, mid, and distal femoral vein, and popliteal vein were performed.  Evaluation of the right posterior tibial and peroneal veins was attempted.   Limitations:  The right posterior tibial and peroneal veins were not visualized on this exam.   Acute occlusive thrombus is present at the right common femoral vein, throughout the femoral vein and at the popliteal vein.         1. Acute deep vein thrombosis at the right lower extremity from the inguinal region to the popliteal region. The right posterior tibial and peroneal veins were not visualized on this exam.     MACRO: Noa Adamson discussed the significance and urgency of this critical finding by telephone with  CYNTHIA HOWARD on 3/10/2025 at 11:24 pm.  (**-RCF-**) Findings:  See findings.     Signed by: Noa Adamson 3/10/2025 11:39 PM Dictation workstation:   [id number]    CT abdomen pelvis wo IV contrast    Result Date: 3/10/2025  Interpreted By:  Esvin Philippe, STUDY: CT ABDOMEN PELVIS WO IV CONTRAST;  3/10/2025 7:28 pm   INDICATION: Signs/Symptoms:bilateral lower quadrant pain.   COMPARISON: 11/01/2024   ACCESSION NUMBER(S): [id number]   ORDERING CLINICIAN: LISBETH FLORENEC   TECHNIQUE: CT of the abdomen and pelvis was performed.  Standard contiguous axial images were obtained at 3 mm slice thickness through the abdomen and pelvis. Coronal and sagittal reconstructions at 3 mm slice thickness were performed.   FINDINGS: Limited study without IV contrast.   LOWER CHEST: Multiple ground-glass  nodules within the lung bases bilaterally reaching up to 9 mm in size each. Findings are nonspecific in etiology may represent inflammatory, infectious or neoplastic nature..   ABDOMEN:   LIVER: Evaluation of the liver is limited without IV contrast. Multiple low-attenuation lesions are suspected diffusely concerning for metastatic liver disease. The largest within the dome of the right lobe anteriorly measures approximately 2.7 cm in size.   BILE DUCTS: Biliary system is nondilated.   GALLBLADDER: The gallbladder has been surgically removed.   PANCREAS: No focal lesions. No peripancreatic fat stranding.   SPLEEN: The spleen is normal in size. No focal abnormalities are seen.   ADRENAL GLANDS: The adrenal glands bilaterally within normal limits.   KIDNEYS AND URETERS: Low-attenuation lesions within bilateral kidneys, right greater than left which may represent cysts, however, incompletely characterized due to small size and lack of IV contrast. No hydronephrosis, hydroureter or urinary tract stones are seen bilaterally. The largest lesion is seen in the right kidney, interpolar region measuring up to 10 mm in size.   PELVIS:   BLADDER: Air within the urinary bladder which may be due to recent bladder instrumentation. Infectious process or fistula may have similar appearance in the appropriate clinical settings.   REPRODUCTIVE ORGANS: Uterus is not identified and may have been surgically removed. No pelvic free fluid. Markedly enlarged bilateral pelvic wall lymph nodes/masses reaching up to 4.6 x 7.4 cm on the right. Evaluation is markedly limited without contrast. There is however hyperdensity within the right external iliac vein and right common femoral vein suggestive of deep venous thrombosis. There is marked diffuse soft tissue swelling of the right thigh.   BOWEL: Extensive diffuse colonic diverticulosis but no CT evidence for acute diverticulitis. Small and large bowel are nondilated. The appendix is normal.  No mesenteric edema or lymphadenopathy.   VESSELS: Thrombus within a suspected right common femoral vein and right external iliac vein.   PERITONEUM/RETROPERITONEUM/LYMPH NODES: Bilateral pelvic lymphadenopathy/masses worse on the right. Additional enlarged retroperitoneal lymph nodes in the pericaval and periaortic space. Findings concerning for lymphoma or metastatic disease.   BONES AND ABDOMINAL WALL: There is no evidence for destructive lytic or blastic bone lesions identified.       1.  Extensive retroperitoneal and bilateral pelvic lymphadenopathy as above concerning for lymphoma or metastatic disease. 2. Suspect acute deep venous thrombosis within the right common femoral vein and right external iliac vein. Correlation with ultrasound findings recommended. 3. Numerous liver masses suggestive of metastatic liver disease. Correlation with ultrasound findings can be performed as clinically warranted. 4. Bilateral renal lesions, nonspecific in etiology and may represent cysts but incompletely characterized in this unenhanced exam. Correlation with ultrasound findings can be performed as clinically warranted. 5. Air in the bladder which may be due to bladder instrumentation. Correlation with urinalysis recommended. 6. Additional detailed findings as above. Critical Finding:  See findings. Notification was initiated on 3/10/2025 at 7:54 pm by  Esvin Philippe.  (**-YCF-**) Instructions:   7.     Signed by: Esvin Philippe 3/10/2025 7:54 PM Dictation workstation:   [id number]    CT head wo IV contrast    Result Date: 3/10/2025  Interpreted By:  Esvin Philippe, STUDY: CT HEAD WO IV CONTRAST;  3/10/2025 7:28 pm   INDICATION: Signs/Symptoms:altered mental status.   COMPARISON: 02/18/2025   ACCESSION NUMBER(S): [id number]   ORDERING CLINICIAN: LISBETH FLORENCE   TECHNIQUE: Noncontrast axial CT scan of head was performed. Angled reformats in brain and bone windows were generated. The images were reviewed in bone, brain,  blood and soft tissue windows.   FINDINGS: CSF Spaces: Mild brain atrophy evidence by prominence of ventricles, sulci and cisterns. There is no extraaxial fluid collection.   Parenchyma: Extensive diffuse confluent areas of subcortical and periventricular white matter changes which given patient's age are suggestive of chronic small vessel ischemic disease. The grey-white differentiation is intact. There is no mass effect or midline shift. Benign calcification of bilateral basal ganglia. There is crowding of the 4th ventricle. Findings similar to prior. There is no intracranial hemorrhage.   Calvarium: The calvarium is unremarkable.   Paranasal sinuses and mastoids: Visualized paranasal sinuses and mastoids are clear.       Mass effect upon the 4th ventricle centrally. Further evaluation with contrast-enhanced MRI is recommended for better assessment.   No evidence for acute cortical infarction or acute intracranial hemorrhage is seen.   MACRO: Critical Finding:  See findings. Notification was initiated on 3/10/2025 at 7:46 pm by  Esvin Philippe.  (**-YCF-**)   Signed by: Esvin Philippe 3/10/2025 7:46 PM Dictation workstation:   [id number]    XR chest 2 views    Result Date: 3/10/2025  STUDY: Chest Radiographs;  03/10/2025 at 17:56 hours. INDICATION: Weakness and cough.  . COMPARISON: CT Chest 02/18/2025.  XR Chest 10/19/2022 at 00:21 hours ACCESSION NUMBER(S): [id number] ORDERING CLINICIAN: LISBETH FLORENCE TECHNIQUE:  Frontal and lateral chest. FINDINGS: CARDIOMEDIASTINAL SILHOUETTE: Cardiomediastinal silhouette is normal in size and configuration.  LUNGS: Lungs are clear.  ABDOMEN: No remarkable upper abdominal findings.  BONES: No acute osseous changes.    No acute process. Signed by Kamaljit Alex MD      Assessment/Plan   Assessment & Plan    Right lower extremity DVT  - heparin gtt not started due to concern for metastatic disease to the brain. Compression of the 4th ventricle seen on CT head.   - MRI in the am  "to evaluate for metastasis  - If not lesion in the brain, high intensity heparin gtt to be started.   - If brain lesion/met found, vascular surgery consult for IVC filter.     Possible metastatic lesion in brain  - MRI of brain in am  - Dr Espitai's note dated 3/7/25, mentions that pt passed out and hit the floor. No imaging of her brain was done at urgent care, per note.     Hypercalcemia  - due to malignancy  - continue IVF at a good rate  - continue to monitor Ca levels  - Oncology consult if needed  - would explain mental status changes/confusion if that was present at home    UTI  - Cefipime  - was recently treated for UTI  - Upon review of Dr Espitia's most recent note, it appears she was placed on Cipro after being seen at Urgent care, where she went due to passing out and hitting the floor.   - Urine culture 3/1/25 positive for >100,000 CFU E coli, sens to ceftriaxone, resistant to Cipro.Pt has \"swelling\" listed as allergy to PCN; received Cefepime in the ED without complication, therefore will continue Cefepime for UTI     Breast cancer  - s/p bilateral mastectomy with LN resection  - on Arimidex  - possible liver lesions- US liver    Renal lesions  - US kidneys    Code status  - Pt uncertain. Full code for now.          I spent 65 minutes in the professional and overall care of this patient.      Janeth Cameron MD    "

## 2025-03-11 NOTE — PROGRESS NOTES
03/11/25 0709   ACS Disability Status   Are you deaf or do you have serious difficulty hearing? N   Are you blind or do you have serious difficulty seeing, even when wearing glasses? N   Because of a physical, mental, or emotional condition, do you have serious difficulty concentrating, remembering, or making decisions? (5 years old or older) Y  (confusion)   Do you have serious difficulty walking or climbing stairs? Y  (dizziness)   Do you have serious difficulty dressing or bathing? N   Because of a physical, mental, or emotional condition, do you have serious difficulty doing errands alone such as visiting the doctor? Y

## 2025-03-11 NOTE — PROGRESS NOTES
[facility] Hospitalist Progress Note        Between 7AM-7PM please message me via Epic Secure Chat.  After 7PM please page Nocturnist on call.        Assessment/Plan     Acute Problems    ?Metabolic encephalopathy and generalized weakness  Hypercalcemia in setting of malignancy, possible dehydration affecting labs  Recent untreated UTI  Leukocytosis  DVT RLE from the inguinal region to the popliteal region    Chronic Problems    Metastatic breast CA including possible mets to brain, liver, etc - on anastrozole-palbociclib. Had prior mastectomy/chemo/RT.  HTN  CKD 3  Anemia - stable    Plan    - continue IV CTX, follow urine cx. Previous one had grown E Coli sens to CTX  - continue IV fluids  - follow up results of brain MR, if shows met disease will consider IR eval for IVC filter placement for her DVTs. If no met disease can consider anticoagulating her  - appreciate pal care assistance with C    Addendum: ok to start Eliquis, no mets noted on brain MR    Fluids: NS infusion  Electrolytes: Replete as needed  Nutrition: Regular  Patel: None  Invasive lines: None  Drains: None  O2: None    DVT Prophylaxis:  SCDs/ambulate    Discharge Planning: The patient is not medically ready for discharge and requires further inpatient treatment. Their prognosis at this time is uncertain. Plan is home upon DC.    Danita Barlow MD  [facility] Medicine

## 2025-03-11 NOTE — PROGRESS NOTES
Transitional Care Coordination Progress Note:  Plan per Medical/Surgical team: treatment of UTI, leg DVT, confusion & dizziness with IV ATB & IV fluids  MRI, US legs & US abd pending R/O mets  ?IVC filter vs anticoag  Status: Inpatient   Payor source: [employer]  Discharge disposition: Home with    Potential Barriers: hx of breast cancer, new lesions in liver & kidneys  ADOD: 3/13/2025   PAULINE Linder RN, BSN Transitional Care Coordinator ED# [phone]      03/11/25 0709   Discharge Planning   Living Arrangements Spouse/significant other   Support Systems Spouse/significant other   Assistance Needed IV ATB, MRI, US legs, US abd pending, ?anticoag vs IVC filter   Type of Residence Private residence   Number of Stairs to Enter Residence 0   Number of Stairs Within Residence 0   Home or Post Acute Services None   Expected Discharge Disposition Home   Does the patient need discharge transport arranged? No   Financial Resource Strain   How hard is it for you to pay for the very basics like food, housing, medical care, and heating? Pt Unable   Housing Stability   In the last 12 months, was there a time when you were not able to pay the mortgage or rent on time? Pt Unable   In the past 12 months, how many times have you moved where you were living? 1   At any time in the past 12 months, were you homeless or living in a shelter (including now)? Pt Unable   Transportation Needs   In the past 12 months, has lack of transportation kept you from medical appointments or from getting medications? Pt Unable   In the past 12 months, has lack of transportation kept you from meetings, work, or from getting things needed for daily living? Pt Unable   Stroke Family Assessment   Stroke Family Assessment Needed No   Intensity of Service   Intensity of Service 0-30 min

## 2025-03-12 LAB
ANION GAP SERPL CALC-SCNC: 10 MMOL/L (ref 10–20)
ANION GAP SERPL CALC-SCNC: 11 MMOL/L (ref 10–20)
BACTERIA UR CULT: NO GROWTH
BASOPHILS # BLD AUTO: 0.03 X10*3/UL (ref 0–0.1)
BASOPHILS NFR BLD AUTO: 0.2 %
BUN SERPL-MCNC: 39 MG/DL (ref 6–23)
BUN SERPL-MCNC: 41 MG/DL (ref 6–23)
CALCIUM SERPL-MCNC: 10.3 MG/DL (ref 8.6–10.3)
CALCIUM SERPL-MCNC: 9.8 MG/DL (ref 8.6–10.3)
CHLORIDE SERPL-SCNC: 101 MMOL/L (ref 98–107)
CHLORIDE SERPL-SCNC: 101 MMOL/L (ref 98–107)
CO2 SERPL-SCNC: 23 MMOL/L (ref 21–32)
CO2 SERPL-SCNC: 25 MMOL/L (ref 21–32)
CREAT SERPL-MCNC: 1.63 MG/DL (ref 0.5–1.05)
CREAT SERPL-MCNC: 1.65 MG/DL (ref 0.5–1.05)
DNA RANGE(S) EXAMINED NAR: NORMAL
EGFRCR SERPLBLD CKD-EPI 2021: 30 ML/MIN/1.73M*2
EGFRCR SERPLBLD CKD-EPI 2021: 31 ML/MIN/1.73M*2
EOSINOPHIL # BLD AUTO: 0.04 X10*3/UL (ref 0–0.4)
EOSINOPHIL NFR BLD AUTO: 0.3 %
ERYTHROCYTE [DISTWIDTH] IN BLOOD BY AUTOMATED COUNT: 12.7 % (ref 11.5–14.5)
GENE DIS ANL INTERP-IMP: NORMAL
GENE DIS ASSESSED: NORMAL
GLUCOSE SERPL-MCNC: 102 MG/DL (ref 74–99)
GLUCOSE SERPL-MCNC: 97 MG/DL (ref 74–99)
HCT VFR BLD AUTO: 25 % (ref 36–46)
HGB BLD-MCNC: 8.6 G/DL (ref 12–16)
IMM GRANULOCYTES # BLD AUTO: 0.11 X10*3/UL (ref 0–0.5)
IMM GRANULOCYTES NFR BLD AUTO: 0.8 % (ref 0–0.9)
LYMPHOCYTES # BLD AUTO: 1.24 X10*3/UL (ref 0.8–3)
LYMPHOCYTES NFR BLD AUTO: 9.5 %
MCH RBC QN AUTO: 30.5 PG (ref 26–34)
MCHC RBC AUTO-ENTMCNC: 34.4 G/DL (ref 32–36)
MCV RBC AUTO: 89 FL (ref 80–100)
MONOCYTES # BLD AUTO: 1.64 X10*3/UL (ref 0.05–0.8)
MONOCYTES NFR BLD AUTO: 12.6 %
NEUTROPHILS # BLD AUTO: 9.93 X10*3/UL (ref 1.6–5.5)
NEUTROPHILS NFR BLD AUTO: 76.6 %
NRBC BLD-RTO: 0 /100 WBCS (ref 0–0)
PLATELET # BLD AUTO: 174 X10*3/UL (ref 150–450)
POTASSIUM SERPL-SCNC: 3.6 MMOL/L (ref 3.5–5.3)
POTASSIUM SERPL-SCNC: 3.7 MMOL/L (ref 3.5–5.3)
RBC # BLD AUTO: 2.82 X10*6/UL (ref 4–5.2)
REASON FOR STUDY: NORMAL
SODIUM SERPL-SCNC: 131 MMOL/L (ref 136–145)
SODIUM SERPL-SCNC: 132 MMOL/L (ref 136–145)
TEMPUS BLOOD TUMOR MUTATIONAL BURDEN: 4.3 M/MB
TEMPUS GENOMIC NOTE: NORMAL
TEMPUS MSI NOTE: NORMAL
TEMPUS PORTAL: NORMAL
TEMPUS TREATMENT IMPLICATIONS NOTE: NORMAL
WBC # BLD AUTO: 13 X10*3/UL (ref 4.4–11.3)

## 2025-03-12 PROCEDURE — 2500000001 HC RX 250 WO HCPCS SELF ADMINISTERED DRUGS (ALT 637 FOR MEDICARE OP): Performed by: STUDENT IN AN ORGANIZED HEALTH CARE EDUCATION/TRAINING PROGRAM

## 2025-03-12 PROCEDURE — 2500000001 HC RX 250 WO HCPCS SELF ADMINISTERED DRUGS (ALT 637 FOR MEDICARE OP): Performed by: INTERNAL MEDICINE

## 2025-03-12 PROCEDURE — 2500000001 HC RX 250 WO HCPCS SELF ADMINISTERED DRUGS (ALT 637 FOR MEDICARE OP): Performed by: HOSPITALIST

## 2025-03-12 PROCEDURE — 80048 BASIC METABOLIC PNL TOTAL CA: CPT | Performed by: STUDENT IN AN ORGANIZED HEALTH CARE EDUCATION/TRAINING PROGRAM

## 2025-03-12 PROCEDURE — 36415 COLL VENOUS BLD VENIPUNCTURE: CPT | Performed by: STUDENT IN AN ORGANIZED HEALTH CARE EDUCATION/TRAINING PROGRAM

## 2025-03-12 PROCEDURE — 99232 SBSQ HOSP IP/OBS MODERATE 35: CPT | Performed by: STUDENT IN AN ORGANIZED HEALTH CARE EDUCATION/TRAINING PROGRAM

## 2025-03-12 PROCEDURE — 2500000004 HC RX 250 GENERAL PHARMACY W/ HCPCS (ALT 636 FOR OP/ED): Performed by: HOSPITALIST

## 2025-03-12 PROCEDURE — 2500000004 HC RX 250 GENERAL PHARMACY W/ HCPCS (ALT 636 FOR OP/ED): Performed by: STUDENT IN AN ORGANIZED HEALTH CARE EDUCATION/TRAINING PROGRAM

## 2025-03-12 PROCEDURE — 85025 COMPLETE CBC W/AUTO DIFF WBC: CPT | Performed by: STUDENT IN AN ORGANIZED HEALTH CARE EDUCATION/TRAINING PROGRAM

## 2025-03-12 PROCEDURE — 1100000001 HC PRIVATE ROOM DAILY

## 2025-03-12 RX ORDER — SODIUM CHLORIDE 9 MG/ML
100 INJECTION, SOLUTION INTRAVENOUS CONTINUOUS
Status: DISCONTINUED | OUTPATIENT
Start: 2025-03-12 | End: 2025-03-12

## 2025-03-12 RX ADMIN — GABAPENTIN 300 MG: 300 CAPSULE ORAL at 20:26

## 2025-03-12 RX ADMIN — SENNOSIDES 17.2 MG: 8.6 TABLET ORAL at 20:25

## 2025-03-12 RX ADMIN — APIXABAN 10 MG: 5 TABLET, FILM COATED ORAL at 09:55

## 2025-03-12 RX ADMIN — SODIUM CHLORIDE 100 ML/HR: 0.9 INJECTION, SOLUTION INTRAVENOUS at 10:04

## 2025-03-12 RX ADMIN — APIXABAN 10 MG: 5 TABLET, FILM COATED ORAL at 20:34

## 2025-03-12 RX ADMIN — ACETAMINOPHEN 325MG 975 MG: 325 TABLET ORAL at 09:56

## 2025-03-12 RX ADMIN — CEFTRIAXONE SODIUM 1 G: 1 INJECTION, SOLUTION INTRAVENOUS at 09:58

## 2025-03-12 RX ADMIN — CARVEDILOL 12.5 MG: 12.5 TABLET, FILM COATED ORAL at 20:25

## 2025-03-12 RX ADMIN — ACETAMINOPHEN 325MG 975 MG: 325 TABLET ORAL at 20:26

## 2025-03-12 RX ADMIN — ANASTROZOLE 1 MG: 1 TABLET, COATED ORAL at 09:56

## 2025-03-12 RX ADMIN — OXYCODONE HYDROCHLORIDE 2.5 MG: 5 TABLET ORAL at 17:47

## 2025-03-12 RX ADMIN — GABAPENTIN 300 MG: 300 CAPSULE ORAL at 09:55

## 2025-03-12 RX ADMIN — CARVEDILOL 12.5 MG: 12.5 TABLET, FILM COATED ORAL at 10:49

## 2025-03-12 ASSESSMENT — COGNITIVE AND FUNCTIONAL STATUS - GENERAL
DRESSING REGULAR LOWER BODY CLOTHING: A LOT
PERSONAL GROOMING: A LITTLE
MOBILITY SCORE: 18
TOILETING: A LITTLE
STANDING UP FROM CHAIR USING ARMS: A LITTLE
CLIMB 3 TO 5 STEPS WITH RAILING: A LOT
HELP NEEDED FOR BATHING: A LITTLE
DAILY ACTIVITIY SCORE: 16
EATING MEALS: A LITTLE
DRESSING REGULAR UPPER BODY CLOTHING: A LOT
TURNING FROM BACK TO SIDE WHILE IN FLAT BAD: A LITTLE
WALKING IN HOSPITAL ROOM: A LITTLE
MOVING TO AND FROM BED TO CHAIR: A LITTLE

## 2025-03-12 ASSESSMENT — PAIN SCALES - GENERAL
PAINLEVEL_OUTOF10: 8
PAINLEVEL_OUTOF10: 8
PAINLEVEL_OUTOF10: 7
PAINLEVEL_OUTOF10: 6

## 2025-03-12 ASSESSMENT — ENCOUNTER SYMPTOMS
VOMITING: 0
FEVER: 0
ABDOMINAL DISTENTION: 0
WEAKNESS: 1
CONFUSION: 1
ABDOMINAL PAIN: 0
SHORTNESS OF BREATH: 0

## 2025-03-12 ASSESSMENT — PAIN DESCRIPTION - DESCRIPTORS: DESCRIPTORS: ACHING

## 2025-03-12 ASSESSMENT — PAIN DESCRIPTION - ORIENTATION: ORIENTATION: RIGHT;LEFT

## 2025-03-12 ASSESSMENT — PAIN - FUNCTIONAL ASSESSMENT: PAIN_FUNCTIONAL_ASSESSMENT: 0-10

## 2025-03-12 NOTE — PROGRESS NOTES
[facility] Hospitalist Progress Note        Between 7AM-7PM please message me via Epic Secure Chat.  After 7PM please page Nocturnist on call.        Assessment/Plan     Acute Problems    ?Metabolic encephalopathy (vs MCI)  Generalized weakness  Hypercalcemia in setting of malignancy, possible dehydration affecting labs  Recent untreated UTI  Leukocytosis  DVT RLE from the inguinal region to the popliteal region/RLE swelling    Chronic Problems    Metastatic breast CA including possible mets to brain, liver, etc - on anastrozole-palbociclib. Had prior mastectomy/chemo/RT.  HTN  CKD 3  Anemia - stable    Plan    - continue IV CTX, her current urine cx did not grow anything but may be affected by prior antibiotic use. Will still treat her for 7D course. Previous one had grown E Coli sens to CTX. Can likely change to PO cefdinir upon DC  - continue IV fluids  - ok to start Eliquis, no mets noted on brain MR. Will ask vascular input if endovascular intervention needed due to ileofemoral DVT and RLE swelling.  - appreciate pal care assistance with GOC    Fluids: NS infusion  Electrolytes: Replete as needed  Nutrition: Regular  Patel: None  Invasive lines: None  Drains: None  O2: None    DVT Prophylaxis:  Eliquis    Discharge Planning: The patient is not medically ready for discharge and requires further inpatient treatment. Their prognosis at this time is uncertain. Plan is home upon DC.    Plan of care was discussed with patient/family    Total time spent: At least 38 minutes, providing counseling or in coordination of care. Total time on this day of visit includes record and documentation review before and after visit including documentation and time not explicitly included on EMR time stamp.      Subjective     Elizabeth Buckner is a 84 y.o. female on day 1 of admission presenting with General weakness.    NAEON. Overall stable, does not complain of new acute issues. Her family feels she is doing a bit better today. Encouraged her  to eat and ambulate. RLE remains fairly swollen relative to LLE.    Review of Systems   Constitutional:  Negative for fever.   Respiratory:  Negative for shortness of breath.    Cardiovascular:  Positive for leg swelling. Negative for chest pain.   Gastrointestinal:  Negative for abdominal distention, abdominal pain and vomiting.   Neurological:  Positive for weakness.   Psychiatric/Behavioral:  Positive for confusion.        Objective     Physical Exam  Constitutional:       General: She is not in acute distress.  Cardiovascular:      Rate and Rhythm: Normal rate and regular rhythm.   Pulmonary:      Effort: Pulmonary effort is normal.      Breath sounds: Normal breath sounds.   Abdominal:      General: There is no distension.      Palpations: Abdomen is soft.   Musculoskeletal:      Right lower leg: Edema present.   Neurological:      Mental Status: She is alert. Mental status is at baseline.         Last Recorded Vitals  Blood pressure 129/61, pulse 70, temperature 37.1 °C (98.7 °F), temperature source Oral, resp. rate 18, SpO2 98%.    Medications  acetaminophen, 975 mg, oral, TID  anastrozole, 1 mg, oral, Daily  apixaban, 10 mg, oral, BID   Followed by  [START ON 3/18/2025] apixaban, 5 mg, oral, BID  [Held by provider] aspirin, 81 mg, oral, Daily  carvedilol, 12.5 mg, oral, BID  cefTRIAXone, 1 g, intravenous, q24h  gabapentin, 300 mg, oral, BID  hydrALAZINE, 100 mg, oral, BID  sennosides, 2 tablet, oral, BID       PRN medications: melatonin, ondansetron ODT **OR** ondansetron, oxyCODONE                Danita Barlow MD  [facility]

## 2025-03-12 NOTE — CARE PLAN
The patient's goals for the shift include  BHANU REST    The clinical goals for the shift include HDS THROUGH THE SHIFT

## 2025-03-12 NOTE — CONSULTS
"Inpatient consult to Cardiology  Consult performed by: Junie Araujo, APRN-CNP  Consult ordered by: Danita Barlow MD  Reason for consult: \"suspected metastatic breast CA. Her RLE is fairly swollen and she has evidence of ileofemoral DVT. Is it possible she would be a candidate for catheter directed thrombolytic therapy/endovascular intervention\"      History Of Present Illness:    Elizabeth Buckner is a 84 y.o. female presenting with [facility] [place] stage IV metastatic breast cancer s/p bilateral mastectomy and lymph node dissection with possible mets to the liver presents with confusion and RLE edema.  Had a recent UTI currently on IV antibiotics.  She has been having recurrent falls and losing weight.  Her PET scan after 11/20/2004 showed possible progression in her pelvic lymph nodes however she did not follow-up.  Vascular surgery consulted for \"suspected metastatic breast CA. Her RLE is fairly swollen and she has evidence of ileofemoral DVT. Is it possible she would be a candidate for catheter directed thrombolytic therapy/endovascular intervention\"    Afebrile, heart rate 80, blood pressure 100/52, 98% on room air.  Notable labs BUNs/CR 39/1.63, sodium 132, WBC 13, H&H 8.6/25.0, UA with leukocytes, urine culture pending.  Currently getting IV antibiotics for her UTI and IV fluids.  Brain MRI was negative for metastasis.  Palliative care has been involved for goals of care.    CT A/P:  - extensive retroperitoneal and b/l pelvic LAD  - DVT right common femoral v and right external iliac V  - numerous liver masses  - bilateral renal lesions  - air in the bladder    Past Medical History:  She has a past medical history of Acute frontal sinusitis, unspecified (11/21/2018), Chronic kidney disease, stage 2 (mild) (12/20/2018), Combined forms of age-related cataract, left eye (04/12/2019), Combined forms of age-related cataract, right eye (04/12/2019), 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 (04/12/2019), Dry eye syndrome of right " lacrimal gland (04/12/2019), Encounter for allergy testing, Encounter for general adult medical examination without abnormal findings (07/24/2020), Encounter for general adult medical examination without abnormal findings (07/19/2021), Encounter for general adult medical examination without abnormal findings (03/06/2018), Other obesity due to excess calories (10/15/2020), Pain in unspecified knee (03/09/2018), Personal history of other diseases of the digestive system (07/10/2018), Personal history of other diseases of the female genital tract (09/08/2016), Personal history of other diseases of the female genital tract, Personal history of other diseases of the nervous system and sense organs (04/12/2019), Personal history of other diseases of the respiratory system (01/02/2020), and Personal history of other diseases of urinary system (01/22/2021).    Past Surgical History:  She has a past surgical history that includes Hysterectomy (03/29/2013); Breast surgery (03/29/2013); and US guided mediastinum percutaneous biopsy (9/22/2021).      Social History:  She reports that she has quit smoking. Her smoking use included cigarettes. She has never used smokeless tobacco. She reports that she does not drink alcohol and does not use drugs.    Family History:  Family History   Problem Relation Name Age of Onset    Cancer Other          Family History    Lung disease Other          Family History        Allergies:  Penicillins and Ramipril    ROS:  10 point review of systems including (Constitutional, Eyes, ENMT, Respiratory, Cardiac, Gastrointestinal, Neurological, Psychiatric, and Hematologic) was performed and is otherwise negative.    Objective Data:  Last Recorded Vitals:  Vitals:    03/11/25 1932 03/11/25 2353 03/12/25 0500 03/12/25 0812   BP: 100/52 108/64 111/80 129/61   BP Location:       Patient Position:       Pulse: 80 76 70 70   Resp: 18 18  18   Temp: 37 °C (98.6 °F) 36.8 °C (98.2 °F) 37 °C (98.6 °F) 37.1 °C  "(98.7 °F)   TempSrc:    Oral   SpO2: 98% 99% 98% 98%     Medical Gas Therapy: None (Room air)  Weight  Av.3 kg (146 lb 3.3 oz)  Min: 64.6 kg (142 lb 6.7 oz)  Max: 68 kg (150 lb)      LABS:  CMP:  Results from last 7 days   Lab Units 25  0525  0643 03/10/25  1955 03/10/25  14325  1139   SODIUM mmol/L 132* 134* 133* 133* 135*   POTASSIUM mmol/L 3.6 3.7 4.0 4.1 3.7   CHLORIDE mmol/L 101 103 99 96* 97*   CO2 mmol/L 25 24 25 27 27   ANION GAP mmol/L 10 11 13 14 15   BUN mg/dL 39* 27* 31* 32* 24*   CREATININE mg/dL 1.63* 1.04 1.29* 1.38* 1.28*   EGFR mL/min/1.73m*2 31* 53* 41* 38* 41*   POCT EGFR mL/min/1.73m*2  --   --   --  31*  --    MAGNESIUM mg/dL  --  1.74 1.41*  --   --    ALBUMIN g/dL  --   --  3.5 3.3* 3.7   ALT U/L  --   --  9 9 14   AST U/L  --   --  20 18 31   BILIRUBIN TOTAL mg/dL  --   --  1.0 1.0 1.0     CBC:  Results from last 7 days   Lab Units 25  0522 03/11/25  0643 03/10/25  1735 03/10/25  14325  1139   WBC AUTO x10*3/uL 13.0* 15.0* 16.1* 15.6* 7.4   HEMOGLOBIN g/dL 8.6* 10.0* 10.7* 10.6* 11.2*   HEMATOCRIT % 25.0* 29.8* 32.5* 31.3* 34.0*   PLATELETS AUTO x10*3/uL 174 158 174 172 181   MCV fL 89 90 91 89 92     COAG:   Results from last 7 days   Lab Units 25  0643 03/10/25  2256   INR  1.0 1.1     ABO: No results found for: \"ABO\"  HEME/ENDO:  Results from last 7 days   Lab Units 03/10/25  1735   TSH mIU/L 2.10      CARDIAC:       No lab exists for component: \"CK\", \"CKMBP\"          Last I/O:    Intake/Output Summary (Last 24 hours) at 3/12/2025 1019  Last data filed at 3/12/2025 0555  Gross per 24 hour   Intake 1106.25 ml   Output --   Net 1106.25 ml     Net IO Since Admission: 1,106.25 mL [25 1019]      Imaging Results:  MR brain w and wo IV contrast    Result Date: 3/11/2025  Interpreted By:  Pete Rice, STUDY: MR BRAIN W AND WO IV CONTRAST;  3/11/2025 9:50 am   INDICATION: Signs/Symptoms:mass effect on 4th ventricle.     COMPARISON: 03/10/2025 " brain CT   ACCESSION NUMBER(S): [id number]   ORDERING CLINICIAN: LISBETH FLORENCE   TECHNIQUE: Axial T2, FLAIR, DWI, gradient echo T2 and sagittal and coronal T1 weighted images of brain were acquired. Post contrast T1 weighted images were acquired after administration of 13 ML Dotarem gadolinium based intravenous contrast.   FINDINGS: CSF Spaces: The ventricles and sulci are normal unchanged. The 4th ventricle is relatively small most likely on a congenital basis.   Parenchyma: The cerebellar tonsils protrude 5-6 mm below the foramen magnum.   No acute infarct, hemorrhage or mass is noted. The white matter has focal to confluent nonspecific FLAIR hyperintensities with a few also noted centrally within the jatin.   Paranasal Sinuses and Mastoids: Visualized paranasal sinuses and mastoid air cells are unremarkable.       1. No acute infarct, hemorrhage or mass is noted. No abnormalities of the 4th ventricle are noted.   2. 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   MACRO: None   Signed by: Pete Rice 3/11/2025 12:21 PM Dictation workstation:   [id number]    ECG 12 lead    Result Date: 3/11/2025  Normal sinus rhythm Possible Left atrial enlargement Minimal voltage criteria for LVH, may be normal variant ( Pranav product ) Borderline ECG When compared with ECG of 03-NOV-2021 13:22, T wave amplitude has decreased in Inferior leads See ED provider note for full interpretation and clinical correlation Confirmed by Bonnie Sun ([id number]) on 3/11/2025 11:37:41 AM    US abdomen complete    Result Date: 3/11/2025  Interpreted By:  Carroll Corley, STUDY: US ABDOMEN COMPLETE;  3/11/2025 6:33 am   INDICATION: 85 y/o   F with  Signs/Symptoms:lesions seen on CT, further eval with US.   COMPARISON: Correlation with CT scan abdomen and pelvis from 03/10/2025.   ACCESSION NUMBER(S): [id number]   ORDERING CLINICIAN: MIKE ODONNELL   TECHNIQUE: Grayscale imaging, color Doppler, and  spectral Doppler were utilized.   FINDINGS: LIVER: Craniocaudal length:  11.1 cm. This is  within normal limits. Echogenicity:   Normal. Mass:  Nonspecific solid hypoechoic nodule in the left hepatic lobe measuring 20 x 11 x 17 mm. Nonspecific hypoechoic solid nodule posteriorly in the right hepatic lobe measuring 19 x 17 x 21 mm. There was another in the right hepatic lobe posteriorly and inferiorly measuring 21 x 20 x 30 mm.   GALLBLADDER: Surgically absent..   BILE DUCTS: No intrahepatic biliary ductal dilatation. Common bile duct measured 8 mm in diameter. This is within the limits of normal relative to the patient's age.   PANCREAS: Pancreatic head and body were well seen and were sonographically normal for size and echogenicity. The pancreatic tail was obscured by bowel gas   RIGHT KIDNEY: Could not be identified due to bowel gas.   LEFT KIDNEY: Craniocaudal length:   7.9 cm, which is small for size.. Echogenicity was normal. No hydronephrosis, shadowing stone, or perinephric edema. No gross left renal mass..   SPLEEN: Obscured by bowel gas.   ABDOMINAL AORTA AND IVC: Visualized portions are unremarkable.   PERITONEAL FLUID: None.   URINARY BLADDER : Not well distended but grossly intact.       Obscuration the spleen and right kidney by bowel gas. Previous cholecystectomy.   Mildly small left kidney. The left kidney was otherwise sonographically unremarkable.   There were 3 identifiable hypoechoic solid lesions in the liver. These are nonspecific and could be atypical hemangiomas or metastatic lesions. Recommend liver MRI in follow-up.   MACRO: None   Signed by: Carroll Corley 3/11/2025 8:04 AM Dictation workstation:   [id number]    Lower extremity venous duplex right    Result Date: 3/10/2025  Interpreted By:  Noa Adamson, STUDY: [facility] US LOWER EXTREMITY VENOUS DUPLEX RIGHT;  3/10/2025 11:32 pm   INDICATION: Signs/Symptoms:Right lower extremity pain and swelling.   COMPARISON: CT abdomen and pelvis  03/10/2025   ACCESSION NUMBER(S): [id number]   ORDERING CLINICIAN: CYNTHIA HOWARD   TECHNIQUE: Vascular ultrasound of the  right lower extremity was performed. Real time compression views as well as Gray scale, and color Doppler analysis was performed.   FINDINGS: Evaluation of the visualized portions of the  right common femoral vein, proximal, mid, and distal femoral vein, and popliteal vein were performed.  Evaluation of the right posterior tibial and peroneal veins was attempted.   Limitations:  The right posterior tibial and peroneal veins were not visualized on this exam.   Acute occlusive thrombus is present at the right common femoral vein, throughout the femoral vein and at the popliteal vein.         1. Acute deep vein thrombosis at the right lower extremity from the inguinal region to the popliteal region. The right posterior tibial and peroneal veins were not visualized on this exam.     MACRO: Noa Adamson discussed the significance and urgency of this critical finding by telephone with  CYNTHIA HOWARD on 3/10/2025 at 11:24 pm.  (**-RCF-**) Findings:  See findings.     Signed by: Noa Adamson 3/10/2025 11:39 PM Dictation workstation:   [id number]    CT abdomen pelvis wo IV contrast    Result Date: 3/10/2025  Interpreted By:  Esvin Philippe, STUDY: CT ABDOMEN PELVIS WO IV CONTRAST;  3/10/2025 7:28 pm   INDICATION: Signs/Symptoms:bilateral lower quadrant pain.   COMPARISON: 11/01/2024   ACCESSION NUMBER(S): [id number]   ORDERING CLINICIAN: LISBETH FLORENCE   TECHNIQUE: CT of the abdomen and pelvis was performed.  Standard contiguous axial images were obtained at 3 mm slice thickness through the abdomen and pelvis. Coronal and sagittal reconstructions at 3 mm slice thickness were performed.   FINDINGS: Limited study without IV contrast.   LOWER CHEST: Multiple ground-glass nodules within the lung bases bilaterally reaching up to 9 mm in size each. Findings are nonspecific in etiology may represent  inflammatory, infectious or neoplastic nature..   ABDOMEN:   LIVER: Evaluation of the liver is limited without IV contrast. Multiple low-attenuation lesions are suspected diffusely concerning for metastatic liver disease. The largest within the dome of the right lobe anteriorly measures approximately 2.7 cm in size.   BILE DUCTS: Biliary system is nondilated.   GALLBLADDER: The gallbladder has been surgically removed.   PANCREAS: No focal lesions. No peripancreatic fat stranding.   SPLEEN: The spleen is normal in size. No focal abnormalities are seen.   ADRENAL GLANDS: The adrenal glands bilaterally within normal limits.   KIDNEYS AND URETERS: Low-attenuation lesions within bilateral kidneys, right greater than left which may represent cysts, however, incompletely characterized due to small size and lack of IV contrast. No hydronephrosis, hydroureter or urinary tract stones are seen bilaterally. The largest lesion is seen in the right kidney, interpolar region measuring up to 10 mm in size.   PELVIS:   BLADDER: Air within the urinary bladder which may be due to recent bladder instrumentation. Infectious process or fistula may have similar appearance in the appropriate clinical settings.   REPRODUCTIVE ORGANS: Uterus is not identified and may have been surgically removed. No pelvic free fluid. Markedly enlarged bilateral pelvic wall lymph nodes/masses reaching up to 4.6 x 7.4 cm on the right. Evaluation is markedly limited without contrast. There is however hyperdensity within the right external iliac vein and right common femoral vein suggestive of deep venous thrombosis. There is marked diffuse soft tissue swelling of the right thigh.   BOWEL: Extensive diffuse colonic diverticulosis but no CT evidence for acute diverticulitis. Small and large bowel are nondilated. The appendix is normal. No mesenteric edema or lymphadenopathy.   VESSELS: Thrombus within a suspected right common femoral vein and right external  iliac vein.   PERITONEUM/RETROPERITONEUM/LYMPH NODES: Bilateral pelvic lymphadenopathy/masses worse on the right. Additional enlarged retroperitoneal lymph nodes in the pericaval and periaortic space. Findings concerning for lymphoma or metastatic disease.   BONES AND ABDOMINAL WALL: There is no evidence for destructive lytic or blastic bone lesions identified.       1.  Extensive retroperitoneal and bilateral pelvic lymphadenopathy as above concerning for lymphoma or metastatic disease. 2. Suspect acute deep venous thrombosis within the right common femoral vein and right external iliac vein. Correlation with ultrasound findings recommended. 3. Numerous liver masses suggestive of metastatic liver disease. Correlation with ultrasound findings can be performed as clinically warranted. 4. Bilateral renal lesions, nonspecific in etiology and may represent cysts but incompletely characterized in this unenhanced exam. Correlation with ultrasound findings can be performed as clinically warranted. 5. Air in the bladder which may be due to bladder instrumentation. Correlation with urinalysis recommended. 6. Additional detailed findings as above. Critical Finding:  See findings. Notification was initiated on 3/10/2025 at 7:54 pm by  Esvin Philippe.  (**-YCF-**) Instructions:   7.     Signed by: Esvin Philippe 3/10/2025 7:54 PM Dictation workstation:   [id number]    CT head wo IV contrast    Result Date: 3/10/2025  Interpreted By:  Esvin Philippe, STUDY: CT HEAD WO IV CONTRAST;  3/10/2025 7:28 pm   INDICATION: Signs/Symptoms:altered mental status.   COMPARISON: 02/18/2025   ACCESSION NUMBER(S): [id number]   ORDERING CLINICIAN: LISBETH FLORENCE   TECHNIQUE: Noncontrast axial CT scan of head was performed. Angled reformats in brain and bone windows were generated. The images were reviewed in bone, brain, blood and soft tissue windows.   FINDINGS: CSF Spaces: Mild brain atrophy evidence by prominence of ventricles, sulci and  cisterns. There is no extraaxial fluid collection.   Parenchyma: Extensive diffuse confluent areas of subcortical and periventricular white matter changes which given patient's age are suggestive of chronic small vessel ischemic disease. The grey-white differentiation is intact. There is no mass effect or midline shift. Benign calcification of bilateral basal ganglia. There is crowding of the 4th ventricle. Findings similar to prior. There is no intracranial hemorrhage.   Calvarium: The calvarium is unremarkable.   Paranasal sinuses and mastoids: Visualized paranasal sinuses and mastoids are clear.       Mass effect upon the 4th ventricle centrally. Further evaluation with contrast-enhanced MRI is recommended for better assessment.   No evidence for acute cortical infarction or acute intracranial hemorrhage is seen.   MACRO: Critical Finding:  See findings. Notification was initiated on 3/10/2025 at 7:46 pm by  Esvin Philippe.  (**-YCF-**)   Signed by: Esvin Philippe 3/10/2025 7:46 PM Dictation workstation:   [id number]    XR chest 2 views    Result Date: 3/10/2025  STUDY: Chest Radiographs;  03/10/2025 at 17:56 hours. INDICATION: Weakness and cough.  . COMPARISON: CT Chest 02/18/2025.  XR Chest 10/19/2022 at 00:21 hours ACCESSION NUMBER(S): [id number] ORDERING CLINICIAN: LISBETH FLORENCE TECHNIQUE:  Frontal and lateral chest. FINDINGS: CARDIOMEDIASTINAL SILHOUETTE: Cardiomediastinal silhouette is normal in size and configuration.  LUNGS: Lungs are clear.  ABDOMEN: No remarkable upper abdominal findings.  BONES: No acute osseous changes.    No acute process. Signed by Kamaljit Alex MD      Inpatient Medications:  Scheduled medications   Medication Dose Route Frequency    acetaminophen  975 mg oral TID    anastrozole  1 mg oral Daily    apixaban  10 mg oral BID    Followed by    [START ON 3/18/2025] apixaban  5 mg oral BID    [Held by provider] aspirin  81 mg oral Daily    carvedilol  12.5 mg oral BID    cefTRIAXone  1  g intravenous q24h    gabapentin  300 mg oral BID    [Held by provider] hydrALAZINE  100 mg oral BID    sennosides  2 tablet oral BID     PRN medications   Medication    melatonin    ondansetron ODT    Or    ondansetron    oxyCODONE     Continuous Medications   Medication Dose Last Rate    sodium chloride 0.9%  100 mL/hr 100 mL/hr (03/12/25 1004)       Outpatient Medications:  Current Outpatient Medications   Medication Instructions    anastrozole (ARIMIDEX) 1 mg, oral, Daily, Take 1 tablet once daily. Swallow whole with a drink of water.    arm brace (Elbow Compression Sleeve) misc Lymphedema Sleeve  and Gauntlet eval and fit 20-30 mmHG for right  arm    aspirin 81 mg, oral, Daily, Take 1 tablet daily    azelastine (Optivar) 0.05 % ophthalmic solution 2 drops, Both Eyes, Daily    carvedilol (COREG) 12.5 mg, oral, 2 times daily, Take 1 tablet twice daily    cholecalciferol, vitamin D3, (VITAMIN D3 ORAL) 25 mcg, oral, Daily, Take 1 tablet daily Vitamin D 25MCG (1000 UT) oral tablet    ciprofloxacin (CIPRO) 250 mg, oral, 2 times daily    diclofenac sodium (VOLTAREN ARTHRITIS PAIN) 4 g, Topical, 4 times daily    FLAXSEED OIL ORAL 1,200 mg, oral, 3 times daily, Take 1 capsule 3 times daily    fluticasone (Flonase) 50 mcg/actuation nasal spray 1 spray, Each Nostril, 2 times daily, Instill 1 spray in each nostril twice daily    furosemide (LASIX) 20 mg, oral, Daily, Take 1 tablet by mouth every day    gabapentin (NEURONTIN) 300 mg, oral, 3 times daily, Take one capsule by mouth three times a day    gabapentin (NEURONTIN) 300 mg, oral, 3 times daily    hydrALAZINE (APRESOLINE) 100 mg, oral, 2 times daily, Take 1 tablet by mouth twice per day    palbociclib (Ibrance) 75 mg tablet Swallow whole.    TURMERIC ORAL 400 mg, oral, Daily, Take 1 capsule daily       Physical Exam:  General:  Patient is awake, alert, and oriented.  Patient is in no acute distress.  HEENT:  Pupils equal and reactive.  Normocephalic.  Moist mucosa.   "  Neck:  No thyromegaly.    Cardiovascular:  Regular rate and rhythm.  Normal S1 and S2.  Pulmonary:  Clear to auscultation bilaterally.  Abdomen:  Soft. Non-tender.   Non-distended.  Positive bowel sounds.  Lower Extremities:  2+ pedal pulses. RLE +2 LLE +1  Neurologic:  Cranial nerves intact.  No focal deficit.   Skin: Skin warm and dry, normal skin turgor.   Psychiatric: Normal affect.     Assessment/Plan   Elizabeth Buckner is a 84 y.o. female presenting with [facility] stage IV metastatic breast cancer s/p bilateral mastectomy and lymph node dissection with possible mets to the liver presents with confusion and RLE edema.  Had a recent UTI currently on IV antibiotics.  She has been having recurrent falls and losing weight.  Her PET scan after 11/20/2004 showed possible progression in her pelvic lymph nodes however she did not follow-up.  Brain MRI is negative for mets. Vascular surgery consulted for \"suspected metastatic breast CA. Her RLE is fairly swollen and she has evidence of ileofemoral DVT. Is it possible she would be a candidate for catheter directed thrombolytic therapy/endovascular intervention\"    #DVT right common femoral vein and right external iliac Vein     RECS:  -Patient with extensive right leg DVT, likely provoked in the setting of her extensive malignancy. Patient has no evidence of phlegmasia on exam, and no debilitating significant swelling or pain. Continue anticoagulation. No indication for IVC filter unless patient cannot tolerate anticoagulation. Patient would likely require anticoagulation for at least 6 months and as long as cancer is active [possiblely lifelong]. Patient to follow-up with vascular medicine. Thank you, and feel free to call us if you have any questions.       Code Status:  Full Code    I spent 30 minutes in the professional and overall care of this patient.        Junie Araujo, APRN-CNP   "

## 2025-03-12 NOTE — PROGRESS NOTES
03/12/25 1042   Discharge Planning   Home or Post Acute Services None   Expected Discharge Disposition Home   Does the patient need discharge transport arranged? No   Stroke Family Assessment   Stroke Family Assessment Needed No   Intensity of Service   Intensity of Service 0-30 min     PLAN/BARRIER: cardiology consult, palliative care for GOC, IV antibiotics, IVF  DISP: home  DME: none  O2: none  WOUNDS: none  ADOD: 1-2 days  TCC to follow for discharge needs.  Will discharge home on oral antibiotics.  January Lopez RN

## 2025-03-12 NOTE — CARE PLAN
The patient's goals for the shift include      The clinical goals for the shift include Pt will remain HDS and free from falls

## 2025-03-13 ENCOUNTER — TELEPHONE (OUTPATIENT)
Dept: HEMATOLOGY/ONCOLOGY | Facility: HOSPITAL | Age: 85
End: 2025-03-13
Payer: COMMERCIAL

## 2025-03-13 LAB
ANION GAP SERPL CALC-SCNC: 10 MMOL/L (ref 10–20)
BASOPHILS # BLD AUTO: 0.06 X10*3/UL (ref 0–0.1)
BASOPHILS NFR BLD AUTO: 0.5 %
BUN SERPL-MCNC: 40 MG/DL (ref 6–23)
CALCIUM SERPL-MCNC: 9.5 MG/DL (ref 8.6–10.3)
CHLORIDE SERPL-SCNC: 102 MMOL/L (ref 98–107)
CO2 SERPL-SCNC: 24 MMOL/L (ref 21–32)
CREAT SERPL-MCNC: 1.47 MG/DL (ref 0.5–1.05)
EGFRCR SERPLBLD CKD-EPI 2021: 35 ML/MIN/1.73M*2
EOSINOPHIL # BLD AUTO: 0.2 X10*3/UL (ref 0–0.4)
EOSINOPHIL NFR BLD AUTO: 1.7 %
ERYTHROCYTE [DISTWIDTH] IN BLOOD BY AUTOMATED COUNT: 13.2 % (ref 11.5–14.5)
GLUCOSE SERPL-MCNC: 83 MG/DL (ref 74–99)
HCT VFR BLD AUTO: 27.1 % (ref 36–46)
HGB BLD-MCNC: 9 G/DL (ref 12–16)
IMM GRANULOCYTES # BLD AUTO: 0.1 X10*3/UL (ref 0–0.5)
IMM GRANULOCYTES NFR BLD AUTO: 0.9 % (ref 0–0.9)
LYMPHOCYTES # BLD AUTO: 1.14 X10*3/UL (ref 0.8–3)
LYMPHOCYTES NFR BLD AUTO: 9.8 %
MCH RBC QN AUTO: 29.8 PG (ref 26–34)
MCHC RBC AUTO-ENTMCNC: 33.2 G/DL (ref 32–36)
MCV RBC AUTO: 90 FL (ref 80–100)
MONOCYTES # BLD AUTO: 1.47 X10*3/UL (ref 0.05–0.8)
MONOCYTES NFR BLD AUTO: 12.7 %
NEUTROPHILS # BLD AUTO: 8.62 X10*3/UL (ref 1.6–5.5)
NEUTROPHILS NFR BLD AUTO: 74.4 %
NRBC BLD-RTO: 0 /100 WBCS (ref 0–0)
PLATELET # BLD AUTO: 200 X10*3/UL (ref 150–450)
POTASSIUM SERPL-SCNC: 3.7 MMOL/L (ref 3.5–5.3)
RBC # BLD AUTO: 3.02 X10*6/UL (ref 4–5.2)
SODIUM SERPL-SCNC: 132 MMOL/L (ref 136–145)
WBC # BLD AUTO: 11.6 X10*3/UL (ref 4.4–11.3)

## 2025-03-13 PROCEDURE — 2500000004 HC RX 250 GENERAL PHARMACY W/ HCPCS (ALT 636 FOR OP/ED): Performed by: HOSPITALIST

## 2025-03-13 PROCEDURE — 2500000004 HC RX 250 GENERAL PHARMACY W/ HCPCS (ALT 636 FOR OP/ED): Performed by: STUDENT IN AN ORGANIZED HEALTH CARE EDUCATION/TRAINING PROGRAM

## 2025-03-13 PROCEDURE — 2500000001 HC RX 250 WO HCPCS SELF ADMINISTERED DRUGS (ALT 637 FOR MEDICARE OP): Performed by: STUDENT IN AN ORGANIZED HEALTH CARE EDUCATION/TRAINING PROGRAM

## 2025-03-13 PROCEDURE — 99232 SBSQ HOSP IP/OBS MODERATE 35: CPT | Performed by: STUDENT IN AN ORGANIZED HEALTH CARE EDUCATION/TRAINING PROGRAM

## 2025-03-13 PROCEDURE — 2500000001 HC RX 250 WO HCPCS SELF ADMINISTERED DRUGS (ALT 637 FOR MEDICARE OP): Performed by: HOSPITALIST

## 2025-03-13 PROCEDURE — 2500000001 HC RX 250 WO HCPCS SELF ADMINISTERED DRUGS (ALT 637 FOR MEDICARE OP): Performed by: INTERNAL MEDICINE

## 2025-03-13 PROCEDURE — 85025 COMPLETE CBC W/AUTO DIFF WBC: CPT | Performed by: STUDENT IN AN ORGANIZED HEALTH CARE EDUCATION/TRAINING PROGRAM

## 2025-03-13 PROCEDURE — 36415 COLL VENOUS BLD VENIPUNCTURE: CPT | Performed by: STUDENT IN AN ORGANIZED HEALTH CARE EDUCATION/TRAINING PROGRAM

## 2025-03-13 PROCEDURE — 80048 BASIC METABOLIC PNL TOTAL CA: CPT | Performed by: STUDENT IN AN ORGANIZED HEALTH CARE EDUCATION/TRAINING PROGRAM

## 2025-03-13 PROCEDURE — 1100000001 HC PRIVATE ROOM DAILY

## 2025-03-13 RX ADMIN — ANASTROZOLE 1 MG: 1 TABLET, COATED ORAL at 08:53

## 2025-03-13 RX ADMIN — GABAPENTIN 300 MG: 300 CAPSULE ORAL at 08:54

## 2025-03-13 RX ADMIN — ACETAMINOPHEN 325MG 975 MG: 325 TABLET ORAL at 15:29

## 2025-03-13 RX ADMIN — CEFTRIAXONE SODIUM 1 G: 1 INJECTION, SOLUTION INTRAVENOUS at 08:52

## 2025-03-13 RX ADMIN — APIXABAN 10 MG: 5 TABLET, FILM COATED ORAL at 21:34

## 2025-03-13 RX ADMIN — ACETAMINOPHEN 325MG 975 MG: 325 TABLET ORAL at 21:33

## 2025-03-13 RX ADMIN — APIXABAN 10 MG: 5 TABLET, FILM COATED ORAL at 08:53

## 2025-03-13 RX ADMIN — OXYCODONE HYDROCHLORIDE 2.5 MG: 5 TABLET ORAL at 05:00

## 2025-03-13 RX ADMIN — GABAPENTIN 300 MG: 300 CAPSULE ORAL at 21:34

## 2025-03-13 RX ADMIN — OXYCODONE HYDROCHLORIDE 2.5 MG: 5 TABLET ORAL at 18:42

## 2025-03-13 RX ADMIN — ACETAMINOPHEN 325MG 975 MG: 325 TABLET ORAL at 08:53

## 2025-03-13 RX ADMIN — CARVEDILOL 12.5 MG: 12.5 TABLET, FILM COATED ORAL at 08:52

## 2025-03-13 RX ADMIN — CARVEDILOL 12.5 MG: 12.5 TABLET, FILM COATED ORAL at 21:34

## 2025-03-13 RX ADMIN — SENNOSIDES 17.2 MG: 8.6 TABLET ORAL at 08:53

## 2025-03-13 RX ADMIN — SENNOSIDES 17.2 MG: 8.6 TABLET ORAL at 21:34

## 2025-03-13 ASSESSMENT — PAIN DESCRIPTION - ORIENTATION
ORIENTATION: RIGHT;LEFT
ORIENTATION: RIGHT

## 2025-03-13 ASSESSMENT — COGNITIVE AND FUNCTIONAL STATUS - GENERAL
STANDING UP FROM CHAIR USING ARMS: A LITTLE
EATING MEALS: A LITTLE
DRESSING REGULAR LOWER BODY CLOTHING: A LOT
TOILETING: A LITTLE
CLIMB 3 TO 5 STEPS WITH RAILING: A LOT
MOBILITY SCORE: 18
WALKING IN HOSPITAL ROOM: A LITTLE
EATING MEALS: A LITTLE
DRESSING REGULAR LOWER BODY CLOTHING: A LOT
DRESSING REGULAR UPPER BODY CLOTHING: A LOT
TURNING FROM BACK TO SIDE WHILE IN FLAT BAD: A LITTLE
TOILETING: A LITTLE
DAILY ACTIVITIY SCORE: 16
TURNING FROM BACK TO SIDE WHILE IN FLAT BAD: A LITTLE
MOVING TO AND FROM BED TO CHAIR: A LITTLE
DAILY ACTIVITIY SCORE: 16
STANDING UP FROM CHAIR USING ARMS: A LITTLE
MOVING TO AND FROM BED TO CHAIR: A LITTLE
MOBILITY SCORE: 18
HELP NEEDED FOR BATHING: A LITTLE
PERSONAL GROOMING: A LITTLE
HELP NEEDED FOR BATHING: A LITTLE
CLIMB 3 TO 5 STEPS WITH RAILING: A LOT
WALKING IN HOSPITAL ROOM: A LITTLE
DRESSING REGULAR UPPER BODY CLOTHING: A LOT
PERSONAL GROOMING: A LITTLE

## 2025-03-13 ASSESSMENT — PAIN - FUNCTIONAL ASSESSMENT
PAIN_FUNCTIONAL_ASSESSMENT: 0-10

## 2025-03-13 ASSESSMENT — ENCOUNTER SYMPTOMS
ABDOMINAL PAIN: 0
ABDOMINAL DISTENTION: 0
VOMITING: 0
FEVER: 0
WEAKNESS: 1
SHORTNESS OF BREATH: 0
CONFUSION: 1

## 2025-03-13 ASSESSMENT — PAIN SCALES - GENERAL
PAINLEVEL_OUTOF10: 0 - NO PAIN
PAINLEVEL_OUTOF10: 1
PAINLEVEL_OUTOF10: 4
PAINLEVEL_OUTOF10: 8
PAINLEVEL_OUTOF10: 0 - NO PAIN

## 2025-03-13 ASSESSMENT — PAIN DESCRIPTION - LOCATION
LOCATION: LEG
LOCATION: LEG

## 2025-03-13 ASSESSMENT — PAIN SCALES - PAIN ASSESSMENT IN ADVANCED DEMENTIA (PAINAD): TOTALSCORE: MEDICATION (SEE MAR)

## 2025-03-13 ASSESSMENT — PAIN DESCRIPTION - DESCRIPTORS: DESCRIPTORS: ACHING

## 2025-03-13 NOTE — PROGRESS NOTES
Elizabeth Buckner was identified as being a new start on a high cost medication.    Medication name(s): Eliquis    Discussed with patient. Per test claim on  Pharmacy software, the copay is $85 per month. The cost is affordable for patient. Advised patient if it is unaffordable long-term to discuss the cost with her doctor as there may be resources available to help.    For discharge: Will apply  free trial if not previously used per patient request    Medication counseling provided to patient on what the medication does, dosing, side effects, and monitoring parameters (also provided printed information). Answered all questions regarding medication, cost, and assistance options to the best of my ability.    *When reviewing signs of bleeding, patient does note she had a black looking stool today and did not tell doctor. Patient denies stomach upset/iron supplementation. I will message for awareness.    Arleen Clancy, PharmD  Transitions of Care  P: [phone]  [employer] secure chat preferred

## 2025-03-13 NOTE — CARE PLAN
The patient's goals for the shift include  decrease/manage pain in bilateral knees    The clinical goals for the shift include remain free from falls and injury

## 2025-03-13 NOTE — TELEPHONE ENCOUNTER
Call to emergency contact Luh to review Elizabeth's status- plan will be to change therapy once she is out from current hospitalization. Would still like patient to complete PET on 3/20 however would like to have her see Dr Dove on 3/19 - is agreeable to help patient get to visit in the morning on this day.

## 2025-03-13 NOTE — PROGRESS NOTES
[facility] Hospitalist Progress Note        Between 7AM-7PM please message me via Epic Secure Chat.  After 7PM please page Nocturnist on call.        Assessment/Plan     Acute Problems    ?Metabolic encephalopathy (vs MCI)  Generalized weakness  Hypercalcemia in setting of malignancy, possible dehydration affecting labs  Recent untreated UTI  Leukocytosis  DVT RLE from the inguinal region to the popliteal region/RLE swelling  HASMUKH on CKD 3    Chronic Problems    Metastatic breast CA including possible mets to brain, liver, etc - on anastrozole-palbociclib. Had prior mastectomy/chemo/RT.  HTN  Anemia - stable    Plan    - continue IV CTX, her current urine cx did not grow anything but may be affected by prior antibiotic use. Will still treat her for 7D course. Previous one had grown E Coli sens to CTX. Can likely change to PO cefdinir upon DC  - watch off IV fluids, may have bumped Scr  - ok to start Eliquis, no mets noted on brain MR. No endovascular intervention needed per vascular  - appreciate pal care assistance with GOC    Fluids: None  Electrolytes: Replete as needed  Nutrition: Regular  Patel: None  Invasive lines: None  Drains: None  O2: None    DVT Prophylaxis:  Eliquis    Discharge Planning: The patient is not medically ready for discharge and requires further inpatient treatment. Their prognosis at this time is uncertain. Plan is home upon DC.    Plan of care was discussed with patient/family    Total time spent: At least 38 minutes, providing counseling or in coordination of care. Total time on this day of visit includes record and documentation review before and after visit including documentation and time not explicitly included on EMR time stamp.      Subjective     Elizabeth Buckner is a 84 y.o. female on day 2 of admission presenting with General weakness.    NAEON. Overall stable, does not complain of new acute issues. Wants to get up and walk around more today. Breathing unlabored.    Review of Systems    Constitutional:  Negative for fever.   Respiratory:  Negative for shortness of breath.    Cardiovascular:  Positive for leg swelling. Negative for chest pain.   Gastrointestinal:  Negative for abdominal distention, abdominal pain and vomiting.   Neurological:  Positive for weakness.   Psychiatric/Behavioral:  Positive for confusion.        Objective     Physical Exam  Constitutional:       General: She is not in acute distress.  Cardiovascular:      Rate and Rhythm: Normal rate and regular rhythm.   Pulmonary:      Effort: Pulmonary effort is normal.      Breath sounds: Normal breath sounds.   Abdominal:      General: There is no distension.      Palpations: Abdomen is soft.   Musculoskeletal:      Right lower leg: Edema present.   Neurological:      Mental Status: She is alert. Mental status is at baseline.         Last Recorded Vitals  Blood pressure (!) 130/42, pulse 71, temperature 37.1 °C (98.7 °F), temperature source Oral, resp. rate 16, SpO2 99%.    Medications  acetaminophen, 975 mg, oral, TID  anastrozole, 1 mg, oral, Daily  apixaban, 10 mg, oral, BID   Followed by  [START ON 3/18/2025] apixaban, 5 mg, oral, BID  [Held by provider] aspirin, 81 mg, oral, Daily  carvedilol, 12.5 mg, oral, BID  cefTRIAXone, 1 g, intravenous, q24h  gabapentin, 300 mg, oral, BID  [Held by provider] hydrALAZINE, 100 mg, oral, BID  sennosides, 2 tablet, oral, BID       PRN medications: melatonin, ondansetron ODT **OR** ondansetron, oxyCODONE                Danita Barlow MD  [facility]

## 2025-03-13 NOTE — PROGRESS NOTES
Transitional Care Coordination Progress Note:  Plan per Medical/Surgical team: treatment of UTI, leg DVT, confusion & dizziness with IV ATB, Eliquis & cardio consult  Status: Inpatient   Payor source: [employer]  Discharge disposition: Home with    Potential Barriers: hx of breast cancer, new lesions in liver & kidneys- pall care consult, plan to follow up with oncology outpatient   ADOD: 3/13/2025   PAULINE Linder RN, BSN Transitional Care Coordinator ED# [phone]      03/13/25 0757   Discharge Planning   Assistance Needed cardio work up, Eliquis & PO ATB @ DC, follow up with oncology   Stroke Family Assessment   Stroke Family Assessment Needed No   Intensity of Service   Intensity of Service 0-30 min

## 2025-03-14 ENCOUNTER — APPOINTMENT (OUTPATIENT)
Dept: CARDIOLOGY | Facility: HOSPITAL | Age: 85
DRG: 299 | End: 2025-03-14
Payer: MEDICARE

## 2025-03-14 LAB
ANION GAP SERPL CALC-SCNC: 9 MMOL/L (ref 10–20)
BASOPHILS # BLD AUTO: 0.07 X10*3/UL (ref 0–0.1)
BASOPHILS NFR BLD AUTO: 0.7 %
BUN SERPL-MCNC: 37 MG/DL (ref 6–23)
CALCIUM SERPL-MCNC: 9.1 MG/DL (ref 8.6–10.3)
CHLORIDE SERPL-SCNC: 103 MMOL/L (ref 98–107)
CO2 SERPL-SCNC: 25 MMOL/L (ref 21–32)
CREAT SERPL-MCNC: 1.24 MG/DL (ref 0.5–1.05)
EGFRCR SERPLBLD CKD-EPI 2021: 43 ML/MIN/1.73M*2
EOSINOPHIL # BLD AUTO: 0.29 X10*3/UL (ref 0–0.4)
EOSINOPHIL NFR BLD AUTO: 3.1 %
ERYTHROCYTE [DISTWIDTH] IN BLOOD BY AUTOMATED COUNT: 13.1 % (ref 11.5–14.5)
GLUCOSE SERPL-MCNC: 90 MG/DL (ref 74–99)
HCT VFR BLD AUTO: 26.2 % (ref 36–46)
HGB BLD-MCNC: 8.7 G/DL (ref 12–16)
IMM GRANULOCYTES # BLD AUTO: 0.09 X10*3/UL (ref 0–0.5)
IMM GRANULOCYTES NFR BLD AUTO: 1 % (ref 0–0.9)
LYMPHOCYTES # BLD AUTO: 1.25 X10*3/UL (ref 0.8–3)
LYMPHOCYTES NFR BLD AUTO: 13.2 %
MCH RBC QN AUTO: 29.7 PG (ref 26–34)
MCHC RBC AUTO-ENTMCNC: 33.2 G/DL (ref 32–36)
MCV RBC AUTO: 89 FL (ref 80–100)
MONOCYTES # BLD AUTO: 1.22 X10*3/UL (ref 0.05–0.8)
MONOCYTES NFR BLD AUTO: 12.9 %
NEUTROPHILS # BLD AUTO: 6.54 X10*3/UL (ref 1.6–5.5)
NEUTROPHILS NFR BLD AUTO: 69.1 %
NRBC BLD-RTO: 0 /100 WBCS (ref 0–0)
PLATELET # BLD AUTO: 228 X10*3/UL (ref 150–450)
POTASSIUM SERPL-SCNC: 3.6 MMOL/L (ref 3.5–5.3)
RBC # BLD AUTO: 2.93 X10*6/UL (ref 4–5.2)
SODIUM SERPL-SCNC: 133 MMOL/L (ref 136–145)
WBC # BLD AUTO: 9.5 X10*3/UL (ref 4.4–11.3)

## 2025-03-14 PROCEDURE — 97161 PT EVAL LOW COMPLEX 20 MIN: CPT | Mod: GP

## 2025-03-14 PROCEDURE — 1100000001 HC PRIVATE ROOM DAILY

## 2025-03-14 PROCEDURE — 36415 COLL VENOUS BLD VENIPUNCTURE: CPT | Performed by: STUDENT IN AN ORGANIZED HEALTH CARE EDUCATION/TRAINING PROGRAM

## 2025-03-14 PROCEDURE — 2500000004 HC RX 250 GENERAL PHARMACY W/ HCPCS (ALT 636 FOR OP/ED): Performed by: STUDENT IN AN ORGANIZED HEALTH CARE EDUCATION/TRAINING PROGRAM

## 2025-03-14 PROCEDURE — 93005 ELECTROCARDIOGRAM TRACING: CPT

## 2025-03-14 PROCEDURE — 2500000001 HC RX 250 WO HCPCS SELF ADMINISTERED DRUGS (ALT 637 FOR MEDICARE OP): Performed by: HOSPITALIST

## 2025-03-14 PROCEDURE — 2500000004 HC RX 250 GENERAL PHARMACY W/ HCPCS (ALT 636 FOR OP/ED): Performed by: HOSPITALIST

## 2025-03-14 PROCEDURE — 85025 COMPLETE CBC W/AUTO DIFF WBC: CPT | Performed by: STUDENT IN AN ORGANIZED HEALTH CARE EDUCATION/TRAINING PROGRAM

## 2025-03-14 PROCEDURE — 2500000001 HC RX 250 WO HCPCS SELF ADMINISTERED DRUGS (ALT 637 FOR MEDICARE OP): Performed by: STUDENT IN AN ORGANIZED HEALTH CARE EDUCATION/TRAINING PROGRAM

## 2025-03-14 PROCEDURE — 99232 SBSQ HOSP IP/OBS MODERATE 35: CPT | Performed by: STUDENT IN AN ORGANIZED HEALTH CARE EDUCATION/TRAINING PROGRAM

## 2025-03-14 PROCEDURE — 80048 BASIC METABOLIC PNL TOTAL CA: CPT | Performed by: STUDENT IN AN ORGANIZED HEALTH CARE EDUCATION/TRAINING PROGRAM

## 2025-03-14 PROCEDURE — 2500000001 HC RX 250 WO HCPCS SELF ADMINISTERED DRUGS (ALT 637 FOR MEDICARE OP): Performed by: INTERNAL MEDICINE

## 2025-03-14 RX ORDER — ONDANSETRON HYDROCHLORIDE 2 MG/ML
4 INJECTION, SOLUTION INTRAVENOUS ONCE
Status: COMPLETED | OUTPATIENT
Start: 2025-03-14 | End: 2025-03-14

## 2025-03-14 RX ADMIN — CARVEDILOL 12.5 MG: 12.5 TABLET, FILM COATED ORAL at 09:21

## 2025-03-14 RX ADMIN — APIXABAN 10 MG: 5 TABLET, FILM COATED ORAL at 22:29

## 2025-03-14 RX ADMIN — ACETAMINOPHEN 325MG 975 MG: 325 TABLET ORAL at 22:28

## 2025-03-14 RX ADMIN — ACETAMINOPHEN 325MG 975 MG: 325 TABLET ORAL at 09:21

## 2025-03-14 RX ADMIN — SENNOSIDES 17.2 MG: 8.6 TABLET ORAL at 09:21

## 2025-03-14 RX ADMIN — GABAPENTIN 300 MG: 300 CAPSULE ORAL at 22:29

## 2025-03-14 RX ADMIN — ANASTROZOLE 1 MG: 1 TABLET, COATED ORAL at 09:21

## 2025-03-14 RX ADMIN — SENNOSIDES 17.2 MG: 8.6 TABLET ORAL at 22:28

## 2025-03-14 RX ADMIN — ONDANSETRON 4 MG: 2 INJECTION, SOLUTION INTRAMUSCULAR; INTRAVENOUS at 18:57

## 2025-03-14 RX ADMIN — ONDANSETRON 4 MG: 2 INJECTION, SOLUTION INTRAMUSCULAR; INTRAVENOUS at 22:29

## 2025-03-14 RX ADMIN — CEFTRIAXONE SODIUM 1 G: 1 INJECTION, SOLUTION INTRAVENOUS at 10:12

## 2025-03-14 RX ADMIN — CARVEDILOL 12.5 MG: 12.5 TABLET, FILM COATED ORAL at 22:29

## 2025-03-14 RX ADMIN — GABAPENTIN 300 MG: 300 CAPSULE ORAL at 09:21

## 2025-03-14 RX ADMIN — APIXABAN 10 MG: 5 TABLET, FILM COATED ORAL at 09:21

## 2025-03-14 ASSESSMENT — PAIN - FUNCTIONAL ASSESSMENT
PAIN_FUNCTIONAL_ASSESSMENT: 0-10

## 2025-03-14 ASSESSMENT — COGNITIVE AND FUNCTIONAL STATUS - GENERAL
HELP NEEDED FOR BATHING: A LITTLE
TOILETING: A LITTLE
MOVING TO AND FROM BED TO CHAIR: A LOT
WALKING IN HOSPITAL ROOM: A LITTLE
EATING MEALS: A LITTLE
MOVING TO AND FROM BED TO CHAIR: A LITTLE
MOVING FROM LYING ON BACK TO SITTING ON SIDE OF FLAT BED WITH BEDRAILS: A LITTLE
TURNING FROM BACK TO SIDE WHILE IN FLAT BAD: A LITTLE
MOVING TO AND FROM BED TO CHAIR: A LOT
TURNING FROM BACK TO SIDE WHILE IN FLAT BAD: A LITTLE
STANDING UP FROM CHAIR USING ARMS: A LITTLE
MOBILITY SCORE: 17
TURNING FROM BACK TO SIDE WHILE IN FLAT BAD: A LITTLE
DRESSING REGULAR UPPER BODY CLOTHING: A LOT
PERSONAL GROOMING: A LITTLE
CLIMB 3 TO 5 STEPS WITH RAILING: A LOT
WALKING IN HOSPITAL ROOM: A LITTLE
STANDING UP FROM CHAIR USING ARMS: A LITTLE
DAILY ACTIVITIY SCORE: 16
MOBILITY SCORE: 17
CLIMB 3 TO 5 STEPS WITH RAILING: A LOT
DRESSING REGULAR LOWER BODY CLOTHING: A LOT

## 2025-03-14 ASSESSMENT — PAIN SCALES - GENERAL
PAINLEVEL_OUTOF10: 0 - NO PAIN
PAINLEVEL_OUTOF10: 5 - MODERATE PAIN
PAINLEVEL_OUTOF10: 0 - NO PAIN

## 2025-03-14 ASSESSMENT — ENCOUNTER SYMPTOMS
CONFUSION: 1
ABDOMINAL PAIN: 0
ABDOMINAL DISTENTION: 0
FEVER: 0
SHORTNESS OF BREATH: 0
VOMITING: 0
WEAKNESS: 1

## 2025-03-14 ASSESSMENT — ACTIVITIES OF DAILY LIVING (ADL): ADL_ASSISTANCE: INDEPENDENT

## 2025-03-14 NOTE — PROGRESS NOTES
Physical Therapy    Physical Therapy Evaluation    Patient Name: Elizabeth Buckner  MRN: [id number]  Department: [facility] FLEX UNIT  Room: 01/01-A  Today's Date: 3/14/2025   Time Calculation  Start Time: 1512  Stop Time: 1530  Time Calculation (min): 18 min    Assessment/Plan   PT Assessment  PT Assessment Results: Decreased strength, Decreased range of motion, Decreased endurance, Impaired balance, Decreased mobility, Pain  Rehab Prognosis: Good  Barriers to Discharge Home: No anticipated barriers  End of Session Communication: Bedside nurse  Assessment Comment: PT Evaluation Completed. The patient presented with decreased mobility, balance, endurance, strength, and increased pain and fatigue. These impairments are negatively impacting her ability to perform at baseline functional level, in home environment. This patient would benefit from skilled therapy intervention to address limitations and progress towards the PT goals.  Anticipate low frequency PT needs at discharge  End of Session Patient Position: Up in chair, Alarm on  IP OR SWING BED PT PLAN  Inpatient or Swing Bed: Inpatient  PT Plan  Treatment/Interventions: Bed mobility, Transfer training, Gait training, Stair training, Balance training, Strengthening, Endurance training, Range of motion, Therapeutic exercise, Therapeutic activity, Home exercise program  PT Plan: Ongoing PT  PT Frequency: 3 times per week  PT Discharge Recommendations: Low intensity level of continued care  PT Recommended Transfer Status: Assist x1  PT - OK to Discharge: Yes (PT POC established.)    Subjective   General Visit Information:  General  Reason for Referral: To ED with increased groin pain, unresolved UTI, and AMS. CT (+) for R LE DVT.  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    Dry eye syndrome of right lacrimal gland 04/12/2019    Dry eye syndrome of right lacrimal gland    Encounter for allergy testing     Encounter for allergy testing    Encounter for general adult medical examination without abnormal findings 07/24/2020    Encounter for annual health examination    Encounter for general adult medical examination without abnormal findings 07/19/2021    Encounter for annual health examination    Encounter for general adult medical examination without abnormal findings 03/06/2018    Encounter for annual health examination    Other obesity due to excess calories 10/15/2020    Exogenous obesity    Pain in unspecified knee 03/09/2018    Joint pain, knee    Personal history of other diseases of the digestive system 07/10/2018    History of gastroesophageal reflux (GERD)    Personal history of other diseases of the female genital tract 09/08/2016    History of vaginitis    Personal history of other diseases of the female genital tract     History of pelvic inflammatory disease    Personal history of other diseases of the nervous system and sense organs 04/12/2019    History of myopia    Personal history of other diseases of the respiratory system 01/02/2020    History of acute bronchitis    Personal history of other diseases of urinary system 01/22/2021    History of chronic kidney disease       Prior to Session Communication: Bedside nurse  Patient Position Received:  (sitting EOB with RN present)  General Comment: Pt pleasant and agreeable to PT eval. Pt's spouse present for session.  Home Living:  Home Living  Type of Home:  House  Lives With: Spouse  Home Adaptive Equipment:  (standard walker)  Home Layout: One level  Home Access: Level entry  Bathroom Shower/Tub: Tub/shower unit, Walk-in shower (uses both regularly)  Bathroom Toilet: Handicapped height  Bathroom Equipment:  (looking into getting shower chair)  Prior Level of Function:  Prior Function Per Pt/Caregiver Report  Level of [place]: Independent with ADLs and functional transfers, Independent with homemaking with ambulation  ADL Assistance: Independent  Homemaking Assistance: Independent  Ambulatory Assistance: Independent  Prior Function Comments: Reports 1 fall in the past 6 months.  Precautions:  Precautions  Medical Precautions: Fall precautions             Objective   Pain:  Pain Assessment  Pain Assessment: 0-10  0-10 (Numeric) Pain Score: 5 - Moderate pain  Pain Type: Chronic pain  Pain Location:  (emmanuelle knees)  Cognition:  Cognition  Overall Cognitive Status: Within Functional Limits  Orientation Level: Oriented X4    General Assessments:       Sensation  Light Touch: No apparent deficits         Postural Control  Postural Control: Within Functional Limits    Static Sitting Balance  Static Sitting-Balance Support: Feet supported, Bilateral upper extremity supported  Static Sitting-Level of Assistance: Close supervision  Dynamic Sitting Balance  Dynamic Sitting-Balance Support: Feet supported, Bilateral upper extremity supported  Dynamic Sitting-Level of Assistance: Close supervision    Static Standing Balance  Static Standing-Balance Support: Bilateral upper extremity supported  Static Standing-Level of Assistance: Contact guard  Dynamic Standing Balance  Dynamic Standing-Balance Support: Bilateral upper extremity supported  Dynamic Standing-Level of Assistance: Contact guard  Functional Assessments:  Bed Mobility  Bed Mobility: Yes  Bed Mobility 1  Bed Mobility 1: Sitting to supine  Level of Assistance 1: Close supervision  Bed Mobility Comments 1: Uses UEs to  maneuver LEs    Transfers  Transfer: Yes  Transfer 1  Technique 1: Sit to stand, Stand to sit  Transfer Device 1: Walker  Transfer Level of Assistance 1: Contact guard  Trials/Comments 1: Cues for hand placement and backing up fully prior to sitting.    Ambulation/Gait Training  Ambulation/Gait Training Performed: Yes  Ambulation/Gait Training 1  Surface 1: Level tile  Device 1: Rolling walker  Assistance 1: Contact guard  Comments/Distance (ft) 1: 100 ft. Pt ambulates with decreased rolando and step length. Cues for upright posture, forward gaze, and walker placement. Overall demonstrating good stability.  Extremity/Trunk Assessments:  RUE   RUE : Within Functional Limits  LUE   LUE: Within Functional Limits  RLE   RLE :  (Strength >3/5 based on observation of functional mobility. ROM WFL. Increased R LE swelling)  LLE   LLE :  (Strength >3/5 based on observation of functional mobility. ROM WFL.)  Outcome Measures:  [facility] Basic Mobility  Turning from your back to your side while in a flat bed without using bedrails: A little  Moving from lying on your back to sitting on the side of a flat bed without using bedrails: A little  Moving to and from bed to chair (including a wheelchair): A little  Standing up from a chair using your arms (e.g. wheelchair or bedside chair): A little  To walk in hospital room: A little  Climbing 3-5 steps with railing: A lot  Basic Mobility - Total Score: 17    Encounter Problems       Encounter Problems (Active)       Balance       complete all mobility with normal balance while dual tasking, negotiating in a dynamic environment, carrying items, etc., with proactive and reactive static and dynamic standing and sitting tasks, with mod I and RW, >15 minutes.         Start:  03/14/25    Expected End:  03/28/25               Mobility       STG - Patient will ambulate 150 ft mod I with a RW       Start:  03/14/25    Expected End:  03/28/25               PT Transfers       STG - Patient will  perform bed mobility independently.        Start:  03/14/25    Expected End:  03/28/25            STG - Patient will transfer sit to and from stand mod I with a RW       Start:  03/14/25    Expected End:  03/28/25               Pain - Adult             Encounter Problems (Resolved)       Pain - Adult              Education Documentation  Body Mechanics, taught by Claudia White, PT at 3/14/2025  3:54 PM.  Learner: Patient  Readiness: Acceptance  Method: Explanation  Response: Verbalizes Understanding    Mobility Training, taught by Claudia White, PT at 3/14/2025  3:54 PM.  Learner: Patient  Readiness: Acceptance  Method: Explanation  Response: Verbalizes Understanding    Education Comments  No comments found.

## 2025-03-14 NOTE — CARE PLAN
The patient's goals for the shift include Pt. will have a safe, restful and uneventful evening    The clinical goals for the shift include Pt. will remain free of falls and injury this shift      Problem: Pain - Adult  Goal: Verbalizes/displays adequate comfort level or baseline comfort level  Outcome: Progressing     Problem: Safety - Adult  Goal: Free from fall injury  Outcome: Progressing     Problem: Discharge Planning  Goal: Discharge to home or other facility with appropriate resources  Outcome: Progressing     Problem: Chronic Conditions and Co-morbidities  Goal: Patient's chronic conditions and co-morbidity symptoms are monitored and maintained or improved  Outcome: Progressing     Problem: Nutrition  Goal: Nutrient intake appropriate for maintaining nutritional needs  Outcome: Progressing     Problem: Fall/Injury  Goal: Not fall by end of shift  Outcome: Progressing  Goal: Be free from injury by end of the shift  Outcome: Progressing  Goal: Verbalize understanding of personal risk factors for fall in the hospital  Outcome: Progressing  Goal: Verbalize understanding of risk factor reduction measures to prevent injury from fall in the home  Outcome: Progressing  Goal: Use assistive devices by end of the shift  Outcome: Progressing     Problem: Skin  Goal: Decreased wound size/increased tissue granulation at next dressing change  Outcome: Progressing  Goal: Participates in plan/prevention/treatment measures  Outcome: Progressing  Goal: Prevent/manage excess moisture  Outcome: Progressing  Goal: Prevent/minimize sheer/friction injuries  3/14/2025 0033 by Chrissy Lobato RN  Outcome: Progressing  3/14/2025 0032 by Chrissy Lobato RN  Flowsheets (Taken 3/14/2025 0032)  Prevent/minimize sheer/friction injuries:   Use pull sheet   Turn/reposition every 2 hours/use positioning/transfer devices   HOB 30 degrees or less   Increase activity/out of bed for meals  Goal: Promote/optimize nutrition  Outcome:  Progressing  Goal: Promote skin healing  Outcome: Progressing     Problem: Pain  Goal: Takes deep breaths with improved pain control throughout the shift  Outcome: Progressing  Goal: Turns in bed with improved pain control throughout the shift  Outcome: Progressing  Goal: Walks with improved pain control throughout the shift  Outcome: Progressing  Goal: Performs ADL's with improved pain control throughout shift  Outcome: Progressing  Goal: Participates in PT with improved pain control throughout the shift  Outcome: Progressing  Goal: Free from opioid side effects throughout the shift  Outcome: Progressing  Goal: Free from acute confusion related to pain meds throughout the shift  Outcome: Progressing

## 2025-03-14 NOTE — PROGRESS NOTES
[facility] Hospitalist Progress Note        Between 7AM-7PM please message me via Epic Secure Chat.  After 7PM please page Nocturnist on call.        Assessment/Plan     Acute Problems    ?Metabolic encephalopathy (vs MCI)  Generalized weakness  Hypercalcemia in setting of malignancy, possible dehydration affecting labs  Recent untreated UTI  Leukocytosis  DVT RLE from the inguinal region to the popliteal region/RLE swelling  HASMUKH on CKD 3    Chronic Problems    Metastatic breast CA including possible mets to brain, liver, etc - on anastrozole-palbociclib. Had prior mastectomy/chemo/RT.  HTN  Anemia - stable    Plan    - continue IV CTX, her current urine cx did not grow anything but may be affected by prior antibiotic use. Will still treat her for 7D course. Previous one had grown E Coli sens to CTX. Can likely change to PO cefdinir upon DC  - continue Eliquis, no mets noted on brain MR. No endovascular intervention needed per vascular  - appreciate pal care assistance with GOC  - she wants to try and get up and walk around more today. PT/OT consults in place    Fluids: None  Electrolytes: Replete as needed  Nutrition: Regular  Patel: None  Invasive lines: None  Drains: None  O2: None    DVT Prophylaxis:  Eliquis    Discharge Planning: The patient is not medically ready for discharge and requires further inpatient treatment. Their prognosis at this time is uncertain. Plan is home upon DC. We will add PT/OT consults today.    Plan of care was discussed with patient/family    Total time spent: At least 38 minutes, providing counseling or in coordination of care. Total time on this day of visit includes record and documentation review before and after visit including documentation and time not explicitly included on EMR time stamp.      Subjective     Elizabeth Buckner is a 84 y.o. female on day 3 of admission presenting with General weakness.    NAEON. Overall stable, does not complain of new acute issues. Wants to get up and walk  around more today. Breathing unlabored. Confirmed BM looked normal to her yesterday.    Review of Systems   Constitutional:  Negative for fever.   Respiratory:  Negative for shortness of breath.    Cardiovascular:  Positive for leg swelling. Negative for chest pain.   Gastrointestinal:  Negative for abdominal distention, abdominal pain and vomiting.   Neurological:  Positive for weakness.   Psychiatric/Behavioral:  Positive for confusion.        Objective     Physical Exam  Constitutional:       General: She is not in acute distress.  Cardiovascular:      Rate and Rhythm: Normal rate and regular rhythm.   Pulmonary:      Effort: Pulmonary effort is normal.      Breath sounds: Normal breath sounds.   Abdominal:      General: There is no distension.      Palpations: Abdomen is soft.   Musculoskeletal:      Right lower leg: Edema present.   Neurological:      Mental Status: She is alert. Mental status is at baseline.         Last Recorded Vitals  Blood pressure 118/56, pulse 76, temperature 36.6 °C (97.9 °F), temperature source Oral, resp. rate 18, SpO2 97%.    Medications  acetaminophen, 975 mg, oral, TID  anastrozole, 1 mg, oral, Daily  apixaban, 10 mg, oral, BID   Followed by  [START ON 3/18/2025] apixaban, 5 mg, oral, BID  [Held by provider] aspirin, 81 mg, oral, Daily  carvedilol, 12.5 mg, oral, BID  cefTRIAXone, 1 g, intravenous, q24h  gabapentin, 300 mg, oral, BID  [Held by provider] hydrALAZINE, 100 mg, oral, BID  sennosides, 2 tablet, oral, BID       PRN medications: melatonin, ondansetron ODT **OR** ondansetron, oxyCODONE                Danita Barlow MD  [facility]

## 2025-03-14 NOTE — CARE PLAN
The patient's goals for the shift include Pt. will have a safe, restful and uneventful evening    The clinical goals for the shift include Pt will remain free from falls and appropriately participate with PT/OT

## 2025-03-14 NOTE — PROGRESS NOTES
Transitional Care Coordination Progress Note:  Plan per Medical/Surgical team: treatment of UTI, leg DVT, confusion & dizziness with IV ATB, Eliquis & cardio consult, cultures pending, PT/OT evals pending, [place] [id number]  Status: Inpatient   Payor source: [employer]  Discharge disposition: Home with    Potential Barriers: hx of breast cancer, new lesions in liver & kidneys- pall care consult, plan to follow up with oncology outpatient   ADOD: 3/15/2025   PAULINE Linder RN, BSN Transitional Care Coordinator ED# [phone]      03/14/25 1442   Discharge Planning   Assistance Needed PT/OT evals pending, [facility]   Stroke Family Assessment   Stroke Family Assessment Needed No   Intensity of Service   Intensity of Service 0-30 min

## 2025-03-15 ENCOUNTER — APPOINTMENT (OUTPATIENT)
Dept: GASTROENTEROLOGY | Facility: HOSPITAL | Age: 85
DRG: 299 | End: 2025-03-15
Payer: MEDICARE

## 2025-03-15 ENCOUNTER — APPOINTMENT (OUTPATIENT)
Dept: RADIOLOGY | Facility: HOSPITAL | Age: 85
DRG: 299 | End: 2025-03-15
Payer: MEDICARE

## 2025-03-15 LAB
ABO GROUP (TYPE) IN BLOOD: NORMAL
ABO GROUP (TYPE) IN BLOOD: NORMAL
ANION GAP BLDA CALCULATED.4IONS-SCNC: 13 MMO/L (ref 10–25)
ANION GAP SERPL CALC-SCNC: 10 MMOL/L (ref 10–20)
ANTIBODY SCREEN: NORMAL
BASE EXCESS BLDA CALC-SCNC: -6.6 MMOL/L (ref -2–3)
BLOOD EXPIRATION DATE: NORMAL
BLOOD EXPIRATION DATE: NORMAL
BODY TEMPERATURE: 37 DEGREES CELSIUS
BUN SERPL-MCNC: 54 MG/DL (ref 6–23)
CA-I BLDA-SCNC: 1.26 MMOL/L (ref 1.1–1.33)
CALCIUM SERPL-MCNC: 8.5 MG/DL (ref 8.6–10.3)
CHLORIDE BLDA-SCNC: 105 MMOL/L (ref 98–107)
CHLORIDE SERPL-SCNC: 106 MMOL/L (ref 98–107)
CO2 SERPL-SCNC: 23 MMOL/L (ref 21–32)
CREAT SERPL-MCNC: 1.26 MG/DL (ref 0.5–1.05)
DISPENSE STATUS: NORMAL
DISPENSE STATUS: NORMAL
EGFRCR SERPLBLD CKD-EPI 2021: 42 ML/MIN/1.73M*2
ERYTHROCYTE [DISTWIDTH] IN BLOOD BY AUTOMATED COUNT: 13.4 % (ref 11.5–14.5)
ERYTHROCYTE [DISTWIDTH] IN BLOOD BY AUTOMATED COUNT: 13.5 % (ref 11.5–14.5)
GLUCOSE BLD MANUAL STRIP-MCNC: 151 MG/DL (ref 74–99)
GLUCOSE BLD MANUAL STRIP-MCNC: 183 MG/DL (ref 74–99)
GLUCOSE BLDA-MCNC: 159 MG/DL (ref 74–99)
GLUCOSE SERPL-MCNC: 112 MG/DL (ref 74–99)
HCO3 BLDA-SCNC: 19.1 MMOL/L (ref 22–26)
HCT VFR BLD AUTO: 21.9 % (ref 36–46)
HCT VFR BLD AUTO: 22.4 % (ref 36–46)
HCT VFR BLD EST: 20 % (ref 36–46)
HEMOCCULT SP1 STL QL: POSITIVE
HGB BLD-MCNC: 6.8 G/DL (ref 12–16)
HGB BLD-MCNC: 7.8 G/DL (ref 12–16)
HGB BLDA-MCNC: 6.7 G/DL (ref 12–16)
HOLD SPECIMEN: NORMAL
INHALED O2 CONCENTRATION: 80 %
LACTATE BLDA-SCNC: 4.8 MMOL/L (ref 0.4–2)
LACTATE SERPL-SCNC: 2.2 MMOL/L (ref 0.4–2)
LACTATE SERPL-SCNC: 3 MMOL/L (ref 0.4–2)
MCH RBC QN AUTO: 29.5 PG (ref 26–34)
MCH RBC QN AUTO: 29.6 PG (ref 26–34)
MCHC RBC AUTO-ENTMCNC: 31.1 G/DL (ref 32–36)
MCHC RBC AUTO-ENTMCNC: 34.8 G/DL (ref 32–36)
MCV RBC AUTO: 85 FL (ref 80–100)
MCV RBC AUTO: 95 FL (ref 80–100)
NRBC BLD-RTO: 0 /100 WBCS (ref 0–0)
NRBC BLD-RTO: 0 /100 WBCS (ref 0–0)
OXYHGB MFR BLDA: 97.2 % (ref 94–98)
PCO2 BLDA: 38 MM HG (ref 38–42)
PEEP CMH2O: 5 CM H2O
PH BLDA: 7.31 PH (ref 7.38–7.42)
PLATELET # BLD AUTO: 173 X10*3/UL (ref 150–450)
PLATELET # BLD AUTO: 224 X10*3/UL (ref 150–450)
PO2 BLDA: 339 MM HG (ref 85–95)
POTASSIUM BLDA-SCNC: 3.9 MMOL/L (ref 3.5–5.3)
POTASSIUM SERPL-SCNC: 4 MMOL/L (ref 3.5–5.3)
PRODUCT BLOOD TYPE: 9500
PRODUCT BLOOD TYPE: 9500
PRODUCT CODE: NORMAL
PRODUCT CODE: NORMAL
RBC # BLD AUTO: 2.3 X10*6/UL (ref 4–5.2)
RBC # BLD AUTO: 2.64 X10*6/UL (ref 4–5.2)
RH FACTOR (ANTIGEN D): NORMAL
RH FACTOR (ANTIGEN D): NORMAL
SAO2 % BLDA: 100 % (ref 94–100)
SODIUM BLDA-SCNC: 133 MMOL/L (ref 136–145)
SODIUM SERPL-SCNC: 135 MMOL/L (ref 136–145)
TIDAL VOLUME: 400 ML
UNIT ABO: NORMAL
UNIT ABO: NORMAL
UNIT NUMBER: NORMAL
UNIT NUMBER: NORMAL
UNIT RH: NORMAL
UNIT RH: NORMAL
UNIT VOLUME: 350
UNIT VOLUME: 350
VENTILATOR MODE: ABNORMAL
VENTILATOR RATE: 14 BPM
WBC # BLD AUTO: 14.8 X10*3/UL (ref 4.4–11.3)
WBC # BLD AUTO: 41.4 X10*3/UL (ref 4.4–11.3)
XM INTEP: NORMAL
XM INTEP: NORMAL

## 2025-03-15 PROCEDURE — 82947 ASSAY GLUCOSE BLOOD QUANT: CPT

## 2025-03-15 PROCEDURE — 86920 COMPATIBILITY TEST SPIN: CPT

## 2025-03-15 PROCEDURE — 2500000004 HC RX 250 GENERAL PHARMACY W/ HCPCS (ALT 636 FOR OP/ED): Performed by: NURSE PRACTITIONER

## 2025-03-15 PROCEDURE — 2500000002 HC RX 250 W HCPCS SELF ADMINISTERED DRUGS (ALT 637 FOR MEDICARE OP, ALT 636 FOR OP/ED): Performed by: NURSE PRACTITIONER

## 2025-03-15 PROCEDURE — 85027 COMPLETE CBC AUTOMATED: CPT | Performed by: NURSE PRACTITIONER

## 2025-03-15 PROCEDURE — 82435 ASSAY OF BLOOD CHLORIDE: CPT | Performed by: NURSE PRACTITIONER

## 2025-03-15 PROCEDURE — 2500000005 HC RX 250 GENERAL PHARMACY W/O HCPCS: Performed by: NURSE PRACTITIONER

## 2025-03-15 PROCEDURE — P9016 RBC LEUKOCYTES REDUCED: HCPCS

## 2025-03-15 PROCEDURE — 86901 BLOOD TYPING SEROLOGIC RH(D): CPT | Performed by: STUDENT IN AN ORGANIZED HEALTH CARE EDUCATION/TRAINING PROGRAM

## 2025-03-15 PROCEDURE — 2020000001 HC ICU ROOM DAILY

## 2025-03-15 PROCEDURE — 2500000001 HC RX 250 WO HCPCS SELF ADMINISTERED DRUGS (ALT 637 FOR MEDICARE OP): Performed by: NURSE PRACTITIONER

## 2025-03-15 PROCEDURE — 80048 BASIC METABOLIC PNL TOTAL CA: CPT | Performed by: STUDENT IN AN ORGANIZED HEALTH CARE EDUCATION/TRAINING PROGRAM

## 2025-03-15 PROCEDURE — 2500000001 HC RX 250 WO HCPCS SELF ADMINISTERED DRUGS (ALT 637 FOR MEDICARE OP): Performed by: HOSPITALIST

## 2025-03-15 PROCEDURE — 94002 VENT MGMT INPAT INIT DAY: CPT

## 2025-03-15 PROCEDURE — 94681 O2 UPTK CO2 OUTP % O2 XTRC: CPT

## 2025-03-15 PROCEDURE — 82810 BLOOD GASES O2 SAT ONLY: CPT | Performed by: NURSE PRACTITIONER

## 2025-03-15 PROCEDURE — 83605 ASSAY OF LACTIC ACID: CPT | Performed by: NURSE PRACTITIONER

## 2025-03-15 PROCEDURE — 99291 CRITICAL CARE FIRST HOUR: CPT | Performed by: NURSE PRACTITIONER

## 2025-03-15 PROCEDURE — 87075 CULTR BACTERIA EXCEPT BLOOD: CPT | Mod: AHULAB | Performed by: STUDENT IN AN ORGANIZED HEALTH CARE EDUCATION/TRAINING PROGRAM

## 2025-03-15 PROCEDURE — 82270 OCCULT BLOOD FECES: CPT | Performed by: STUDENT IN AN ORGANIZED HEALTH CARE EDUCATION/TRAINING PROGRAM

## 2025-03-15 PROCEDURE — 71045 X-RAY EXAM CHEST 1 VIEW: CPT | Mod: FOREIGN READ | Performed by: RADIOLOGY

## 2025-03-15 PROCEDURE — 2500000004 HC RX 250 GENERAL PHARMACY W/ HCPCS (ALT 636 FOR OP/ED): Performed by: HOSPITALIST

## 2025-03-15 PROCEDURE — 2500000004 HC RX 250 GENERAL PHARMACY W/ HCPCS (ALT 636 FOR OP/ED): Performed by: STUDENT IN AN ORGANIZED HEALTH CARE EDUCATION/TRAINING PROGRAM

## 2025-03-15 PROCEDURE — 37799 UNLISTED PX VASCULAR SURGERY: CPT | Performed by: NURSE PRACTITIONER

## 2025-03-15 PROCEDURE — 36430 TRANSFUSION BLD/BLD COMPNT: CPT

## 2025-03-15 PROCEDURE — 36415 COLL VENOUS BLD VENIPUNCTURE: CPT | Performed by: STUDENT IN AN ORGANIZED HEALTH CARE EDUCATION/TRAINING PROGRAM

## 2025-03-15 PROCEDURE — 86923 COMPATIBILITY TEST ELECTRIC: CPT

## 2025-03-15 PROCEDURE — 2500000004 HC RX 250 GENERAL PHARMACY W/ HCPCS (ALT 636 FOR OP/ED)

## 2025-03-15 PROCEDURE — 3E033XZ INTRODUCTION OF VASOPRESSOR INTO PERIPHERAL VEIN, PERCUTANEOUS APPROACH: ICD-10-PCS | Performed by: INTERNAL MEDICINE

## 2025-03-15 PROCEDURE — 85027 COMPLETE CBC AUTOMATED: CPT | Performed by: SURGERY

## 2025-03-15 PROCEDURE — 2500000005 HC RX 250 GENERAL PHARMACY W/O HCPCS: Performed by: SURGERY

## 2025-03-15 PROCEDURE — 0BH17EZ INSERTION OF ENDOTRACHEAL AIRWAY INTO TRACHEA, VIA NATURAL OR ARTIFICIAL OPENING: ICD-10-PCS | Performed by: SURGERY

## 2025-03-15 PROCEDURE — 5A1935Z RESPIRATORY VENTILATION, LESS THAN 24 CONSECUTIVE HOURS: ICD-10-PCS | Performed by: SURGERY

## 2025-03-15 PROCEDURE — 71045 X-RAY EXAM CHEST 1 VIEW: CPT

## 2025-03-15 PROCEDURE — 99232 SBSQ HOSP IP/OBS MODERATE 35: CPT | Performed by: STUDENT IN AN ORGANIZED HEALTH CARE EDUCATION/TRAINING PROGRAM

## 2025-03-15 PROCEDURE — 6360000002 HC RX 636 FACTOR: Mod: JZ | Performed by: NURSE PRACTITIONER

## 2025-03-15 PROCEDURE — 02HV33Z INSERTION OF INFUSION DEVICE INTO SUPERIOR VENA CAVA, PERCUTANEOUS APPROACH: ICD-10-PCS | Performed by: INTERNAL MEDICINE

## 2025-03-15 PROCEDURE — 43235 EGD DIAGNOSTIC BRUSH WASH: CPT | Performed by: INTERNAL MEDICINE

## 2025-03-15 PROCEDURE — 0DJ08ZZ INSPECTION OF UPPER INTESTINAL TRACT, VIA NATURAL OR ARTIFICIAL OPENING ENDOSCOPIC: ICD-10-PCS | Performed by: INTERNAL MEDICINE

## 2025-03-15 PROCEDURE — 2500000004 HC RX 250 GENERAL PHARMACY W/ HCPCS (ALT 636 FOR OP/ED): Performed by: INTERNAL MEDICINE

## 2025-03-15 PROCEDURE — 87040 BLOOD CULTURE FOR BACTERIA: CPT | Mod: AHULAB | Performed by: STUDENT IN AN ORGANIZED HEALTH CARE EDUCATION/TRAINING PROGRAM

## 2025-03-15 PROCEDURE — 2500000001 HC RX 250 WO HCPCS SELF ADMINISTERED DRUGS (ALT 637 FOR MEDICARE OP): Performed by: INTERNAL MEDICINE

## 2025-03-15 PROCEDURE — 87081 CULTURE SCREEN ONLY: CPT | Mod: AHULAB | Performed by: NURSE PRACTITIONER

## 2025-03-15 RX ORDER — ACETAMINOPHEN 325 MG/1
650 TABLET ORAL EVERY 4 HOURS PRN
Status: DISCONTINUED | OUTPATIENT
Start: 2025-03-15 | End: 2025-03-19

## 2025-03-15 RX ORDER — INSULIN LISPRO 100 [IU]/ML
0-10 INJECTION, SOLUTION INTRAVENOUS; SUBCUTANEOUS EVERY 4 HOURS
Status: DISCONTINUED | OUTPATIENT
Start: 2025-03-15 | End: 2025-03-23

## 2025-03-15 RX ORDER — ACETAMINOPHEN 650 MG/1
650 SUPPOSITORY RECTAL EVERY 4 HOURS PRN
Status: DISCONTINUED | OUTPATIENT
Start: 2025-03-15 | End: 2025-03-19

## 2025-03-15 RX ORDER — PROPOFOL 10 MG/ML
0-50 INJECTION, EMULSION INTRAVENOUS CONTINUOUS
Status: DISCONTINUED | OUTPATIENT
Start: 2025-03-15 | End: 2025-03-16

## 2025-03-15 RX ORDER — NOREPINEPHRINE BITARTRATE/D5W 8 MG/250ML
.01-1 PLASTIC BAG, INJECTION (ML) INTRAVENOUS CONTINUOUS
Status: DISCONTINUED | OUTPATIENT
Start: 2025-03-15 | End: 2025-03-17

## 2025-03-15 RX ORDER — PANTOPRAZOLE SODIUM 40 MG/10ML
40 INJECTION, POWDER, LYOPHILIZED, FOR SOLUTION INTRAVENOUS 2 TIMES DAILY
Status: DISCONTINUED | OUTPATIENT
Start: 2025-03-15 | End: 2025-03-31 | Stop reason: HOSPADM

## 2025-03-15 RX ORDER — PROPOFOL 10 MG/ML
INJECTION, EMULSION INTRAVENOUS
Status: COMPLETED
Start: 2025-03-15 | End: 2025-03-15

## 2025-03-15 RX ORDER — ACETAMINOPHEN 160 MG/5ML
650 SOLUTION ORAL EVERY 4 HOURS PRN
Status: DISCONTINUED | OUTPATIENT
Start: 2025-03-15 | End: 2025-03-19

## 2025-03-15 RX ORDER — METOCLOPRAMIDE HYDROCHLORIDE 5 MG/ML
10 INJECTION INTRAMUSCULAR; INTRAVENOUS ONCE
Status: COMPLETED | OUTPATIENT
Start: 2025-03-15 | End: 2025-03-15

## 2025-03-15 RX ORDER — NOREPINEPHRINE BITARTRATE/D5W 8 MG/250ML
PLASTIC BAG, INJECTION (ML) INTRAVENOUS
Status: DISPENSED
Start: 2025-03-15 | End: 2025-03-16

## 2025-03-15 RX ORDER — ETOMIDATE 2 MG/ML
20 INJECTION INTRAVENOUS ONCE
Status: COMPLETED | OUTPATIENT
Start: 2025-03-15 | End: 2025-03-15

## 2025-03-15 RX ORDER — DEXTROSE 50 % IN WATER (D50W) INTRAVENOUS SYRINGE
25
Status: DISCONTINUED | OUTPATIENT
Start: 2025-03-15 | End: 2025-03-31 | Stop reason: HOSPADM

## 2025-03-15 RX ORDER — DEXTROSE 50 % IN WATER (D50W) INTRAVENOUS SYRINGE
12.5
Status: DISCONTINUED | OUTPATIENT
Start: 2025-03-15 | End: 2025-03-31 | Stop reason: HOSPADM

## 2025-03-15 RX ORDER — ROCURONIUM BROMIDE 10 MG/ML
50 INJECTION, SOLUTION INTRAVENOUS ONCE
Status: COMPLETED | OUTPATIENT
Start: 2025-03-15 | End: 2025-03-15

## 2025-03-15 RX ADMIN — CARVEDILOL 12.5 MG: 12.5 TABLET, FILM COATED ORAL at 10:17

## 2025-03-15 RX ADMIN — INSULIN LISPRO 2 UNITS: 100 INJECTION, SOLUTION INTRAVENOUS; SUBCUTANEOUS at 20:53

## 2025-03-15 RX ADMIN — ROCURONIUM BROMIDE 50 MG: 10 INJECTION, SOLUTION INTRAVENOUS at 15:30

## 2025-03-15 RX ADMIN — ACETAMINOPHEN 325MG 975 MG: 325 TABLET ORAL at 20:48

## 2025-03-15 RX ADMIN — ANASTROZOLE 1 MG: 1 TABLET, COATED ORAL at 10:17

## 2025-03-15 RX ADMIN — VASOPRESSIN 0.03 UNITS/MIN: 0.2 INJECTION INTRAVENOUS at 17:40

## 2025-03-15 RX ADMIN — SODIUM CHLORIDE, SODIUM LACTATE, POTASSIUM CHLORIDE, AND CALCIUM CHLORIDE 1000 ML: .6; .31; .03; .02 INJECTION, SOLUTION INTRAVENOUS at 20:47

## 2025-03-15 RX ADMIN — METOCLOPRAMIDE 10 MG: 5 INJECTION, SOLUTION INTRAMUSCULAR; INTRAVENOUS at 17:40

## 2025-03-15 RX ADMIN — SODIUM CHLORIDE, POTASSIUM CHLORIDE, SODIUM LACTATE AND CALCIUM CHLORIDE 1000 ML: 600; 310; 30; 20 INJECTION, SOLUTION INTRAVENOUS at 15:31

## 2025-03-15 RX ADMIN — GABAPENTIN 300 MG: 300 CAPSULE ORAL at 10:17

## 2025-03-15 RX ADMIN — Medication 2480 UNITS: at 15:29

## 2025-03-15 RX ADMIN — NOREPINEPHRINE BITARTRATE 0.28 MCG/KG/MIN: 8 INJECTION, SOLUTION INTRAVENOUS at 23:06

## 2025-03-15 RX ADMIN — PANTOPRAZOLE SODIUM 40 MG: 40 INJECTION, POWDER, FOR SOLUTION INTRAVENOUS at 20:48

## 2025-03-15 RX ADMIN — Medication 40 PERCENT: at 23:10

## 2025-03-15 RX ADMIN — ACETAMINOPHEN 325MG 975 MG: 325 TABLET ORAL at 10:16

## 2025-03-15 RX ADMIN — PROPOFOL 20 MCG/KG/MIN: 10 INJECTION, EMULSION INTRAVENOUS at 23:05

## 2025-03-15 RX ADMIN — ETOMIDATE 20 MG: 2 INJECTION, SOLUTION INTRAVENOUS at 15:30

## 2025-03-15 RX ADMIN — PROPOFOL 15 MCG/KG/MIN: 10 INJECTION, EMULSION INTRAVENOUS at 15:33

## 2025-03-15 RX ADMIN — HYDROCORTISONE SODIUM SUCCINATE 50 MG: 100 INJECTION, POWDER, FOR SOLUTION INTRAMUSCULAR; INTRAVENOUS at 17:40

## 2025-03-15 RX ADMIN — SODIUM CHLORIDE, SODIUM LACTATE, POTASSIUM CHLORIDE, AND CALCIUM CHLORIDE 1000 ML: .6; .31; .03; .02 INJECTION, SOLUTION INTRAVENOUS at 17:40

## 2025-03-15 RX ADMIN — PANTOPRAZOLE SODIUM 40 MG: 40 INJECTION, POWDER, FOR SOLUTION INTRAVENOUS at 12:20

## 2025-03-15 RX ADMIN — CEFTRIAXONE SODIUM 1 G: 1 INJECTION, SOLUTION INTRAVENOUS at 10:23

## 2025-03-15 RX ADMIN — Medication 80 PERCENT: at 14:53

## 2025-03-15 ASSESSMENT — COGNITIVE AND FUNCTIONAL STATUS - GENERAL
CLIMB 3 TO 5 STEPS WITH RAILING: A LOT
DRESSING REGULAR UPPER BODY CLOTHING: A LOT
EATING MEALS: A LITTLE
WALKING IN HOSPITAL ROOM: A LITTLE
HELP NEEDED FOR BATHING: A LITTLE
DRESSING REGULAR LOWER BODY CLOTHING: A LOT
MOBILITY SCORE: 17
TURNING FROM BACK TO SIDE WHILE IN FLAT BAD: A LITTLE
DAILY ACTIVITIY SCORE: 16
MOVING TO AND FROM BED TO CHAIR: A LOT
STANDING UP FROM CHAIR USING ARMS: A LITTLE
TOILETING: A LITTLE
PERSONAL GROOMING: A LITTLE

## 2025-03-15 ASSESSMENT — PAIN - FUNCTIONAL ASSESSMENT
PAIN_FUNCTIONAL_ASSESSMENT: CPOT (CRITICAL CARE PAIN OBSERVATION TOOL)
PAIN_FUNCTIONAL_ASSESSMENT: CPOT (CRITICAL CARE PAIN OBSERVATION TOOL)

## 2025-03-15 ASSESSMENT — PAIN SCALES - GENERAL
PAINLEVEL_OUTOF10: 0 - NO PAIN
PAINLEVEL_OUTOF10: 0 - NO PAIN

## 2025-03-15 NOTE — PROGRESS NOTES
Elizabeth Buckner is a 84 y.o. female on day 4 of admission presenting with General weakness.    Subjective   This is an 85 yo female with history with stage IV breast ca post bilateral mastectomy as well as treated with radiation and tamoxifen, as well as anastrozole and palbociclib (2021) lymph node dissection, CRF cerebral, hepatic, pelvic, and brain mets.  who [place] [facility] [place] of RLE edema and altered mental status with generalized weakness progressively worsening over the past few days prior to admission.  Per notes patient was recently treated for UTI prior to admission.   Work up revealed CT A/P showing extensive retroperitoneal and B/L pelvic LAD with concern for metastatic disease, liver masses with concern for metastatic disease, bilateral renal lesions of nonspecific etiologies vs cysts, DVTs also seen with in the R common femoral vein and right external iliac veins.  DVT seen on US right lower extreity form the inguinal region to popliteal region.  Patient started on eliquis.  RRT called this am for hematemesis, at the time there patient was no active bleeding, recommended at stat CBC and transfuse as indicated.  Late this afternoon ICU was asked to see patient as she has had episodes of continue hematemesis and melena associated with hypotension and altered mental status.  Upon arrival to bedside BP 70/30 with low of 40/20. Patient pale and somnolent.  At the time of evaluation patient had 1 of 2 units of uncrossed blood currently infusing and continued hypotension.  Decision made to begin levophed and transfer to ICU for further management.         Objective     Physical Exam  Constitutional:       General: She is in acute distress.      Appearance: She is overweight. She is ill-appearing and toxic-appearing.   Cardiovascular:      Rate and Rhythm: Regular rhythm. Tachycardia present.      Pulses: Normal pulses.   Pulmonary:      Comments: Diminished breath sounds bilaterally, symmetrical chest  expansion.  Oxygenating on RA  Abdominal:      General: There is no distension.      Palpations: Abdomen is soft.      Tenderness: There is no abdominal tenderness.   Skin:     General: Skin is warm and dry.      Capillary Refill: Capillary refill takes less than 2 seconds.   Neurological:      Mental Status: She is lethargic and disoriented.         Last Recorded Vitals  Blood pressure (!) 126/111, pulse (!) 111, temperature 36.6 °C (97.8 °F), temperature source Temporal, resp. rate 23, SpO2 100%.  Intake/Output last 3 Shifts:  I/O last 3 completed shifts:  In: 120 [P.O.:120]  Out: 100 [Urine:100]    Relevant Results  Scheduled medications  acetaminophen, 975 mg, oral, TID  anastrozole, 1 mg, oral, Daily  [Held by provider] aspirin, 81 mg, oral, Daily  carvedilol, 12.5 mg, oral, BID  cefTRIAXone, 1 g, intravenous, q24h  gabapentin, 300 mg, oral, BID  [Held by provider] hydrALAZINE, 100 mg, oral, BID  lactated Ringer's, 1,000 mL, intravenous, Once  norepinephrine in dextrose 5 %, , ,   pantoprazole, 40 mg, intravenous, BID  sennosides, 2 tablet, oral, BID      Continuous medications  norepinephrine, 0.01-1 mcg/kg/min  propofol, 0-50 mcg/kg/min, Last Rate: 15 mcg/kg/min (03/15/25 1533)      PRN medications  PRN medications: alteplase, melatonin, norepinephrine in dextrose 5 %, ondansetron ODT **OR** ondansetron, oxyCODONE, oxygen    Results for orders placed or performed during the hospital encounter of 03/10/25 (from the past 24 hours)   CBC   Result Value Ref Range    WBC 14.8 (H) 4.4 - 11.3 x10*3/uL    nRBC 0.0 0.0 - 0.0 /100 WBCs    RBC 2.30 (L) 4.00 - 5.20 x10*6/uL    Hemoglobin 6.8 (L) 12.0 - 16.0 g/dL    Hematocrit 21.9 (L) 36.0 - 46.0 %    MCV 95 80 - 100 fL    MCH 29.6 26.0 - 34.0 pg    MCHC 31.1 (L) 32.0 - 36.0 g/dL    RDW 13.4 11.5 - 14.5 %    Platelets 224 150 - 450 x10*3/uL   Basic Metabolic Panel   Result Value Ref Range    Glucose 112 (H) 74 - 99 mg/dL    Sodium 135 (L) 136 - 145 mmol/L    Potassium  4.0 3.5 - 5.3 mmol/L    Chloride 106 98 - 107 mmol/L    Bicarbonate 23 21 - 32 mmol/L    Anion Gap 10 10 - 20 mmol/L    Urea Nitrogen 54 (H) 6 - 23 mg/dL    Creatinine 1.26 (H) 0.50 - 1.05 mg/dL    eGFR 42 (L) >60 mL/min/1.73m*2    Calcium 8.5 (L) 8.6 - 10.3 mg/dL   Occult Blood, Stool    Specimen: Stool   Result Value Ref Range    Occult Blood, Stool X1 Positive (A) Negative   Blood Culture    Specimen: Peripheral Venipuncture; Blood culture   Result Value Ref Range    Blood Culture Loaded on Instrument - Culture in progress    Type and screen   Result Value Ref Range    ABO TYPE B     Rh TYPE POS     ANTIBODY SCREEN NEG    SST TOP   Result Value Ref Range    Extra Tube Hold for add-ons.    POCT GLUCOSE   Result Value Ref Range    POCT Glucose 151 (H) 74 - 99 mg/dL   ABO/Rh   Result Value Ref Range    ABO TYPE B     Rh TYPE POS    Blood Gas Arterial Full Panel   Result Value Ref Range    POCT pH, Arterial 7.31 (L) 7.38 - 7.42 pH    POCT pCO2, Arterial 38 38 - 42 mm Hg    POCT pO2, Arterial 339 (H) 85 - 95 mm Hg    POCT SO2, Arterial 100 94 - 100 %    POCT Oxy Hemoglobin, Arterial 97.2 94.0 - 98.0 %    POCT Hematocrit Calculated, Arterial 20.0 (L) 36.0 - 46.0 %    POCT Sodium, Arterial 133 (L) 136 - 145 mmol/L    POCT Potassium, Arterial 3.9 3.5 - 5.3 mmol/L    POCT Chloride, Arterial 105 98 - 107 mmol/L    POCT Ionized Calcium, Arterial 1.26 1.10 - 1.33 mmol/L    POCT Glucose, Arterial 159 (H) 74 - 99 mg/dL    POCT Lactate, Arterial 4.8 (HH) 0.4 - 2.0 mmol/L    POCT Base Excess, Arterial -6.6 (L) -2.0 - 3.0 mmol/L    POCT HCO3 Calculated, Arterial 19.1 (L) 22.0 - 26.0 mmol/L    POCT Hemoglobin, Arterial 6.7 (L) 12.0 - 16.0 g/dL    POCT Anion Gap, Arterial 13 10 - 25 mmo/L    Patient Temperature 37.0 degrees Celsius    FiO2 80 %    Ventilator Mode A/C     Ventilator Rate 14 bpm    Tidal Volume 400 mL    Peep CHM2O 5.0 cm H2O     *Note: Due to a large number of results and/or encounters for the requested time period,  some results have not been displayed. A complete set of results can be found in Results Review.     No results found.    This patient has a central line   Reason for the central line remaining today? Parenteral medication    This patient has a urinary catheter   Reason for the urinary catheter remaining today? critically ill patient who need accurate urinary output measurements    This patient is intubated   Reason for patient to remain intubated today? they are unable to protect their airway and they have continued cardiopulmonary lability/instability             Assessment/Plan   Assessment & Plan  General weakness    This is an 83 yo female with history with stage IV breast ca post bilateral mastectomy as well as treated with radiation and tamoxifen, as well as anastrozole and palbociclib (2021) lymph node dissection, CRF cerebral, hepatic, pelvic, and brain mets.  [place] [facility] [place] edema and altered mental status with generalized weakness progressively worsening over the past few days prior to admission.  Found to have DVT started on eliquis.  Transferred to ICU for acute hemorrhagic shock due to GIB with hematemesis and melena requiring blood transfusions, Kcentra for eliquis reversal, IVF resuscitation and vasopressor support.  Transferred to ICU for ongoing management.     Neuro:   - Propofol for sedation  - Neuro and pain assessment  - Oxycodone and as needed tylenol for acute pain    CV:   Hypotension 2/2 acute hemorrhagic shock 2/2 likely UGIB refractory to 2 units PRBCs and 2L IVF resuscitation  - ABP and tele-monitoring  - Titrate vasopressor support to maintain MAP >65  - IVF boluses as needed  - Hold all anti-hypertensives  - Add stress dose steroids    Pulm:   Acute respiratory failure, intubated for airway protection  - Maintain saturations >90%  - As needed nebs  - Bronch hygiene  - vent bundle    GI:   Acute UGIB likely 2/2 eliquis   - GI consulted, appreciate recs  -  Protonix 40mg BID  - Bowel regimen  - NPO   - Dietary consult    Renal:   Hx CKD  - Daily rfp and replete electrolytes as needed  - Place brewer catheter for hourly I&Os    Endo  - ISS while on steroids    Heme/Onc:   Hx  stage IV breast ca post bilateral mastectomy, treated with radiation and tamoxifen, as well as anastrozole and palbociclib.  Acute blood loss anemia 2/2 acute UGIB in setting of DVTs with Eliquis  - CBC stat  - Transfused 2 units PRBCs, CBC following 2nd unit of PRBCs  - Kcentra  - SCDs for DVTppx  - Holding on pharmacologic ppx due to UGIB    ID:   Afebrile, leukocytosis likely reactive  UA + LE and Nitrites, UC negative  - Trend temps  - Trend WBCs  - Continue with ceftriaxone    Dispo: Transfer to ICU    Discussed with Dr. Melgar    Discussed with family patient condition and ongoing plan of care.  All questions answered.     I spent 90 minutes of critical care time in the professional and overall care of this patient.      Toan Garcia, APRN-CNP

## 2025-03-15 NOTE — SIGNIFICANT EVENT
Central Venous Catheter Insertion Procedure Note    * No surgery found *    Indications:  vascular access, hypovolemia, need for frequent blood draws, hemorrhagic shock    Procedure Details   Procedure emergent in setting of acute hemorrhagic shock     Maximum sterile technique was used including antiseptics, cap, gloves, gown, hand hygiene, mask, and sheet.    Under sterile conditions the skin above the on the right internal jugular vein was prepped with betadine and covered with a sterile drape. Local anesthesia was applied to the skin and subcutaneous tissues. Target vessel identified using ultrasound guidance. Following site anesthesia with 1% lidocaine an 18-gauge needle was inserted into the vein.  Verification with manometry, a guide wire was then passed easily through the catheter, ultrasound used to verify placement. There were no arrhythmias.  Skin nick complete, tissue dilated, and  A 7.0 Slovak triple-lumen was then inserted into the vessel over the guide wire.  Guide wire removed and catheter was sutured into place.    Findings:  There were no changes to vital signs. Catheter was flushed with 20 cc NS. Patient did tolerate procedure well.    Recommendations:  CXR ordered to verify placement.    Toan Garcia CNP

## 2025-03-15 NOTE — SIGNIFICANT EVENT
Rapid Response called for hematemesis. HD # 4 for this 85 YO female admitted with a third reoccurance of Breast cancer , frequent falls, FTT ( weight loss) , CRF Cerebral, Hepatic and pelvic mets. Noted Palliative Care intervention.  NO active bleeding at time of my evaluation , ABD soft non tender no guarding. No peristaltic rushes . Check CBC  transfuse if indicated and consistent with GOC.   Job Melgar MD

## 2025-03-15 NOTE — NURSING NOTE
1405-Rapid response called for lethargy and confusion.  1407- Vital signs obtained.  1424- Blood started.  1440-Patient transported to ICU

## 2025-03-15 NOTE — CARE PLAN
The patient's goals for the shift include Pt. will have a safe, restful and uneventful evening    The clinical goals for the shift include pt will remain safe throughout the shift    Over the shift, the patient did make progress toward the following goals.

## 2025-03-15 NOTE — SIGNIFICANT EVENT
[facility] Rapid Response Evaluation Note  [facility]      Elizabeth Buckner    :  1940(84 y.o.)    MRN:  [id number]  Date: 03/15/25     Rapid response called for hypotension/decline in mental status.     Continued concerns for upper GI bleed following episode of hematemesis and dark stools earlier this morning. BP has steadily been dropping. 2 units of emergency un-crossmatched blood ordered to be given at this time (benefits outweigh risks). ICU team notified and she is being transferred to ICU for further stabilization. Family at bedside updated on our concerns.    Danita Barlow MD  [facility] Medicine

## 2025-03-15 NOTE — CARE PLAN
The patient's goals for the shift include Pt. will have a safe, restful and uneventful evening    The clinical goals for the shift include Pt will remain free from falls and appropriately participate with PT/OT  Problem: Pain - Adult  Goal: Verbalizes/displays adequate comfort level or baseline comfort level  Outcome: Progressing     Problem: Safety - Adult  Goal: Free from fall injury  Outcome: Progressing     Problem: Discharge Planning  Goal: Discharge to home or other facility with appropriate resources  Outcome: Progressing     Problem: Chronic Conditions and Co-morbidities  Goal: Patient's chronic conditions and co-morbidity symptoms are monitored and maintained or improved  Outcome: Progressing     Problem: Nutrition  Goal: Nutrient intake appropriate for maintaining nutritional needs  Outcome: Progressing     Problem: Skin  Goal: Decreased wound size/increased tissue granulation at next dressing change  Outcome: Progressing  Goal: Participates in plan/prevention/treatment measures  Outcome: Progressing  Goal: Prevent/manage excess moisture  Outcome: Progressing  Goal: Prevent/minimize sheer/friction injuries  Outcome: Progressing  Goal: Promote/optimize nutrition  Outcome: Progressing  Goal: Promote skin healing  Outcome: Progressing     Problem: Pain  Goal: Takes deep breaths with improved pain control throughout the shift  Outcome: Progressing  Goal: Turns in bed with improved pain control throughout the shift  Outcome: Progressing  Goal: Walks with improved pain control throughout the shift  Outcome: Progressing  Goal: Performs ADL's with improved pain control throughout shift  Outcome: Progressing  Goal: Participates in PT with improved pain control throughout the shift  Outcome: Progressing  Goal: Free from opioid side effects throughout the shift  Outcome: Progressing  Goal: Free from acute confusion related to pain meds throughout the shift  Outcome: Progressing

## 2025-03-15 NOTE — PROGRESS NOTES
[facility] Hospitalist Progress Note        Between 7AM-7PM please message me via Epic Secure Chat.  After 7PM please page Nocturnist on call.        Assessment/Plan     Acute Problems    ?Metabolic encephalopathy (w/ suspected MCI)  GI bleed  Acute on chronic anemia  Generalized weakness  Hypercalcemia in setting of malignancy, possible dehydration affecting labs  Recent untreated UTI  Leukocytosis  DVT RLE from the inguinal region to the popliteal region/RLE swelling  HASMUKH on CKD 3    Chronic Problems    Metastatic breast CA including possible mets to brain, liver, etc - on anastrozole-palbociclib. Had prior mastectomy/chemo/RT.  HTN      Plan    - continue IV CTX, her current urine cx did not grow anything but may be affected by prior antibiotic use. Will still treat her for 7D course. Previous one had grown E Coli sens to CTX. Can likely change to PO cefdinir upon DC. Her WBC did bump a bit, maybe reactive from GI bleed. But will check blood cx  - stop Eliquis with GI bleed. May need to proceed with IVC filter  - 1 U PRBC ordered  - GI consult appreciated, PPI BID for now, monitor hgb  - appreciate pal care assistance with GOC  - PT/OT had rec low    Fluids: None  Electrolytes: Replete as needed  Nutrition: Regular  Patel: None  Invasive lines: None  Drains: None  O2: None    DVT Prophylaxis:  Eliquis held due to GI bleed    Discharge Planning: The patient is not medically ready for discharge and requires further inpatient treatment. Their prognosis at this time is uncertain. Plan is home upon DC    Plan of care was discussed with patient/family    Total time spent: At least 38 minutes, providing counseling or in coordination of care. Total time on this day of visit includes record and documentation review before and after visit including documentation and time not explicitly included on EMR time stamp.      Subjective     Elizabeth Buckner is a 84 y.o. female on day 4 of admission presenting with General weakness.    Bloody  emesis, dark stools overnight. This AM, confused.    Review of Systems   Unable to perform ROS: Mental status change       Objective     Physical Exam  Constitutional:       General: She is not in acute distress.  Cardiovascular:      Rate and Rhythm: Normal rate and regular rhythm.   Pulmonary:      Effort: Pulmonary effort is normal.      Breath sounds: Normal breath sounds.   Abdominal:      General: There is no distension.      Palpations: Abdomen is soft.   Musculoskeletal:      Right lower leg: Edema present.   Neurological:      Mental Status: She is alert. Mental status is at baseline.         Last Recorded Vitals  Blood pressure 109/61, pulse 95, temperature 36.8 °C (98.3 °F), temperature source Temporal, resp. rate 18, SpO2 100%.    Medications  acetaminophen, 975 mg, oral, TID  anastrozole, 1 mg, oral, Daily  [Held by provider] aspirin, 81 mg, oral, Daily  carvedilol, 12.5 mg, oral, BID  cefTRIAXone, 1 g, intravenous, q24h  gabapentin, 300 mg, oral, BID  [Held by provider] hydrALAZINE, 100 mg, oral, BID  pantoprazole, 40 mg, intravenous, BID  sennosides, 2 tablet, oral, BID       PRN medications: melatonin, ondansetron ODT **OR** ondansetron, oxyCODONE                Danita Barlow MD  [facility] Medicine

## 2025-03-15 NOTE — POST-PROCEDURE NOTE
Consent: implied secondary to emergent situation.   Sedation: Etomidate  NM Blockade: Rocuronium   Technique: Glidescope   Glidescope was inserted into the oropharynx at which time the vocal cords were visualized. A 7.0-Swedish endotracheal tube was inserted and visualized going through the vocal cords with one pass. The stylette was removed. Colorimetric change was visualized on the CO2 meter. Breath sounds were heard in both lung fields equally.    This procedure was performed in addition to and exclusive to any other face to face care time/initial management.    Procedure proctored by Dr. Melgar at bedside.     Toan Garcia CNP    Smooth intubation first pass well tolerated.  Job Melgar MD

## 2025-03-15 NOTE — POST-PROCEDURE NOTE
The area was prepped with chlorhexidine and draped in the usual sterile fashion. Anesthesia was obtained with Lidocaine 1%.  Artery was visualized utilizing ultrasound guidance and the artery was cannulated with a 20 G arterial catheter under ultrasound. Catheter was exchanged over a guidewire and bright red pulsatile blood exited catheter.  Appropriate wave form was noted and the patient tolerated the procedure well.    This procedure was performed in addition to and exclusive to any other face to face care time/initial management.    Toan Garcia, CNP

## 2025-03-15 NOTE — PROGRESS NOTES
Occupational Therapy                 Therapy Communication Note    Patient Name: Elizabeth Buckner  MRN: [id number]  Department: [facility] A 5  Room: [address] [place] [address]  Today's Date: 3/15/2025     Discipline: Occupational Therapy    OT Missed Visit: Yes     Missed Visit Reason: Missed Visit Reason:  (Pt not feeling well and will be receiving a blood transfusion. RN recommending to hold therapy for today.) Hgb 6.8 and downtrending.    Missed Time: Attempt    Comment:

## 2025-03-15 NOTE — PROGRESS NOTES
03/15/25 0734   Discharge Planning   Expected Discharge Disposition Home     Per notes plan would be to discharge home with her , PT rec'd Lowx1 on 3/14 per notes hx of breast cancer with mets, patient will benefit from Pal care consult, will reach out for order to be placed, I will continue to monitor for discharge planing , possible need for HC on discharge.

## 2025-03-16 LAB
ANION GAP SERPL CALC-SCNC: 10 MMOL/L (ref 10–20)
BACTERIA BLD CULT: NORMAL
BASOPHILS # BLD AUTO: 0.14 X10*3/UL (ref 0–0.1)
BASOPHILS NFR BLD AUTO: 0.3 %
BUN SERPL-MCNC: 65 MG/DL (ref 6–23)
CALCIUM SERPL-MCNC: 7.8 MG/DL (ref 8.6–10.3)
CHLORIDE SERPL-SCNC: 104 MMOL/L (ref 98–107)
CO2 SERPL-SCNC: 23 MMOL/L (ref 21–32)
CREAT SERPL-MCNC: 1.62 MG/DL (ref 0.5–1.05)
EGFRCR SERPLBLD CKD-EPI 2021: 31 ML/MIN/1.73M*2
EOSINOPHIL # BLD AUTO: 0.01 X10*3/UL (ref 0–0.4)
EOSINOPHIL NFR BLD AUTO: 0 %
ERYTHROCYTE [DISTWIDTH] IN BLOOD BY AUTOMATED COUNT: 13.8 % (ref 11.5–14.5)
GLUCOSE BLD MANUAL STRIP-MCNC: 111 MG/DL (ref 74–99)
GLUCOSE BLD MANUAL STRIP-MCNC: 115 MG/DL (ref 74–99)
GLUCOSE BLD MANUAL STRIP-MCNC: 126 MG/DL (ref 74–99)
GLUCOSE BLD MANUAL STRIP-MCNC: 129 MG/DL (ref 74–99)
GLUCOSE BLD MANUAL STRIP-MCNC: 153 MG/DL (ref 74–99)
GLUCOSE BLD MANUAL STRIP-MCNC: 199 MG/DL (ref 74–99)
GLUCOSE SERPL-MCNC: 170 MG/DL (ref 74–99)
HCT VFR BLD AUTO: 23.6 % (ref 36–46)
HGB BLD-MCNC: 8.2 G/DL (ref 12–16)
IMM GRANULOCYTES # BLD AUTO: 2.54 X10*3/UL (ref 0–0.5)
IMM GRANULOCYTES NFR BLD AUTO: 5.3 % (ref 0–0.9)
LYMPHOCYTES # BLD AUTO: 1.62 X10*3/UL (ref 0.8–3)
LYMPHOCYTES NFR BLD AUTO: 3.3 %
MCH RBC QN AUTO: 29.5 PG (ref 26–34)
MCHC RBC AUTO-ENTMCNC: 34.7 G/DL (ref 32–36)
MCV RBC AUTO: 85 FL (ref 80–100)
MONOCYTES # BLD AUTO: 1.81 X10*3/UL (ref 0.05–0.8)
MONOCYTES NFR BLD AUTO: 3.7 %
NEUTROPHILS # BLD AUTO: 42.26 X10*3/UL (ref 1.6–5.5)
NEUTROPHILS NFR BLD AUTO: 87.4 %
NRBC BLD-RTO: 0 /100 WBCS (ref 0–0)
PLATELET # BLD AUTO: 208 X10*3/UL (ref 150–450)
POTASSIUM SERPL-SCNC: 4.3 MMOL/L (ref 3.5–5.3)
RBC # BLD AUTO: 2.78 X10*6/UL (ref 4–5.2)
SODIUM SERPL-SCNC: 133 MMOL/L (ref 136–145)
WBC # BLD AUTO: 48.4 X10*3/UL (ref 4.4–11.3)

## 2025-03-16 PROCEDURE — 94681 O2 UPTK CO2 OUTP % O2 XTRC: CPT

## 2025-03-16 PROCEDURE — 94003 VENT MGMT INPAT SUBQ DAY: CPT

## 2025-03-16 PROCEDURE — 37799 UNLISTED PX VASCULAR SURGERY: CPT | Performed by: NURSE PRACTITIONER

## 2025-03-16 PROCEDURE — 2020000001 HC ICU ROOM DAILY

## 2025-03-16 PROCEDURE — P9045 ALBUMIN (HUMAN), 5%, 250 ML: HCPCS | Mod: JZ | Performed by: NURSE PRACTITIONER

## 2025-03-16 PROCEDURE — 2500000004 HC RX 250 GENERAL PHARMACY W/ HCPCS (ALT 636 FOR OP/ED): Performed by: NURSE PRACTITIONER

## 2025-03-16 PROCEDURE — 80048 BASIC METABOLIC PNL TOTAL CA: CPT | Performed by: NURSE PRACTITIONER

## 2025-03-16 PROCEDURE — 2500000004 HC RX 250 GENERAL PHARMACY W/ HCPCS (ALT 636 FOR OP/ED): Performed by: STUDENT IN AN ORGANIZED HEALTH CARE EDUCATION/TRAINING PROGRAM

## 2025-03-16 PROCEDURE — 99223 1ST HOSP IP/OBS HIGH 75: CPT | Performed by: INTERNAL MEDICINE

## 2025-03-16 PROCEDURE — 99291 CRITICAL CARE FIRST HOUR: CPT | Performed by: NURSE PRACTITIONER

## 2025-03-16 PROCEDURE — 82947 ASSAY GLUCOSE BLOOD QUANT: CPT

## 2025-03-16 PROCEDURE — 2500000005 HC RX 250 GENERAL PHARMACY W/O HCPCS: Performed by: NURSE PRACTITIONER

## 2025-03-16 PROCEDURE — 85025 COMPLETE CBC W/AUTO DIFF WBC: CPT | Performed by: NURSE PRACTITIONER

## 2025-03-16 PROCEDURE — 2500000002 HC RX 250 W HCPCS SELF ADMINISTERED DRUGS (ALT 637 FOR MEDICARE OP, ALT 636 FOR OP/ED): Performed by: NURSE PRACTITIONER

## 2025-03-16 PROCEDURE — 2500000005 HC RX 250 GENERAL PHARMACY W/O HCPCS: Performed by: SURGERY

## 2025-03-16 PROCEDURE — 2500000001 HC RX 250 WO HCPCS SELF ADMINISTERED DRUGS (ALT 637 FOR MEDICARE OP): Performed by: INTERNAL MEDICINE

## 2025-03-16 PROCEDURE — 2500000001 HC RX 250 WO HCPCS SELF ADMINISTERED DRUGS (ALT 637 FOR MEDICARE OP): Performed by: NURSE PRACTITIONER

## 2025-03-16 RX ORDER — CARVEDILOL 6.25 MG/1
6.25 TABLET ORAL 2 TIMES DAILY
Status: DISCONTINUED | OUTPATIENT
Start: 2025-03-16 | End: 2025-03-31 | Stop reason: HOSPADM

## 2025-03-16 RX ORDER — OXYCODONE HYDROCHLORIDE 5 MG/1
5 TABLET ORAL EVERY 4 HOURS PRN
Status: DISCONTINUED | OUTPATIENT
Start: 2025-03-16 | End: 2025-03-31

## 2025-03-16 RX ORDER — ALBUMIN HUMAN 50 G/1000ML
25 SOLUTION INTRAVENOUS ONCE
Status: COMPLETED | OUTPATIENT
Start: 2025-03-16 | End: 2025-03-16

## 2025-03-16 RX ADMIN — VASOPRESSIN 0.03 UNITS/MIN: 0.2 INJECTION INTRAVENOUS at 01:37

## 2025-03-16 RX ADMIN — HYDROCORTISONE SODIUM SUCCINATE 50 MG: 100 INJECTION, POWDER, FOR SOLUTION INTRAMUSCULAR; INTRAVENOUS at 12:53

## 2025-03-16 RX ADMIN — HYDROCORTISONE SODIUM SUCCINATE 50 MG: 100 INJECTION, POWDER, FOR SOLUTION INTRAMUSCULAR; INTRAVENOUS at 00:26

## 2025-03-16 RX ADMIN — CARVEDILOL 6.25 MG: 6.25 TABLET, FILM COATED ORAL at 21:54

## 2025-03-16 RX ADMIN — HYDROCORTISONE SODIUM SUCCINATE 50 MG: 100 INJECTION, POWDER, FOR SOLUTION INTRAMUSCULAR; INTRAVENOUS at 17:25

## 2025-03-16 RX ADMIN — OXYCODONE HYDROCHLORIDE 5 MG: 5 TABLET ORAL at 17:27

## 2025-03-16 RX ADMIN — INSULIN LISPRO 2 UNITS: 100 INJECTION, SOLUTION INTRAVENOUS; SUBCUTANEOUS at 00:24

## 2025-03-16 RX ADMIN — Medication 3 L/MIN: at 12:30

## 2025-03-16 RX ADMIN — ALBUMIN HUMAN 25 G: 0.05 INJECTION, SOLUTION INTRAVENOUS at 18:20

## 2025-03-16 RX ADMIN — INSULIN LISPRO 2 UNITS: 100 INJECTION, SOLUTION INTRAVENOUS; SUBCUTANEOUS at 04:49

## 2025-03-16 RX ADMIN — HYDROCORTISONE SODIUM SUCCINATE 50 MG: 100 INJECTION, POWDER, FOR SOLUTION INTRAMUSCULAR; INTRAVENOUS at 05:04

## 2025-03-16 RX ADMIN — PANTOPRAZOLE SODIUM 40 MG: 40 INJECTION, POWDER, FOR SOLUTION INTRAVENOUS at 20:20

## 2025-03-16 RX ADMIN — PANTOPRAZOLE SODIUM 40 MG: 40 INJECTION, POWDER, FOR SOLUTION INTRAVENOUS at 09:29

## 2025-03-16 RX ADMIN — NOREPINEPHRINE BITARTRATE 0.17 MCG/KG/MIN: 8 INJECTION, SOLUTION INTRAVENOUS at 09:27

## 2025-03-16 RX ADMIN — ANASTROZOLE 1 MG: 1 TABLET, COATED ORAL at 09:29

## 2025-03-16 RX ADMIN — CEFTRIAXONE SODIUM 1 G: 1 INJECTION, SOLUTION INTRAVENOUS at 09:28

## 2025-03-16 RX ADMIN — Medication 40 PERCENT: at 03:05

## 2025-03-16 RX ADMIN — SODIUM CHLORIDE, SODIUM LACTATE, POTASSIUM CHLORIDE, AND CALCIUM CHLORIDE 500 ML: .6; .31; .03; .02 INJECTION, SOLUTION INTRAVENOUS at 10:08

## 2025-03-16 ASSESSMENT — PAIN DESCRIPTION - DESCRIPTORS
DESCRIPTORS: CRAMPING
DESCRIPTORS: ACHING;SORE

## 2025-03-16 ASSESSMENT — PAIN - FUNCTIONAL ASSESSMENT
PAIN_FUNCTIONAL_ASSESSMENT: CPOT (CRITICAL CARE PAIN OBSERVATION TOOL)
PAIN_FUNCTIONAL_ASSESSMENT: CPOT (CRITICAL CARE PAIN OBSERVATION TOOL)
PAIN_FUNCTIONAL_ASSESSMENT: 0-10
PAIN_FUNCTIONAL_ASSESSMENT: 0-10
PAIN_FUNCTIONAL_ASSESSMENT: CPOT (CRITICAL CARE PAIN OBSERVATION TOOL)
PAIN_FUNCTIONAL_ASSESSMENT: 0-10
PAIN_FUNCTIONAL_ASSESSMENT: 0-10

## 2025-03-16 ASSESSMENT — COGNITIVE AND FUNCTIONAL STATUS - GENERAL
DRESSING REGULAR LOWER BODY CLOTHING: A LOT
DAILY ACTIVITIY SCORE: 16
TOILETING: A LITTLE
CLIMB 3 TO 5 STEPS WITH RAILING: A LOT
EATING MEALS: A LITTLE
TURNING FROM BACK TO SIDE WHILE IN FLAT BAD: A LITTLE
WALKING IN HOSPITAL ROOM: A LITTLE
DRESSING REGULAR UPPER BODY CLOTHING: A LOT
STANDING UP FROM CHAIR USING ARMS: A LITTLE
MOVING TO AND FROM BED TO CHAIR: A LOT
HELP NEEDED FOR BATHING: A LITTLE
PERSONAL GROOMING: A LITTLE
MOBILITY SCORE: 17

## 2025-03-16 ASSESSMENT — PAIN SCALES - GENERAL
PAINLEVEL_OUTOF10: 8
PAINLEVEL_OUTOF10: 0 - NO PAIN
PAINLEVEL_OUTOF10: 6
PAINLEVEL_OUTOF10: 0 - NO PAIN
PAINLEVEL_OUTOF10: 3

## 2025-03-16 NOTE — CARE PLAN
The patient's goals for the shift include Pt. will have a safe, restful and uneventful evening    The clinical goals for the shift include patient will remain HDS throughtout the shift      Problem: Fall/Injury  Goal: Not fall by end of shift  Outcome: Progressing  Goal: Be free from injury by end of the shift  Outcome: Progressing  Goal: Verbalize understanding of personal risk factors for fall in the hospital  Outcome: Progressing  Goal: Verbalize understanding of risk factor reduction measures to prevent injury from fall in the home  Outcome: Progressing  Goal: Use assistive devices by end of the shift  Outcome: Progressing     Problem: Skin  Goal: Decreased wound size/increased tissue granulation at next dressing change  Outcome: Progressing  Goal: Participates in plan/prevention/treatment measures  Outcome: Progressing  Goal: Prevent/manage excess moisture  Outcome: Progressing  Goal: Prevent/minimize sheer/friction injuries  Outcome: Progressing  Goal: Promote/optimize nutrition  Outcome: Progressing  Goal: Promote skin healing  Outcome: Progressing     Problem: Pain  Goal: Takes deep breaths with improved pain control throughout the shift  Outcome: Progressing  Goal: Turns in bed with improved pain control throughout the shift  Outcome: Progressing  Goal: Walks with improved pain control throughout the shift  Outcome: Progressing  Goal: Performs ADL's with improved pain control throughout shift  Outcome: Progressing  Goal: Participates in PT with improved pain control throughout the shift  Outcome: Progressing  Goal: Free from opioid side effects throughout the shift  Outcome: Progressing  Goal: Free from acute confusion related to pain meds throughout the shift  Outcome: Progressing     Problem: Pain - Adult  Goal: Verbalizes/displays adequate comfort level or baseline comfort level  Outcome: Progressing     Problem: Safety - Adult  Goal: Free from fall injury  Outcome: Progressing     Problem: Discharge  Planning  Goal: Discharge to home or other facility with appropriate resources  Outcome: Progressing     Problem: Chronic Conditions and Co-morbidities  Goal: Patient's chronic conditions and co-morbidity symptoms are monitored and maintained or improved  Outcome: Progressing     Problem: Nutrition  Goal: Nutrient intake appropriate for maintaining nutritional needs  Outcome: Progressing     Problem: Safety - Medical Restraint  Goal: Remains free of injury from restraints (Restraint for Interference with Medical Device)  Outcome: Progressing  Goal: Free from restraint(s) (Restraint for Interference with Medical Device)  Outcome: Progressing

## 2025-03-16 NOTE — PROGRESS NOTES
Elizabeth Buckner is a 84 y.o. female on day 5 of admission presenting with General weakness    Subjective   CLEM reported. Pt currently off vasopressin, following commands.     Objective     Physical Exam  Vitals and nursing note reviewed.   Constitutional:       General: She is awake.      Appearance: She is ill-appearing.      Interventions: She is sedated and intubated.   Cardiovascular:      Rate and Rhythm: Normal rate and regular rhythm.      Pulses: Normal pulses.      Heart sounds: Normal heart sounds. No murmur heard.     No friction rub. No gallop.   Pulmonary:      Effort: She is intubated.      Breath sounds: Decreased breath sounds present.      Comments: ETT present, symmetrical chest excursion bilaterally, diminished breath sounds bilaterally   Abdominal:      General: Bowel sounds are normal. There is no distension.      Palpations: Abdomen is soft.      Tenderness: There is no abdominal tenderness.   Genitourinary:     Comments: Patel cath present draining yellow urine   Skin:     General: Skin is warm and dry.      Capillary Refill: Capillary refill takes less than 2 seconds.   Neurological:      Mental Status: She is easily aroused.      Comments: Sedated on VENT, following commands        Last Recorded Vitals   Blood pressure (!) 126/111, pulse 91, temperature 36.9 °C (98.4 °F), temperature source Temporal, resp. rate 18, weight 73.9 kg (162 lb 14.7 oz), SpO2 100%.    Intake/Output Last 3 Shifts  I/O last 3 completed shifts:  In: 2519.5 (34.1 mL/kg) [I.V.:109.5 (1.5 mL/kg); Blood:330; IV Piggyback:2080]  Out: 700 (9.5 mL/kg) [Urine:350 (0.1 mL/kg/hr); Emesis/NG output:350]  Weight: 73.9 kg     Lines  CVC 03/15/25 Right Internal jugular (Active)   Placement Date/Time: 03/15/25 1520   Orientation: Right  Location: Internal jugular   Number of days: 0       Arterial Line 03/15/25 Left Radial (Active)   Placement Date/Time: 03/15/25 1519   Orientation: Left  Location: Radial  Securement Method:  Sutured;Transparent dressing   Number of days: 0       ETT  7 mm (Active)   Placement Date/Time: 03/15/25 1449   ETT Type: ETT - single  Single Lumen Tube Size: 7 mm  Location: Oral   Number of days: 0       Urethral Catheter 16 Fr. (Active)   Placement Date/Time: 03/15/25 1522   Tube Size (Fr.): 16 Fr.  Catheter Balloon Size: 10 mL   Number of days: 0       NG/OG/Feeding Tube OG - [place] sump 16 Fr Center mouth (Active)   Placement Date/Time: 03/15/25 1524   Type of Tube: NG/OG Tube  Tube Type: OG - [place] sump  NG/OG Tube Size: 16 Fr  Tube Location: Center mouth   Number of days: 0        Recent Labs  CMP:  Results from last 7 days   Lab Units 03/16/25  0457 03/15/25  0650 03/14/25  0458 03/13/25  0455 03/12/25  1636 03/12/25  0522 03/11/25  0643 03/10/25  1955 03/10/25  1434   SODIUM mmol/L 133* 135* 133* 132* 131* 132* 134* 133* 133*   POTASSIUM mmol/L 4.3 4.0 3.6 3.7 3.7 3.6 3.7 4.0 4.1   CHLORIDE mmol/L 104 106 103 102 101 101 103 99 96*   CO2 mmol/L 23 23 25 24 23 25 24 25 27   ANION GAP mmol/L 10 10 9* 10 11 10 11 13 14   BUN mg/dL 65* 54* 37* 40* 41* 39* 27* 31* 32*   CREATININE mg/dL 1.62* 1.26* 1.24* 1.47* 1.65* 1.63* 1.04 1.29* 1.38*   EGFR mL/min/1.73m*2 31* 42* 43* 35* 30* 31* 53* 41* 38*   POCT EGFR mL/min/1.73m*2  --   --   --   --   --   --   --   --  31*   MAGNESIUM mg/dL  --   --   --   --   --   --  1.74 1.41*  --    ALBUMIN g/dL  --   --   --   --   --   --   --  3.5 3.3*   ALT U/L  --   --   --   --   --   --   --  9 9   AST U/L  --   --   --   --   --   --   --  20 18   BILIRUBIN TOTAL mg/dL  --   --   --   --   --   --   --  1.0 1.0     CBC:  Results from last 7 days   Lab Units 03/16/25  0457 03/15/25  1807 03/15/25  0649 03/14/25  0458 03/13/25  0455 03/12/25  0522 03/11/25  0643 03/10/25  1735   WBC AUTO x10*3/uL 48.4* 41.4* 14.8* 9.5 11.6* 13.0* 15.0* 16.1*   HEMOGLOBIN g/dL 8.2* 7.8* 6.8* 8.7* 9.0* 8.6* 10.0* 10.7*   HEMATOCRIT % 23.6* 22.4* 21.9* 26.2* 27.1* 25.0* 29.8* 32.5*   PLATELETS  AUTO x10*3/uL 208 173 224 228 200 174 158 174   MCV fL 85 85 95 89 90 89 90 91     COAG:   Results from last 7 days   Lab Units 03/11/25  0643 03/10/25  2256   INR  1.0 1.1     HEME/ENDO:  Results from last 7 days   Lab Units 03/10/25  1735   TSH mIU/L 2.10      CARDIAC:        Imaging  XR chest 1 view   Final Result   Addendum (preliminary) 1 of 1   Potentially critical findings are discussed with and acknowledged by   Yael Garcia RN at 7:38 PM Eastern time on 3/15/2025.   Signed by Shavonne Ye DO      Final   1.Endotracheal tube with the tip at or less than 1 cm above the   level of the fabrizio. Repositioning is recommended.  This could be   pulled back about 4 to 5 cm.   2.Enteric tube and right internal jugular line in place.   3.No pneumothorax.   4.Normal heart size with no radiographic signs of active   pulmonary parenchymal infiltration.  Known pleural and parenchymal   nodules worrisome for metastatic disease seen on CT are not well   visualized radiographically.   Signed by Shavonne Ye DO      MR brain w and wo IV contrast   Final Result   1. No acute infarct, hemorrhage or mass is noted. No abnormalities of   the 4th ventricle are noted.        2. The cerebellar tonsils are low-lying consistent with mild Chiari 1   type malformation.        MACRO:   None        Signed by: Pete Rice 3/11/2025 12:21 PM   Dictation workstation:   [id number]      US abdomen complete   Final Result   Obscuration the spleen and right kidney by bowel gas. Previous   cholecystectomy.        Mildly small left kidney. The left kidney was otherwise   sonographically unremarkable.        There were 3 identifiable hypoechoic solid lesions in the liver.   These are nonspecific and could be atypical hemangiomas or metastatic   lesions. Recommend liver MRI in follow-up.        MACRO:   None        Signed by: Carroll Corley 3/11/2025 8:04 AM   Dictation workstation:   [id number]      Lower extremity venous duplex right   Final  Result   1. Acute deep vein thrombosis at the right lower extremity from the   inguinal region to the popliteal region. The right posterior tibial   and peroneal veins were not visualized on this exam.             MACRO:   Noa Adamson discussed the significance and urgency of this   critical finding by telephone with  CYNTHIA HOWARD on 3/10/2025 at 11:24   pm.  (**-RCF-**) Findings:  See findings.             Signed by: Noa Adamson 3/10/2025 11:39 PM   Dictation workstation:   [id number]      CT head wo IV contrast   Final Result   Mass effect upon the 4th ventricle centrally. Further evaluation with   contrast-enhanced MRI is recommended for better assessment.        No evidence for acute cortical infarction or acute intracranial   hemorrhage is seen.        MACRO:   Critical Finding:  See findings. Notification was initiated on   3/10/2025 at 7:46 pm by  Esvin Philippe.  (**-YCF-**)        Signed by: Esvin Philippe 3/10/2025 7:46 PM   Dictation workstation:   [id number]      CT abdomen pelvis wo IV contrast   Final Result   1.  Extensive retroperitoneal and bilateral pelvic lymphadenopathy as   above concerning for lymphoma or metastatic disease.   2. Suspect acute deep venous thrombosis within the right common   femoral vein and right external iliac vein. Correlation with   ultrasound findings recommended.   3. Numerous liver masses suggestive of metastatic liver disease.   Correlation with ultrasound findings can be performed as clinically   warranted.   4. Bilateral renal lesions, nonspecific in etiology and may represent   cysts but incompletely characterized in this unenhanced exam.   Correlation with ultrasound findings can be performed as clinically   warranted.   5. Air in the bladder which may be due to bladder instrumentation.   Correlation with urinalysis recommended.   6. Additional detailed findings as above.   Critical Finding:  See findings. Notification was initiated on   3/10/2025 at 7:54 pm  by  Esvin Philippe.  (**-YCF-**) Instructions:        7.             Signed by: Esvin Philippe 3/10/2025 7:54 PM   Dictation workstation:   [id number]      XR chest 2 views   Final Result   No acute process.   Signed by Kamaljit Alex MD           Medications  Scheduled medications  acetaminophen, 975 mg, oral, TID  anastrozole, 1 mg, oral, Daily  [Held by provider] aspirin, 81 mg, oral, Daily  [Held by provider] carvedilol, 12.5 mg, oral, BID  cefTRIAXone, 1 g, intravenous, q24h  [Held by provider] gabapentin, 300 mg, oral, BID  [Held by provider] hydrALAZINE, 100 mg, oral, BID  hydrocortisone sodium succinate, 50 mg, intravenous, q6h  insulin lispro, 0-10 Units, subcutaneous, q4h  pantoprazole, 40 mg, intravenous, BID  sennosides, 2 tablet, oral, BID      Continuous medications  norepinephrine, 0.01-1 mcg/kg/min, Last Rate: 0.18 mcg/kg/min (03/16/25 0632)  propofol, 0-50 mcg/kg/min, Last Rate: 10 mcg/kg/min (03/16/25 0646)  vasopressin, 0.03 Units/min, Last Rate: Stopped (03/16/25 0516)      PRN medications  PRN medications: acetaminophen **OR** acetaminophen **OR** acetaminophen, alteplase, dextrose, dextrose, glucagon, glucagon, melatonin, ondansetron ODT **OR** ondansetron, oxyCODONE, oxygen    Assessment/Plan   Assessment & Plan  General weakness    This is an 85 yo female with history with stage IV breast ca post bilateral mastectomy as well as treated with radiation and tamoxifen, as well as anastrozole and palbociclib (2021) lymph node dissection, CRF cerebral, hepatic, pelvic, and brain mets.  who presented to [facility] [place] with complaints of RLE edema and altered mental status with generalized weakness progressively worsening over the past few days prior to admission.  Found to have DVT started on eliquis.  Transferred to ICU for acute hemorrhagic shock due to GIB with hematemesis and melena requiring blood transfusions, Kcentra for eliquis reversal, IVF resuscitation and vasopressor support.  Transferred to ICU for  ongoing management.     Neuro:  - Propofol for sedation   - Neuro and pain assessment per unit protocol   - PRN oxycodone and tylenol for analgesia   - SAT trial daily     CV:  Hypotension 2/2 acute hemorrhagic shock likely 2/2 UGIB refractory to 2 units PRBCs and 2L IVF resuscitation--improving   - ABP and tele monitoring  - Titrate vasopressor support to maintain MAP >65   - Continue stress dose steroids  - Antihypertensives on hold until HD permit     Pulm:  Acute respiratory failure, intubated for airway protection   - Maintain SpO2 >90%   - SBT daily --> will likely liberate from vent today  - PRN nebs   - BPH  - Vent bundle     GI:   Acute UGIB likely d/t eliquis in setting of acute DVT  EGD 03/15/2025 showed no active bleeding, significant amount of blood clotting in the fundus of stomach and body of the stomach   - GI consulted, appreciate recs  - Protonix 40 mg IV BID   - Bowel regimen   - NPO   - Dietary on consult     /Renal:  Hx CKD stage 2 (baseline Cr. ~1-1.2)   - RFP daily   - Replete electrolytes as clinically indicated  - Maintain brewer catheter for hourly I&Os     Endo:  - ISS while on steroids     Heme/Onc:  Hx stage IV breast cancer s/p post bilateral mastectomy, treated with radiation and tamoxifen along with anastrozole and palbociclib   Acute blood loss anemia 2/2 UGIB in setting of DVTs on Eliquis--stable   - Daily CBC   - Received 2 units PRBCs   - Received dose of Kcentra   - SCDs for DVT ppx  - Pharmacological ppx on hold 2/2 UGIB   - ??? Eliquis     ID:  Leukocytosis likely 2/2 metastatic disease   Afebrile, UA + for LE and nitrites, UC negative   - trend temps and WBCs     Dispo: Maintain in ICU     Discussed plan with Dr. Melgar    Time spent on the assessment of patient, gathering and interpreting data, review of medical record/patient history, personally reviewing radiographic imaging and formulation of this note. With greater than 50% spent in personal discussion with patient/  family.  Time: 60 minutes of critical care time      DEYSI STORM     I was present with the student who participated in the documentation of this note. I personally saw and evaluated the patient and performed my own history and examination. I discussed the case with the student. I have reviewed, verified, and revised the note as necessary and agree with the content and plan as written by the student.     Toan Garcia CNP    Updated daughter Cris over the phone regarding her mother's condition and ongoing plan of care.  All questions answered.

## 2025-03-16 NOTE — PROGRESS NOTES
Elizabeth Buckner is a 84 y.o. female on day 5 of admission presenting with General weakness.      Subjective   Patient seen and examined at bedside.  No acute events overnight.  She is still intubated.  Per nurse, no further hematemesis.  She had a bloody bowel movement overnight which looked like old blood.        Objective     Last Recorded Vitals  BP (!) 126/111   Pulse 92   Temp 37.5 °C (99.5 °F) (Temporal)   Resp 25   Wt 73.9 kg (162 lb 14.7 oz)   SpO2 100%   Intake/Output last 3 Shifts:    Intake/Output Summary (Last 24 hours) at 3/16/2025 1347  Last data filed at 3/16/2025 1300  Gross per 24 hour   Intake 3119.5 ml   Output 785 ml   Net 2334.5 ml       Admission Weight  Weight: 73.9 kg (162 lb 14.7 oz) (03/16/25 0600)    Daily Weight  03/16/25 : 73.9 kg (162 lb 14.7 oz)    Image Results  XR chest 1 view  Addendum: Potentially critical findings are discussed with and acknowledged by   Yael Garcia RN at 7:38 PM Eastern time on 3/15/2025.   Signed by Shavonne Ye DO  Narrative: STUDY:  Chest Radiograph;  3/15/2025 5:05 PM  INDICATION:  Orogastric placement, central venous catheter placement, endotracheal  tube placement .  COMPARISON:  3/10/2025 XR Chest.  CT chest 2/18/2025.  ACCESSION NUMBER(S):  [id number]  ORDERING CLINICIAN:  BERLIN HELM  TECHNIQUE:  Frontal chest was obtained at 17:04 hours (six images).  FINDINGS:  CARDIOMEDIASTINAL SILHOUETTE:  An endotracheal tube is in place, the tip is at or less than 1 cm  above the level of the fabrizio.  Repositioning is recommended.  Enteric  tube traverses the mediastinum, the tip is in the right upper quadrant  of the abdomen and could be in the gastric antrum or proximal  duodenum.  Right internal jugular line is in place with the tip in the  projection of the cavoatrial junction.  No pneumothorax.  The cardiac size is within normal limits.  Tortuosity and  calcification of the thoracic aorta is radiographically stable.     LUNGS:  Lungs are free of dense  consolidation, no pleural effusion or  pneumothorax.  The multiple pleural and parenchymal nodules seen on CT  are not well visualized radiographically.     ABDOMEN AND SOFT TISSUES:  No acute upper abdominal findings.  There is a history of previous  bilateral mastectomy and right axillary lymph node dissection.  Clips  in the right axilla and right supraclavicular region could also be  seen previously     BONES:  No acute osseous changes.  Impression: 1.Endotracheal tube with the tip at or less than 1 cm above the  level of the fabrizio. Repositioning is recommended.  This could be  pulled back about 4 to 5 cm.  2.Enteric tube and right internal jugular line in place.  3.No pneumothorax.  4.Normal heart size with no radiographic signs of active  pulmonary parenchymal infiltration.  Known pleural and parenchymal  nodules worrisome for metastatic disease seen on CT are not well  visualized radiographically.  Signed by Shavonne Ye DO  Esophagogastroduodenoscopy (EGD)  Table formatting from the original result was not included.  Impression  The upper third of the esophagus, middle third of the esophagus and lower   third of the esophagus appeared normal.  Significant amount of blood clotting in the fundus of the stomach and body   of the stomach, which obscured visualization. Within limitation of the   exam, no active bleeding was seen in the stomach.  The duodenal bulb and 2nd part of the duodenum appeared normal.    Findings  The upper third of the esophagus, middle third of the esophagus and lower   third of the esophagus appeared normal.  Significant amount of blood clotting in the fundus of the stomach and body   of the stomach, which obscured visualization. Within limitation of the   exam, no active bleeding was seen in the stomach.  The duodenal bulb and 2nd part of the duodenum appeared normal.    Recommendation  Repeat EGD in 1 month, due: 3/17/2025     Indication  Hemorrhagic shock (Multi), General weakness,  Hematemesis of fresh blood    Staff  Staff Role   Timur Che MD, MS Proceduralist     Medications  See Anesthesia Record.     Preprocedure  A history and physical has been performed, and patient medication   allergies have been reviewed. The patient's tolerance of previous   anesthesia has been reviewed. The risks and benefits of the procedure and   the sedation options and risks were discussed with the  POA . All   questions were answered and informed consent obtained.    Details of the Procedure  The patient underwent general anesthesia, which was administered by an   anesthesia professional. The patient's blood pressure, ECG, ETCO2, heart   rate, level of consciousness, respirations and oxygen were monitored   throughout the procedure. The scope was introduced through the mouth and   advanced to the second part of the duodenum. Retroflexion was performed in   the cardia. The patient experienced no blood loss. The procedure was not   difficult. The patient tolerated the procedure well. There were no   apparent adverse events.     Events  Procedure Events   Event Event Time   ENDO SCOPE IN TIME 3/15/2025  5:54 PM   ENDO SCOPE OUT TIME 3/15/2025  6:05 PM     Specimens  No specimens collected    Procedure Location  [facility]  [address] [facility] [id number] [phone]    Referring Provider  Timur Che MD, MS    Procedure Provider  Timur Che MD, MS      Physical Exam  Vitals reviewed.   Constitutional:       Interventions: She is sedated and intubated.   HENT:      Head: Atraumatic.   Cardiovascular:      Rate and Rhythm: Tachycardia present.      Heart sounds: Normal heart sounds.   Pulmonary:      Effort: She is intubated.      Breath sounds: Normal breath sounds.   Abdominal:      General: Bowel sounds are normal. There is no distension.      Palpations: Abdomen is soft.   Musculoskeletal:      Cervical back: Neck supple.   Skin:      General: Skin is warm and dry.         Relevant Results           Scheduled medications  anastrozole, 1 mg, oral, Daily  [Held by provider] aspirin, 81 mg, oral, Daily  [Held by provider] carvedilol, 12.5 mg, oral, BID  [Held by provider] gabapentin, 300 mg, oral, BID  [Held by provider] hydrALAZINE, 100 mg, oral, BID  hydrocortisone sodium succinate, 50 mg, intravenous, q6h  insulin lispro, 0-10 Units, subcutaneous, q4h  pantoprazole, 40 mg, intravenous, BID  sennosides, 2 tablet, oral, BID      Continuous medications  norepinephrine, 0.01-1 mcg/kg/min, Last Rate: 0.17 mcg/kg/min (03/16/25 0930)      PRN medications  PRN medications: acetaminophen **OR** acetaminophen **OR** acetaminophen, alteplase, dextrose, dextrose, glucagon, glucagon, melatonin, ondansetron ODT **OR** ondansetron, oxyCODONE, oxygen   Results for orders placed or performed during the hospital encounter of 03/10/25 (from the past 24 hours)   POCT GLUCOSE   Result Value Ref Range    POCT Glucose 151 (H) 74 - 99 mg/dL   ABO/Rh   Result Value Ref Range    ABO TYPE B     Rh TYPE POS    Blood Gas Arterial Full Panel   Result Value Ref Range    POCT pH, Arterial 7.31 (L) 7.38 - 7.42 pH    POCT pCO2, Arterial 38 38 - 42 mm Hg    POCT pO2, Arterial 339 (H) 85 - 95 mm Hg    POCT SO2, Arterial 100 94 - 100 %    POCT Oxy Hemoglobin, Arterial 97.2 94.0 - 98.0 %    POCT Hematocrit Calculated, Arterial 20.0 (L) 36.0 - 46.0 %    POCT Sodium, Arterial 133 (L) 136 - 145 mmol/L    POCT Potassium, Arterial 3.9 3.5 - 5.3 mmol/L    POCT Chloride, Arterial 105 98 - 107 mmol/L    POCT Ionized Calcium, Arterial 1.26 1.10 - 1.33 mmol/L    POCT Glucose, Arterial 159 (H) 74 - 99 mg/dL    POCT Lactate, Arterial 4.8 (HH) 0.4 - 2.0 mmol/L    POCT Base Excess, Arterial -6.6 (L) -2.0 - 3.0 mmol/L    POCT HCO3 Calculated, Arterial 19.1 (L) 22.0 - 26.0 mmol/L    POCT Hemoglobin, Arterial 6.7 (L) 12.0 - 16.0 g/dL    POCT Anion Gap, Arterial 13 10 - 25 mmo/L    Patient Temperature  37.0 degrees Celsius    FiO2 80 %    Ventilator Mode A/C     Ventilator Rate 14 bpm    Tidal Volume 400 mL    Peep CHM2O 5.0 cm H2O   CBC   Result Value Ref Range    WBC 41.4 (H) 4.4 - 11.3 x10*3/uL    nRBC 0.0 0.0 - 0.0 /100 WBCs    RBC 2.64 (L) 4.00 - 5.20 x10*6/uL    Hemoglobin 7.8 (L) 12.0 - 16.0 g/dL    Hematocrit 22.4 (L) 36.0 - 46.0 %    MCV 85 80 - 100 fL    MCH 29.5 26.0 - 34.0 pg    MCHC 34.8 32.0 - 36.0 g/dL    RDW 13.5 11.5 - 14.5 %    Platelets 173 150 - 450 x10*3/uL   Lactate   Result Value Ref Range    Lactate 3.0 (H) 0.4 - 2.0 mmol/L   POCT GLUCOSE   Result Value Ref Range    POCT Glucose 183 (H) 74 - 99 mg/dL   Lactate   Result Value Ref Range    Lactate 2.2 (H) 0.4 - 2.0 mmol/L   POCT GLUCOSE   Result Value Ref Range    POCT Glucose 199 (H) 74 - 99 mg/dL   POCT GLUCOSE   Result Value Ref Range    POCT Glucose 153 (H) 74 - 99 mg/dL   Basic Metabolic Panel   Result Value Ref Range    Glucose 170 (H) 74 - 99 mg/dL    Sodium 133 (L) 136 - 145 mmol/L    Potassium 4.3 3.5 - 5.3 mmol/L    Chloride 104 98 - 107 mmol/L    Bicarbonate 23 21 - 32 mmol/L    Anion Gap 10 10 - 20 mmol/L    Urea Nitrogen 65 (H) 6 - 23 mg/dL    Creatinine 1.62 (H) 0.50 - 1.05 mg/dL    eGFR 31 (L) >60 mL/min/1.73m*2    Calcium 7.8 (L) 8.6 - 10.3 mg/dL   CBC and Auto Differential   Result Value Ref Range    WBC 48.4 (H) 4.4 - 11.3 x10*3/uL    nRBC 0.0 0.0 - 0.0 /100 WBCs    RBC 2.78 (L) 4.00 - 5.20 x10*6/uL    Hemoglobin 8.2 (L) 12.0 - 16.0 g/dL    Hematocrit 23.6 (L) 36.0 - 46.0 %    MCV 85 80 - 100 fL    MCH 29.5 26.0 - 34.0 pg    MCHC 34.7 32.0 - 36.0 g/dL    RDW 13.8 11.5 - 14.5 %    Platelets 208 150 - 450 x10*3/uL    Neutrophils % 87.4 40.0 - 80.0 %    Immature Granulocytes %, Automated 5.3 (H) 0.0 - 0.9 %    Lymphocytes % 3.3 13.0 - 44.0 %    Monocytes % 3.7 2.0 - 10.0 %    Eosinophils % 0.0 0.0 - 6.0 %    Basophils % 0.3 0.0 - 2.0 %    Neutrophils Absolute 42.26 (H) 1.60 - 5.50 x10*3/uL    Immature Granulocytes Absolute,  Automated 2.54 (H) 0.00 - 0.50 x10*3/uL    Lymphocytes Absolute 1.62 0.80 - 3.00 x10*3/uL    Monocytes Absolute 1.81 (H) 0.05 - 0.80 x10*3/uL    Eosinophils Absolute 0.01 0.00 - 0.40 x10*3/uL    Basophils Absolute 0.14 (H) 0.00 - 0.10 x10*3/uL   POCT GLUCOSE   Result Value Ref Range    POCT Glucose 129 (H) 74 - 99 mg/dL   POCT GLUCOSE   Result Value Ref Range    POCT Glucose 126 (H) 74 - 99 mg/dL     *Note: Due to a large number of results and/or encounters for the requested time period, some results have not been displayed. A complete set of results can be found in Results Review.      Assessment/Plan                  Assessment & Plan  General weakness      Elizabeth Buckner is a 84 y.o. female with PMHx s/f stage IV breast ca s/p bilateral mastectomy as well as treated with radiation and tamoxifen, anastrozole and palbociclib (2021), lymph node dissection, CRF cerebral, hepatic, pelvic, and brain mets who [place] [facility] [place] complaints of RLE edema and altered mental status with generalized weakness progressively worsening over the past few days prior to admission.  Transferred to ICU for acute hemorrhagic shock due to GIB with hematemesis and melena requiring blood transfusions, entra for eliquis reversal, IVF resuscitation and vasopressor support.   S/p EGD yesterday.  Findings  -The upper third of the esophagus, middle third of the esophagus and lower third of the esophagus appeared normal.  -Significant amount of blood clotting in the fundus of the stomach and body of the stomach, which obscured visualization. Within limitation of the exam, no active bleeding was seen in the stomach.  -The duodenal bulb and 2nd part of the duodenum appeared normal.  Hemoglobin 7.8-> 8.2.  No further bleeding.  Hemodynamically stable.  Currently on CPAP trial and to be extubated.      -N.p.o. after midnight  -Continue to hold Eliquis  -Continue PPI 40 mg twice daily  -EGD tomorrow  -Further recommendations pending EGD  results  -Supportive care as per primary team  -GI will follow    Case discussed with Dr. Chinedu Gutiérrez, APRN-CNP

## 2025-03-16 NOTE — DOCUMENTATION CLARIFICATION NOTE
PATIENT:               STEPHANIE GUTIERREZ  ACCT #:                  [id number]  MRN:                       [id number]  :                       1940  ADMIT DATE:       3/10/2025 4:53 PM  DISCH DATE:  RESPONDING PROVIDER #:        [id number]          PROVIDER RESPONSE TEXT:    Metabolic encephalopathy 2/2 UTI    CDI QUERY TEXT:    Clarification        Instruction:    Based on your assessment of the patient and the clinical information, please provide the requested documentation by clicking on the appropriate radio button and enter any additional information if prompted.    Question: Please further clarify the most likely etiology of the altered mental status as    When answering this query, please exercise your independent professional judgment. The fact that a question is being asked, does not imply that any particular answer is desired or expected.    The patient's clinical indicators include:  Clinical Information: Patient presented with confusion and RLE edema, ruled in for DVT, UTI    Clinical Indicators:  -ED physical exam: She is alert. She is disoriented.    -H&P physical exam: She is oriented x 3, She is alert and oriented to person, place, and time.    -3/12/25 Internal medicine progress note: Questionable metabolic encephalopathy vs mild cognitive impairment    Treatment:  IV CTX-3/11/25-3/14/25    Risk Factors: advanced cancer, UTI  Options provided:  -- Metabolic encephalopathy 2/2 UTI  -- Metabolic encephalopathy 2/2 other etiology, Please indicate other etiology below  -- Mild cognitive impairment  -- Confusion resolved on admission  -- Other - I will add my own diagnosis  -- Refer to Clinical Documentation Reviewer    Query created by: Jesika Araya on 3/14/2025 12:19 PM      Electronically signed by:  BARTOLO HERCULES MD 3/16/2025 8:10 AM

## 2025-03-17 ENCOUNTER — APPOINTMENT (OUTPATIENT)
Dept: GASTROENTEROLOGY | Facility: HOSPITAL | Age: 85
DRG: 299 | End: 2025-03-17
Payer: MEDICARE

## 2025-03-17 ENCOUNTER — ANESTHESIA (OUTPATIENT)
Dept: GASTROENTEROLOGY | Facility: HOSPITAL | Age: 85
End: 2025-03-17
Payer: COMMERCIAL

## 2025-03-17 ENCOUNTER — ANESTHESIA EVENT (OUTPATIENT)
Dept: GASTROENTEROLOGY | Facility: HOSPITAL | Age: 85
End: 2025-03-17
Payer: COMMERCIAL

## 2025-03-17 VITALS
TEMPERATURE: 97.2 F | RESPIRATION RATE: 23 BRPM | OXYGEN SATURATION: 98 % | DIASTOLIC BLOOD PRESSURE: 111 MMHG | BODY MASS INDEX: 31.82 KG/M2 | WEIGHT: 162.92 LBS | HEART RATE: 93 BPM | SYSTOLIC BLOOD PRESSURE: 126 MMHG

## 2025-03-17 PROBLEM — K92.2 GI BLEED: Status: ACTIVE | Noted: 2025-03-17

## 2025-03-17 PROBLEM — I82.409 DEEP VEIN THROMBOSIS (DVT) OF LOWER EXTREMITY: Status: ACTIVE | Noted: 2025-03-17

## 2025-03-17 LAB
ALBUMIN SERPL BCP-MCNC: 2.7 G/DL (ref 3.4–5)
ANION GAP SERPL CALC-SCNC: 11 MMOL/L (ref 10–20)
BASOPHILS # BLD MANUAL: 0 X10*3/UL (ref 0–0.1)
BASOPHILS NFR BLD MANUAL: 0 %
BITE CELLS BLD QL SMEAR: PRESENT
BLOOD EXPIRATION DATE: NORMAL
BUN SERPL-MCNC: 73 MG/DL (ref 6–23)
BURR CELLS BLD QL SMEAR: ABNORMAL
CALCIUM SERPL-MCNC: 7.7 MG/DL (ref 8.6–10.3)
CHLORIDE SERPL-SCNC: 106 MMOL/L (ref 98–107)
CO2 SERPL-SCNC: 21 MMOL/L (ref 21–32)
CREAT SERPL-MCNC: 1.58 MG/DL (ref 0.5–1.05)
DISPENSE STATUS: NORMAL
EGFRCR SERPLBLD CKD-EPI 2021: 32 ML/MIN/1.73M*2
EOSINOPHIL # BLD MANUAL: 0 X10*3/UL (ref 0–0.4)
EOSINOPHIL NFR BLD MANUAL: 0 %
ERYTHROCYTE [DISTWIDTH] IN BLOOD BY AUTOMATED COUNT: 14.1 % (ref 11.5–14.5)
GLUCOSE BLD MANUAL STRIP-MCNC: 113 MG/DL (ref 74–99)
GLUCOSE BLD MANUAL STRIP-MCNC: 116 MG/DL (ref 74–99)
GLUCOSE BLD MANUAL STRIP-MCNC: 118 MG/DL (ref 74–99)
GLUCOSE BLD MANUAL STRIP-MCNC: 120 MG/DL (ref 74–99)
GLUCOSE BLD MANUAL STRIP-MCNC: 125 MG/DL (ref 74–99)
GLUCOSE BLD MANUAL STRIP-MCNC: 126 MG/DL (ref 74–99)
GLUCOSE BLD MANUAL STRIP-MCNC: 134 MG/DL (ref 74–99)
GLUCOSE SERPL-MCNC: 122 MG/DL (ref 74–99)
HCT VFR BLD AUTO: 21 % (ref 36–46)
HGB BLD-MCNC: 7.3 G/DL (ref 12–16)
IMM GRANULOCYTES # BLD AUTO: 2.19 X10*3/UL (ref 0–0.5)
IMM GRANULOCYTES NFR BLD AUTO: 5.5 % (ref 0–0.9)
LACTATE BLDV-SCNC: 1 MMOL/L (ref 0.4–2)
LYMPHOCYTES # BLD MANUAL: 0.8 X10*3/UL (ref 0.8–3)
LYMPHOCYTES NFR BLD MANUAL: 2 %
MAGNESIUM SERPL-MCNC: 1.62 MG/DL (ref 1.6–2.4)
MCH RBC QN AUTO: 29.9 PG (ref 26–34)
MCHC RBC AUTO-ENTMCNC: 34.8 G/DL (ref 32–36)
MCV RBC AUTO: 86 FL (ref 80–100)
METAMYELOCYTES # BLD MANUAL: 1.61 X10*3/UL
METAMYELOCYTES NFR BLD MANUAL: 4 %
MONOCYTES # BLD MANUAL: 0 X10*3/UL (ref 0.05–0.8)
MONOCYTES NFR BLD MANUAL: 0 %
NEUTROPHILS # BLD MANUAL: 37.78 X10*3/UL (ref 1.6–5.5)
NEUTS BAND # BLD MANUAL: 6.83 X10*3/UL (ref 0–0.5)
NEUTS BAND NFR BLD MANUAL: 17 %
NEUTS SEG # BLD MANUAL: 30.95 X10*3/UL (ref 1.6–5)
NEUTS SEG NFR BLD MANUAL: 77 %
NEUTS VAC BLD QL SMEAR: PRESENT
NRBC BLD-RTO: 0.1 /100 WBCS (ref 0–0)
OVALOCYTES BLD QL SMEAR: ABNORMAL
PHOSPHATE SERPL-MCNC: 1.5 MG/DL (ref 2.5–4.9)
PLATELET # BLD AUTO: 195 X10*3/UL (ref 150–450)
POLYCHROMASIA BLD QL SMEAR: ABNORMAL
POTASSIUM SERPL-SCNC: 3.8 MMOL/L (ref 3.5–5.3)
PRODUCT BLOOD TYPE: 5100
PRODUCT BLOOD TYPE: 9500
PRODUCT BLOOD TYPE: 9500
PRODUCT CODE: NORMAL
RBC # BLD AUTO: 2.44 X10*6/UL (ref 4–5.2)
RBC MORPH BLD: ABNORMAL
SCHISTOCYTES BLD QL SMEAR: ABNORMAL
SODIUM SERPL-SCNC: 134 MMOL/L (ref 136–145)
STAPHYLOCOCCUS SPEC CULT: NORMAL
TOTAL CELLS COUNTED BLD: 100
UNIT ABO: NORMAL
UNIT NUMBER: NORMAL
UNIT RH: NORMAL
UNIT VOLUME: 350
WBC # BLD AUTO: 40.2 X10*3/UL (ref 4.4–11.3)
XM INTEP: NORMAL

## 2025-03-17 PROCEDURE — 97535 SELF CARE MNGMENT TRAINING: CPT | Mod: GO

## 2025-03-17 PROCEDURE — 83735 ASSAY OF MAGNESIUM: CPT | Performed by: NURSE PRACTITIONER

## 2025-03-17 PROCEDURE — 3E0G8GC INTRODUCTION OF OTHER THERAPEUTIC SUBSTANCE INTO UPPER GI, VIA NATURAL OR ARTIFICIAL OPENING ENDOSCOPIC: ICD-10-PCS | Performed by: INTERNAL MEDICINE

## 2025-03-17 PROCEDURE — 2500000004 HC RX 250 GENERAL PHARMACY W/ HCPCS (ALT 636 FOR OP/ED): Performed by: NURSE PRACTITIONER

## 2025-03-17 PROCEDURE — 2720000007 HC OR 272 NO HCPCS

## 2025-03-17 PROCEDURE — 2500000001 HC RX 250 WO HCPCS SELF ADMINISTERED DRUGS (ALT 637 FOR MEDICARE OP): Performed by: HOSPITALIST

## 2025-03-17 PROCEDURE — 43255 EGD CONTROL BLEEDING ANY: CPT | Performed by: INTERNAL MEDICINE

## 2025-03-17 PROCEDURE — 85007 BL SMEAR W/DIFF WBC COUNT: CPT | Performed by: NURSE PRACTITIONER

## 2025-03-17 PROCEDURE — 3700000002 HC GENERAL ANESTHESIA TIME - EACH INCREMENTAL 1 MINUTE

## 2025-03-17 PROCEDURE — 36430 TRANSFUSION BLD/BLD COMPNT: CPT

## 2025-03-17 PROCEDURE — 37799 UNLISTED PX VASCULAR SURGERY: CPT | Performed by: NURSE PRACTITIONER

## 2025-03-17 PROCEDURE — 2500000001 HC RX 250 WO HCPCS SELF ADMINISTERED DRUGS (ALT 637 FOR MEDICARE OP): Performed by: NURSE PRACTITIONER

## 2025-03-17 PROCEDURE — 85027 COMPLETE CBC AUTOMATED: CPT | Performed by: NURSE PRACTITIONER

## 2025-03-17 PROCEDURE — 2500000001 HC RX 250 WO HCPCS SELF ADMINISTERED DRUGS (ALT 637 FOR MEDICARE OP): Performed by: INTERNAL MEDICINE

## 2025-03-17 PROCEDURE — 0DJ08ZZ INSPECTION OF UPPER INTESTINAL TRACT, VIA NATURAL OR ARTIFICIAL OPENING ENDOSCOPIC: ICD-10-PCS | Performed by: INTERNAL MEDICINE

## 2025-03-17 PROCEDURE — 82947 ASSAY GLUCOSE BLOOD QUANT: CPT

## 2025-03-17 PROCEDURE — 80069 RENAL FUNCTION PANEL: CPT | Performed by: NURSE PRACTITIONER

## 2025-03-17 PROCEDURE — 97165 OT EVAL LOW COMPLEX 30 MIN: CPT | Mod: GO

## 2025-03-17 PROCEDURE — 2500000004 HC RX 250 GENERAL PHARMACY W/ HCPCS (ALT 636 FOR OP/ED): Performed by: ANESTHESIOLOGIST ASSISTANT

## 2025-03-17 PROCEDURE — 2500000004 HC RX 250 GENERAL PHARMACY W/ HCPCS (ALT 636 FOR OP/ED): Performed by: INTERNAL MEDICINE

## 2025-03-17 PROCEDURE — 99233 SBSQ HOSP IP/OBS HIGH 50: CPT | Performed by: INTERNAL MEDICINE

## 2025-03-17 PROCEDURE — 99291 CRITICAL CARE FIRST HOUR: CPT | Performed by: NURSE PRACTITIONER

## 2025-03-17 PROCEDURE — A43255 PR EDG TRANSORAL CONTROL BLEEDING ANY METHOD: Performed by: ANESTHESIOLOGIST ASSISTANT

## 2025-03-17 PROCEDURE — 3700000001 HC GENERAL ANESTHESIA TIME - INITIAL BASE CHARGE

## 2025-03-17 PROCEDURE — 2500000005 HC RX 250 GENERAL PHARMACY W/O HCPCS: Performed by: HOSPITALIST

## 2025-03-17 PROCEDURE — 2060000001 HC INTERMEDIATE ICU ROOM DAILY

## 2025-03-17 PROCEDURE — 83605 ASSAY OF LACTIC ACID: CPT | Performed by: NURSE PRACTITIONER

## 2025-03-17 PROCEDURE — P9040 RBC LEUKOREDUCED IRRADIATED: HCPCS

## 2025-03-17 PROCEDURE — 99100 ANES PT EXTEME AGE<1 YR&>70: CPT | Performed by: ANESTHESIOLOGY

## 2025-03-17 PROCEDURE — 2500000005 HC RX 250 GENERAL PHARMACY W/O HCPCS: Performed by: NURSE PRACTITIONER

## 2025-03-17 PROCEDURE — A43255 PR EDG TRANSORAL CONTROL BLEEDING ANY METHOD: Performed by: ANESTHESIOLOGY

## 2025-03-17 RX ORDER — PROPOFOL 10 MG/ML
INJECTION, EMULSION INTRAVENOUS CONTINUOUS PRN
Status: DISCONTINUED | OUTPATIENT
Start: 2025-03-17 | End: 2025-03-17

## 2025-03-17 RX ORDER — MAGNESIUM SULFATE HEPTAHYDRATE 40 MG/ML
2 INJECTION, SOLUTION INTRAVENOUS ONCE
Status: COMPLETED | OUTPATIENT
Start: 2025-03-17 | End: 2025-03-17

## 2025-03-17 RX ORDER — EPINEPHRINE 0.1 MG/ML
INJECTION INTRACARDIAC; INTRAVENOUS AS NEEDED
Status: COMPLETED | OUTPATIENT
Start: 2025-03-17 | End: 2025-03-17

## 2025-03-17 RX ADMIN — PROPOFOL 50 MCG/KG/MIN: 10 INJECTION, EMULSION INTRAVENOUS at 09:41

## 2025-03-17 RX ADMIN — HYDROCORTISONE SODIUM SUCCINATE 50 MG: 100 INJECTION, POWDER, FOR SOLUTION INTRAMUSCULAR; INTRAVENOUS at 00:08

## 2025-03-17 RX ADMIN — HYDROCORTISONE SODIUM SUCCINATE 50 MG: 100 INJECTION, POWDER, FOR SOLUTION INTRAMUSCULAR; INTRAVENOUS at 17:36

## 2025-03-17 RX ADMIN — MAGNESIUM SULFATE HEPTAHYDRATE 2 G: 40 INJECTION, SOLUTION INTRAVENOUS at 05:37

## 2025-03-17 RX ADMIN — HYDROCORTISONE SODIUM SUCCINATE 50 MG: 100 INJECTION, POWDER, FOR SOLUTION INTRAMUSCULAR; INTRAVENOUS at 05:37

## 2025-03-17 RX ADMIN — OXYCODONE HYDROCHLORIDE 5 MG: 5 TABLET ORAL at 13:11

## 2025-03-17 RX ADMIN — PROPOFOL 20 MG: 10 INJECTION, EMULSION INTRAVENOUS at 10:00

## 2025-03-17 RX ADMIN — PANTOPRAZOLE SODIUM 40 MG: 40 INJECTION, POWDER, FOR SOLUTION INTRAVENOUS at 20:16

## 2025-03-17 RX ADMIN — ACETAMINOPHEN 650 MG: 325 TABLET, FILM COATED ORAL at 22:42

## 2025-03-17 RX ADMIN — PROPOFOL 20 MG: 10 INJECTION, EMULSION INTRAVENOUS at 10:08

## 2025-03-17 RX ADMIN — OXYCODONE HYDROCHLORIDE 5 MG: 5 TABLET ORAL at 23:46

## 2025-03-17 RX ADMIN — PROPOFOL 20 MG: 10 INJECTION, EMULSION INTRAVENOUS at 09:55

## 2025-03-17 RX ADMIN — CARVEDILOL 6.25 MG: 6.25 TABLET, FILM COATED ORAL at 11:57

## 2025-03-17 RX ADMIN — ANASTROZOLE 1 MG: 1 TABLET, COATED ORAL at 11:57

## 2025-03-17 RX ADMIN — POTASSIUM PHOSPHATE, MONOBASIC AND POTASSIUM PHOSPHATE, DIBASIC 21 MMOL: 224; 236 INJECTION, SOLUTION, CONCENTRATE INTRAVENOUS at 10:59

## 2025-03-17 RX ADMIN — Medication 3 MG: at 22:42

## 2025-03-17 RX ADMIN — PANTOPRAZOLE SODIUM 40 MG: 40 INJECTION, POWDER, FOR SOLUTION INTRAVENOUS at 11:57

## 2025-03-17 RX ADMIN — CARVEDILOL 6.25 MG: 6.25 TABLET, FILM COATED ORAL at 20:21

## 2025-03-17 RX ADMIN — PROPOFOL 20 MG: 10 INJECTION, EMULSION INTRAVENOUS at 09:43

## 2025-03-17 RX ADMIN — EPINEPHRINE 1 MG: 0.1 INJECTION INTRACARDIAC; INTRAVENOUS at 09:55

## 2025-03-17 RX ADMIN — PROPOFOL 15 MG: 10 INJECTION, EMULSION INTRAVENOUS at 10:05

## 2025-03-17 SDOH — HEALTH STABILITY: MENTAL HEALTH: CURRENT SMOKER: 0

## 2025-03-17 ASSESSMENT — COGNITIVE AND FUNCTIONAL STATUS - GENERAL
EATING MEALS: A LITTLE
TOILETING: TOTAL
MOBILITY SCORE: 17
HELP NEEDED FOR BATHING: A LOT
DRESSING REGULAR LOWER BODY CLOTHING: A LOT
STANDING UP FROM CHAIR USING ARMS: A LITTLE
HELP NEEDED FOR BATHING: A LITTLE
DRESSING REGULAR UPPER BODY CLOTHING: A LOT
PERSONAL GROOMING: A LITTLE
PERSONAL GROOMING: A LITTLE
DAILY ACTIVITIY SCORE: 16
TURNING FROM BACK TO SIDE WHILE IN FLAT BAD: A LITTLE
EATING MEALS: A LITTLE
DRESSING REGULAR LOWER BODY CLOTHING: A LOT
WALKING IN HOSPITAL ROOM: A LITTLE
MOVING TO AND FROM BED TO CHAIR: A LOT
TOILETING: A LITTLE
DAILY ACTIVITIY SCORE: 13
DRESSING REGULAR UPPER BODY CLOTHING: A LOT
CLIMB 3 TO 5 STEPS WITH RAILING: A LOT

## 2025-03-17 ASSESSMENT — ACTIVITIES OF DAILY LIVING (ADL)
HOME_MANAGEMENT_TIME_ENTRY: 15
ADL_ASSISTANCE: INDEPENDENT

## 2025-03-17 ASSESSMENT — PAIN - FUNCTIONAL ASSESSMENT
PAIN_FUNCTIONAL_ASSESSMENT: 0-10

## 2025-03-17 ASSESSMENT — PAIN SCALES - GENERAL
PAINLEVEL_OUTOF10: 0 - NO PAIN
PAINLEVEL_OUTOF10: 0 - NO PAIN
PAINLEVEL_OUTOF10: 8
PAINLEVEL_OUTOF10: 7
PAINLEVEL_OUTOF10: 0 - NO PAIN
PAIN_LEVEL: 0
PAINLEVEL_OUTOF10: 7

## 2025-03-17 ASSESSMENT — PAIN DESCRIPTION - DESCRIPTORS
DESCRIPTORS: ACHING;SORE
DESCRIPTORS: ACHING;SORE

## 2025-03-17 ASSESSMENT — COLUMBIA-SUICIDE SEVERITY RATING SCALE - C-SSRS
1. IN THE PAST MONTH, HAVE YOU WISHED YOU WERE DEAD OR WISHED YOU COULD GO TO SLEEP AND NOT WAKE UP?: NO
6. HAVE YOU EVER DONE ANYTHING, STARTED TO DO ANYTHING, OR PREPARED TO DO ANYTHING TO END YOUR LIFE?: NO
2. HAVE YOU ACTUALLY HAD ANY THOUGHTS OF KILLING YOURSELF?: NO

## 2025-03-17 NOTE — PROGRESS NOTES
Elizabeth Buckner is a 84 y.o. female on day 6 of admission presenting with General weakness.    Subjective   CLEM reported.  Weaned off vasopressor support.          Objective     Physical Exam  Constitutional:       General: She is sleeping.      Appearance: She is normal weight. She is ill-appearing.   Neck:      Comments: R internal jugular CVC dressing dry and intact   Cardiovascular:      Rate and Rhythm: Normal rate and regular rhythm.      Pulses: Normal pulses.   Pulmonary:      Effort: No respiratory distress.      Comments: Diminished breath sounds bilaterally, symmetrical chest expansion.   Abdominal:      General: There is no distension.      Palpations: Abdomen is soft.      Tenderness: There is no abdominal tenderness.   Genitourinary:     Comments: Patel catheter clear yellow urine.   Skin:     General: Skin is warm and dry.      Capillary Refill: Capillary refill takes less than 2 seconds.   Neurological:      General: No focal deficit present.      Mental Status: She is oriented to person, place, and time and easily aroused. Mental status is at baseline.   Psychiatric:         Mood and Affect: Mood normal.         Last Recorded Vitals  Blood pressure (!) 117/43, pulse 92, temperature 37 °C (98.6 °F), temperature source Temporal, resp. rate 25, weight 72.5 kg (159 lb 13.3 oz), SpO2 99%.  Intake/Output last 3 Shifts:  I/O last 3 completed shifts:  In: 1786.1 (24.6 mL/kg) [I.V.:186.1 (2.6 mL/kg); Blood:500; NG/GT:50; IV Piggyback:1050]  Out: 1125 (15.5 mL/kg) [Urine:1125 (0.4 mL/kg/hr)]  Weight: 72.5 kg     Relevant Results  Scheduled medications  anastrozole, 1 mg, oral, Daily  [Held by provider] aspirin, 81 mg, oral, Daily  carvedilol, 6.25 mg, oral, BID  [Held by provider] gabapentin, 300 mg, oral, BID  [Held by provider] hydrALAZINE, 100 mg, oral, BID  hydrocortisone sodium succinate, 50 mg, intravenous, q12h  insulin lispro, 0-10 Units, subcutaneous, q4h  pantoprazole, 40 mg, intravenous, BID  potassium  phosphate, 21 mmol, intravenous, Once  sennosides, 2 tablet, oral, BID      Continuous medications     PRN medications  PRN medications: acetaminophen **OR** acetaminophen **OR** acetaminophen, alteplase, dextrose, dextrose, glucagon, glucagon, melatonin, ondansetron ODT **OR** ondansetron, oxyCODONE, oxygen    Results for orders placed or performed during the hospital encounter of 03/10/25 (from the past 24 hours)   POCT GLUCOSE   Result Value Ref Range    POCT Glucose 129 (H) 74 - 99 mg/dL   POCT GLUCOSE   Result Value Ref Range    POCT Glucose 126 (H) 74 - 99 mg/dL   POCT GLUCOSE   Result Value Ref Range    POCT Glucose 115 (H) 74 - 99 mg/dL   POCT GLUCOSE   Result Value Ref Range    POCT Glucose 111 (H) 74 - 99 mg/dL   POCT GLUCOSE   Result Value Ref Range    POCT Glucose 118 (H) 74 - 99 mg/dL   POCT GLUCOSE   Result Value Ref Range    POCT Glucose 116 (H) 74 - 99 mg/dL   Blood Gas Lactic Acid, Venous   Result Value Ref Range    POCT Lactate, Venous 1.0 0.4 - 2.0 mmol/L   CBC and Auto Differential   Result Value Ref Range    WBC 40.2 (H) 4.4 - 11.3 x10*3/uL    nRBC 0.1 (H) 0.0 - 0.0 /100 WBCs    RBC 2.44 (L) 4.00 - 5.20 x10*6/uL    Hemoglobin 7.3 (L) 12.0 - 16.0 g/dL    Hematocrit 21.0 (L) 36.0 - 46.0 %    MCV 86 80 - 100 fL    MCH 29.9 26.0 - 34.0 pg    MCHC 34.8 32.0 - 36.0 g/dL    RDW 14.1 11.5 - 14.5 %    Platelets 195 150 - 450 x10*3/uL    Immature Granulocytes %, Automated 5.5 (H) 0.0 - 0.9 %    Immature Granulocytes Absolute, Automated 2.19 (H) 0.00 - 0.50 x10*3/uL   Renal Function Panel   Result Value Ref Range    Glucose 122 (H) 74 - 99 mg/dL    Sodium 134 (L) 136 - 145 mmol/L    Potassium 3.8 3.5 - 5.3 mmol/L    Chloride 106 98 - 107 mmol/L    Bicarbonate 21 21 - 32 mmol/L    Anion Gap 11 10 - 20 mmol/L    Urea Nitrogen 73 (H) 6 - 23 mg/dL    Creatinine 1.58 (H) 0.50 - 1.05 mg/dL    eGFR 32 (L) >60 mL/min/1.73m*2    Calcium 7.7 (L) 8.6 - 10.3 mg/dL    Phosphorus 1.5 (L) 2.5 - 4.9 mg/dL    Albumin 2.7  (L) 3.4 - 5.0 g/dL   Magnesium   Result Value Ref Range    Magnesium 1.62 1.60 - 2.40 mg/dL   Manual Differential   Result Value Ref Range    Neutrophils %, Manual 77.0 40.0 - 80.0 %    Bands %, Manual 17.0 0.0 - 5.0 %    Lymphocytes %, Manual 2.0 13.0 - 44.0 %    Monocytes %, Manual 0.0 2.0 - 10.0 %    Eosinophils %, Manual 0.0 0.0 - 6.0 %    Basophils %, Manual 0.0 0.0 - 2.0 %    Metamyelocytes %, Manual 4.0 0.0 - 0.0 %    Seg Neutrophils Absolute, Manual 30.95 (H) 1.60 - 5.00 x10*3/uL    Bands Absolute, Manual 6.83 (H) 0.00 - 0.50 x10*3/uL    Lymphocytes Absolute, Manual 0.80 0.80 - 3.00 x10*3/uL    Monocytes Absolute, Manual 0.00 (L) 0.05 - 0.80 x10*3/uL    Eosinophils Absolute, Manual 0.00 0.00 - 0.40 x10*3/uL    Basophils Absolute, Manual 0.00 0.00 - 0.10 x10*3/uL    Metamyelocytes Absolute, Manual 1.61 0.00 - 0.00 x10*3/uL    Total Cells Counted 100     Neutrophils Absolute, Manual 37.78 (H) 1.60 - 5.50 x10*3/uL    RBC Morphology See Below     Polychromasia Mild     RBC Fragments Few     Ovalocytes Few     [place] Cells Few     Vacuolated Neutrophils Present     Bite Cells Present    POCT GLUCOSE   Result Value Ref Range    POCT Glucose 125 (H) 74 - 99 mg/dL     *Note: Due to a large number of results and/or encounters for the requested time period, some results have not been displayed. A complete set of results can be found in Results Review.     XR chest 1 view    Addendum Date: 3/15/2025    Potentially critical findings are discussed with and acknowledged by Yael Garcia RN at 7:38 PM Eastern time on 3/15/2025. Signed by Shavonne Ye DO    Result Date: 3/15/2025  STUDY: Chest Radiograph;  3/15/2025 5:05 PM INDICATION: Orogastric placement, central venous catheter placement, endotracheal tube placement . COMPARISON: 3/10/2025 XR Chest.  CT chest 2/18/2025. ACCESSION NUMBER(S): [id number] ORDERING CLINICIAN: BERLIN HELM TECHNIQUE:  Frontal chest was obtained at 17:04 hours (six images). FINDINGS:  CARDIOMEDIASTINAL SILHOUETTE: An endotracheal tube is in place, the tip is at or less than 1 cm above the level of the fabrizio.  Repositioning is recommended.  Enteric tube traverses the mediastinum, the tip is in the right upper quadrant of the abdomen and could be in the gastric antrum or proximal duodenum.  Right internal jugular line is in place with the tip in the projection of the cavoatrial junction.  No pneumothorax. The cardiac size is within normal limits.  Tortuosity and calcification of the thoracic aorta is radiographically stable.  LUNGS: Lungs are free of dense consolidation, no pleural effusion or pneumothorax.  The multiple pleural and parenchymal nodules seen on CT are not well visualized radiographically.  ABDOMEN AND SOFT TISSUES: No acute upper abdominal findings.  There is a history of previous bilateral mastectomy and right axillary lymph node dissection.  Clips in the right axilla and right supraclavicular region could also be seen previously  BONES: No acute osseous changes.    1.Endotracheal tube with the tip at or less than 1 cm above the level of the fabrizio. Repositioning is recommended.  This could be pulled back about 4 to 5 cm. 2.Enteric tube and right internal jugular line in place. 3.No pneumothorax. 4.Normal heart size with no radiographic signs of active pulmonary parenchymal infiltration.  Known pleural and parenchymal nodules worrisome for metastatic disease seen on CT are not well visualized radiographically. Signed by Shavonne Ye DO    Esophagogastroduodenoscopy (EGD)    Result Date: 3/15/2025  Table formatting from the original result was not included. Impression The upper third of the esophagus, middle third of the esophagus and lower third of the esophagus appeared normal. Significant amount of blood clotting in the fundus of the stomach and body of the stomach, which obscured visualization. Within limitation of the exam, no active bleeding was seen in the stomach. The  duodenal bulb and 2nd part of the duodenum appeared normal. Findings The upper third of the esophagus, middle third of the esophagus and lower third of the esophagus appeared normal. Significant amount of blood clotting in the fundus of the stomach and body of the stomach, which obscured visualization. Within limitation of the exam, no active bleeding was seen in the stomach. The duodenal bulb and 2nd part of the duodenum appeared normal. Recommendation Repeat EGD in 1 month, due: 3/17/2025 Indication Hemorrhagic shock (Multi), General weakness, Hematemesis of fresh blood Staff Staff Role Timur Che MD, MS Proceduralist Medications See Anesthesia Record. Preprocedure A history and physical has been performed, and patient medication allergies have been reviewed. The patient's tolerance of previous anesthesia has been reviewed. The risks and benefits of the procedure and the sedation options and risks were discussed with the  POA . All questions were answered and informed consent obtained. Details of the Procedure The patient underwent general anesthesia, which was administered by an anesthesia professional. The patient's blood pressure, ECG, ETCO2, heart rate, level of consciousness, respirations and oxygen were monitored throughout the procedure. The scope was introduced through the mouth and advanced to the second part of the duodenum. Retroflexion was performed in the cardia. The patient experienced no blood loss. The procedure was not difficult. The patient tolerated the procedure well. There were no apparent adverse events. Events Procedure Events Event Event Time ENDO SCOPE IN TIME 3/15/2025  5:54 PM ENDO SCOPE OUT TIME 3/15/2025  6:05 PM Specimens No specimens collected Procedure Location [facility] [address] [facility] [id number] [phone] Referring Provider Timur Che MD, MS Procedure Provider Timur Che MD, MS      This patient has a  central line   Reason for the central line remaining today? Line unnecessary, will be removed today    Assessment/Plan   Assessment & Plan  General weakness    This is an 83 yo female with history with stage IV breast ca post bilateral mastectomy as well as treated with radiation and tamoxifen, as well as anastrozole and palbociclib (2021) lymph node dissection, CRF cerebral, hepatic, pelvic, and brain mets.  [place] [facility] [place] and altered mental status with generalized weakness progressively worsening over the past few days prior to admission.  Found to have DVT started on eliquis.  Transferred to ICU for acute hemorrhagic shock due to GIB with hematemesis and melena requiring blood transfusions, Kcentra for eliquis reversal, IVF resuscitation and vasopressor support.  Transferred to ICU for ongoing management.      Neuro:  - Neuro and pain assessment per unit protocol   - PRN oxycodone and tylenol for analgesia   - Sleep hygiene    CV:  Hypotension 2/2 acute hemorrhagic shock likely 2/2 UGIB refractory to 2 units PRBCs and 2L IVF resuscitation, resolved, no longer needing vasopressor support  - NIBP and tele monitoring  - Maintain MAP > 65  - Hydrocortisone to 12 hours then tomorrow daily, then off.   - Continue coreg      Pulm:  Acute respiratory failure, resolved, extubated  - Maintain SpO2 >90%   - PRN nebs   - BPH     GI:   Acute UGIB likely d/t eliquis in setting of acute DVT  EGD 03/15/2025 showed no active bleeding, significant amount of blood clotting in the fundus of stomach and body of the stomach   3/17 For EGD today  - GI consulted, appreciate recs  - Protonix 40 mg IV BID   - Bowel regimen   - Full liquid diet  - Dietary on consult      /Renal:  Hx CKD stage 2 (baseline Cr. ~1-1.2)   HASMUKH likely due to hypoperfusion from acute bleeding event, appears to be improving.   - RFP daily   - Replete electrolytes as clinically indicated  - Maintain brewer catheter for hourly  I&Os      Endo:  - ISS while on steroids      Heme/Onc:  Hx stage IV breast cancer s/p post bilateral mastectomy, treated with radiation and tamoxifen along with anastrozole and palbociclib   Acute blood loss anemia 2/2 UGIB in setting of DVTs on Eliquis--stable   Received 2 units PRBCs  and K centra  - Daily CBC    - SCDs for DVT ppx  - Pharmacological ppx on hold 2/2 UGIB   - Continue holding oral AC per GI  - Will patient benefit from IVC filter????     ID:  Leukocytosis likely 2/2 metastatic disease   Afebrile, UA + for LE and nitrites, UC negative   - trend temps and WBCs      Dispo: Maintain in ICU      Discussed plan with Dr. Melgar    I spent 60 minutes of critical care time in the professional and overall care of this patient.      Toan Garcia, JOSELO-CNP

## 2025-03-17 NOTE — PROGRESS NOTES
03/17/25 1500   Discharge Planning   Expected Discharge Disposition Home     Spoke with pt  and the plan remains home with [facility] and Paliative care at this time. She had an EGD today and they did find a bleed and placed clips and epinephrine injection.She is also due for a PET scan related to her breast cancer. The notes say that the pt is Hospice worthy and that they will bring it up gradually over the next few times they are in the room but have not started the conversation as of yet. Care Transitions to continue to follow. Brooklyn Sutton BSN/RN-TCC

## 2025-03-17 NOTE — PROGRESS NOTES
"Elizabeth Buckner is a 84 y.o. female on day 6 of admission presenting with General weakness.    Subjective   Symptoms (0 - 10, Best to Worst)  [place] Symptom Assessment System  0-10 (Numeric) Pain Score: 0 - No pain  Intubated. No further hematemesis       Interval history: Patient in ICU after episode of hematemesis and melena on 3/15/25. She required 2 U uncross-matched blood.  She was intubated for airway protection. She was on levophed until 3/16. Bleeding event while on eliquis so she was given reversal agent w/ Kcentra.   EGD on 3/16/25 without evidence of active bleeding but pooled blood present in gastric fundus.     Repeat EGD today with Gary IIb ulcer in fundus.       I answered questions regarding her current admission, she wanted to know \"how to get back on track\". I told her that we would need to watch her for a few days since bleeding is life threatening. She has been using a lot of NSAID at home due to her RT LE pain 2/2 DVT. We discussed avoiding those meds. She had questions about her baby aspirin, I told her that this might be discontinued. She asked about heparin, I told her that it is being held for now.   I did not discuss code status but we did discuss her cancer and work up. I told her that she may need to reschedule her oncology appt and PET scan.          Objective     Physical Exam  Constitutional:       General: She is not in acute distress.     Appearance: Normal appearance. She is obese. She is not ill-appearing.   HENT:      Head:      Comments: Mild bitemporal muscle wasting.     Mouth/Throat:      Mouth: Mucous membranes are moist.   Eyes:      Extraocular Movements: Extraocular movements intact.      Pupils: Pupils are equal, round, and reactive to light.   Cardiovascular:      Rate and Rhythm: Normal rate and regular rhythm.      Heart sounds: Normal heart sounds.   Pulmonary:      Effort: Pulmonary effort is normal.      Breath sounds: Normal breath sounds.   Abdominal:      General: " Abdomen is flat. Bowel sounds are normal.      Palpations: Abdomen is soft.      Tenderness: There is no abdominal tenderness.   Musculoskeletal:         General: Swelling and tenderness present.      Cervical back: Normal range of motion and neck supple.      Comments: RT LE >LT LE   Skin:     General: Skin is warm and dry.   Neurological:      Mental Status: She is alert and oriented to person, place, and time.   Psychiatric:         Behavior: Behavior normal.         Thought Content: Thought content normal.         Judgment: Judgment normal.      Comments: Appears worried.          Last Recorded Vitals  Blood pressure (!) 117/43, pulse 97, temperature 37 °C (98.6 °F), temperature source Temporal, resp. rate 25, weight 72.5 kg (159 lb 13.3 oz), SpO2 95%.  Intake/Output last 3 Shifts:  I/O last 3 completed shifts:  In: 1786.1 (24.6 mL/kg) [I.V.:186.1 (2.6 mL/kg); Blood:500; NG/GT:50; IV Piggyback:1050]  Out: 1125 (15.5 mL/kg) [Urine:1125 (0.4 mL/kg/hr)]  Weight: 72.5 kg     Relevant Results    Results for orders placed or performed during the hospital encounter of 03/10/25 (from the past 24 hours)   POCT GLUCOSE   Result Value Ref Range    POCT Glucose 126 (H) 74 - 99 mg/dL   POCT GLUCOSE   Result Value Ref Range    POCT Glucose 115 (H) 74 - 99 mg/dL   POCT GLUCOSE   Result Value Ref Range    POCT Glucose 111 (H) 74 - 99 mg/dL   POCT GLUCOSE   Result Value Ref Range    POCT Glucose 118 (H) 74 - 99 mg/dL   POCT GLUCOSE   Result Value Ref Range    POCT Glucose 116 (H) 74 - 99 mg/dL   Blood Gas Lactic Acid, Venous   Result Value Ref Range    POCT Lactate, Venous 1.0 0.4 - 2.0 mmol/L   CBC and Auto Differential   Result Value Ref Range    WBC 40.2 (H) 4.4 - 11.3 x10*3/uL    nRBC 0.1 (H) 0.0 - 0.0 /100 WBCs    RBC 2.44 (L) 4.00 - 5.20 x10*6/uL    Hemoglobin 7.3 (L) 12.0 - 16.0 g/dL    Hematocrit 21.0 (L) 36.0 - 46.0 %    MCV 86 80 - 100 fL    MCH 29.9 26.0 - 34.0 pg    MCHC 34.8 32.0 - 36.0 g/dL    RDW 14.1 11.5 - 14.5  %    Platelets 195 150 - 450 x10*3/uL    Immature Granulocytes %, Automated 5.5 (H) 0.0 - 0.9 %    Immature Granulocytes Absolute, Automated 2.19 (H) 0.00 - 0.50 x10*3/uL   Renal Function Panel   Result Value Ref Range    Glucose 122 (H) 74 - 99 mg/dL    Sodium 134 (L) 136 - 145 mmol/L    Potassium 3.8 3.5 - 5.3 mmol/L    Chloride 106 98 - 107 mmol/L    Bicarbonate 21 21 - 32 mmol/L    Anion Gap 11 10 - 20 mmol/L    Urea Nitrogen 73 (H) 6 - 23 mg/dL    Creatinine 1.58 (H) 0.50 - 1.05 mg/dL    eGFR 32 (L) >60 mL/min/1.73m*2    Calcium 7.7 (L) 8.6 - 10.3 mg/dL    Phosphorus 1.5 (L) 2.5 - 4.9 mg/dL    Albumin 2.7 (L) 3.4 - 5.0 g/dL   Magnesium   Result Value Ref Range    Magnesium 1.62 1.60 - 2.40 mg/dL   Manual Differential   Result Value Ref Range    Neutrophils %, Manual 77.0 40.0 - 80.0 %    Bands %, Manual 17.0 0.0 - 5.0 %    Lymphocytes %, Manual 2.0 13.0 - 44.0 %    Monocytes %, Manual 0.0 2.0 - 10.0 %    Eosinophils %, Manual 0.0 0.0 - 6.0 %    Basophils %, Manual 0.0 0.0 - 2.0 %    Metamyelocytes %, Manual 4.0 0.0 - 0.0 %    Seg Neutrophils Absolute, Manual 30.95 (H) 1.60 - 5.00 x10*3/uL    Bands Absolute, Manual 6.83 (H) 0.00 - 0.50 x10*3/uL    Lymphocytes Absolute, Manual 0.80 0.80 - 3.00 x10*3/uL    Monocytes Absolute, Manual 0.00 (L) 0.05 - 0.80 x10*3/uL    Eosinophils Absolute, Manual 0.00 0.00 - 0.40 x10*3/uL    Basophils Absolute, Manual 0.00 0.00 - 0.10 x10*3/uL    Metamyelocytes Absolute, Manual 1.61 0.00 - 0.00 x10*3/uL    Total Cells Counted 100     Neutrophils Absolute, Manual 37.78 (H) 1.60 - 5.50 x10*3/uL    RBC Morphology See Below     Polychromasia Mild     RBC Fragments Few     Ovalocytes Few     Luis Alberto Cells Few     Vacuolated Neutrophils Present     Bite Cells Present    POCT GLUCOSE   Result Value Ref Range    POCT Glucose 125 (H) 74 - 99 mg/dL     *Note: Due to a large number of results and/or encounters for the requested time period, some results have not been displayed. A complete set of  results can be found in Results Review.       XR chest 1 view    Addendum Date: 3/15/2025    Potentially critical findings are discussed with and acknowledged by Yael Garcia RN at 7:38 PM Eastern time on 3/15/2025. Signed by Shavonne Ye DO    Result Date: 3/15/2025  1.Endotracheal tube with the tip at or less than 1 cm above the level of the fabrizio. Repositioning is recommended.  This could be pulled back about 4 to 5 cm. 2.Enteric tube and right internal jugular line in place. 3.No pneumothorax. 4.Normal heart size with no radiographic signs of active pulmonary parenchymal infiltration.  Known pleural and parenchymal nodules worrisome for metastatic disease seen on CT are not well visualized radiographically. Signed by Shavonne Ye DO    MR brain w and wo IV contrast    Result Date: 3/11/2025  1. No acute infarct, hemorrhage or mass is noted. No abnormalities of the 4th ventricle are noted.   2. 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   MACRO: None   Signed by: Pete Rice 3/11/2025 12:21 PM Dictation workstation:   [id number]    US abdomen complete    Result Date: 3/11/2025  Obscuration the spleen and right kidney by bowel gas. Previous cholecystectomy.   Mildly small left kidney. The left kidney was otherwise sonographically unremarkable.   There were 3 identifiable hypoechoic solid lesions in the liver. These are nonspecific and could be atypical hemangiomas or metastatic lesions. Recommend liver MRI in follow-up.   MACRO: None   Signed by: Carroll Corley 3/11/2025 8:04 AM Dictation workstation:   [id number]    Lower extremity venous duplex right    Result Date: 3/10/2025  1. Acute deep vein thrombosis at the right lower extremity from the inguinal region to the popliteal region. The right posterior tibial and peroneal veins were not visualized on this exam.     MACRO: Noa Adamson discussed the significance and urgency of this critical finding by telephone  with  CYNTHIA ACOSTAJESSE on 3/10/2025 at 11:24 pm.  (**-RCF-**) Findings:  See findings.     Signed by: Noa Adamson 3/10/2025 11:39 PM Dictation workstation:   [id number]    CT abdomen pelvis wo IV contrast    Result Date: 3/10/2025  1.  Extensive retroperitoneal and bilateral pelvic lymphadenopathy as above concerning for lymphoma or metastatic disease. 2. Suspect acute deep venous thrombosis within the right common femoral vein and right external iliac vein. Correlation with ultrasound findings recommended. 3. Numerous liver masses suggestive of metastatic liver disease. Correlation with ultrasound findings can be performed as clinically warranted. 4. Bilateral renal lesions, nonspecific in etiology and may represent cysts but incompletely characterized in this unenhanced exam. Correlation with ultrasound findings can be performed as clinically warranted. 5. Air in the bladder which may be due to bladder instrumentation. Correlation with urinalysis recommended. 6. Additional detailed findings as above. Critical Finding:  See findings. Notification was initiated on 3/10/2025 at 7:54 pm by  Esvin Philippe.  (**-YCF-**) Instructions:   7.     Signed by: Esvin Philippe 3/10/2025 7:54 PM Dictation workstation:   [id number]    CT head wo IV contrast    Result Date: 3/10/2025  Mass effect upon the 4th ventricle centrally. Further evaluation with contrast-enhanced MRI is recommended for better assessment.   No evidence for acute cortical infarction or acute intracranial hemorrhage is seen.   MACRO: Critical Finding:  See findings. Notification was initiated on 3/10/2025 at 7:46 pm by  Esvin Philippe.  (**-YCF-**)   Signed by: Esvin Philippe 3/10/2025 7:46 PM Dictation workstation:   [id number]    XR chest 2 views    Result Date: 3/10/2025  No acute process. Signed by Kamaljit Alex MD    CT chest wo IV contrast    Result Date: 2/18/2025  Previous bilateral mastectomy. Presumed mild post radiation scarring anteriorly in each  upper lobe subjacent to each mastectomy site.   Development of multiple scattered bilateral lung pleural and parenchymal densities consistent with new metastatic lesions.   Stable right axillary surgical clips. No suspicious thoracic adenopathy in this unenhanced exam.   Mild cardiomegaly.   Calcified hepatic granulomas. Previous cholecystectomy.   Bilateral upper pole renal cysts.   Hepatic flexure colonic diverticulosis..   MACRO: None   Signed by: Carroll Corley 2/18/2025 2:05 PM Dictation workstation:   [id number]    CT head wo IV contrast    Result Date: 2/18/2025  No acute intracranial bleed or focal mass effect.   Mild volume loss.   Mild chronic white matter ischemic disease in the deep periventricular regions.   MACRO: None   Signed by: Carroll Corley 2/18/2025 1:50 PM Dictation workstation:   [id number]    CT soft tissue neck wo IV contrast    Result Date: 2/18/2025  There are surgical clips noted within the right supraclavicular region. There is nonspecific ill-defined intermediate attenuation noted surrounding the surgical clips with adjacent skin thickening which while nonspecific may be postsurgical in etiology.   There are scattered nonspecific abnormally enlarged lymph nodes noted within the neck. Specifically, there are enlarged level 1B submandibular nodes bilaterally with the largest on the left measuring approximately 23 mm in greatest axial dimension and the largest on the right measuring approximately 20 mm in greatest axial dimension. There is an enlarged left superior clavicular lymph node measuring approximately 18 mm in greatest coronal dimension. There is an adjacent smaller but abnormally enlarged 13 mm left supraclavicular lymph node.   There are nonspecific prominent nodular foci of intermediate attenuation noted within and/or along the margins of the parotid glands bilaterally which while nonspecific raises the possibility of additional scattered prominent parotid and/or periparotid  lymph nodes with the largest on the left measuring approximately 17 mm in greatest axial dimension and the largest on the right measuring approximately 13 mm in greatest axial dimension.   There is multilevel cervical spondylosis with the most pronounced posterior osteophytic spurring noted at the C5/6 level.   Please see the dedicated report of the CT of the chest for details of findings within the chest.   MACRO: None.   Signed by: Jefferson Rosales 2/18/2025 12:59 PM Dictation workstation:   [id number]     Scheduled medications  anastrozole, 1 mg, oral, Daily  [Held by provider] aspirin, 81 mg, oral, Daily  carvedilol, 6.25 mg, oral, BID  [Held by provider] gabapentin, 300 mg, oral, BID  [Held by provider] hydrALAZINE, 100 mg, oral, BID  hydrocortisone sodium succinate, 50 mg, intravenous, q12h  insulin lispro, 0-10 Units, subcutaneous, q4h  pantoprazole, 40 mg, intravenous, BID  potassium phosphate, 21 mmol, intravenous, Once  sennosides, 2 tablet, oral, BID      Continuous medications     PRN medications  PRN medications: acetaminophen **OR** acetaminophen **OR** acetaminophen, alteplase, dextrose, dextrose, glucagon, glucagon, melatonin, ondansetron ODT **OR** ondansetron, oxyCODONE, oxygen                   Assessment/Plan   IMP: 84-year-old female with history of right breast carcinoma first diagnosed in 1991 status post breast conserving surgery, radiation and tamoxifen who then went on to have a recurrence in 2012 and had complete mastectomy.  She then had breast cancer in her left breast and had a left mastectomy followed by radiation and trastuzumab.  Finally she had another recurrence in November 2021 and was on anastrozole and palbociclib.  She is presenting with confusion and weakness found to have a UTI.  She has been having recurrent falls and is losing weight.  She likely has a large disease burden in her abdomen including diffuse LAD and several liver masses suspicious for malignancy.  She was due for  a PET scan after an 11/20/2024 CT showed possible progression in her pelvic lymph nodes however she did not follow-up.  Palliative consulted for goals of care and symptom management in the setting of likely disease progression.     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   They are awaiting information about DVT treatment, possible biopsy.   Full code for now. Patient was sleeping when I arrived and I did not wake her but spoke with her  and niece  C/w oxycodone 2.5mg p.o. every 4 hours as needed for severe pain  C/w Tylenol 650mg q4h prn for mild pain.   Recommend adding back her home gabapentin dose prior to discharge.   Will continue to follow                 Patient/proxy preference for information  Prefers full information     Goals of Care  Full Code     Is the patient hospice-eligible?   Yes  Was a discussion held re hospice services?   no  Was a decision made re hospice services?  No, patient has been treatment oriented until recently. I will delicately bring up this option on future visits.        I spent 50 minutes in the professional and overall care of this patient.      Richar Francis MD

## 2025-03-17 NOTE — SIGNIFICANT EVENT
EGD done  The upper third of the esophagus, middle third of the esophagus and lower third of the esophagus appeared normal.  Single ulcer in the fundus of the stomach with adherent clot (Gary IIB); placed clips; injected epinephrine to address bleeding; hemostasis achieved  The duodenal bulb and 2nd part of the duodenum appeared normal.     -Continue twice daily PPI  -Recommend to check stool for H. pylori antigen  -Repeat EGD in 1 months, gi will order

## 2025-03-17 NOTE — Clinical Note
I performed a sepsis reperfusion exam on Elizabeth Buckner on 3/17/2025NOWNOREF@    A targeted fluid bolus of *** was given, if adult patient did not receive a 30cc/kg fluid bolus, the clinical rationale is ***    Jesika Saha MD

## 2025-03-17 NOTE — ANESTHESIA PREPROCEDURE EVALUATION
Patient: Elizabeth Buckner    Procedure Information       Date/Time: 03/17/25 3866    Scheduled providers: Timur Che MD, MS; JOSELO Chaudhari-CNP; Wily Ahumada MD    Procedure: EGD    Location: [facility]            Relevant Problems   Anesthesia (within normal limits)      Cardiac   (+) Hypertension   (+) Other hyperlipidemia      Pulmonary (within normal limits)      Neuro   (+) Anxiety   (+) Depression   (+) Drug-induced polyneuropathy (Multi)      GI   (+) Esophageal reflux   (+) GI bleed      /Renal   (+) Chronic renal insufficiency   (+) UTI (urinary tract infection)   (+) Urinary tract infection      Liver   (+) Gallbladder stone without cholecystitis or obstruction      Endocrine   (+) Exogenous obesity      Hematology   (+) Anemia of chronic disease   (+) Deep vein thrombosis (DVT) of lower extremity (Multi)      Musculoskeletal   (+) Osteoarthritis of knee   (+) Osteoarthritis, knee      ID   (+) COVID-19   (+) Flu syndrome   (+) UTI (urinary tract infection)   (+) Urinary tract infection   (+) Viral URI with cough      GYN   (+) Breast cancer (Multi)       Clinical information reviewed:                   NPO Detail:  No data recorded     Physical Exam    Airway  Mallampati: II  TM distance: >3 FB  Neck ROM: full     Cardiovascular   Rhythm: regular  Rate: normal     Dental   (+) upper dentures     Pulmonary - normal exam     Abdominal            Anesthesia Plan    History of general anesthesia?: yes  History of complications of general anesthesia?: no    ASA 3     MAC     The patient is not a current smoker.    intravenous induction   Anesthetic plan and risks discussed with patient.  Use of blood products discussed with patient who consented to blood products.    Plan discussed with CRNA and CAA.

## 2025-03-17 NOTE — CARE PLAN
Problem: Nutrition  Goal: Nutrient intake appropriate for maintaining nutritional needs  Outcome: Progressing     Problem: Fall/Injury  Goal: Verbalize understanding of risk factor reduction measures to prevent injury from fall in the home  Outcome: Progressing     Problem: Skin  Goal: Prevent/manage excess moisture  Outcome: Progressing  Flowsheets (Taken 3/17/2025 0203)  Prevent/manage excess moisture: Moisturize dry skin

## 2025-03-17 NOTE — PROGRESS NOTES
Occupational Therapy                 Therapy Communication Note    Patient Name: Elizabeth Buckner  MRN: [id number]  Department: [facility]  Room: [facility]  Today's Date: 3/17/2025     Discipline: Occupational Therapy    OT Missed Visit: Yes     Missed Visit Reason:  Pt is off floor at EGD. OT will re-attempt when pt is able.    Missed Time: Attempt

## 2025-03-17 NOTE — PROGRESS NOTES
Occupational Therapy    Evaluation/Treatment    Patient Name: Elizabeth Buckner  MRN: [id number]  Department: [facility] GI LAB  Room: [facility]423-A  Today's Date: 03/17/25  Time Calculation  Start Time: 1521  Stop Time: 1551  Time Calculation (min): 30 min       Assessment:  OT Assessment: Pt functioning below baseline for ADLs/IADLs.  Prognosis: Fair  Barriers to Discharge Home: No anticipated barriers  Evaluation/Treatment Tolerance: Patient limited by fatigue, Patient tolerated treatment well  Medical Staff Made Aware: Yes  End of Session Communication: Bedside nurse  End of Session Patient Position: Bed, 3 rail up, Alarm on  OT Assessment Results: Decreased ADL status, Decreased upper extremity range of motion, Decreased upper extremity strength, Decreased safe judgment during ADL, Decreased cognition, Decreased endurance, Decreased functional mobility, Decreased gross motor control, Decreased IADLs  Prognosis: Fair  Barriers to Discharge: None  Evaluation/Treatment Tolerance: Patient limited by fatigue, Patient tolerated treatment well  Medical Staff Made Aware: Yes  Strengths: Ability to acquire knowledge, Access to adaptive/assistive products, Attitude of self  Barriers to Participation: Capable of completing ADLs semi/independent, Comorbidities  Plan:  Treatment Interventions: ADL retraining, UE strengthening/ROM, Endurance training, Compensatory technique education  OT Frequency: 3 times per week  OT Discharge Recommendations: Moderate intensity level of continued care  Equipment Recommended upon Discharge: Wheeled walker  OT Recommended Transfer Status: Moderate assist, Minimal assist, Assist of 1  OT - OK to Discharge: Yes  Treatment Interventions: ADL retraining, UE strengthening/ROM, Endurance training, Compensatory technique education    Subjective   Current Problem:  1. General weakness  Esophagogastroduodenoscopy (EGD)    Esophagogastroduodenoscopy (EGD)      2. Altered mental status, unspecified altered mental status  type        3. Metastasis to liver (Multi)        4. Hypercalcemia        5. Acute deep vein thrombosis (DVT) of femoral vein of right lower extremity (Multi)        6. Hematemesis of fresh blood  Esophagogastroduodenoscopy (EGD)      7. Hemorrhagic shock (Multi)  Esophagogastroduodenoscopy (EGD)      8. Hematemesis, unspecified whether nausea present  Esophagogastroduodenoscopy (EGD)    Esophagogastroduodenoscopy (EGD)        General:   OT Received On: 03/17/25  General  Reason for Referral: To ED with increased groin pain, unresolved UTI, and AMS. CT (+) for R LE DVT. Transferred to ICU for acute hemorrhagic shock due to GIB with hematemesis and melena requiring blood transfusions, Kcentra for eliquis reversal, IVF resuscitation and vasopressor support.  Transferred to ICU for ongoing management.  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    Dry eye syndrome of right lacrimal gland 04/12/2019    Dry eye syndrome of right lacrimal gland    Encounter for allergy testing     Encounter for allergy testing    Encounter for general adult medical examination without abnormal findings 07/24/2020    Encounter for annual health examination    Encounter for general adult medical examination without abnormal findings 07/19/2021    Encounter for annual health examination    Encounter for general adult medical examination without abnormal findings 03/06/2018     Encounter for annual health examination    Other obesity due to excess calories 10/15/2020    Exogenous obesity    Pain in unspecified knee 03/09/2018    Joint pain, knee    Personal history of other diseases of the digestive system 07/10/2018    History of gastroesophageal reflux (GERD)    Personal history of other diseases of the female genital tract 09/08/2016    History of vaginitis    Personal history of other diseases of the female genital tract     History of pelvic inflammatory disease    Personal history of other diseases of the nervous system and sense organs 04/12/2019    History of myopia    Personal history of other diseases of the respiratory system 01/02/2020    History of acute bronchitis    Personal history of other diseases of urinary system 01/22/2021    History of chronic kidney disease     OT Missed Visit: Yes  Family/Caregiver Present: Yes  Caregiver Feedback: pt's sister present  Prior to Session Communication: Bedside nurse  Patient Position Received: Bed, 3 rail up, Alarm off, not on at start of session  Preferred Learning Style: auditory, kinesthetic  General Comment: pt agreeable to OT, cleared by RN   Precautions:  Medical Precautions: Fall precautions     Date/Time Vitals Session Patient Position Pulse Resp SpO2 BP MAP (mmHg)    03/17/25 1430 --  --  85  31  98 %  --  --     03/17/25 1445 --  --  80  19  98 %  --  --     03/17/25 1500 --  --  79  20  98 %  118/45  63     03/17/25 1515 --  --  78  21  97 %  --  --     03/17/25 1521 During OT  Sitting  87  --  98 %  142/71  --     03/17/25 1530 --  --  89  24  99 %  --  --     03/17/25 1545 --  --  102  23  96 %  --  --     03/17/25 1600 --  --  84  27  99 %  130/53  76     03/17/25 1615 --  --  83  25  99 %  --  --           Pain:  Pain Assessment  Pain Assessment: 0-10  0-10 (Numeric) Pain Score: 0 - No pain    Objective   Cognition:  Overall Cognitive Status: Within Functional Limits  Orientation Level: Oriented X4  Attention: Within  Functional Limits  Memory: Within Funtional Limits  Insight: Within function limits     Home Living:  Type of Home: House  Lives With: Spouse  Home Adaptive Equipment: Walker rolling or standard  Home Layout: One level  Home Access: Level entry  Bathroom Shower/Tub: Tub/shower unit, Walk-in shower  Bathroom Toilet: Handicapped height  Home Living Comments: laundry in basement, pt's spouse has had recent strokes  Prior Function:  Level of [place]: Independent with ADLs and functional transfers, Independent with homemaking with ambulation  ADL Assistance: Independent  Homemaking Assistance: Independent  Ambulatory Assistance: Independent  Prior Function Comments: Reports 1 fall in the past 6 months.  IADL History:  Leisure and Hobbies: bowling  ADL:  LE Dressing Assistance: Maximal  LE Dressing Deficit: Don/doff R sock, Don/doff L sock (assist to bring into figure-4)  Activities of Daily Living: LE Dressing  LE Dressing: Yes  Sock Level of Assistance: Moderate assistance (pt donned L sock, assist to bring LE into figure-4 d/t weakness; pt unable to lift R LE due to edema and weakness requiring assist to don)    Toileting  Toileting Level of Assistance: Dependent  Where Assessed:  (standing from bed, pt with BM)  Activity Tolerance:  Endurance: Tolerates less than 10 min exercise, no significant change in vital signs     Bed Mobility/Transfers: Bed Mobility  Bed Mobility: Yes  Bed Mobility 1  Bed Mobility 1: Supine to sitting  Level of Assistance 1: Minimum assistance  Bed Mobility Comments 1: assist at trunk  Bed Mobility 2  Bed Mobility  2: Sitting to supine  Level of Assistance 2: Moderate assistance  Bed Mobility Comments 2: assist at B LE's    Transfers  Transfer: Yes  Transfer 1  Technique 1: Sit to stand, Stand to sit  Transfer Device 1: Walker  Transfer Level of Assistance 1: Moderate assistance  Trials/Comments 1: increased time to complete, cues for hand placement    Functional Mobility:  Functional  Mobility  Functional Mobility Performed: Yes  Functional Mobility 1  Surface 1: Level tile  Device 1: Rolling walker  Assistance 1: Contact guard  Comments 1: pt took 3-4 steps forward and back; 3-4 lateral steps to the R, no LOB, VSS, RN present at end  Sitting Balance:  Static Sitting Balance  Static Sitting-Balance Support: Feet supported  Static Sitting-Level of Assistance: Close supervision  Dynamic Sitting Balance  Dynamic Sitting-Balance Support: Feet supported  Dynamic Sitting-Level of Assistance: Minimum assistance, Moderate assistance  Dynamic Sitting-Balance: Lateral lean  Dynamic Sitting-Comments: pt donning socks, mod A at trunk to maintain balance  Standing Balance:  Static Standing Balance  Static Standing-Balance Support: Bilateral upper extremity supported  Static Standing-Level of Assistance: Contact guard     Vision:Vision - Basic Assessment  Current Vision: No visual deficits  Sensation:  Light Touch: No apparent deficits  Strength:  Strength Comments: pt with decreased UE strength     Perception:  Inattention/Neglect: Appears intact  Coordination:  Movements are Fluid and Coordinated: No  Upper Body Coordination: decreased GM coordination   Hand Function:  Hand Function  Gross Grasp: Functional  Coordination: Impaired  Extremities: RUE   RUE : Exceptions to WFL (shoulder flexion to ~90 degrees, pt limited by edema) and LUE   LUE: Within Functional Limits    Outcome Measures: [facility] Daily Activity  Putting on and taking off regular lower body clothing: A lot  Bathing (including washing, rinsing, drying): A lot  Putting on and taking off regular upper body clothing: A lot  Toileting, which includes using toilet, bedpan or urinal: Total  Taking care of personal grooming such as brushing teeth: A little  Eating Meals: A little  Daily Activity - Total Score: 13    Education Documentation  Body Mechanics, taught by Stephanie Ugalde OT at 3/17/2025  4:20 PM.  Learner: Patient  Readiness:  Acceptance  Method: Explanation, Demonstration  Response: Verbalizes Understanding, Demonstrated Understanding, Needs Reinforcement    ADL Training, taught by Stephanie Ugalde OT at 3/17/2025  4:20 PM.  Learner: Patient  Readiness: Acceptance  Method: Explanation, Demonstration  Response: Verbalizes Understanding, Demonstrated Understanding, Needs Reinforcement    Education Comments  No comments found.           Goals:  Encounter Problems       Encounter Problems (Active)       ADLs       Patient will perform UB and LB bathing with set-up level of assistance.       Start:  03/17/25    Expected End:  03/31/25            Patient with complete upper body dressing with set-up level of assistance donning and doffing all UE clothes.       Start:  03/17/25    Expected End:  03/31/25            Patient with complete lower body dressing with set-up level of assistance donning and doffing all LE clothes.       Start:  03/17/25    Expected End:  03/31/25            Patient will complete daily grooming tasks brushing teeth and washing face/hair with modified independent level of assistance and PRN adaptive equipment.       Start:  03/17/25    Expected End:  03/31/25               MOBILITY       Patient will perform Functional mobility min Household distances/Community Distances with stand by assist level of assistance and least restrictive device in order to improve safety and functional mobility.       Start:  03/17/25    Expected End:  03/31/25               TRANSFERS       Patient will perform bed mobility stand by assist level of assistance in order to improve safety and independence with mobility       Start:  03/17/25    Expected End:  03/31/25

## 2025-03-17 NOTE — CONSULTS
Gastroenterology Consultation Note    ASSESSMENT and PLAN:     Elizabeth Buckner is a 84 y.o. female with PMHx s/f stage IV breast ca s/p bilateral mastectomy as well as treated with radiation and tamoxifen, anastrozole and palbociclib (2021), lymph node dissection, cerebral, hepatic, pelvic, and brain mets who presented to [facility] [place] with complaints of RLE edema and altered mental status with generalized weakness progressively worsening over the past few days prior to admission.    GI consulted for blood through NG tube.    Transferred to ICU for acute hemorrhagic shock due to GIB with hematemesis and melena requiring blood transfusions, entra for eliquis reversal, IVF resuscitation and vasopressor support.     Urgent EGD showed    Findings  -The upper third of the esophagus, middle third of the esophagus and lower third of the esophagus appeared normal.  -Significant amount of blood clotting in the fundus of the stomach and body of the stomach, which obscured visualization. Within limitation of the exam, no active bleeding was seen in the stomach.  -The duodenal bulb and 2nd part of the duodenum appeared normal.       - repeat eGD on Monday    Timur Che MD, MS        HISTORY OF PRESENT ILLNESS:     History Of Present Illness:    Elizabeth Buckner is a 84 y.o. female with PMHx s/f stage IV breast ca s/p bilateral mastectomy as well as treated with radiation and tamoxifen, anastrozole and palbociclib (2021), lymph node dissection, cerebral, hepatic, pelvic, and brain mets who presented to [facility] [place] with complaints of RLE edema and altered mental status with generalized weakness progressively worsening over the past few days prior to admission.     Urgent EGD showed    Findings  -The upper third of the esophagus, middle third of the esophagus and lower third of the esophagus appeared normal.  -Significant amount of blood clotting in the fundus of the stomach and body of the stomach, which obscured visualization. Within limitation of the  exam, no active bleeding was seen in the stomach.  -The duodenal bulb and 2nd part of the duodenum appeared normal.    Relevant endoscopic evaluation results were personally reviewed.    Review of systems:   Review of Systems   A 10+ point ROS was completed and otherwise negative except as noted and per HPI.    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    Dry eye syndrome of right lacrimal gland 04/12/2019    Dry eye syndrome of right lacrimal gland    Encounter for allergy testing     Encounter for allergy testing    Encounter for general adult medical examination without abnormal findings 07/24/2020    Encounter for annual health examination    Encounter for general adult medical examination without abnormal findings 07/19/2021    Encounter for annual health examination    Encounter for general adult medical examination without abnormal findings 03/06/2018    Encounter for annual health examination    Other obesity due to excess calories 10/15/2020    Exogenous obesity    Pain in unspecified knee 03/09/2018    Joint pain, knee    Personal history of other diseases of the digestive system 07/10/2018    History of gastroesophageal reflux (GERD)    Personal history of other diseases of the female genital tract 09/08/2016    History of vaginitis    Personal history of other diseases of the female genital tract     History of pelvic inflammatory disease    Personal history of other  diseases of the nervous system and sense organs 04/12/2019    History of myopia    Personal history of other diseases of the respiratory system 01/02/2020    History of acute bronchitis    Personal history of other diseases of urinary system 01/22/2021    History of chronic kidney disease       Past Surgical History:  Past Surgical History:   Procedure Laterality Date    BREAST SURGERY  03/29/2013    Breast Surgery    HYSTERECTOMY  03/29/2013    Hysterectomy    US GUIDED MEDIASTINUM PERCUTANEOUS BIOPSY  9/22/2021    US GUIDED MEDIASTINUM PERCUTANEOUS BIOPSY 9/22/2021 [facility] ANCILLARY LEGACY        Family History:  Family History   Problem Relation Name Age of Onset    Cancer Other          Family History    Lung disease Other          Family History        Social History:   reports that she has quit smoking. Her smoking use included cigarettes. She has never used smokeless tobacco. She reports that she does not drink alcohol and does not use drugs.    OBJECTIVE:     Last Recorded Vitals:  BP (!) 117/43   Pulse 97   Temp 37 °C (98.6 °F) (Temporal)   Resp 25   Wt 72.5 kg (159 lb 13.3 oz)   LMP  (LMP Unknown)   SpO2 95%   BMI 31.22 kg/m²   Physical Exam:  Abdomen: soft, non tender, no guarding/rigidity    Allergies:  Allergies   Allergen Reactions    Penicillins Swelling     Facial swelling, >30+ years ago    Ramipril Angioedema       Inpatient Medications:  anastrozole, 1 mg, oral, Daily  [Held by provider] aspirin, 81 mg, oral, Daily  carvedilol, 6.25 mg, oral, BID  [Held by provider] gabapentin, 300 mg, oral, BID  [Held by provider] hydrALAZINE, 100 mg, oral, BID  hydrocortisone sodium succinate, 50 mg, intravenous, q12h  insulin lispro, 0-10 Units, subcutaneous, q4h  pantoprazole, 40 mg, intravenous, BID  potassium phosphate, 21 mmol, intravenous, Once  sennosides, 2 tablet, oral, BID         PRN medications: acetaminophen **OR** acetaminophen **OR** acetaminophen, alteplase, dextrose, dextrose, glucagon,  glucagon, melatonin, ondansetron ODT **OR** ondansetron, oxyCODONE, oxygen    Outpatient Medications:  Prior to Admission medications    Medication Sig Start Date End Date Taking? Authorizing Provider   ciprofloxacin (Cipro) 250 mg tablet Take 1 tablet (250 mg) by mouth twice a day. 3/7/25 3/12/25 Yes Historical Provider, MD   nitrofurantoin, macrocrystal-monohydrate, (Macrobid) 100 mg capsule Take 1 capsule (100 mg) by mouth 2 times a day for 7 days. 3/1/25 3/11/25 Yes Anjali Singleton APRN-CNP   anastrozole (Arimidex) 1 mg tablet Take 1 tablet (1 mg total) by mouth once daily.  Take 1 tablet once daily. Swallow whole with a drink of water. 12/10/24 12/10/25  Tova Espitia MD   arm brace (Elbow Compression Sleeve) misc Lymphedema Sleeve  and Gauntlet eval and fit 20-30 mmHG for right  arm 4/30/24   JOSELO Fisher-CNP   aspirin 81 mg EC tablet Take 1 tablet (81 mg) by mouth once daily. Take 1 tablet daily    Historical Provider, MD   azelastine (Optivar) 0.05 % ophthalmic solution Administer 2 drops into both eyes once daily. 5/10/22   Historical Provider, MD   carvedilol (Coreg) 12.5 mg tablet Take 1 tablet (12.5 mg) by mouth 2 times a day. Take 1 tablet twice daily 11/25/24   Tova Espitia MD   cholecalciferol, vitamin D3, (VITAMIN D3 ORAL) Take 25 mcg by mouth 1 (one) time each day. Take 1 tablet daily Vitamin D 25MCG (1000 UT) oral tablet    Historical Provider, MD   diclofenac sodium (Voltaren Arthritis Pain) 1 % gel Apply 4.5 inches (4 g) topically 4 times a day. 6/18/24   Tova Espitia MD   FLAXSEED OIL ORAL Take 1,200 mg by mouth in the morning, at noon, and at bedtime. Take 1 capsule 3 times daily    Historical Provider, MD   fluticasone (Flonase) 50 mcg/actuation nasal spray Administer 1 spray into each nostril 2 times a day. Instill 1 spray in each nostril twice daily 12/10/24   Tova Espitia MD   furosemide (Lasix) 20 mg tablet Take 1 tablet (20 mg) by mouth once daily. Take 1 tablet by  mouth every day 12/10/24   Tova Espitia MD   gabapentin (Neurontin) 300 mg capsule Take 1 capsule (300 mg) by mouth 3 times a day. Take one capsule by mouth three times a day 9/5/23   Tova Espitia MD   gabapentin (Neurontin) 300 mg capsule Take 1 capsule (300 mg) by mouth 3 times a day. 12/10/24 6/8/25  Tova Espitia MD   hydrALAZINE (Apresoline) 100 mg tablet Take 1 tablet (100 mg) by mouth 2 times a day. Take 1 tablet by mouth twice per day 11/25/24   Tova Espitia MD   palbociclib (Ibrance) 75 mg tablet Swallow whole. 3/4/25   William Dove MD   TURMERIC ORAL Take 400 mg by mouth 1 (one) time each day. Take 1 capsule daily    Historical Provider, MD       LABS AND IMAGING:     Labs:  Results for orders placed or performed during the hospital encounter of 03/10/25 (from the past 24 hours)   POCT GLUCOSE   Result Value Ref Range    POCT Glucose 126 (H) 74 - 99 mg/dL   POCT GLUCOSE   Result Value Ref Range    POCT Glucose 115 (H) 74 - 99 mg/dL   POCT GLUCOSE   Result Value Ref Range    POCT Glucose 111 (H) 74 - 99 mg/dL   POCT GLUCOSE   Result Value Ref Range    POCT Glucose 118 (H) 74 - 99 mg/dL   POCT GLUCOSE   Result Value Ref Range    POCT Glucose 116 (H) 74 - 99 mg/dL   Blood Gas Lactic Acid, Venous   Result Value Ref Range    POCT Lactate, Venous 1.0 0.4 - 2.0 mmol/L   CBC and Auto Differential   Result Value Ref Range    WBC 40.2 (H) 4.4 - 11.3 x10*3/uL    nRBC 0.1 (H) 0.0 - 0.0 /100 WBCs    RBC 2.44 (L) 4.00 - 5.20 x10*6/uL    Hemoglobin 7.3 (L) 12.0 - 16.0 g/dL    Hematocrit 21.0 (L) 36.0 - 46.0 %    MCV 86 80 - 100 fL    MCH 29.9 26.0 - 34.0 pg    MCHC 34.8 32.0 - 36.0 g/dL    RDW 14.1 11.5 - 14.5 %    Platelets 195 150 - 450 x10*3/uL    Immature Granulocytes %, Automated 5.5 (H) 0.0 - 0.9 %    Immature Granulocytes Absolute, Automated 2.19 (H) 0.00 - 0.50 x10*3/uL   Renal Function Panel   Result Value Ref Range    Glucose 122 (H) 74 - 99 mg/dL    Sodium 134 (L) 136 - 145 mmol/L    Potassium  3.8 3.5 - 5.3 mmol/L    Chloride 106 98 - 107 mmol/L    Bicarbonate 21 21 - 32 mmol/L    Anion Gap 11 10 - 20 mmol/L    Urea Nitrogen 73 (H) 6 - 23 mg/dL    Creatinine 1.58 (H) 0.50 - 1.05 mg/dL    eGFR 32 (L) >60 mL/min/1.73m*2    Calcium 7.7 (L) 8.6 - 10.3 mg/dL    Phosphorus 1.5 (L) 2.5 - 4.9 mg/dL    Albumin 2.7 (L) 3.4 - 5.0 g/dL   Magnesium   Result Value Ref Range    Magnesium 1.62 1.60 - 2.40 mg/dL   Manual Differential   Result Value Ref Range    Neutrophils %, Manual 77.0 40.0 - 80.0 %    Bands %, Manual 17.0 0.0 - 5.0 %    Lymphocytes %, Manual 2.0 13.0 - 44.0 %    Monocytes %, Manual 0.0 2.0 - 10.0 %    Eosinophils %, Manual 0.0 0.0 - 6.0 %    Basophils %, Manual 0.0 0.0 - 2.0 %    Metamyelocytes %, Manual 4.0 0.0 - 0.0 %    Seg Neutrophils Absolute, Manual 30.95 (H) 1.60 - 5.00 x10*3/uL    Bands Absolute, Manual 6.83 (H) 0.00 - 0.50 x10*3/uL    Lymphocytes Absolute, Manual 0.80 0.80 - 3.00 x10*3/uL    Monocytes Absolute, Manual 0.00 (L) 0.05 - 0.80 x10*3/uL    Eosinophils Absolute, Manual 0.00 0.00 - 0.40 x10*3/uL    Basophils Absolute, Manual 0.00 0.00 - 0.10 x10*3/uL    Metamyelocytes Absolute, Manual 1.61 0.00 - 0.00 x10*3/uL    Total Cells Counted 100     Neutrophils Absolute, Manual 37.78 (H) 1.60 - 5.50 x10*3/uL    RBC Morphology See Below     Polychromasia Mild     RBC Fragments Few     Ovalocytes Few     Dacoma Cells Few     Vacuolated Neutrophils Present     Bite Cells Present    POCT GLUCOSE   Result Value Ref Range    POCT Glucose 125 (H) 74 - 99 mg/dL     *Note: Due to a large number of results and/or encounters for the requested time period, some results have not been displayed. A complete set of results can be found in Results Review.        Relevant imaging results were personally reviewed.

## 2025-03-17 NOTE — ANESTHESIA POSTPROCEDURE EVALUATION
Patient: Elizabeth Buckner    Procedure Summary       Date: 03/17/25 [place] [facility] [place]    Anesthesia Start: 0929 Anesthesia Stop: 1026    Procedure: EGD Diagnosis: Hematemesis, unspecified whether nausea present    Scheduled Providers: Timur Che MD, MS; JOSELO Chaudhari-CNP; Wily Ahumada MD Responsible Provider: Wily Ahumada MD    Anesthesia Type: MAC ASA Status: 3            Anesthesia Type: MAC    Vitals Value Taken Time   /111 03/17/25 1037   Temp 37.5 °C (99.5 °F) 03/17/25 1030   Pulse 96 03/17/25 1037   Resp 31 03/17/25 1037   SpO2 98 % 03/17/25 1037   Vitals shown include unfiled device data.    Anesthesia Post Evaluation    Patient location during evaluation: ICU  Patient participation: complete - patient participated  Level of consciousness: awake  Pain score: 0  Pain management: adequate  Airway patency: patent  Cardiovascular status: acceptable and stable  Respiratory status: acceptable  Hydration status: acceptable  Postoperative Nausea and Vomiting: none        No notable events documented.

## 2025-03-18 ENCOUNTER — TELEPHONE (OUTPATIENT)
Dept: HEMATOLOGY/ONCOLOGY | Facility: HOSPITAL | Age: 85
End: 2025-03-18
Payer: COMMERCIAL

## 2025-03-18 LAB
ALBUMIN SERPL BCP-MCNC: 2.6 G/DL (ref 3.4–5)
ANION GAP SERPL CALC-SCNC: 10 MMOL/L (ref 10–20)
BASOPHILS # BLD MANUAL: 0 X10*3/UL (ref 0–0.1)
BASOPHILS NFR BLD MANUAL: 0 %
BITE CELLS BLD QL SMEAR: PRESENT
BUN SERPL-MCNC: 71 MG/DL (ref 6–23)
BURR CELLS BLD QL SMEAR: ABNORMAL
CALCIUM SERPL-MCNC: 7.4 MG/DL (ref 8.6–10.3)
CHLORIDE SERPL-SCNC: 106 MMOL/L (ref 98–107)
CO2 SERPL-SCNC: 24 MMOL/L (ref 21–32)
CREAT SERPL-MCNC: 1.4 MG/DL (ref 0.5–1.05)
EGFRCR SERPLBLD CKD-EPI 2021: 37 ML/MIN/1.73M*2
EOSINOPHIL # BLD MANUAL: 0 X10*3/UL (ref 0–0.4)
EOSINOPHIL NFR BLD MANUAL: 0 %
ERYTHROCYTE [DISTWIDTH] IN BLOOD BY AUTOMATED COUNT: 15.4 % (ref 11.5–14.5)
GLUCOSE BLD MANUAL STRIP-MCNC: 106 MG/DL (ref 74–99)
GLUCOSE BLD MANUAL STRIP-MCNC: 109 MG/DL (ref 74–99)
GLUCOSE BLD MANUAL STRIP-MCNC: 116 MG/DL (ref 74–99)
GLUCOSE BLD MANUAL STRIP-MCNC: 116 MG/DL (ref 74–99)
GLUCOSE BLD MANUAL STRIP-MCNC: 117 MG/DL (ref 74–99)
GLUCOSE SERPL-MCNC: 110 MG/DL (ref 74–99)
HCT VFR BLD AUTO: 23.9 % (ref 36–46)
HGB BLD-MCNC: 8.1 G/DL (ref 12–16)
IMM GRANULOCYTES # BLD AUTO: 0.33 X10*3/UL (ref 0–0.5)
IMM GRANULOCYTES NFR BLD AUTO: 0.9 % (ref 0–0.9)
LYMPHOCYTES # BLD MANUAL: 0 X10*3/UL (ref 0.8–3)
LYMPHOCYTES NFR BLD MANUAL: 0 %
MAGNESIUM SERPL-MCNC: 2.2 MG/DL (ref 1.6–2.4)
MCH RBC QN AUTO: 29.7 PG (ref 26–34)
MCHC RBC AUTO-ENTMCNC: 33.9 G/DL (ref 32–36)
MCV RBC AUTO: 88 FL (ref 80–100)
MONOCYTES # BLD MANUAL: 0.37 X10*3/UL (ref 0.05–0.8)
MONOCYTES NFR BLD MANUAL: 1 %
NEUTROPHILS # BLD MANUAL: 36.73 X10*3/UL (ref 1.6–5.5)
NEUTS BAND # BLD MANUAL: 4.08 X10*3/UL (ref 0–0.5)
NEUTS BAND NFR BLD MANUAL: 11 %
NEUTS SEG # BLD MANUAL: 32.65 X10*3/UL (ref 1.6–5)
NEUTS SEG NFR BLD MANUAL: 88 %
NRBC BLD-RTO: 0.1 /100 WBCS (ref 0–0)
OVALOCYTES BLD QL SMEAR: ABNORMAL
PHOSPHATE SERPL-MCNC: 2.3 MG/DL (ref 2.5–4.9)
PLATELET # BLD AUTO: 208 X10*3/UL (ref 150–450)
POTASSIUM SERPL-SCNC: 3.9 MMOL/L (ref 3.5–5.3)
RBC # BLD AUTO: 2.73 X10*6/UL (ref 4–5.2)
RBC MORPH BLD: ABNORMAL
SODIUM SERPL-SCNC: 136 MMOL/L (ref 136–145)
TOTAL CELLS COUNTED BLD: 100
WBC # BLD AUTO: 37.1 X10*3/UL (ref 4.4–11.3)

## 2025-03-18 PROCEDURE — 97116 GAIT TRAINING THERAPY: CPT | Mod: GP

## 2025-03-18 PROCEDURE — 2060000001 HC INTERMEDIATE ICU ROOM DAILY

## 2025-03-18 PROCEDURE — 2500000004 HC RX 250 GENERAL PHARMACY W/ HCPCS (ALT 636 FOR OP/ED): Performed by: HOSPITALIST

## 2025-03-18 PROCEDURE — 2500000001 HC RX 250 WO HCPCS SELF ADMINISTERED DRUGS (ALT 637 FOR MEDICARE OP): Performed by: HOSPITALIST

## 2025-03-18 PROCEDURE — 85007 BL SMEAR W/DIFF WBC COUNT: CPT | Performed by: NURSE PRACTITIONER

## 2025-03-18 PROCEDURE — 97110 THERAPEUTIC EXERCISES: CPT | Mod: GP

## 2025-03-18 PROCEDURE — 85027 COMPLETE CBC AUTOMATED: CPT | Performed by: NURSE PRACTITIONER

## 2025-03-18 PROCEDURE — 36415 COLL VENOUS BLD VENIPUNCTURE: CPT | Performed by: NURSE PRACTITIONER

## 2025-03-18 PROCEDURE — 99232 SBSQ HOSP IP/OBS MODERATE 35: CPT | Performed by: HOSPITALIST

## 2025-03-18 PROCEDURE — 99221 1ST HOSP IP/OBS SF/LOW 40: CPT | Performed by: NURSE PRACTITIONER

## 2025-03-18 PROCEDURE — 80069 RENAL FUNCTION PANEL: CPT | Performed by: NURSE PRACTITIONER

## 2025-03-18 PROCEDURE — 82947 ASSAY GLUCOSE BLOOD QUANT: CPT

## 2025-03-18 PROCEDURE — 83735 ASSAY OF MAGNESIUM: CPT | Performed by: NURSE PRACTITIONER

## 2025-03-18 PROCEDURE — 99232 SBSQ HOSP IP/OBS MODERATE 35: CPT | Performed by: INTERNAL MEDICINE

## 2025-03-18 RX ADMIN — CARVEDILOL 6.25 MG: 6.25 TABLET, FILM COATED ORAL at 08:49

## 2025-03-18 RX ADMIN — ANASTROZOLE 1 MG: 1 TABLET, COATED ORAL at 08:48

## 2025-03-18 RX ADMIN — PANTOPRAZOLE SODIUM 40 MG: 40 INJECTION, POWDER, FOR SOLUTION INTRAVENOUS at 20:40

## 2025-03-18 RX ADMIN — SENNOSIDES 17.2 MG: 8.6 TABLET ORAL at 20:40

## 2025-03-18 RX ADMIN — HYDROCORTISONE SODIUM SUCCINATE 50 MG: 100 INJECTION, POWDER, FOR SOLUTION INTRAMUSCULAR; INTRAVENOUS at 05:28

## 2025-03-18 RX ADMIN — OXYCODONE HYDROCHLORIDE 5 MG: 5 TABLET ORAL at 18:30

## 2025-03-18 RX ADMIN — ACETAMINOPHEN 650 MG: 325 TABLET, FILM COATED ORAL at 20:40

## 2025-03-18 RX ADMIN — PANTOPRAZOLE SODIUM 40 MG: 40 INJECTION, POWDER, FOR SOLUTION INTRAVENOUS at 08:48

## 2025-03-18 RX ADMIN — CARVEDILOL 6.25 MG: 6.25 TABLET, FILM COATED ORAL at 20:40

## 2025-03-18 ASSESSMENT — PAIN - FUNCTIONAL ASSESSMENT
PAIN_FUNCTIONAL_ASSESSMENT: 0-10

## 2025-03-18 ASSESSMENT — COGNITIVE AND FUNCTIONAL STATUS - GENERAL
TURNING FROM BACK TO SIDE WHILE IN FLAT BAD: A LITTLE
MOBILITY SCORE: 16
HELP NEEDED FOR BATHING: A LITTLE
STANDING UP FROM CHAIR USING ARMS: A LITTLE
MOVING FROM LYING ON BACK TO SITTING ON SIDE OF FLAT BED WITH BEDRAILS: A LITTLE
WALKING IN HOSPITAL ROOM: A LITTLE
TURNING FROM BACK TO SIDE WHILE IN FLAT BAD: A LITTLE
MOVING TO AND FROM BED TO CHAIR: A LITTLE
MOBILITY SCORE: 20
DRESSING REGULAR UPPER BODY CLOTHING: A LITTLE
CLIMB 3 TO 5 STEPS WITH RAILING: TOTAL
DRESSING REGULAR LOWER BODY CLOTHING: A LITTLE
TOILETING: A LITTLE
MOBILITY SCORE: 22
TURNING FROM BACK TO SIDE WHILE IN FLAT BAD: A LITTLE
MOVING FROM LYING ON BACK TO SITTING ON SIDE OF FLAT BED WITH BEDRAILS: A LITTLE
DAILY ACTIVITIY SCORE: 20
DRESSING REGULAR LOWER BODY CLOTHING: A LITTLE
MOVING TO AND FROM BED TO CHAIR: A LITTLE
DAILY ACTIVITIY SCORE: 20
DRESSING REGULAR UPPER BODY CLOTHING: A LITTLE
WALKING IN HOSPITAL ROOM: A LITTLE
TOILETING: A LITTLE
MOVING FROM LYING ON BACK TO SITTING ON SIDE OF FLAT BED WITH BEDRAILS: A LITTLE
HELP NEEDED FOR BATHING: A LITTLE

## 2025-03-18 ASSESSMENT — PAIN DESCRIPTION - ORIENTATION
ORIENTATION: RIGHT
ORIENTATION: RIGHT

## 2025-03-18 ASSESSMENT — PAIN SCALES - GENERAL
PAINLEVEL_OUTOF10: 3
PAINLEVEL_OUTOF10: 3
PAINLEVEL_OUTOF10: 0 - NO PAIN
PAINLEVEL_OUTOF10: 8
PAINLEVEL_OUTOF10: 3
PAINLEVEL_OUTOF10: 8

## 2025-03-18 ASSESSMENT — PAIN DESCRIPTION - LOCATION
LOCATION: LEG
LOCATION: LEG

## 2025-03-18 ASSESSMENT — PAIN SCALES - WONG BAKER: WONGBAKER_NUMERICALRESPONSE: HURTS LITTLE BIT

## 2025-03-18 NOTE — PROGRESS NOTES
Between 7AM-7PM please message me via Epic Secure Chat.  After 7PM please page Nocturnist on call.    [facility] Hospitalist Progress Note      Elizaebth Buckner    :  1940(84 y.o.)    MRN:  [id number]  Date: 25     Assessment and Plan:     GI bleed due to cratered ulcer in the fundus of the stomach  - EGD on 3/27 showed Single 8 mm cratered ulcer in the fundus of the stomach with adherent clot (Gary IIB); placed MRI conditional clips; injected 7 mL of epinephrine to address bleeding; hemostasis achieved. The mucosa surrounding the clot was injected with epinephrine.  - GI following, plan for repeat EGD on 3/19. Continue IV PPI BID  - H Pylori stool antigen pending; paged RN to collect on 3/18    ABLA  Hypotension 2/2 acute hemorrhagic shock   - due to UGIB; s/p 4 units PRBCs and IVF resuscitation. Initially was refractory and needed vasopressor support. Now resolved, no longer needing vasopressor support; transferred out of ICU on 3/17    Acute DVT  - LE Duplex with acute deep vein thrombosis at the right lower extremity from the inguinal region to the popliteal region  - Unable to anticoagulate due to severe bleeding from ulcer; IR consulted, plan for IVC filter on 3/19  - will need OP follow up with vascular medicine    Leukocytosis  - UCX negative. Blood culture negative. CXR without PNA. CT AP with concern for metastatic disease  - Persists despite 7d course of empiric abx --> suspect due to malignancy/stress dose steroids    HASMUKH on CKD2  - Baseline Cr 1-1.2; Cr peaked at 1.6, now improved to 1.4    HTN  - continue coreg; hydralazine, lasix held due to shock and BP now normal, will restart if BP rises    Stage IV breast cancer   - history of right breast carcinoma first diagnosed in  status post breast conserving surgery, radiation and tamoxifen who then went on to have a recurrence in  and had complete mastectomy. She then had breast cancer in her left breast and had a left  mastectomy followed by radiation and trastuzumab. Finally she had another recurrence in November 2021 and was on anastrozole and palbociclib.   - a large disease burden in her abdomen including diffuse LAD and several liver masses suspicious for malignancy. She was due for a PET scan after an 11/20/2024 CT showed possible progression in her pelvic lymph nodes however she did not follow-up. Needs OP follow up with oncology vs hospice    Recent UTI  - Urine culture 3/1 with E Coli; Repeat Ucx on 3/11 negative.       Malnutrition Diagnosis Status: New  Malnutrition Diagnosis: Severe malnutrition related to chronic disease or condition  Related to: pt with 8% weight decrease over past four months, suspect oral intake less than 75% of estimated energy requirements for greater than one month, visualized areas of depleted adipose tissues and skeletal tissue wasting     I agree with the dietitian's malnutrition diagnosis.    DVT Prophylaxis: SCDs    Disposition: continue to monitor inpatient, await consultant recommendations, await test results, and await clinical improvement    Electronically signed by Davidson Santiago DO on 03/18/25 at 5:32 PM     Subjective:      Interval History:   Vitals and chart notes from overnight reviewed.   No acute issues overnight.   Patient seen and evaluated at bedside.   No melena or BRBPR. No fevers or chills. No nausea, vomiting. No abd pain.    Review of Systems:   Other than patient's chronic conditions and those complaints in the history above, the rest of the 10 systems review were done and were negative.     Current medications:  Scheduled Meds:anastrozole, 1 mg, oral, Daily  [Held by provider] aspirin, 81 mg, oral, Daily  carvedilol, 6.25 mg, oral, BID  [Held by provider] gabapentin, 300 mg, oral, BID  [Held by provider] hydrALAZINE, 100 mg, oral, BID  hydrocortisone sodium succinate, 50 mg, intravenous, q12h  insulin lispro, 0-10 Units, subcutaneous, q4h  pantoprazole, 40 mg,  intravenous, BID  sennosides, 2 tablet, oral, BID      Continuous Infusions:   PRN Meds:PRN medications: acetaminophen **OR** acetaminophen **OR** acetaminophen, alteplase, dextrose, dextrose, glucagon, glucagon, melatonin, ondansetron ODT **OR** ondansetron, oxyCODONE, oxygen      Objective:     Heart Rate:  []   Temp:  [36.6 °C (97.9 °F)-36.8 °C (98.3 °F)]   Resp:  [14-29]   BP: (118-147)/(52-73)   Height:  [152.4 cm (5')]   Weight:  [72.5 kg (159 lb 13.3 oz)]   SpO2:  [94 %-100 %]     Oxygen Dose: *3 L/min    Physical Exam  Vitals and nursing note reviewed.   HENT:      Mouth/Throat:      Mouth: Mucous membranes are moist.      Pharynx: Oropharynx is clear.   Cardiovascular:      Rate and Rhythm: Normal rate and regular rhythm.   Pulmonary:      Effort: Pulmonary effort is normal.   Abdominal:      General: There is no distension.      Palpations: Abdomen is soft.      Tenderness: There is no abdominal tenderness.   Musculoskeletal:      Right lower leg: No edema.      Left lower leg: No edema.   Neurological:      Mental Status: She is alert.         Labs:   Lab Results   Component Value Date     03/18/2025    K 3.9 03/18/2025     03/18/2025    CO2 24 03/18/2025    BUN 71 (H) 03/18/2025    CREATININE 1.40 (H) 03/18/2025    GLUCOSE 110 (H) 03/18/2025    CALCIUM 7.4 (L) 03/18/2025    PROT 7.4 03/10/2025    BILITOT 1.0 03/10/2025    ALKPHOS 54 03/10/2025    AST 20 03/10/2025    ALT 9 03/10/2025       Lab Results   Component Value Date    WBC 37.1 (H) 03/18/2025    HGB 8.1 (L) 03/18/2025    HCT 23.9 (L) 03/18/2025    MCV 88 03/18/2025     03/18/2025

## 2025-03-18 NOTE — PROGRESS NOTES
Music Therapy Note    Elizabeth Buckner was referred by REBECCA Francis MD    Therapy Session  Referral Type: New referral this admission  Visit Type: New visit  Session Start Time: 1420  Intervention Delivery: In-person  Conflict of Service: Asleep        Narrative  Assessment Detail: Pt was asleep and was not disturbed. No family at bedside.  Follow-up: [facility] staff will reattempt as able.    Education Documentation  No documentation found.

## 2025-03-18 NOTE — CARE PLAN
Problem: Chronic Conditions and Co-morbidities  Goal: Patient's chronic conditions and co-morbidity symptoms are monitored and maintained or improved  Recent Flowsheet Documentation  Taken 3/18/2025 0643 by Justen Vasques RN  Care Plan - Patient's Chronic Conditions and Co-Morbidity Symptoms are Monitored and Maintained or Improved:   Monitor and assess patient's chronic conditions and comorbid symptoms for stability, deterioration, or improvement   Update acute care plan with appropriate goals if chronic or comorbid symptoms are exacerbated and prevent overall improvement and discharge     Problem: Chronic Conditions and Co-morbidities  Goal: Patient's chronic conditions and co-morbidity symptoms are monitored and maintained or improved  Outcome: Progressing  Flowsheets (Taken 3/18/2025 0643)  Care Plan - Patient's Chronic Conditions and Co-Morbidity Symptoms are Monitored and Maintained or Improved:   Monitor and assess patient's chronic conditions and comorbid symptoms for stability, deterioration, or improvement   Update acute care plan with appropriate goals if chronic or comorbid symptoms are exacerbated and prevent overall improvement and discharge     Problem: Fall/Injury  Goal: Verbalize understanding of personal risk factors for fall in the hospital  Outcome: Progressing  Goal: Verbalize understanding of risk factor reduction measures to prevent injury from fall in the home  Outcome: Progressing  Goal: Use assistive devices by end of the shift  Outcome: Progressing     Problem: Skin  Goal: Decreased wound size/increased tissue granulation at next dressing change  Outcome: Progressing  Flowsheets (Taken 3/18/2025 0643)  Decreased wound size/increased tissue granulation at next dressing change:   Promote sleep for wound healing   Utilize specialty bed per algorithm  Goal: Participates in plan/prevention/treatment measures  Outcome: Progressing  Goal: Prevent/manage excess moisture  Outcome:  Progressing  Flowsheets (Taken 3/18/2025 1143)  Prevent/manage excess moisture:   Cleanse incontinence/protect with barrier cream   Use wicking fabric (obtain order)   Moisturize dry skin  Goal: Prevent/minimize sheer/friction injuries  Outcome: Progressing  Goal: Promote/optimize nutrition  Outcome: Progressing  Goal: Promote skin healing  Outcome: Progressing   The patient's goals for the shift include Pt. will have a safe, restful and uneventful evening    The clinical goals for the shift include patient will remain HDS throughout the shift    Over the shift, the patient did make progress toward the following goals.

## 2025-03-18 NOTE — PROGRESS NOTES
"Spiritual Care Visit  Spiritual Care Request    Reason for Visit:  Routine Visit: Introduction  Continue Visiting: Yes   Request Received From:  Referral From: Physician  Focus of Care:  Visited With: Patient and family together   Refer to :  Referral To:    Spiritual Care Assessment    Spiritual Assessment:  Patient Spiritual Care Encounters  Suffering Severity: None  Fear Level: None  Feelings of Loneliness: Excellent  Feelings of Hopelessness: Excellent  Coping: Consistently demonstrated    Family Spiritual Care Encounters  Family Coping: Accepting  Family Participation in Care: Consistently demonstrated  Family Support During Treatment: Consistently demonstrated  Care Provided:  Methods: Demonstrate acceptance, Encourage sharing of feelings, Encouraging spiritual/Advent practices, Exploring hope, Offer emotional support  Interventions: Ask guided questions, [place], Share words of hope and inspiration  Sense of Community and or Episcopal Affiliation:  Congregation    Addressed Needs/Concerns and/or José Miguel Through:  Episcopal Encounters  Episcopal Needs: Prayer  Outcome:  Outcome of Spiritual Care Visit: Affirmation, Trust in self/others/God   Advance Directives:   Not Received  Spiritual Care Annotation    Annotation:  The pt stated that she wants God's will to be done. She said that she is struggling because she is not ready to give up. \"I will accept whatever God's will is\" It was said and agreed upon that she is \"on the battlefield and has fought the good fight and has been victorious. Her daughter-rita-law and  were at bedside. The family was given directives. She prayed for her soul to have peace and for her family to be taken care of.  Her cm is the foundation of her well-being.    Chaplain Layla Dinero          "

## 2025-03-18 NOTE — CONSULTS
Consults    Reason For Consult  IVC filter placement    History Of Present Illness  Elizabeth Buckner is a 84 y.o. female presenting with weakness. She has a history of right breast carcinoma first diagnosed in 1991 s/p breast sparing surgery, tamoxifen and radiation. Unfortunately suffered recurrence and underwent a right mastectomy, and eventual recurrence in the left breast with mastectomy and radiation. She complains of right leg edema for the past few months. Workup in the ED was positive for extensive RLE DVT. Also notable for numerous liver masses, extensive rertoperitoneal lymphadenopathy. She was started on Eliquis for DVT, unfortunately suffered an upper GI bleed necessitating ICU admission for hemorrhagic shock, vasopressor support and PRBC infusion. She underwent an EGD where an 8mm gastric ulcer was noted with clot, clips and epi applied. IR was consulted for placement of an IVC filter in the setting of DVT with GIB.      Past Medical History  She has a past medical history of Acute frontal sinusitis, unspecified (11/21/2018), Chronic kidney disease, stage 2 (mild) (12/20/2018), Combined forms of age-related cataract, left eye (04/12/2019), Combined forms of age-related cataract, right eye (04/12/2019), 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 (04/12/2019), Dry eye syndrome of right lacrimal gland (04/12/2019), Encounter for allergy testing, Encounter for general adult medical examination without abnormal findings (07/24/2020), Encounter for general adult medical examination without abnormal findings (07/19/2021), Encounter for general adult medical examination without abnormal findings (03/06/2018), Other obesity due to excess calories (10/15/2020), Pain in unspecified knee (03/09/2018), Personal history of other diseases of the digestive system (07/10/2018), Personal history of other diseases of the female genital tract (09/08/2016), Personal history of other diseases of the female genital tract, Personal  history of other diseases of the nervous system and sense organs (04/12/2019), Personal history of other diseases of the respiratory system (01/02/2020), and Personal history of other diseases of urinary system (01/22/2021).    Surgical History  She has a past surgical history that includes Hysterectomy (03/29/2013); Breast surgery (Right, 03/29/2013); and US guided mediastinum percutaneous biopsy (09/22/2021).     Social History  She reports that she has quit smoking. Her smoking use included cigarettes. She has never used smokeless tobacco. She reports that she does not drink alcohol and does not use drugs.    Family History  Family History   Problem Relation Name Age of Onset    Cancer Other          Family History    Lung disease Other          Family History        Allergies  Penicillins and Ramipril    MEDS:    Current Facility-Administered Medications:     acetaminophen (Tylenol) tablet 650 mg, 650 mg, oral, q4h PRN, 650 mg at 03/18/25 2040 **OR** acetaminophen (Tylenol) oral liquid 650 mg, 650 mg, nasogastric tube, q4h PRN **OR** acetaminophen (Tylenol) suppository 650 mg, 650 mg, rectal, q4h PRN, Janeth Cameron MD    alteplase (Cathflo Activase) injection 2 mg, 2 mg, intra-catheter, PRN, Janeth Cameron MD    anastrozole (Arimidex) tablet 1 mg, 1 mg, oral, Daily, Janeth Cameron MD, 1 mg at 03/18/25 0848    [Held by provider] aspirin EC tablet 81 mg, 81 mg, oral, Daily, Janeth Cameron MD    carvedilol (Coreg) tablet 6.25 mg, 6.25 mg, oral, BID, Janeth Cameron MD, 6.25 mg at 03/18/25 2040    dextrose 50 % injection 12.5 g, 12.5 g, intravenous, q15 min PRN, Janeth Cameron MD    dextrose 50 % injection 25 g, 25 g, intravenous, q15 min PRN, Janeth Cameron MD    [Held by provider] gabapentin (Neurontin) capsule 300 mg, 300 mg, oral, BID, Toan Garcia, APRN-CNP, 300 mg at 03/15/25 1017    glucagon (Glucagen) injection 1 mg, 1 mg, intramuscular, q15 min PRN, Janeth Cameron MD    glucagon (Glucagen) injection 1  mg, 1 mg, intramuscular, q15 min PRN, Janeth Cameron MD    [Held by provider] hydrALAZINE (Apresoline) tablet 100 mg, 100 mg, oral, BID, Janeth Cameron MD, 100 mg at 03/11/25 0822    insulin lispro injection 0-10 Units, 0-10 Units, subcutaneous, q4h, Janeth Cameron MD, 2 Units at 03/16/25 0449    melatonin tablet 3 mg, 3 mg, oral, Nightly PRN, Janeth Cameron MD, 3 mg at 03/17/25 2242    ondansetron ODT (Zofran-ODT) disintegrating tablet 4 mg, 4 mg, oral, q8h PRN **OR** ondansetron (Zofran) injection 4 mg, 4 mg, intravenous, q8h PRN, Janeth Cameron MD, 4 mg at 03/14/25 1857    oxyCODONE (Roxicodone) immediate release tablet 5 mg, 5 mg, oral, q4h PRN, Janeth Cameron MD, 5 mg at 03/18/25 1830    oxygen (O2) therapy, , inhalation, Continuous PRN - O2/gases, Toan Garcia, JOSELO-CNP, Last Rate: 180,000 mL/hr at 03/16/25 1230, 3 L/min at 03/16/25 1230    pantoprazole (ProtoNix) injection 40 mg, 40 mg, intravenous, BID, Janeth Cameron MD, 40 mg at 03/18/25 2040    sennosides (Senokot) tablet 17.2 mg, 2 tablet, oral, BID, Janeth Cameron MD, 17.2 mg at 03/18/25 2040    Review of Systems  History obtained from chart review and the patient  General ROS: positive for  - fatigue  Respiratory ROS: no cough, shortness of breath, or wheezing  Cardiovascular ROS: no chest pain or dyspnea on exertion  Gastrointestinal ROS: positive for - hematemesis and melena  Genitourinary ROS: no dysuria, trouble voiding, or hematuria     Last Recorded Vitals  /69 (BP Location: Left arm, Patient Position: Lying)   Pulse 89   Temp 37.3 °C (99.2 °F) (Temporal)   Resp 18   Wt 72.5 kg (159 lb 13.3 oz) Comment: bedscale.  SpO2 97%      Physical Exam  Orientation: oriented to person, place, time, and general circumstances  HEENT: normocephalic, atraumatic  Pulm: normal  Cardiac: Regular rate and rhythm  GI: soft, nontender, normal bowel sounds  Pulses: peripheral pulses symmetrical and +2 pitting edema to the RLE    Relevant  "Results    LABS:  Lab Results   Component Value Date    WBC 37.1 (H) 03/18/2025    HGB 8.1 (L) 03/18/2025    HCT 23.9 (L) 03/18/2025    MCV 88 03/18/2025     03/18/2025      Results from last 72 hours   Lab Units 03/18/25  0510   SODIUM mmol/L 136   POTASSIUM mmol/L 3.9   CHLORIDE mmol/L 106   CO2 mmol/L 24   BUN mg/dL 71*   CREATININE mg/dL 1.40*   GLUCOSE mg/dL 110*   CALCIUM mg/dL 7.4*   ANION GAP mmol/L 10   EGFR mL/min/1.73m*2 37*     Results from last 72 hours   Lab Units 03/18/25  0510   ALBUMIN g/dL 2.6*           No lab exists for component: \"PT\"    MICRO:  No results found for the last 14 days.      IMAGING:   XR chest 1 view   Final Result   Addendum (preliminary) 1 of 1   Potentially critical findings are discussed with and acknowledged by   Yael Garcia RN at 7:38 PM Eastern time on 3/15/2025.   Signed by Shavonne Ye DO      Final   1.Endotracheal tube with the tip at or less than 1 cm above the   level of the fabrizio. Repositioning is recommended.  This could be   pulled back about 4 to 5 cm.   2.Enteric tube and right internal jugular line in place.   3.No pneumothorax.   4.Normal heart size with no radiographic signs of active   pulmonary parenchymal infiltration.  Known pleural and parenchymal   nodules worrisome for metastatic disease seen on CT are not well   visualized radiographically.   Signed by Shavonne Ye DO      MR brain w and wo IV contrast   Final Result   1. No acute infarct, hemorrhage or mass is noted. No abnormalities of   the 4th ventricle are noted.        2. The cerebellar tonsils are low-lying consistent with mild Chiari 1   type malformation.        MACRO:   None        Signed by: Pete Rice 3/11/2025 12:21 PM   Dictation workstation:   [id number]      US abdomen complete   Final Result   Obscuration the spleen and right kidney by bowel gas. Previous   cholecystectomy.        Mildly small left kidney. The left kidney was otherwise   sonographically unremarkable.      "   There were 3 identifiable hypoechoic solid lesions in the liver.   These are nonspecific and could be atypical hemangiomas or metastatic   lesions. Recommend liver MRI in follow-up.        MACRO:   None        Signed by: Carroll Corley 3/11/2025 8:04 AM   Dictation workstation:   [id number]      Lower extremity venous duplex right   Final Result   1. Acute deep vein thrombosis at the right lower extremity from the   inguinal region to the popliteal region. The right posterior tibial   and peroneal veins were not visualized on this exam.             MACRO:   Noa Adamson discussed the significance and urgency of this   critical finding by telephone with  CYNTHIA HOWARD on 3/10/2025 at 11:24   pm.  (**-RCF-**) Findings:  See findings.             Signed by: Noa Adamson 3/10/2025 11:39 PM   Dictation workstation:   [id number]      CT head wo IV contrast   Final Result   Mass effect upon the 4th ventricle centrally. Further evaluation with   contrast-enhanced MRI is recommended for better assessment.        No evidence for acute cortical infarction or acute intracranial   hemorrhage is seen.        MACRO:   Critical Finding:  See findings. Notification was initiated on   3/10/2025 at 7:46 pm by  Esvin Philippe.  (**-YCF-**)        Signed by: Esvin Philippe 3/10/2025 7:46 PM   Dictation workstation:   [id number]      CT abdomen pelvis wo IV contrast   Final Result   1.  Extensive retroperitoneal and bilateral pelvic lymphadenopathy as   above concerning for lymphoma or metastatic disease.   2. Suspect acute deep venous thrombosis within the right common   femoral vein and right external iliac vein. Correlation with   ultrasound findings recommended.   3. Numerous liver masses suggestive of metastatic liver disease.   Correlation with ultrasound findings can be performed as clinically   warranted.   4. Bilateral renal lesions, nonspecific in etiology and may represent   cysts but incompletely characterized in this  unenhanced exam.   Correlation with ultrasound findings can be performed as clinically   warranted.   5. Air in the bladder which may be due to bladder instrumentation.   Correlation with urinalysis recommended.   6. Additional detailed findings as above.   Critical Finding:  See findings. Notification was initiated on   3/10/2025 at 7:54 pm by  Esvin Philippe.  (**-YCF-**) Instructions:        7.             Signed by: Esvin Philippe 3/10/2025 7:54 PM   Dictation workstation:   [id number]      XR chest 2 views   Final Result   No acute process.   Signed by Kamaljit Alex MD      Consult to Interventional Radiology    (Results Pending)          Assessment/Plan     Reason For Consult  IVC filter placement    History Of Present Illness  This is a 84 y.o. female presenting with weakness. History of right breast carcinoma first diagnosed in 1991 with eventual recurrence bilaterally requiring bilateral mastectomies, radiation, and adjvant chemotherapy. Notes right leg swelling for over 1 month duration.     Workup in the ED included duplex US which was reviewed and demonstrates extensive RLE DVT throughout the common fem to the popliteal region. CT A/P was also reviewed notable for numerous liver masses, extensive rertoperitoneal lymphadenopathy consistent with metastasis.     She was started on Eliquis for DVT, unfortunately suffered an upper GI bleed necessitating ICU admission for hemorrhagic shock. EGD positive for 8mm gastric ulcer was noted with clot, clips and epi applied. IR was consulted for placement of an IVC filter in the setting of DVT with GIB. Scheduled for repeat EGD tomorrow.     IVC filter placement was discussed with the patient in detail. We discussed her risk of rebleeding on Eliquis given her gastric ulcer. We also reviewed the risk of recurrent DVT with her malignancy. Patient would like to discuss filter placement further with her family. I printed out material for her to read and discuss.     We are  prepared to proceed with IVC filter placement tomorrow pending IR schedule and patient discussion with family.   Please keep NPO after midnight.     Sedation Plan    ASA 3     Mallampati class: III.            Melia Rm, JOSELO-CNP

## 2025-03-18 NOTE — CARE PLAN
The patient's goals for the shift include Pt. will have a safe, restful and uneventful evening    The clinical goals for the shift include monitor Bleeding

## 2025-03-18 NOTE — PROGRESS NOTES
GI Daily Progress Note    Assessment/Plan:    Assessment & Plan  General weakness     84 y.o. female with h/o stage IV breast ca s/p bilateral mastectomy, s/p radiation and tamoxifen, anastrozole and palbociclib (2021), lymph node dissection, CRF cerebral, hepatic, pelvic, and brain mets, presented with c/o RLE edema and altered mental status, generalized weakness, acute hemorrhagic shock due to upper GIB secondary to PUD.     -Monitor H&H, transfuse as needed to keep hemoglobin above 7  -Continue twice daily PPI  -Full liquid diet, n.p.o. after midnight  -Plan to repeat EGD tomorrow  -Stool for H. pylori antigen ordered     LOS: 7 days     Elizabeth Buckner is a 84 y.o. female who was admitted with General weakness. She reports having small black stool, H&H stable.  Yesterday showed single ulcer in the fundus of the stomach with adherent clot, placed clips, injected epinephrine to address bleeding, hemostasis achieved.    Subjective:    Patient expresses No new complaints  Patient denies abdominal pain, nausea or vomiting    Objective:    Vital signs in last 24 hours:  Temp:  [36.6 °C (97.9 °F)-36.8 °C (98.3 °F)] 36.6 °C (97.9 °F)  Heart Rate:  [] 85  Resp:  [14-29] 18  BP: (118-147)/(49-73) 145/65    Intake/Output last 3 shifts:  I/O last 3 completed shifts:  In: 353.1 (4.9 mL/kg) [I.V.:3.1 (0 mL/kg); Blood:350]  Out: 1400 (19.3 mL/kg) [Urine:1400 (0.5 mL/kg/hr)]  Weight: 72.5 kg   Intake/Output this shift:  I/O this shift:  In: 480 [P.O.:480]  Out: -     Physical Exam  Vitals reviewed.   Constitutional:       Appearance: Normal appearance. She is obese.   HENT:      Head: Normocephalic and atraumatic.   Pulmonary:      Effort: Pulmonary effort is normal.   Abdominal:      Palpations: Abdomen is soft.   Skin:     General: Skin is warm and dry.   Neurological:      General: No focal deficit present.      Mental Status: She is alert. Mental status is at baseline.   Psychiatric:         Mood and Affect: Mood normal.          Behavior: Behavior normal.          Results for orders placed or performed during the hospital encounter of 03/10/25 (from the past 24 hours)   POCT GLUCOSE   Result Value Ref Range    POCT Glucose 120 (H) 74 - 99 mg/dL   POCT GLUCOSE   Result Value Ref Range    POCT Glucose 113 (H) 74 - 99 mg/dL   POCT GLUCOSE   Result Value Ref Range    POCT Glucose 117 (H) 74 - 99 mg/dL   CBC and Auto Differential   Result Value Ref Range    WBC 37.1 (H) 4.4 - 11.3 x10*3/uL    nRBC 0.1 (H) 0.0 - 0.0 /100 WBCs    RBC 2.73 (L) 4.00 - 5.20 x10*6/uL    Hemoglobin 8.1 (L) 12.0 - 16.0 g/dL    Hematocrit 23.9 (L) 36.0 - 46.0 %    MCV 88 80 - 100 fL    MCH 29.7 26.0 - 34.0 pg    MCHC 33.9 32.0 - 36.0 g/dL    RDW 15.4 (H) 11.5 - 14.5 %    Platelets 208 150 - 450 x10*3/uL    Immature Granulocytes %, Automated 0.9 0.0 - 0.9 %    Immature Granulocytes Absolute, Automated 0.33 0.00 - 0.50 x10*3/uL   Renal Function Panel   Result Value Ref Range    Glucose 110 (H) 74 - 99 mg/dL    Sodium 136 136 - 145 mmol/L    Potassium 3.9 3.5 - 5.3 mmol/L    Chloride 106 98 - 107 mmol/L    Bicarbonate 24 21 - 32 mmol/L    Anion Gap 10 10 - 20 mmol/L    Urea Nitrogen 71 (H) 6 - 23 mg/dL    Creatinine 1.40 (H) 0.50 - 1.05 mg/dL    eGFR 37 (L) >60 mL/min/1.73m*2    Calcium 7.4 (L) 8.6 - 10.3 mg/dL    Phosphorus 2.3 (L) 2.5 - 4.9 mg/dL    Albumin 2.6 (L) 3.4 - 5.0 g/dL   Magnesium   Result Value Ref Range    Magnesium 2.20 1.60 - 2.40 mg/dL   Manual Differential   Result Value Ref Range    Neutrophils %, Manual 88.0 40.0 - 80.0 %    Bands %, Manual 11.0 0.0 - 5.0 %    Lymphocytes %, Manual 0.0 13.0 - 44.0 %    Monocytes %, Manual 1.0 2.0 - 10.0 %    Eosinophils %, Manual 0.0 0.0 - 6.0 %    Basophils %, Manual 0.0 0.0 - 2.0 %    Seg Neutrophils Absolute, Manual 32.65 (H) 1.60 - 5.00 x10*3/uL    Bands Absolute, Manual 4.08 (H) 0.00 - 0.50 x10*3/uL    Lymphocytes Absolute, Manual 0.00 (L) 0.80 - 3.00 x10*3/uL    Monocytes Absolute, Manual 0.37 0.05 - 0.80  x10*3/uL    Eosinophils Absolute, Manual 0.00 0.00 - 0.40 x10*3/uL    Basophils Absolute, Manual 0.00 0.00 - 0.10 x10*3/uL    Total Cells Counted 100     Neutrophils Absolute, Manual 36.73 (H) 1.60 - 5.50 x10*3/uL    RBC Morphology See Below     Ovalocytes Few     Luis Alberto Cells Many     Bite Cells Present    POCT GLUCOSE   Result Value Ref Range    POCT Glucose 106 (H) 74 - 99 mg/dL   POCT GLUCOSE   Result Value Ref Range    POCT Glucose 116 (H) 74 - 99 mg/dL     *Note: Due to a large number of results and/or encounters for the requested time period, some results have not been displayed. A complete set of results can be found in Results Review.

## 2025-03-18 NOTE — PROGRESS NOTES
Physical Therapy    Physical Therapy Treatment    Patient Name: Elizabeth Buckner  MRN: [id number]  Department: [place] [address]  Room: [address] [place] [address]  Today's Date: 3/18/2025  Time Calculation  Start Time: 1137  Stop Time: 1203  Time Calculation (min): 26 min         Assessment/Plan   PT Assessment  PT Assessment Results: Decreased strength, Decreased range of motion, Decreased endurance, Impaired balance, Decreased mobility, Pain  Rehab Prognosis: Good  Barriers to Discharge Home: Physical needs, Caregiver assistance  End of Session Communication: Bedside nurse  Assessment Comment: PT treatment completed. Pt demonstrates a slight decline in mobility due to complications during this admission. Pt continues to demonstrate decreased endurance and deconditioning. Pt's discharge rec being updated to moderate intensity.  End of Session Patient Position: Bed, 3 rail up, Alarm on  PT Plan  Inpatient/Swing Bed or Outpatient: Inpatient  PT Plan  Treatment/Interventions: Bed mobility, Transfer training, Gait training, Stair training, Balance training, Strengthening, Endurance training, Therapeutic exercise, Range of motion, Home exercise program, Therapeutic activity  PT Plan: Ongoing PT  PT Frequency: 3 times per week  PT Discharge Recommendations: Moderate intensity level of continued care  PT Recommended Transfer Status: Assist x1  PT - OK to Discharge: Yes (PT POC established.)      General Visit Information:   PT  Visit  PT Received On: 03/18/25  General  Reason for Referral: To ED with increased groin pain, unresolved UTI, and AMS. CT (+) for R LE DVT. Transferred to ICU for acute hemorrhagic shock due to GIB with hematemesis and melena requiring blood transfusions, Kcentra for eliquis reversal, IVF resuscitation and vasopressor support.  Transferred to ICU for ongoing management.  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    Dry eye syndrome of right lacrimal gland 04/12/2019    Dry eye syndrome of right lacrimal gland    Encounter for allergy testing     Encounter for allergy testing    Encounter for general adult medical examination without abnormal findings 07/24/2020    Encounter for annual health examination    Encounter for general adult medical examination without abnormal findings 07/19/2021    Encounter for annual health examination    Encounter for general adult medical examination without abnormal findings 03/06/2018    Encounter for annual health examination    Other obesity due to excess calories 10/15/2020    Exogenous obesity    Pain in unspecified knee 03/09/2018    Joint pain, knee    Personal history of other diseases of the digestive system 07/10/2018    History of gastroesophageal reflux (GERD)    Personal history of other diseases of the female genital tract 09/08/2016    History of vaginitis    Personal history of other diseases of the female genital tract     History of pelvic inflammatory disease    Personal history of other diseases of the nervous system and sense organs 04/12/2019    History of myopia    Personal history of other diseases of the respiratory system 01/02/2020    History of acute bronchitis    Personal history of other diseases of urinary system 01/22/2021    History of chronic kidney disease       Prior to Session Communication: Bedside nurse  Patient Position Received: Bed, 3 rail up, Alarm on  General Comment: Pt pleasant and agreeable to  treatment.    Subjective   Precautions:  Precautions  Medical Precautions: Fall precautions     Date/Time Vitals Session Patient Position Pulse Resp SpO2 BP MAP (mmHg)    03/18/25 1222 --  --  85  --  97 %  145/65  92                 Objective   Pain:  Pain Assessment  Pain Assessment: 0-10  0-10 (Numeric) Pain Score: 8  Pain Location:  (Kaushik LEs)  Pain Interventions: Repositioned, Ambulation/increased activity  Cognition:  Cognition  Overall Cognitive Status: Within Functional Limits  Orientation Level: Oriented X4    Postural Control:  Static Sitting Balance  Static Sitting-Balance Support: Feet supported, Bilateral upper extremity supported  Static Sitting-Level of Assistance: Close supervision  Dynamic Sitting Balance  Dynamic Sitting-Balance Support: Feet supported, Bilateral upper extremity supported  Dynamic Sitting-Level of Assistance: Close supervision  Static Standing Balance  Static Standing-Balance Support: Bilateral upper extremity supported  Static Standing-Level of Assistance: Contact guard  Dynamic Standing Balance  Dynamic Standing-Balance Support: Bilateral upper extremity supported  Dynamic Standing-Level of Assistance: Contact guard    Activity Tolerance:  Activity Tolerance  Endurance: Tolerates 10 - 20 min exercise with multiple rests  Activity Tolerance Comments: Rest breaks given throughout  Treatments:  Therapeutic Exercise  Therapeutic Exercise Performed: Yes  Therapeutic Exercise Activity 1: Pt completes each of the following x15 reps kaushik in order to improve strength and endurance: ankle pumps, LAQs, seated marches, hip adduction pillow squeeze, hip abduction with resistance. Cues for proper form and technique.    Bed Mobility  Bed Mobility: Yes  Bed Mobility 1  Bed Mobility 1: Supine to sitting  Level of Assistance 1: Close supervision  Bed Mobility Comments 1: HOB elevated. Use of bed rails.    Ambulation/Gait Training  Ambulation/Gait Training Performed: Yes  Ambulation/Gait Training  1  Surface 1: Level tile  Device 1: Rolling walker  Gait Support Devices: Gait belt  Assistance 1: Contact guard, Minimum assistance (Grossly CGA with occasional min A)  Comments/Distance (ft) 1: 40 ft. Pt ambulates with decreased rolando and step length. Cues for walker placement with occasional assist to maneuver around obstacles. Increased difficulty advancing the R LE.  Transfers  Transfer: Yes  Transfer 1  Technique 1: Sit to stand, Stand to sit  Transfer Device 1: Walker  Transfer Level of Assistance 1: Contact guard  Trials/Comments 1: Repeated cues for hand placement, scooting to the EOB, and anterior weight shifting.    Outcome Measures:  [facility] Basic Mobility  Turning from your back to your side while in a flat bed without using bedrails: A little  Moving from lying on your back to sitting on the side of a flat bed without using bedrails: A little  Moving to and from bed to chair (including a wheelchair): A little  Standing up from a chair using your arms (e.g. wheelchair or bedside chair): A little  To walk in hospital room: A little  Climbing 3-5 steps with railing: Total  Basic Mobility - Total Score: 16    Education Documentation  Body Mechanics, taught by Claudia White, PT at 3/18/2025  1:18 PM.  Learner: Patient  Readiness: Acceptance  Method: Explanation  Response: Needs Reinforcement    Mobility Training, taught by Claudia White PT at 3/18/2025  1:18 PM.  Learner: Patient  Readiness: Acceptance  Method: Explanation  Response: Needs Reinforcement    Education Comments  No comments found.        OP EDUCATION:       Encounter Problems       Encounter Problems (Active)       Balance       complete all mobility with normal balance while dual tasking, negotiating in a dynamic environment, carrying items, etc., with proactive and reactive static and dynamic standing and sitting tasks, with mod I and RW, >15 minutes.   (Progressing)       Start:  03/14/25    Expected End:  03/28/25                Mobility       STG - Patient will ambulate 150 ft mod I with a RW (Progressing)       Start:  03/14/25    Expected End:  03/28/25               PT Transfers       STG - Patient will perform bed mobility independently.  (Progressing)       Start:  03/14/25    Expected End:  03/28/25            STG - Patient will transfer sit to and from stand mod I with a RW (Progressing)       Start:  03/14/25    Expected End:  03/28/25               Pain - Adult             Encounter Problems (Resolved)       Pain - Adult

## 2025-03-18 NOTE — PROGRESS NOTES
03/18/25 0900   Discharge Planning   Expected Discharge Disposition SNF   Does the patient need discharge transport arranged? Yes   RoundTrip coordination needed? Yes   Has discharge transport been arranged? No     Spoke with Elizabeth this morning and we discussed how difficult it would be for her  Edwin to care for her right now as he is just recently recovered from a stroke. Her [facility] is 13/17 per therapy and she agreed with me. I asked her if she was willing to go somewhere for some rehab before returning home with her  and she agreed that would be best. I told her I would have a choice list sent to her room and I asked her to pick 3-5 choices incase someone did not have a bed available now. She stated she understood. I emailed the list to Gema Landrum RN to assist as I am working offsite today. Care Transitions to follow. [place] FORBES/RN-TCC     [id number] Followed up with pt for SNF choices and pt stated she has not received the choice list yet. Care Transitions to follow. [place] FORBES/RN-TCC     [id number] Spoke with  Edwin who is on his way to see his wife now and he will review the SNF choices with her and discuss this with her and I did tell him to ask for Care Transitions or Social Work once they have decided as I will no longer be available. I will let SW know that he is on his way to see his wife now. Care Transitions to follow. [place] FORBES/MKIE-TCC

## 2025-03-18 NOTE — TELEPHONE ENCOUNTER
Call to patient Emergency contact Gila - we have reviewed given hospitalization will cancel [facility] apt tomorrow and will give her a week to heal up. Plan for apt with our team week of 3/31. Agreeable to this plan

## 2025-03-18 NOTE — CONSULTS
Nutrition Initial Assessment Note    Pt presented to ED 3/10 with weakness and confusion, concern for upper GIB.  Pt transferred to ICU upon admission 2/2 hemorrhagic shock, + hematemesis, intubated. EGD 3/17 showed stomach ulcer with clot, clips placed, hemostasis achieved. esophagus appeared normal. Pt extubated.     Pt oral diet advanced to fulls. Attempted to meet with pt this am, pt sleeping.   Met with pt in ICU yesterday, family at bedside along with RN for pt care.     PMH includes metatstatic breast cancer.     Reason for Assessment: Provider consult order, Enteral assessment/recommendation (TF)  - enteral support no longer indicated     History:  Energy Intake: Fair 50-75 %  Food and Nutrient History: full liquids    Dietary Orders (From admission, onward)       Start     Ordered    03/18/25 0851  Adult diet Full Liquid  Diet effective now        Question:  Diet type  Answer:  Full Liquid    03/18/25 0850                    Anthropometrics:  Height: 152.4 cm (5')  Weight: 72.5 kg (159 lb 13.3 oz) (bedscale.)  BMI (Calculated): 31.22      Weight History / % Weight Change: 68.9 kg 3/5/25, 75 kg 11/13/24, 77.1 kg 6/18/24, 75.9 kg 4/30/24.  Significant Weight Loss: Yes (8% weight decrease over past 4 months based on 3/5/25 wt)  Interpretation of Weight Loss: >7.5% in 3 months  Significant Weight Gain: Fluid related         IBW/kg (Dietitian Calculated): 45.5 kg  Percent of IBW: 159 %     Scheduled medications  anastrozole, 1 mg, oral, Daily  [Held by provider] aspirin, 81 mg, oral, Daily  carvedilol, 6.25 mg, oral, BID  [Held by provider] gabapentin, 300 mg, oral, BID  [Held by provider] hydrALAZINE, 100 mg, oral, BID  hydrocortisone sodium succinate, 50 mg, intravenous, q12h  insulin lispro, 0-10 Units, subcutaneous, q4h  pantoprazole, 40 mg, intravenous, BID  sennosides, 2 tablet, oral, BID      Continuous medications     PRN medications  PRN medications: acetaminophen **OR** acetaminophen **OR**  acetaminophen, alteplase, dextrose, dextrose, glucagon, glucagon, melatonin, ondansetron ODT **OR** ondansetron, oxyCODONE, oxygen       Latest Reference Range & Units 03/18/25 05:10   GLUCOSE 74 - 99 mg/dL 110 (H)   SODIUM 136 - 145 mmol/L 136   POTASSIUM 3.5 - 5.3 mmol/L 3.9   CHLORIDE 98 - 107 mmol/L 106   Bicarbonate 21 - 32 mmol/L 24   Anion Gap 10 - 20 mmol/L 10   Blood Urea Nitrogen 6 - 23 mg/dL 71 (H)   Creatinine 0.50 - 1.05 mg/dL 1.40 (H)   EGFR >60 mL/min/1.73m*2 37 (L)   Calcium 8.6 - 10.3 mg/dL 7.4 (L)   PHOSPHORUS 2.5 - 4.9 mg/dL 2.3 (L)   Albumin 3.4 - 5.0 g/dL 2.6 (L)   MAGNESIUM 1.60 - 2.40 mg/dL 2.20   (H): Data is abnormally high  (L): Data is abnormally low    I/O last 3 completed shifts:  In: 353.1 (4.9 mL/kg) [I.V.:3.1 (0 mL/kg); Blood:350]  Out: 1400 (19.3 mL/kg) [Urine:1400 (0.5 mL/kg/hr)]  Weight: 72.5 kg   I/O this shift:  In: 480 [P.O.:480]  Out: -             Energy Needs:  Height: 152.4 cm (5')  Minute Ventilation (L/min): 7 L/min  Temp: 36.8 °C (98.2 °F)    Method for Estimating Needs: 5107-7569 kcal/day (22-25 kcal/kg using 68.9 kg 3/5/25 wt)    Method for Estimating 24 Hour Protein Needs: 45-60 g/d protein ( 1.0-1.3 g/kg IBW)    Method for Estimating 24 Hour Fluid Needs: 1700 ml/day or as per MD  Patient on Order Fluid Restriction: No         Nutrition Focused Physical Findings:  Orbital Fat Pads: Mild-Moderate (slight dark circles and slight hollowing)  Buccal Fat Pads: Mild-Moderate (flat cheeks, minimal bounce)    Temporalis: Mild-Moderate (slight depression)  Pectoralis (Clavicular Region): Mild-Moderate (some protrusion of clavicle)  Deltoid/Trapezius: Mild-Moderate (slight protrusion of acromion process)    Edema: +1 trace, +2 mild         Skin: Negative       Nutrition Diagnosis   Malnutrition Diagnosis  Patient has Malnutrition Diagnosis: Yes  Diagnosis Status: New  Malnutrition Diagnosis: Severe malnutrition related to chronic disease or condition  Related to: pt with 8%  weight decrease over past four months, suspect oral intake less than 75% of estimated energy requirements for greater than one month, visualized areas of depleted adipose tissues and skeletal tissue wasting  Additional Assessment Information: suspect edema may be masking accurate wt            Nutrition Interventions/Recommendations   Nutrition Prescription: Nutrition prescription for oral nutrition  Individualized Nutrition Prescription Provided for : oral diet per MD, suggest advance to low fiber when medically appropriate (or regular if okay with MD) ; ensure plus TID with meals  - monitor weight trend, oral intake   - consider MVI once daily   - music therapy consult       Food and/or Nutrient Delivery Interventions  Meals and Snacks: Other (Comment)  Goal: diet as per MD         Collaboration and Referral of Nutrition Care: Collaboration by nutrition professional with other providers  Coordination of Care with Providers: Nursing, Provider    Education Documentation  No documentation found.      Nutrition Monitoring and Evaluation   Food and Nutrient Related History  Estimated Energy Intake: Energy intake greater or equal to 75% of estimated energy needs    Fluid Intake: Estimated fluid intake    Intake / Amount of food: Consumes at least 75% or more of meals/snacks/supplements, Meets > 75% estimated energy needs              Eating Behavior: Refusing food  Criteria: monitor         Anthropometrics: Body Composition/Growth/Weight History  Body Weight: Body weight - Weight reduction from fluids, as needed    Body Weight Change: Body weight change percentage              Biochemical Data, Medical Tests and Procedures  Electrolyte and Renal Panel: Electrolytes within normal limits, BUN, Calcium, ionized, Calcium, serum, Chloride, Creatinine, Magnesium, GFR, Sodium, Potassium, Phosphorus  Criteria: daily or as per MD    Gastrointestinal Profile: Alanine aminotransferase (ALT), Alkaline phosphatase, Aspartate  aminotransferase (AST), Bilirubin, total  Criteria: as clinically indicated    Glucose/Endocrine Profile: Glucose within normal limits ( mg/dL), Hemoglobin A1c (HgbA1c)  Criteria: as clincially indicated    Nutritional Anemia Profile: Hematocrit, Hemoglobin, Iron, serum, B12, Folate, serum  Criteria: as clinically indicated    Vitamin Profile: Vitamin D, 25 hydroxy  Criteria: as appropriate    Nutrition Focused Physical Findings  Adipose Finding: Other (Comment)  Criteria: monitor NFPE    Bones Finding: Other (Comment)  Criteria: monitor NFPE    Digestive System Finding: Other (Comment)  Criteria: monitor GI function closely    Muscle Finding: Other (Comment)  Criteria: monitor NFPE    Skin Finding: Other (Comment)  Criteria: monitor skin integrity closely    Edema Finding: +1 Pitting edema, +2 Pitting edema  Criteria: monitor +/- edema    Mouth Finding: Other (Comment)  Criteria: monitor    Teeth Finding: Other (Comment)  Criteria: monitor         Time Spent (min): 60 minutes  Last Date of Nutrition Visit: 03/18/24  Nutrition Follow-Up Needed?: Dietitian to reassess per policy  Follow up Comment: ANGELA angelo

## 2025-03-19 ENCOUNTER — ANESTHESIA EVENT (OUTPATIENT)
Dept: GASTROENTEROLOGY | Facility: HOSPITAL | Age: 85
End: 2025-03-19
Payer: COMMERCIAL

## 2025-03-19 ENCOUNTER — APPOINTMENT (OUTPATIENT)
Dept: RADIOLOGY | Facility: HOSPITAL | Age: 85
DRG: 299 | End: 2025-03-19
Payer: MEDICARE

## 2025-03-19 ENCOUNTER — APPOINTMENT (OUTPATIENT)
Dept: CARDIOLOGY | Facility: HOSPITAL | Age: 85
DRG: 299 | End: 2025-03-19
Payer: MEDICARE

## 2025-03-19 ENCOUNTER — APPOINTMENT (OUTPATIENT)
Dept: HEMATOLOGY/ONCOLOGY | Facility: CLINIC | Age: 85
End: 2025-03-19
Payer: COMMERCIAL

## 2025-03-19 ENCOUNTER — PREP FOR PROCEDURE (OUTPATIENT)
Dept: RADIOLOGY | Facility: HOSPITAL | Age: 85
End: 2025-03-19

## 2025-03-19 ENCOUNTER — ANESTHESIA (OUTPATIENT)
Dept: GASTROENTEROLOGY | Facility: HOSPITAL | Age: 85
End: 2025-03-19
Payer: COMMERCIAL

## 2025-03-19 LAB
ALBUMIN SERPL BCP-MCNC: 2.5 G/DL (ref 3.4–5)
ANION GAP SERPL CALC-SCNC: 12 MMOL/L (ref 10–20)
BACTERIA BLD CULT: NORMAL
BASOPHILS # BLD MANUAL: 0 X10*3/UL (ref 0–0.1)
BASOPHILS NFR BLD MANUAL: 0 %
BUN SERPL-MCNC: 64 MG/DL (ref 6–23)
BURR CELLS BLD QL SMEAR: ABNORMAL
CA-I BLD-SCNC: 0.95 MMOL/L (ref 1.1–1.33)
CALCIUM SERPL-MCNC: 7.4 MG/DL (ref 8.6–10.3)
CHLORIDE SERPL-SCNC: 106 MMOL/L (ref 98–107)
CO2 SERPL-SCNC: 23 MMOL/L (ref 21–32)
CREAT SERPL-MCNC: 1.46 MG/DL (ref 0.5–1.05)
EGFRCR SERPLBLD CKD-EPI 2021: 35 ML/MIN/1.73M*2
EOSINOPHIL # BLD MANUAL: 0 X10*3/UL (ref 0–0.4)
EOSINOPHIL NFR BLD MANUAL: 0 %
ERYTHROCYTE [DISTWIDTH] IN BLOOD BY AUTOMATED COUNT: 15.6 % (ref 11.5–14.5)
FLUAV RNA RESP QL NAA+PROBE: NOT DETECTED
FLUBV RNA RESP QL NAA+PROBE: NOT DETECTED
GLUCOSE BLD MANUAL STRIP-MCNC: 95 MG/DL (ref 74–99)
GLUCOSE BLD MANUAL STRIP-MCNC: 97 MG/DL (ref 74–99)
GLUCOSE BLD MANUAL STRIP-MCNC: 98 MG/DL (ref 74–99)
GLUCOSE SERPL-MCNC: 81 MG/DL (ref 74–99)
HCT VFR BLD AUTO: 25.7 % (ref 36–46)
HGB BLD-MCNC: 8.2 G/DL (ref 12–16)
IMM GRANULOCYTES # BLD AUTO: 0.32 X10*3/UL (ref 0–0.5)
IMM GRANULOCYTES NFR BLD AUTO: 1.1 % (ref 0–0.9)
LYMPHOCYTES # BLD MANUAL: 2.07 X10*3/UL (ref 0.8–3)
LYMPHOCYTES NFR BLD MANUAL: 7 %
MAGNESIUM SERPL-MCNC: 2.28 MG/DL (ref 1.6–2.4)
MCH RBC QN AUTO: 29.3 PG (ref 26–34)
MCHC RBC AUTO-ENTMCNC: 31.9 G/DL (ref 32–36)
MCV RBC AUTO: 92 FL (ref 80–100)
MONOCYTES # BLD MANUAL: 0 X10*3/UL (ref 0.05–0.8)
MONOCYTES NFR BLD MANUAL: 0 %
NEUTROPHILS # BLD MANUAL: 27.44 X10*3/UL (ref 1.6–5.5)
NEUTS BAND # BLD MANUAL: 2.07 X10*3/UL (ref 0–0.5)
NEUTS BAND NFR BLD MANUAL: 7 %
NEUTS SEG # BLD MANUAL: 25.37 X10*3/UL (ref 1.6–5)
NEUTS SEG NFR BLD MANUAL: 86 %
NRBC BLD-RTO: 0.3 /100 WBCS (ref 0–0)
OVALOCYTES BLD QL SMEAR: ABNORMAL
PHOSPHATE SERPL-MCNC: 2.2 MG/DL (ref 2.5–4.9)
PLATELET # BLD AUTO: 209 X10*3/UL (ref 150–450)
POLYCHROMASIA BLD QL SMEAR: ABNORMAL
POTASSIUM SERPL-SCNC: 3.9 MMOL/L (ref 3.5–5.3)
RBC # BLD AUTO: 2.8 X10*6/UL (ref 4–5.2)
RBC MORPH BLD: ABNORMAL
SARS-COV-2 RNA RESP QL NAA+PROBE: NOT DETECTED
SODIUM SERPL-SCNC: 137 MMOL/L (ref 136–145)
TARGETS BLD QL SMEAR: ABNORMAL
TOTAL CELLS COUNTED BLD: 100
WBC # BLD AUTO: 29.5 X10*3/UL (ref 4.4–11.3)

## 2025-03-19 PROCEDURE — 99233 SBSQ HOSP IP/OBS HIGH 50: CPT | Performed by: HOSPITALIST

## 2025-03-19 PROCEDURE — 9420000001 HC RT PATIENT EDUCATION 5 MIN

## 2025-03-19 PROCEDURE — 93010 ELECTROCARDIOGRAM REPORT: CPT | Performed by: INTERNAL MEDICINE

## 2025-03-19 PROCEDURE — 2500000004 HC RX 250 GENERAL PHARMACY W/ HCPCS (ALT 636 FOR OP/ED): Performed by: HOSPITALIST

## 2025-03-19 PROCEDURE — 2500000001 HC RX 250 WO HCPCS SELF ADMINISTERED DRUGS (ALT 637 FOR MEDICARE OP): Performed by: HOSPITALIST

## 2025-03-19 PROCEDURE — 80069 RENAL FUNCTION PANEL: CPT | Performed by: NURSE PRACTITIONER

## 2025-03-19 PROCEDURE — 82330 ASSAY OF CALCIUM: CPT | Performed by: HOSPITALIST

## 2025-03-19 PROCEDURE — 83735 ASSAY OF MAGNESIUM: CPT | Performed by: NURSE PRACTITIONER

## 2025-03-19 PROCEDURE — 2060000001 HC INTERMEDIATE ICU ROOM DAILY

## 2025-03-19 PROCEDURE — 82947 ASSAY GLUCOSE BLOOD QUANT: CPT

## 2025-03-19 PROCEDURE — 36415 COLL VENOUS BLD VENIPUNCTURE: CPT | Performed by: NURSE PRACTITIONER

## 2025-03-19 PROCEDURE — 71045 X-RAY EXAM CHEST 1 VIEW: CPT

## 2025-03-19 PROCEDURE — 99233 SBSQ HOSP IP/OBS HIGH 50: CPT | Performed by: INTERNAL MEDICINE

## 2025-03-19 PROCEDURE — 93005 ELECTROCARDIOGRAM TRACING: CPT

## 2025-03-19 PROCEDURE — 85007 BL SMEAR W/DIFF WBC COUNT: CPT | Performed by: NURSE PRACTITIONER

## 2025-03-19 PROCEDURE — 2500000004 HC RX 250 GENERAL PHARMACY W/ HCPCS (ALT 636 FOR OP/ED): Mod: JZ | Performed by: HOSPITALIST

## 2025-03-19 PROCEDURE — 87636 SARSCOV2 & INF A&B AMP PRB: CPT | Performed by: HOSPITALIST

## 2025-03-19 PROCEDURE — 85027 COMPLETE CBC AUTOMATED: CPT | Performed by: NURSE PRACTITIONER

## 2025-03-19 PROCEDURE — 71045 X-RAY EXAM CHEST 1 VIEW: CPT | Performed by: RADIOLOGY

## 2025-03-19 RX ORDER — CALCIUM GLUCONATE 20 MG/ML
2 INJECTION, SOLUTION INTRAVENOUS ONCE
Status: COMPLETED | OUTPATIENT
Start: 2025-03-19 | End: 2025-03-19

## 2025-03-19 RX ORDER — METOPROLOL TARTRATE 1 MG/ML
5 INJECTION, SOLUTION INTRAVENOUS ONCE
Status: CANCELLED | OUTPATIENT
Start: 2025-03-19

## 2025-03-19 RX ORDER — ACETAMINOPHEN 325 MG/1
650 TABLET ORAL EVERY 4 HOURS PRN
Status: DISCONTINUED | OUTPATIENT
Start: 2025-03-19 | End: 2025-03-31 | Stop reason: HOSPADM

## 2025-03-19 RX ORDER — ACETAMINOPHEN 160 MG/5ML
650 SOLUTION ORAL EVERY 4 HOURS PRN
Status: DISCONTINUED | OUTPATIENT
Start: 2025-03-19 | End: 2025-03-31 | Stop reason: HOSPADM

## 2025-03-19 RX ORDER — ACETAMINOPHEN 650 MG/1
650 SUPPOSITORY RECTAL EVERY 4 HOURS PRN
Status: DISCONTINUED | OUTPATIENT
Start: 2025-03-19 | End: 2025-03-31 | Stop reason: HOSPADM

## 2025-03-19 RX ADMIN — SENNOSIDES 17.2 MG: 8.6 TABLET ORAL at 21:22

## 2025-03-19 RX ADMIN — CARVEDILOL 6.25 MG: 6.25 TABLET, FILM COATED ORAL at 21:22

## 2025-03-19 RX ADMIN — ANASTROZOLE 1 MG: 1 TABLET, COATED ORAL at 08:56

## 2025-03-19 RX ADMIN — CALCIUM GLUCONATE 2 G: 20 INJECTION, SOLUTION INTRAVENOUS at 10:23

## 2025-03-19 RX ADMIN — OXYCODONE HYDROCHLORIDE 5 MG: 5 TABLET ORAL at 17:15

## 2025-03-19 RX ADMIN — ACETAMINOPHEN 650 MG: 325 TABLET, FILM COATED ORAL at 09:23

## 2025-03-19 RX ADMIN — PANTOPRAZOLE SODIUM 40 MG: 40 INJECTION, POWDER, FOR SOLUTION INTRAVENOUS at 21:22

## 2025-03-19 RX ADMIN — PANTOPRAZOLE SODIUM 40 MG: 40 INJECTION, POWDER, FOR SOLUTION INTRAVENOUS at 08:56

## 2025-03-19 RX ADMIN — OXYCODONE HYDROCHLORIDE 5 MG: 5 TABLET ORAL at 00:08

## 2025-03-19 RX ADMIN — CARVEDILOL 6.25 MG: 6.25 TABLET, FILM COATED ORAL at 08:56

## 2025-03-19 RX ADMIN — OXYCODONE HYDROCHLORIDE 5 MG: 5 TABLET ORAL at 11:08

## 2025-03-19 ASSESSMENT — COGNITIVE AND FUNCTIONAL STATUS - GENERAL
HELP NEEDED FOR BATHING: A LITTLE
WALKING IN HOSPITAL ROOM: A LITTLE
TURNING FROM BACK TO SIDE WHILE IN FLAT BAD: A LITTLE
DRESSING REGULAR UPPER BODY CLOTHING: A LITTLE
MOBILITY SCORE: 20
TURNING FROM BACK TO SIDE WHILE IN FLAT BAD: A LITTLE
WALKING IN HOSPITAL ROOM: A LITTLE
MOVING FROM LYING ON BACK TO SITTING ON SIDE OF FLAT BED WITH BEDRAILS: A LITTLE
DAILY ACTIVITIY SCORE: 20
HELP NEEDED FOR BATHING: A LITTLE
DRESSING REGULAR LOWER BODY CLOTHING: A LITTLE
MOBILITY SCORE: 20
DAILY ACTIVITIY SCORE: 20
MOVING TO AND FROM BED TO CHAIR: A LITTLE
MOVING FROM LYING ON BACK TO SITTING ON SIDE OF FLAT BED WITH BEDRAILS: A LITTLE
TOILETING: A LITTLE
TOILETING: A LITTLE
MOVING TO AND FROM BED TO CHAIR: A LITTLE
DRESSING REGULAR UPPER BODY CLOTHING: A LITTLE
DRESSING REGULAR LOWER BODY CLOTHING: A LITTLE

## 2025-03-19 ASSESSMENT — PAIN - FUNCTIONAL ASSESSMENT: PAIN_FUNCTIONAL_ASSESSMENT: 0-10

## 2025-03-19 ASSESSMENT — PAIN DESCRIPTION - LOCATION
LOCATION: LEG
LOCATION: KNEE
LOCATION: LEG

## 2025-03-19 ASSESSMENT — PAIN SCALES - WONG BAKER
WONGBAKER_NUMERICALRESPONSE: HURTS LITTLE MORE
WONGBAKER_NUMERICALRESPONSE: NO HURT

## 2025-03-19 ASSESSMENT — PAIN SCALES - GENERAL
PAINLEVEL_OUTOF10: 8
PAINLEVEL_OUTOF10: 2
PAINLEVEL_OUTOF10: 4
PAINLEVEL_OUTOF10: 10 - WORST POSSIBLE PAIN
PAINLEVEL_OUTOF10: 9
PAINLEVEL_OUTOF10: 0 - NO PAIN

## 2025-03-19 ASSESSMENT — PAIN DESCRIPTION - ORIENTATION
ORIENTATION: RIGHT

## 2025-03-19 NOTE — CARE PLAN
The patient's goals for the shift include Pt. will have a safe, restful and uneventful evening    The clinical goals for the shift include monitor for bleeding    Over the shift, the patient did not make progress toward the following goals. Barriers to progression include . Recommendations to address these barriers include   Problem: Discharge Planning  Goal: Discharge to home or other facility with appropriate resources  Outcome: Progressing     Problem: Chronic Conditions and Co-morbidities  Goal: Patient's chronic conditions and co-morbidity symptoms are monitored and maintained or improved  Outcome: Progressing     Problem: Nutrition  Goal: Nutrient intake appropriate for maintaining nutritional needs  Outcome: Progressing     Problem: Fall/Injury  Goal: Verbalize understanding of personal risk factors for fall in the hospital  Outcome: Progressing  Goal: Verbalize understanding of risk factor reduction measures to prevent injury from fall in the home  Outcome: Progressing  Goal: Use assistive devices by end of the shift  Outcome: Progressing     Problem: Skin  Goal: Decreased wound size/increased tissue granulation at next dressing change  Outcome: Progressing  Goal: Participates in plan/prevention/treatment measures  Outcome: Progressing  Goal: Prevent/manage excess moisture  Outcome: Progressing  Goal: Prevent/minimize sheer/friction injuries  Outcome: Progressing  Goal: Promote/optimize nutrition  Outcome: Progressing  Goal: Promote skin healing  Outcome: Progressing   .

## 2025-03-19 NOTE — PROGRESS NOTES
Occupational Therapy                 Therapy Communication Note    Patient Name: Elizabeth Buckner  MRN: [id number]  Department: [facility]   Room: [address] [place] [address]  Today's Date: 3/19/2025     Discipline: Occupational Therapy    OT Missed Visit: Yes     Missed Visit Reason: Missed Visit Reason: Patient refused (per PT, attempted pt for session and pt refusing PT/OT currently.)    Missed Time: Attempt    Comment:

## 2025-03-19 NOTE — PROGRESS NOTES
Occupational Therapy                 Therapy Communication Note    Patient Name: Elizabeth Buckner  MRN: [id number]  Department: [facility] [id number]  Room: [address] [place] [address]  Today's Date: 3/19/2025     Discipline: Occupational Therapy    OT Missed Visit: Yes     Missed Visit Reason: Missed Visit Reason: Reschedule (per RN, high levels of pain right now and not appropriate. Also reports pt has an IR procedure and EGD this afternoon. Will re-attempt as pt is available)    Missed Time: Attempt

## 2025-03-19 NOTE — PROGRESS NOTES
Elizabeth Buckner is a 84 y.o. female on day 8 of admission presenting with General weakness.    Subjective   Symptoms (0 - 10, Best to Worst)  [facility] Symptom Assessment System  0-10 (Numeric) Pain Score: 4  She has pain in her right ankle. Movement and palpation illicit pain. She is somewhat anxious about not getting clear information about upcoming procedures. She reports ongoing anorexia; is agreeable to try Ensures while here for some additional nutrition.     Interval history:   Patient febrile this morning, repeat endoscopy cancelled for today. Improving leukocytosis but with mild increase in immature granulocytes. She is still mulling over IVC filter; procedures make her worried. I discussed the risk benefit somewhat since she had a significant GIB event this admission; also reinforced the need to avoid NSAIDs.     I saw Nayeli CHURCH who is part of patient's oncology team; they may still be offering some modified chemotherapy regimen. PET scan and appointment about treatment planned after discharge.          Objective     Physical Exam  Constitutional:       General: She is not in acute distress.     Appearance: Normal appearance. She is obese. She is not ill-appearing.   HENT:      Head:      Comments: Mild bitemporal muscle wasting.     Mouth/Throat:      Mouth: Mucous membranes are moist.   Eyes:      Extraocular Movements: Extraocular movements intact.      Pupils: Pupils are equal, round, and reactive to light.   Cardiovascular:      Rate and Rhythm: Normal rate and regular rhythm.      Heart sounds: Normal heart sounds.   Pulmonary:      Effort: Pulmonary effort is normal.      Breath sounds: Normal breath sounds.      Comments: Coughed after deep inspiration though lungs sound clear.   Abdominal:      General: Abdomen is flat. Bowel sounds are normal.      Palpations: Abdomen is soft.      Tenderness: There is no abdominal tenderness.   Musculoskeletal:         General: Swelling and tenderness  present.      Cervical back: Normal range of motion and neck supple.      Comments: RT LE >LT LE  Tender RT ankle but not hot or significantly swollen, no skin defect.    Skin:     General: Skin is warm and dry.   Neurological:      Mental Status: She is alert and oriented to person, place, and time.   Psychiatric:         Behavior: Behavior normal.         Thought Content: Thought content normal.         Judgment: Judgment normal.      Comments: Appears worried.          Last Recorded Vitals  Blood pressure 128/78, pulse 87, temperature 36.6 °C (97.9 °F), temperature source Oral, resp. rate 20, height 1.524 m (5'), weight 72.5 kg (159 lb 13.3 oz), SpO2 98%.  Intake/Output last 3 Shifts:  I/O last 3 completed shifts:  In: 480 (6.6 mL/kg) [P.O.:480]  Out: 650 (9 mL/kg) [Urine:650 (0.2 mL/kg/hr)]  Weight: 72.5 kg     Wt Readings from Last 10 Encounters:   03/18/25 72.5 kg (159 lb 13.3 oz)   03/10/25 64.6 kg (142 lb 6.7 oz)   03/09/25 68 kg (150 lb)   03/05/25 68.9 kg (152 lb)   01/22/25 73.6 kg (162 lb 4.1 oz)   12/06/24 73.9 kg (163 lb)   11/14/24 73.9 kg (163 lb)   11/13/24 75 kg (165 lb 5.5 oz)   11/08/24 75.9 kg (167 lb 5.3 oz)   07/30/24 76.5 kg (168 lb 10.4 oz)         Relevant Results    Results for orders placed or performed during the hospital encounter of 03/10/25 (from the past 24 hours)   POCT GLUCOSE   Result Value Ref Range    POCT Glucose 109 (H) 74 - 99 mg/dL   POCT GLUCOSE   Result Value Ref Range    POCT Glucose 116 (H) 74 - 99 mg/dL   CBC and Auto Differential   Result Value Ref Range    WBC 29.5 (H) 4.4 - 11.3 x10*3/uL    nRBC 0.3 (H) 0.0 - 0.0 /100 WBCs    RBC 2.80 (L) 4.00 - 5.20 x10*6/uL    Hemoglobin 8.2 (L) 12.0 - 16.0 g/dL    Hematocrit 25.7 (L) 36.0 - 46.0 %    MCV 92 80 - 100 fL    MCH 29.3 26.0 - 34.0 pg    MCHC 31.9 (L) 32.0 - 36.0 g/dL    RDW 15.6 (H) 11.5 - 14.5 %    Platelets 209 150 - 450 x10*3/uL    Immature Granulocytes %, Automated 1.1 (H) 0.0 - 0.9 %    Immature Granulocytes  Absolute, Automated 0.32 0.00 - 0.50 x10*3/uL   Renal Function Panel   Result Value Ref Range    Glucose 81 74 - 99 mg/dL    Sodium 137 136 - 145 mmol/L    Potassium 3.9 3.5 - 5.3 mmol/L    Chloride 106 98 - 107 mmol/L    Bicarbonate 23 21 - 32 mmol/L    Anion Gap 12 10 - 20 mmol/L    Urea Nitrogen 64 (H) 6 - 23 mg/dL    Creatinine 1.46 (H) 0.50 - 1.05 mg/dL    eGFR 35 (L) >60 mL/min/1.73m*2    Calcium 7.4 (L) 8.6 - 10.3 mg/dL    Phosphorus 2.2 (L) 2.5 - 4.9 mg/dL    Albumin 2.5 (L) 3.4 - 5.0 g/dL   Magnesium   Result Value Ref Range    Magnesium 2.28 1.60 - 2.40 mg/dL   Calcium, ionized   Result Value Ref Range    POCT Calcium, Ionized 0.95 (L) 1.1 - 1.33 mmol/L   Manual Differential   Result Value Ref Range    Neutrophils %, Manual 86.0 40.0 - 80.0 %    Bands %, Manual 7.0 0.0 - 5.0 %    Lymphocytes %, Manual 7.0 13.0 - 44.0 %    Monocytes %, Manual 0.0 2.0 - 10.0 %    Eosinophils %, Manual 0.0 0.0 - 6.0 %    Basophils %, Manual 0.0 0.0 - 2.0 %    Seg Neutrophils Absolute, Manual 25.37 (H) 1.60 - 5.00 x10*3/uL    Bands Absolute, Manual 2.07 (H) 0.00 - 0.50 x10*3/uL    Lymphocytes Absolute, Manual 2.07 0.80 - 3.00 x10*3/uL    Monocytes Absolute, Manual 0.00 (L) 0.05 - 0.80 x10*3/uL    Eosinophils Absolute, Manual 0.00 0.00 - 0.40 x10*3/uL    Basophils Absolute, Manual 0.00 0.00 - 0.10 x10*3/uL    Total Cells Counted 100     Neutrophils Absolute, Manual 27.44 (H) 1.60 - 5.50 x10*3/uL    RBC Morphology See Below     Polychromasia Mild     Target Cells Few     Ovalocytes Few     [place] Cells Many    POCT GLUCOSE   Result Value Ref Range    POCT Glucose 95 74 - 99 mg/dL   POCT GLUCOSE   Result Value Ref Range    POCT Glucose 98 74 - 99 mg/dL     *Note: Due to a large number of results and/or encounters for the requested time period, some results have not been displayed. A complete set of results can be found in Results Review.       XR chest 1 view    Addendum Date: 3/15/2025    Potentially critical findings are  discussed with and acknowledged by Yael Garcia, RN at 7:38 PM Eastern time on 3/15/2025. Signed by Shavonne Ye DO    Result Date: 3/15/2025  1.Endotracheal tube with the tip at or less than 1 cm above the level of the fabrizio. Repositioning is recommended.  This could be pulled back about 4 to 5 cm. 2.Enteric tube and right internal jugular line in place. 3.No pneumothorax. 4.Normal heart size with no radiographic signs of active pulmonary parenchymal infiltration.  Known pleural and parenchymal nodules worrisome for metastatic disease seen on CT are not well visualized radiographically. Signed by Shavonne Ye DO    MR brain w and wo IV contrast    Result Date: 3/11/2025  1. No acute infarct, hemorrhage or mass is noted. No abnormalities of the 4th ventricle are noted.   2. 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   MACRO: None   Signed by: Pete Rice 3/11/2025 12:21 PM Dictation workstation:   [id number]    US abdomen complete    Result Date: 3/11/2025  Obscuration the spleen and right kidney by bowel gas. Previous cholecystectomy.   Mildly small left kidney. The left kidney was otherwise sonographically unremarkable.   There were 3 identifiable hypoechoic solid lesions in the liver. These are nonspecific and could be atypical hemangiomas or metastatic lesions. Recommend liver MRI in follow-up.   MACRO: None   Signed by: Carroll Corley 3/11/2025 8:04 AM Dictation workstation:   [id number]    Lower extremity venous duplex right    Result Date: 3/10/2025  1. Acute deep vein thrombosis at the right lower extremity from the inguinal region to the popliteal region. The right posterior tibial and peroneal veins were not visualized on this exam.     MACRO: Noa Adamson discussed the significance and urgency of this critical finding by telephone with  CYNTHIA HOWARD on 3/10/2025 at 11:24 pm.  (**-RCF-**) Findings:  See findings.     Signed by: Noa Adamson 3/10/2025  11:39 PM Dictation workstation:   [id number]    CT abdomen pelvis wo IV contrast    Result Date: 3/10/2025  1.  Extensive retroperitoneal and bilateral pelvic lymphadenopathy as above concerning for lymphoma or metastatic disease. 2. Suspect acute deep venous thrombosis within the right common femoral vein and right external iliac vein. Correlation with ultrasound findings recommended. 3. Numerous liver masses suggestive of metastatic liver disease. Correlation with ultrasound findings can be performed as clinically warranted. 4. Bilateral renal lesions, nonspecific in etiology and may represent cysts but incompletely characterized in this unenhanced exam. Correlation with ultrasound findings can be performed as clinically warranted. 5. Air in the bladder which may be due to bladder instrumentation. Correlation with urinalysis recommended. 6. Additional detailed findings as above. Critical Finding:  See findings. Notification was initiated on 3/10/2025 at 7:54 pm by  Esvin Philippe.  (**-YCF-**) Instructions:   7.     Signed by: Esvin Philippe 3/10/2025 7:54 PM Dictation workstation:   [id number]    CT head wo IV contrast    Result Date: 3/10/2025  Mass effect upon the 4th ventricle centrally. Further evaluation with contrast-enhanced MRI is recommended for better assessment.   No evidence for acute cortical infarction or acute intracranial hemorrhage is seen.   MACRO: Critical Finding:  See findings. Notification was initiated on 3/10/2025 at 7:46 pm by  Esvin Philippe.  (**-YCF-**)   Signed by: Esvin Philippe 3/10/2025 7:46 PM Dictation workstation:   [id number]    XR chest 2 views    Result Date: 3/10/2025  No acute process. Signed by Kamaljit Alex MD    CT chest wo IV contrast    Result Date: 2/18/2025  Previous bilateral mastectomy. Presumed mild post radiation scarring anteriorly in each upper lobe subjacent to each mastectomy site.   Development of multiple scattered bilateral lung pleural and parenchymal  densities consistent with new metastatic lesions.   Stable right axillary surgical clips. No suspicious thoracic adenopathy in this unenhanced exam.   Mild cardiomegaly.   Calcified hepatic granulomas. Previous cholecystectomy.   Bilateral upper pole renal cysts.   Hepatic flexure colonic diverticulosis..   MACRO: None   Signed by: Carroll Corley 2/18/2025 2:05 PM Dictation workstation:   [id number]    CT head wo IV contrast    Result Date: 2/18/2025  No acute intracranial bleed or focal mass effect.   Mild volume loss.   Mild chronic white matter ischemic disease in the deep periventricular regions.   MACRO: None   Signed by: Carroll Corley 2/18/2025 1:50 PM Dictation workstation:   [id number]    CT soft tissue neck wo IV contrast    Result Date: 2/18/2025  There are surgical clips noted within the right supraclavicular region. There is nonspecific ill-defined intermediate attenuation noted surrounding the surgical clips with adjacent skin thickening which while nonspecific may be postsurgical in etiology.   There are scattered nonspecific abnormally enlarged lymph nodes noted within the neck. Specifically, there are enlarged level 1B submandibular nodes bilaterally with the largest on the left measuring approximately 23 mm in greatest axial dimension and the largest on the right measuring approximately 20 mm in greatest axial dimension. There is an enlarged left superior clavicular lymph node measuring approximately 18 mm in greatest coronal dimension. There is an adjacent smaller but abnormally enlarged 13 mm left supraclavicular lymph node.   There are nonspecific prominent nodular foci of intermediate attenuation noted within and/or along the margins of the parotid glands bilaterally which while nonspecific raises the possibility of additional scattered prominent parotid and/or periparotid lymph nodes with the largest on the left measuring approximately 17 mm in greatest axial dimension and the largest on the  right measuring approximately 13 mm in greatest axial dimension.   There is multilevel cervical spondylosis with the most pronounced posterior osteophytic spurring noted at the C5/6 level.   Please see the dedicated report of the CT of the chest for details of findings within the chest.   MACRO: None.   Signed by: Jefferson Rosales 2/18/2025 12:59 PM Dictation workstation:   [id number]     Scheduled medications  anastrozole, 1 mg, oral, Daily  [Held by provider] aspirin, 81 mg, oral, Daily  carvedilol, 6.25 mg, oral, BID  [Held by provider] gabapentin, 300 mg, oral, BID  [Held by provider] hydrALAZINE, 100 mg, oral, BID  insulin lispro, 0-10 Units, subcutaneous, q4h  pantoprazole, 40 mg, intravenous, BID  sennosides, 2 tablet, oral, BID      Continuous medications     PRN medications  PRN medications: acetaminophen **OR** acetaminophen **OR** acetaminophen, alteplase, dextrose, dextrose, glucagon, glucagon, melatonin, ondansetron ODT **OR** ondansetron, oxyCODONE, oxygen                   Assessment/Plan   IMP: 84-year-old female with history of right breast carcinoma first diagnosed in 1991 status post breast conserving surgery, radiation and tamoxifen who then went on to have a recurrence in 2012 and had complete mastectomy.  She then had breast cancer in her left breast and had a left mastectomy followed by radiation and trastuzumab.  Finally she had another recurrence in November 2021 and was on anastrozole and palbociclib.  She is presenting with confusion and weakness found to have a UTI.  She has been having recurrent falls and is losing weight.  She likely has a large disease burden in her abdomen including diffuse LAD and several liver masses suspicious for malignancy.  She was due for a PET scan after an 11/20/2024 CT showed possible progression in her pelvic lymph nodes however she did not follow-up.  She had a sentinel event with hematemesis from Gary IIB ulcer in gastric fundus. Palliative consulted for  goals of care and symptom management in the setting of likely disease progression.     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 Will order incentive spirometry to ensure patient recruits more lung now that she is essentially bedridden.   Will order nutritional supplements BID.   Continue current disease modifying model of care.   Full code.  Patient wants all measures in place.   C/w oxycodone 5mg PO q4h as needed for severe pain  C/w Tylenol 650mg q4h prn for mild and moderate pain.   Recommend adding back her home gabapentin dose prior to discharge.   Per oncology she may still be candidate for some chemotherapy, possibly modified capecitabine.   Patient has clear goals. Should there be issues r/t IVC filter, please reconsult and I can meet with patient and family.   Palliative will sign off.         Update: IVC filter is still being decided on by patient. I spoke with her sister Geraldine. She recommended we speak with her daughter Kyler who is an CRNP, [phone].  Patient's  had CVA recently and his ability to process information may be limited.            Patient/proxy preference for information  Prefers full information     Goals of Care  Full Code     Is the patient hospice-eligible?   Yes  Was a discussion held re hospice services?   no  Was a decision made re hospice services?  No, patient has been treatment oriented until recently. I will delicately bring up this option on future visits.        I spent 50 minutes in the professional and overall care of this patient.      Richar Francis MD

## 2025-03-19 NOTE — PROGRESS NOTES
Between 7AM-7PM please message me via Epic Secure Chat.  After 7PM please page Nocturnist on call.    [facility] Hospitalist Progress Note      Elizabeth Buckner    :  1940(84 y.o.)    MRN:  [id number]  Date: 25     Assessment and Plan:     GI bleed due to cratered ulcer in the fundus of the stomach  - EGD on 3/27 showed Single 8 mm cratered ulcer in the fundus of the stomach with adherent clot (Gary IIB); placed MRI conditional clips; injected 7 mL of epinephrine to address bleeding; hemostasis achieved. The mucosa surrounding the clot was injected with epinephrine.  - GI following, plan for repeat EGD on 3/19. Continue IV PPI BID  - H Pylori stool antigen ordered, awaiting RN to collect    ABLA  Hypotension 2/2 acute hemorrhagic shock   - due to UGIB; s/p 4 units PRBCs and IVF resuscitation. Initially was refractory and needed vasopressor support. Now resolved, no longer needing vasopressor support; transferred out of ICU on 3/17    Acute DVT  - LE Duplex with acute deep vein thrombosis at the right lower extremity from the inguinal region to the popliteal region  - Unable to anticoagulate due to severe bleeding from ulcer; IR consulted for IVC filter placement. Patient still discussing with family and unsure if she wants to proceed with placement.   - will need OP follow up with vascular medicine    Leukocytosis  - UCX negative. Blood culture negative. CXR without PNA. CT AP with concern for metastatic disease  - Persists despite 7d course of empiric abx --> suspect due to malignancy/stress dose steroids  - fever on 3/19 but no clinical signs or symptoms of infection. Monitor wbc/fever curve as work up thus far unrevealing.     PAF, new onset  - suspect triggered by acute illness; continue bb. Not on OAC due to GIB    HASMUKH on CKD2  - Baseline Cr 1-1.2; Cr peaked at 1.6, now improved to 1.4    HTN  - continue coreg; hydralazine, lasix held due to shock and BP now normal, will restart if BP  rises    Stage IV breast cancer   - history of right breast carcinoma first diagnosed in 1991 status post breast conserving surgery, radiation and tamoxifen who then went on to have a recurrence in 2012 and had complete mastectomy. She then had breast cancer in her left breast and had a left mastectomy followed by radiation and trastuzumab. Finally she had another recurrence in November 2021 and was on anastrozole and palbociclib.   - a large disease burden in her abdomen including diffuse LAD and several liver masses suspicious for malignancy. She was due for a PET scan after an 11/20/2024 CT showed possible progression in her pelvic lymph nodes however she did not follow-up. Needs OP follow up with oncology vs hospice    Recent UTI  - Urine culture 3/1 with E Coli; Repeat Ucx on 3/11 negative.        Malnutrition Diagnosis Status: New  Malnutrition Diagnosis: Severe malnutrition related to chronic disease or condition  Related to: pt with 8% weight decrease over past four months, suspect oral intake less than 75% of estimated energy requirements for greater than one month, visualized areas of depleted adipose tissues and skeletal tissue wasting     I agree with the dietitian's malnutrition diagnosis.    DVT Prophylaxis: SCDs    Disposition: continue to monitor inpatient, await consultant recommendations, await test results, and await clinical improvement    Electronically signed by Davidson Santiago DO on 03/19/25 at 10:00 AM     Subjective:      Interval History:   Vitals and chart notes from overnight reviewed.   No acute issues overnight.   Patient seen and evaluated at bedside.   Fever this am. Brief episodes of paf with rvr on tele this am. Symptoms of nausea and dizziness during episode but none at time of my eval when she was NSR. No diarrhea. No urinary symptoms. No cough or shortness of breath. No sore throat/runny nose.    Review of Systems:   Other than patient's chronic conditions and those complaints in the  history above, the rest of the 10 systems review were done and were negative.     Current medications:  Scheduled Meds:anastrozole, 1 mg, oral, Daily  [Held by provider] aspirin, 81 mg, oral, Daily  calcium gluconate, 2 g, intravenous, Once  carvedilol, 6.25 mg, oral, BID  [Held by provider] gabapentin, 300 mg, oral, BID  [Held by provider] hydrALAZINE, 100 mg, oral, BID  insulin lispro, 0-10 Units, subcutaneous, q4h  pantoprazole, 40 mg, intravenous, BID  sennosides, 2 tablet, oral, BID      Continuous Infusions:   PRN Meds:PRN medications: acetaminophen **OR** acetaminophen **OR** acetaminophen, alteplase, dextrose, dextrose, glucagon, glucagon, melatonin, ondansetron ODT **OR** ondansetron, oxyCODONE, oxygen      Objective:     Heart Rate:  [79-96]   Temp:  [36.6 °C (97.9 °F)-38.6 °C (101.5 °F)]   Resp:  [16-20]   BP: (123-147)/(52-69)   Height:  [152.4 cm (5')]   Weight:  [72.5 kg (159 lb 13.3 oz)]   SpO2:  [97 %-99 %] on RA    Physical Exam  Vitals and nursing note reviewed.   HENT:      Mouth/Throat:      Mouth: Mucous membranes are moist.      Pharynx: Oropharynx is clear.   Cardiovascular:      Rate and Rhythm: Normal rate and regular rhythm.   Pulmonary:      Effort: Pulmonary effort is normal.   Abdominal:      General: There is no distension.      Palpations: Abdomen is soft.      Tenderness: There is no abdominal tenderness.   Musculoskeletal:      Right lower leg: No edema.      Left lower leg: No edema.   Neurological:      Mental Status: She is alert.         Labs:   Lab Results   Component Value Date     03/19/2025    K 3.9 03/19/2025     03/19/2025    CO2 23 03/19/2025    BUN 64 (H) 03/19/2025    CREATININE 1.46 (H) 03/19/2025    GLUCOSE 81 03/19/2025    CALCIUM 7.4 (L) 03/19/2025    PROT 7.4 03/10/2025    BILITOT 1.0 03/10/2025    ALKPHOS 54 03/10/2025    AST 20 03/10/2025    ALT 9 03/10/2025       Lab Results   Component Value Date    WBC 29.5 (H) 03/19/2025    HGB 8.2 (L) 03/19/2025     HCT 25.7 (L) 03/19/2025    MCV 92 03/19/2025     03/19/2025

## 2025-03-19 NOTE — ANESTHESIA PREPROCEDURE EVALUATION
Patient: Elizabeth Buckner    Procedure Information       Date/Time: 03/19/25 0700    Scheduled providers: Chetan Kimble MD; RICKY Armenta    Procedure: EGD    [facility]            Relevant Problems   Cardiac   (+) Hypertension   (+) Other hyperlipidemia      Neuro   (+) Anxiety   (+) Depression   (+) Drug-induced polyneuropathy (Multi)      GI   (+) Esophageal reflux   (+) GI bleed      /Renal   (+) Chronic renal insufficiency   (+) UTI (urinary tract infection)   (+) Urinary tract infection      Liver   (+) Gallbladder stone without cholecystitis or obstruction      Endocrine   (+) Exogenous obesity      Hematology   (+) Anemia of chronic disease   (+) Deep vein thrombosis (DVT) of lower extremity (Multi)      Musculoskeletal   (+) Osteoarthritis of knee   (+) Osteoarthritis, knee      ID   (+) COVID-19   (+) Flu syndrome   (+) UTI (urinary tract infection)   (+) Urinary tract infection   (+) Viral URI with cough      GYN   (+) Breast cancer (Multi)       Clinical information reviewed:                    Past Medical History:   Diagnosis Date    Anxiety     Breast cancer (Multi)     CKD (chronic kidney disease)     Depression     DVT (deep venous thrombosis) (Multi) 03/10/2025    Right leg    Endometrial cancer (Multi)     GERD (gastroesophageal reflux disease)     HTN (hypertension)     Hypothyroidism       Past Surgical History:   Procedure Laterality Date    AXILLARY NODE DISSECTION  2013    BREAST LUMPECTOMY Right 1991    CHOLECYSTECTOMY  2014    COLONOSCOPY      ESOPHAGOGASTRODUODENOSCOPY      MASTECTOMY Right 2012    TOTAL ABDOMINAL HYSTERECTOMY W/ BILATERAL SALPINGOOPHORECTOMY  2008    US GUIDED MEDIASTINUM PERCUTANEOUS BIOPSY  09/22/2021    US GUIDED MEDIASTINUM PERCUTANEOUS BIOPSY 9/22/2021 [facility]     Social History     Tobacco Use    Smoking status: Former     Types: Cigarettes    Smokeless tobacco: Never   Vaping Use    Vaping status: Never Used   Substance Use  Topics    Alcohol use: Never    Drug use: Never      Current Outpatient Medications   Medication Instructions    anastrozole (ARIMIDEX) 1 mg, oral, Daily, Take 1 tablet once daily. Swallow whole with a drink of water.    arm brace (Elbow Compression Sleeve) misc Lymphedema Sleeve  and Gauntlet eval and fit 20-30 mmHG for right  arm    aspirin 81 mg, oral, Daily, Take 1 tablet daily    azelastine (Optivar) 0.05 % ophthalmic solution 2 drops, Both Eyes, Daily    carvedilol (COREG) 12.5 mg, oral, 2 times daily, Take 1 tablet twice daily    cholecalciferol, vitamin D3, (VITAMIN D3 ORAL) 25 mcg, oral, Daily, Take 1 tablet daily Vitamin D 25MCG (1000 UT) oral tablet    diclofenac sodium (VOLTAREN ARTHRITIS PAIN) 4 g, Topical, 4 times daily    FLAXSEED OIL ORAL 1,200 mg, oral, 3 times daily, Take 1 capsule 3 times daily    fluticasone (Flonase) 50 mcg/actuation nasal spray 1 spray, Each Nostril, 2 times daily, Instill 1 spray in each nostril twice daily    furosemide (LASIX) 20 mg, oral, Daily, Take 1 tablet by mouth every day    gabapentin (NEURONTIN) 300 mg, oral, 3 times daily, Take one capsule by mouth three times a day    gabapentin (NEURONTIN) 300 mg, oral, 3 times daily    hydrALAZINE (APRESOLINE) 100 mg, oral, 2 times daily, Take 1 tablet by mouth twice per day    palbociclib (Ibrance) 75 mg tablet Swallow whole.    TURMERIC ORAL 400 mg, oral, Daily, Take 1 capsule daily      Allergies   Allergen Reactions    Penicillins Swelling     Facial swelling, >30+ years ago    Ramipril Angioedema        Chemistry    Lab Results   Component Value Date/Time     03/19/2025 0546    K 3.9 03/19/2025 0546     03/19/2025 0546    CO2 23 03/19/2025 0546    BUN 64 (H) 03/19/2025 0546    CREATININE 1.46 (H) 03/19/2025 0546    Lab Results   Component Value Date/Time    CALCIUM 7.4 (L) 03/19/2025 0546    ALKPHOS 54 03/10/2025 1955    AST 20 03/10/2025 1955    ALT 9 03/10/2025 1955    BILITOT 1.0 03/10/2025 1955          No  "results found for: \"HGBA1C\"  Lab Results   Component Value Date/Time    WBC 29.5 (H) 03/19/2025 0546    HGB 8.2 (L) 03/19/2025 0546    HCT 25.7 (L) 03/19/2025 0546     03/19/2025 0546     Lab Results   Component Value Date/Time    PROTIME 11.3 03/11/2025 0643    INR 1.0 03/11/2025 0643     No results found for: \"ABORH\"  Encounter Date: 03/10/25   ECG 12 lead   Result Value    Ventricular Rate 99    Atrial Rate 99    IN Interval 148    QRS Duration 78    QT Interval 330    QTC Calculation(Bazett) 423    P Axis 43    R Axis -21    T Axis 32    QRS Count 16    Q Onset 224    P Onset 150    P Offset 201    T Offset 389    QTC Fredericia 389    Narrative    Normal sinus rhythm  Possible Left atrial enlargement  Minimal voltage criteria for LVH, may be normal variant ( [place] product )  Borderline ECG  When compared with ECG of 03-NOV-2021 13:22,  T wave amplitude has decreased in Inferior leads  See ED provider note for full interpretation and clinical correlation  Confirmed by Bonnie Sun ([id number]) on 3/11/2025 11:37:41 AM     No results found for this or any previous visit from the past 1095 days.    Echo 2/27/2018:   Left Ventricle: The left ventricular systolic function is normal, with an estimated ejection fraction of 60%. The left ventricular cavity size is normal. Spectral Doppler shows an abnormal pattern of left ventricular diastolic filling.  Left Atrium: The left atrium is mildly dilated.  Right Ventricle: The right ventricle is normal in size. There is normal right ventricular global systolic function.  Right Atrium: The right atrium is normal in size.  Aortic Valve: The aortic valve appears normal. Aortic valve regurgitation was not assessed.  Mitral Valve: The mitral valve is normal in structure. There is trace mitral valve regurgitation.  Tricuspid Valve: The tricuspid valve is structurally normal. Tricuspid regurgitation was not assessed.  Pulmonic Valve: The pulmonic valve is not well " visualized. The pulmonic valve regurgitation was not assessed.  Pericardium: There is no pericardial effusion noted.  Aorta: The aortic root is normal.  CONCLUSIONS:   1. The left ventricular systolic function is normal with a 60% estimated ejection fraction.   2. Spectral Doppler shows an abnormal pattern of left ventricular diastolic filling.    Visit Vitals  LMP  (LMP Unknown)   OB Status Hysterectomy   Smoking Status Former     No data recorded    PHYSICAL EXAM    Anesthesia Plan

## 2025-03-19 NOTE — PROGRESS NOTES
03/19/25 1236   Discharge Planning   Expected Discharge Disposition SNF         Patient had elevated temperature this morning and rapid response was called. Her EGD was cancelled due to that. She also needs IVC filter placed.  Called patient's  and spoke with him about SNF choices. He stated he didn't have chance to look at the list but he is coming in to visit this afternoon and will let me know. Will follow.

## 2025-03-20 ENCOUNTER — ANESTHESIA EVENT (OUTPATIENT)
Dept: GASTROENTEROLOGY | Facility: HOSPITAL | Age: 85
End: 2025-03-20
Payer: COMMERCIAL

## 2025-03-20 ENCOUNTER — APPOINTMENT (OUTPATIENT)
Dept: GASTROENTEROLOGY | Facility: HOSPITAL | Age: 85
DRG: 299 | End: 2025-03-20
Payer: MEDICARE

## 2025-03-20 ENCOUNTER — ANESTHESIA (OUTPATIENT)
Dept: GASTROENTEROLOGY | Facility: HOSPITAL | Age: 85
End: 2025-03-20
Payer: COMMERCIAL

## 2025-03-20 ENCOUNTER — APPOINTMENT (OUTPATIENT)
Dept: CARDIOLOGY | Facility: HOSPITAL | Age: 85
DRG: 299 | End: 2025-03-20
Payer: MEDICARE

## 2025-03-20 PROBLEM — C24.9 HEPATOBILIARY CANCER (MULTI): Status: ACTIVE | Noted: 2025-03-20

## 2025-03-20 PROBLEM — Z92.89 HISTORY OF BLOOD TRANSFUSION: Status: ACTIVE | Noted: 2025-03-20

## 2025-03-20 PROBLEM — K27.9 PUD (PEPTIC ULCER DISEASE): Status: ACTIVE | Noted: 2025-03-20

## 2025-03-20 LAB
ALBUMIN SERPL BCP-MCNC: 2.4 G/DL (ref 3.4–5)
ANION GAP SERPL CALC-SCNC: 11 MMOL/L (ref 10–20)
BASOPHILS # BLD MANUAL: 0 X10*3/UL (ref 0–0.1)
BASOPHILS NFR BLD MANUAL: 0 %
BUN SERPL-MCNC: 61 MG/DL (ref 6–23)
BURR CELLS BLD QL SMEAR: ABNORMAL
CALCIUM SERPL-MCNC: 7.6 MG/DL (ref 8.6–10.3)
CHLORIDE SERPL-SCNC: 106 MMOL/L (ref 98–107)
CO2 SERPL-SCNC: 23 MMOL/L (ref 21–32)
CREAT SERPL-MCNC: 1.41 MG/DL (ref 0.5–1.05)
EGFRCR SERPLBLD CKD-EPI 2021: 37 ML/MIN/1.73M*2
EOSINOPHIL # BLD MANUAL: 0 X10*3/UL (ref 0–0.4)
EOSINOPHIL NFR BLD MANUAL: 0 %
ERYTHROCYTE [DISTWIDTH] IN BLOOD BY AUTOMATED COUNT: 15.4 % (ref 11.5–14.5)
GLUCOSE BLD MANUAL STRIP-MCNC: 101 MG/DL (ref 74–99)
GLUCOSE BLD MANUAL STRIP-MCNC: 151 MG/DL (ref 74–99)
GLUCOSE BLD MANUAL STRIP-MCNC: 90 MG/DL (ref 74–99)
GLUCOSE BLD MANUAL STRIP-MCNC: 97 MG/DL (ref 74–99)
GLUCOSE BLD MANUAL STRIP-MCNC: 98 MG/DL (ref 74–99)
GLUCOSE SERPL-MCNC: 86 MG/DL (ref 74–99)
HCT VFR BLD AUTO: 25.2 % (ref 36–46)
HGB BLD-MCNC: 8.2 G/DL (ref 12–16)
IMM GRANULOCYTES # BLD AUTO: 0.57 X10*3/UL (ref 0–0.5)
IMM GRANULOCYTES NFR BLD AUTO: 2.3 % (ref 0–0.9)
LYMPHOCYTES # BLD MANUAL: 0.74 X10*3/UL (ref 0.8–3)
LYMPHOCYTES NFR BLD MANUAL: 3 %
MAGNESIUM SERPL-MCNC: 2.12 MG/DL (ref 1.6–2.4)
MCH RBC QN AUTO: 28.7 PG (ref 26–34)
MCHC RBC AUTO-ENTMCNC: 32.5 G/DL (ref 32–36)
MCV RBC AUTO: 88 FL (ref 80–100)
MONOCYTES # BLD MANUAL: 0.25 X10*3/UL (ref 0.05–0.8)
MONOCYTES NFR BLD MANUAL: 1 %
NEUTROPHILS # BLD MANUAL: 23.81 X10*3/UL (ref 1.6–5.5)
NEUTS BAND # BLD MANUAL: 3.47 X10*3/UL (ref 0–0.5)
NEUTS BAND NFR BLD MANUAL: 14 %
NEUTS SEG # BLD MANUAL: 20.34 X10*3/UL (ref 1.6–5)
NEUTS SEG NFR BLD MANUAL: 82 %
NRBC BLD-RTO: 0.1 /100 WBCS (ref 0–0)
OVALOCYTES BLD QL SMEAR: ABNORMAL
PHOSPHATE SERPL-MCNC: 2.5 MG/DL (ref 2.5–4.9)
PLATELET # BLD AUTO: 292 X10*3/UL (ref 150–450)
POLYCHROMASIA BLD QL SMEAR: ABNORMAL
POTASSIUM SERPL-SCNC: 4.2 MMOL/L (ref 3.5–5.3)
RBC # BLD AUTO: 2.86 X10*6/UL (ref 4–5.2)
RBC MORPH BLD: ABNORMAL
SODIUM SERPL-SCNC: 136 MMOL/L (ref 136–145)
TOTAL CELLS COUNTED BLD: 100
WBC # BLD AUTO: 24.8 X10*3/UL (ref 4.4–11.3)

## 2025-03-20 PROCEDURE — 97530 THERAPEUTIC ACTIVITIES: CPT | Mod: GO

## 2025-03-20 PROCEDURE — 3700000001 HC GENERAL ANESTHESIA TIME - INITIAL BASE CHARGE

## 2025-03-20 PROCEDURE — 85027 COMPLETE CBC AUTOMATED: CPT | Performed by: NURSE PRACTITIONER

## 2025-03-20 PROCEDURE — 2500000004 HC RX 250 GENERAL PHARMACY W/ HCPCS (ALT 636 FOR OP/ED): Performed by: STUDENT IN AN ORGANIZED HEALTH CARE EDUCATION/TRAINING PROGRAM

## 2025-03-20 PROCEDURE — C1889 IMPLANT/INSERT DEVICE, NOC: HCPCS

## 2025-03-20 PROCEDURE — 0W3P8ZZ CONTROL BLEEDING IN GASTROINTESTINAL TRACT, VIA NATURAL OR ARTIFICIAL OPENING ENDOSCOPIC: ICD-10-PCS | Performed by: INTERNAL MEDICINE

## 2025-03-20 PROCEDURE — 2780000003 HC OR 278 NO HCPCS

## 2025-03-20 PROCEDURE — A43239 PR EDG TRANSORAL BIOPSY SINGLE/MULTIPLE: Performed by: ANESTHESIOLOGY

## 2025-03-20 PROCEDURE — 2500000001 HC RX 250 WO HCPCS SELF ADMINISTERED DRUGS (ALT 637 FOR MEDICARE OP): Performed by: NURSE PRACTITIONER

## 2025-03-20 PROCEDURE — 2060000001 HC INTERMEDIATE ICU ROOM DAILY

## 2025-03-20 PROCEDURE — 99152 MOD SED SAME PHYS/QHP 5/>YRS: CPT | Performed by: STUDENT IN AN ORGANIZED HEALTH CARE EDUCATION/TRAINING PROGRAM

## 2025-03-20 PROCEDURE — 37191 INS ENDOVAS VENA CAVA FILTR: CPT | Performed by: STUDENT IN AN ORGANIZED HEALTH CARE EDUCATION/TRAINING PROGRAM

## 2025-03-20 PROCEDURE — 80069 RENAL FUNCTION PANEL: CPT | Performed by: NURSE PRACTITIONER

## 2025-03-20 PROCEDURE — 2500000001 HC RX 250 WO HCPCS SELF ADMINISTERED DRUGS (ALT 637 FOR MEDICARE OP): Performed by: HOSPITALIST

## 2025-03-20 PROCEDURE — 88341 IMHCHEM/IMCYTCHM EA ADD ANTB: CPT | Mod: TC,AHULAB | Performed by: INTERNAL MEDICINE

## 2025-03-20 PROCEDURE — 99100 ANES PT EXTEME AGE<1 YR&>70: CPT | Performed by: ANESTHESIOLOGY

## 2025-03-20 PROCEDURE — 3700000002 HC GENERAL ANESTHESIA TIME - EACH INCREMENTAL 1 MINUTE

## 2025-03-20 PROCEDURE — 88365 INSITU HYBRIDIZATION (FISH): CPT | Performed by: PATHOLOGY

## 2025-03-20 PROCEDURE — 97530 THERAPEUTIC ACTIVITIES: CPT | Mod: GP

## 2025-03-20 PROCEDURE — 36415 COLL VENOUS BLD VENIPUNCTURE: CPT | Performed by: NURSE PRACTITIONER

## 2025-03-20 PROCEDURE — 7100000010 HC PHASE TWO TIME - EACH INCREMENTAL 1 MINUTE

## 2025-03-20 PROCEDURE — 2500000004 HC RX 250 GENERAL PHARMACY W/ HCPCS (ALT 636 FOR OP/ED): Performed by: NURSE PRACTITIONER

## 2025-03-20 PROCEDURE — 0DB68ZZ EXCISION OF STOMACH, VIA NATURAL OR ARTIFICIAL OPENING ENDOSCOPIC: ICD-10-PCS | Performed by: INTERNAL MEDICINE

## 2025-03-20 PROCEDURE — 83735 ASSAY OF MAGNESIUM: CPT | Performed by: NURSE PRACTITIONER

## 2025-03-20 PROCEDURE — 88341 IMHCHEM/IMCYTCHM EA ADD ANTB: CPT | Performed by: PATHOLOGY

## 2025-03-20 PROCEDURE — 7100000009 HC PHASE TWO TIME - INITIAL BASE CHARGE

## 2025-03-20 PROCEDURE — 2500000004 HC RX 250 GENERAL PHARMACY W/ HCPCS (ALT 636 FOR OP/ED): Performed by: HOSPITALIST

## 2025-03-20 PROCEDURE — 88342 IMHCHEM/IMCYTCHM 1ST ANTB: CPT | Performed by: PATHOLOGY

## 2025-03-20 PROCEDURE — 43239 EGD BIOPSY SINGLE/MULTIPLE: CPT | Performed by: INTERNAL MEDICINE

## 2025-03-20 PROCEDURE — 85007 BL SMEAR W/DIFF WBC COUNT: CPT | Performed by: NURSE PRACTITIONER

## 2025-03-20 PROCEDURE — 76937 US GUIDE VASCULAR ACCESS: CPT | Performed by: STUDENT IN AN ORGANIZED HEALTH CARE EDUCATION/TRAINING PROGRAM

## 2025-03-20 PROCEDURE — 06H03DZ INSERTION OF INTRALUMINAL DEVICE INTO INFERIOR VENA CAVA, PERCUTANEOUS APPROACH: ICD-10-PCS | Performed by: STUDENT IN AN ORGANIZED HEALTH CARE EDUCATION/TRAINING PROGRAM

## 2025-03-20 PROCEDURE — 97535 SELF CARE MNGMENT TRAINING: CPT | Mod: GO

## 2025-03-20 PROCEDURE — 2500000004 HC RX 250 GENERAL PHARMACY W/ HCPCS (ALT 636 FOR OP/ED)

## 2025-03-20 PROCEDURE — 99232 SBSQ HOSP IP/OBS MODERATE 35: CPT | Performed by: HOSPITALIST

## 2025-03-20 PROCEDURE — 2780000003 HC OR 278 NO HCPCS: Performed by: STUDENT IN AN ORGANIZED HEALTH CARE EDUCATION/TRAINING PROGRAM

## 2025-03-20 PROCEDURE — 88305 TISSUE EXAM BY PATHOLOGIST: CPT | Performed by: PATHOLOGY

## 2025-03-20 PROCEDURE — 2720000007 HC OR 272 NO HCPCS

## 2025-03-20 PROCEDURE — 2550000001 HC RX 255 CONTRASTS: Performed by: STUDENT IN AN ORGANIZED HEALTH CARE EDUCATION/TRAINING PROGRAM

## 2025-03-20 PROCEDURE — 82947 ASSAY GLUCOSE BLOOD QUANT: CPT

## 2025-03-20 PROCEDURE — 2500000005 HC RX 250 GENERAL PHARMACY W/O HCPCS: Performed by: STUDENT IN AN ORGANIZED HEALTH CARE EDUCATION/TRAINING PROGRAM

## 2025-03-20 PROCEDURE — C1880 VENA CAVA FILTER: HCPCS | Performed by: STUDENT IN AN ORGANIZED HEALTH CARE EDUCATION/TRAINING PROGRAM

## 2025-03-20 RX ORDER — PROPOFOL 10 MG/ML
INJECTION, EMULSION INTRAVENOUS AS NEEDED
Status: DISCONTINUED | OUTPATIENT
Start: 2025-03-20 | End: 2025-03-20

## 2025-03-20 RX ORDER — LIDOCAINE HYDROCHLORIDE 20 MG/ML
INJECTION, SOLUTION EPIDURAL; INFILTRATION; INTRACAUDAL; PERINEURAL AS NEEDED
Status: DISCONTINUED | OUTPATIENT
Start: 2025-03-20 | End: 2025-03-20

## 2025-03-20 RX ORDER — FENTANYL CITRATE 50 UG/ML
INJECTION, SOLUTION INTRAMUSCULAR; INTRAVENOUS AS NEEDED
Status: DISCONTINUED | OUTPATIENT
Start: 2025-03-20 | End: 2025-03-20 | Stop reason: HOSPADM

## 2025-03-20 RX ORDER — MIDAZOLAM HYDROCHLORIDE 1 MG/ML
INJECTION, SOLUTION INTRAMUSCULAR; INTRAVENOUS AS NEEDED
Status: DISCONTINUED | OUTPATIENT
Start: 2025-03-20 | End: 2025-03-20 | Stop reason: HOSPADM

## 2025-03-20 RX ORDER — LIDOCAINE HYDROCHLORIDE 10 MG/ML
INJECTION, SOLUTION EPIDURAL; INFILTRATION; INTRACAUDAL; PERINEURAL AS NEEDED
Status: DISCONTINUED | OUTPATIENT
Start: 2025-03-20 | End: 2025-03-20 | Stop reason: HOSPADM

## 2025-03-20 RX ADMIN — PANTOPRAZOLE SODIUM 40 MG: 40 INJECTION, POWDER, FOR SOLUTION INTRAVENOUS at 12:36

## 2025-03-20 RX ADMIN — OXYCODONE HYDROCHLORIDE 5 MG: 5 TABLET ORAL at 20:54

## 2025-03-20 RX ADMIN — LIDOCAINE HYDROCHLORIDE 100 MG: 20 INJECTION, SOLUTION EPIDURAL; INFILTRATION; INTRACAUDAL; PERINEURAL at 10:51

## 2025-03-20 RX ADMIN — LIDOCAINE HYDROCHLORIDE 5 ML: 10 INJECTION, SOLUTION EPIDURAL; INFILTRATION; INTRACAUDAL; PERINEURAL at 08:51

## 2025-03-20 RX ADMIN — FENTANYL CITRATE 25 MCG: 50 INJECTION, SOLUTION INTRAMUSCULAR; INTRAVENOUS at 08:50

## 2025-03-20 RX ADMIN — CARVEDILOL 6.25 MG: 6.25 TABLET, FILM COATED ORAL at 12:36

## 2025-03-20 RX ADMIN — PANTOPRAZOLE SODIUM 40 MG: 40 INJECTION, POWDER, FOR SOLUTION INTRAVENOUS at 20:55

## 2025-03-20 RX ADMIN — IOHEXOL 45 ML: 350 INJECTION, SOLUTION INTRAVENOUS at 09:10

## 2025-03-20 RX ADMIN — ANASTROZOLE 1 MG: 1 TABLET, COATED ORAL at 12:36

## 2025-03-20 RX ADMIN — PROPOFOL 30 MG: 10 INJECTION, EMULSION INTRAVENOUS at 10:51

## 2025-03-20 RX ADMIN — CARVEDILOL 6.25 MG: 6.25 TABLET, FILM COATED ORAL at 20:55

## 2025-03-20 RX ADMIN — PROPOFOL 20 MG: 10 INJECTION, EMULSION INTRAVENOUS at 10:55

## 2025-03-20 RX ADMIN — SENNOSIDES 17.2 MG: 8.6 TABLET ORAL at 12:36

## 2025-03-20 RX ADMIN — Medication 2 L/MIN: at 08:34

## 2025-03-20 RX ADMIN — MIDAZOLAM HYDROCHLORIDE 0.5 MG: 1 INJECTION, SOLUTION INTRAMUSCULAR; INTRAVENOUS at 08:50

## 2025-03-20 RX ADMIN — SENNOSIDES 17.2 MG: 8.6 TABLET ORAL at 20:55

## 2025-03-20 RX ADMIN — PROPOFOL 100 MCG/KG/MIN: 10 INJECTION, EMULSION INTRAVENOUS at 10:52

## 2025-03-20 ASSESSMENT — COGNITIVE AND FUNCTIONAL STATUS - GENERAL
MOBILITY SCORE: 20
CLIMB 3 TO 5 STEPS WITH RAILING: TOTAL
DRESSING REGULAR UPPER BODY CLOTHING: A LITTLE
MOVING TO AND FROM BED TO CHAIR: A LOT
TOILETING: A LITTLE
TOILETING: TOTAL
MOVING FROM LYING ON BACK TO SITTING ON SIDE OF FLAT BED WITH BEDRAILS: A LITTLE
MOBILITY SCORE: 13
DAILY ACTIVITIY SCORE: 20
TURNING FROM BACK TO SIDE WHILE IN FLAT BAD: A LITTLE
DAILY ACTIVITIY SCORE: 20
WALKING IN HOSPITAL ROOM: A LITTLE
PERSONAL GROOMING: A LITTLE
EATING MEALS: A LITTLE
TOILETING: A LITTLE
DAILY ACTIVITIY SCORE: 14
MOVING FROM LYING ON BACK TO SITTING ON SIDE OF FLAT BED WITH BEDRAILS: A LITTLE
DRESSING REGULAR LOWER BODY CLOTHING: A LITTLE
TURNING FROM BACK TO SIDE WHILE IN FLAT BAD: A LITTLE
MOVING FROM LYING ON BACK TO SITTING ON SIDE OF FLAT BED WITH BEDRAILS: A LITTLE
DRESSING REGULAR LOWER BODY CLOTHING: A LITTLE
MOBILITY SCORE: 20
STANDING UP FROM CHAIR USING ARMS: A LOT
WALKING IN HOSPITAL ROOM: A LITTLE
HELP NEEDED FOR BATHING: A LOT
DRESSING REGULAR UPPER BODY CLOTHING: A LITTLE
DRESSING REGULAR UPPER BODY CLOTHING: A LITTLE
WALKING IN HOSPITAL ROOM: A LOT
HELP NEEDED FOR BATHING: A LITTLE
TURNING FROM BACK TO SIDE WHILE IN FLAT BAD: A LITTLE
MOVING TO AND FROM BED TO CHAIR: A LITTLE
HELP NEEDED FOR BATHING: A LITTLE
MOVING TO AND FROM BED TO CHAIR: A LITTLE
DRESSING REGULAR LOWER BODY CLOTHING: A LOT

## 2025-03-20 ASSESSMENT — PAIN - FUNCTIONAL ASSESSMENT
PAIN_FUNCTIONAL_ASSESSMENT: 0-10

## 2025-03-20 ASSESSMENT — ACTIVITIES OF DAILY LIVING (ADL): HOME_MANAGEMENT_TIME_ENTRY: 15

## 2025-03-20 ASSESSMENT — PAIN SCALES - GENERAL
PAINLEVEL_OUTOF10: 0 - NO PAIN
PAINLEVEL_OUTOF10: 8
PAINLEVEL_OUTOF10: 0 - NO PAIN
PAIN_LEVEL: 0
PAINLEVEL_OUTOF10: 0 - NO PAIN

## 2025-03-20 ASSESSMENT — PAIN DESCRIPTION - ORIENTATION: ORIENTATION: RIGHT;LEFT

## 2025-03-20 ASSESSMENT — PAIN DESCRIPTION - LOCATION: LOCATION: LEG

## 2025-03-20 NOTE — PROGRESS NOTES
Occupational Therapy    OT Treatment    Patient Name: Elizabeth Buckner  MRN: [id number]  Department: [place] [address]  Room: [address] [place] [address]  Today's Date: 3/20/2025  Time Calculation  Start Time: 1431  Stop Time: 1501  Time Calculation (min): 30 min        Assessment:  OT Assessment: Pt with some confusion noted with sequencing steps to grooming tasks and transfers. Pt requiring assist for safety. A&Ox4.  Evaluation/Treatment Tolerance: Patient tolerated treatment well  Medical Staff Made Aware: Yes  End of Session Communication: Bedside nurse  End of Session Patient Position: Alarm on, Up in chair  Evaluation/Treatment Tolerance: Patient tolerated treatment well  Medical Staff Made Aware: Yes  Plan:  Treatment Interventions: ADL retraining, UE strengthening/ROM, Endurance training, Compensatory technique education  OT Frequency: 3 times per week  OT Discharge Recommendations: Moderate intensity level of continued care  Equipment Recommended upon Discharge: Wheeled walker  OT Recommended Transfer Status: Assist of 1  OT - OK to Discharge: Yes  Treatment Interventions: ADL retraining, UE strengthening/ROM, Endurance training, Compensatory technique education    Subjective   Previous Visit Info:  OT Last Visit  OT Received On: 03/20/25  General:  General  Reason for Referral: To ED with increased groin pain, unresolved UTI, and AMS. CT (+) for R LE DVT. Transferred to ICU for acute hemorrhagic shock due to GIB with hematemesis and melena requiring blood transfusions, Kcentra for eliquis reversal, IVF resuscitation and vasopressor support.  Transferred to ICU for ongoing management.  Referred By: Danita Barlow MD  Past Medical History Relevant to Rehab: stage IV breast cancer s/p bilateral mastectomy and lymph node dissection  Prior to Session Communication: Bedside nurse  Patient Position Received: Bed, 3 rail up, Alarm on  Preferred Learning Style: auditory, kinesthetic  General Comment: Pt agreeable to OT  Precautions:  Medical  Precautions: Fall precautions    Pain:  Pain Assessment  Pain Assessment: 0-10  0-10 (Numeric) Pain Score: 0 - No pain    Objective    Cognition:  Cognition  Overall Cognitive Status: Impaired  Orientation Level: Oriented X4  Safety/Judgement: Exceptions to WFL  Complex Functional Tasks: Minimal  Insight: Mild  Planning: Reduced planning skills  Organization: Mildly disorganized  Coordination:     Activities of Daily Living: Grooming  Grooming Level of Assistance: Moderate assistance  Grooming Where Assessed: Chair  Grooming Comments: pt declining to complete at sink due to fatigue; pt putting tooth paste into mouth, cues to use tooth brush with assist to squeeze tooth paste due to confusion    Toileting  Toileting Comments: assist to wipe bottocks standing from the bed     Bed Mobility/Transfers: Bed Mobility  Bed Mobility: Yes  Bed Mobility 1  Bed Mobility 1: Supine to sitting  Level of Assistance 1: Close supervision  Bed Mobility Comments 1: HOB elevated, SUP for safety, increased time due to fatigue    Transfers  Transfer: Yes  Transfer 1  Transfer From 1: Bed to  Transfer to 1: Chair with arms  Technique 1: Sit to stand, Stand to sit (pt taking steps to the chair)  Transfer Device 1: Walker  Transfer Level of Assistance 1: Contact guard, Moderate verbal cues  Trials/Comments 1: pt asking where she is going upon standing from the bed, cues to instruct pt to take steps to the chair, cues for hand placement (pt sitting in chair with UE's on FWW)    Sitting Balance:  Static Sitting Balance  Static Sitting-Balance Support: Feet supported  Static Sitting-Level of Assistance: Close supervision    Outcome Measures:[facility] Daily Activity  Putting on and taking off regular lower body clothing: A lot  Bathing (including washing, rinsing, drying): A lot  Putting on and taking off regular upper body clothing: A little  Toileting, which includes using toilet, bedpan or urinal: Total  Taking care of personal grooming such as  brushing teeth: A little  Eating Meals: A little  Daily Activity - Total Score: 14    Education Documentation  Body Mechanics, taught by Stephanie Ugalde OT at 3/20/2025  3:17 PM.  Learner: Patient  Readiness: Acceptance  Method: Explanation, Demonstration  Response: Verbalizes Understanding, Needs Reinforcement    ADL Training, taught by Stephanie Ugalde OT at 3/20/2025  3:17 PM.  Learner: Patient  Readiness: Acceptance  Method: Explanation, Demonstration  Response: Verbalizes Understanding, Needs Reinforcement    Education Comments  No comments found.           Goals:  Encounter Problems       Encounter Problems (Active)       ADLs       Patient will perform UB and LB bathing with set-up level of assistance. (Progressing)       Start:  03/17/25    Expected End:  03/31/25            Patient with complete upper body dressing with set-up level of assistance donning and doffing all UE clothes. (Progressing)       Start:  03/17/25    Expected End:  03/31/25            Patient with complete lower body dressing with set-up level of assistance donning and doffing all LE clothes. (Progressing)       Start:  03/17/25    Expected End:  03/31/25            Patient will complete daily grooming tasks brushing teeth and washing face/hair with modified independent level of assistance and PRN adaptive equipment. (Progressing)       Start:  03/17/25    Expected End:  03/31/25               MOBILITY       Patient will perform Functional mobility min Household distances/Community Distances with stand by assist level of assistance and least restrictive device in order to improve safety and functional mobility. (Progressing)       Start:  03/17/25    Expected End:  03/31/25               TRANSFERS       Patient will perform bed mobility stand by assist level of assistance in order to improve safety and independence with mobility (Progressing)       Start:  03/17/25    Expected End:  03/31/25

## 2025-03-20 NOTE — PROGRESS NOTES
Between 7AM-7PM please message me via Epic Secure Chat.  After 7PM please page Nocturnist on call.    [facility] Hospitalist Progress Note      Elizabeth Buckner    :  1940(84 y.o.)    MRN:  [id number]  Date: 25     Assessment and Plan:     GI bleed due to cratered ulcer in the fundus of the stomach  - EGD on 3/17 showed Single 8 mm cratered ulcer in the fundus of the stomach with adherent clot (Gary IIB); placed MRI conditional clips; injected 7 mL of epinephrine to address bleeding; hemostasis achieved. The mucosa surrounding the clot was injected with epinephrine.  - EGD on 3/20 showed Single 7 mm x 8 mm cratered, benign-appearing ulcer in the fundus of the stomach with clean base (Gary III); performed cold forceps biopsy from ulcer edge to rule out malignancy; placed 3 clips successfully and 1 clip got dislodged after placement (clips are MRI conditional and through-the-scope); hemostasis achieved.   - H Pylori stool antigen ordered, awaiting RN to collect  - PPI bid for 3 months, then once daily; repeat EGD in 3 months    ABLA  Hypotension 2/2 acute hemorrhagic shock   - due to UGIB; s/p 4 units PRBCs and IVF resuscitation. Initially was refractory and needed vasopressor support. Now resolved, no longer needing vasopressor support; transferred out of ICU on 3/17    Acute DVT  - LE Duplex with acute deep vein thrombosis at the right lower extremity from the inguinal region to the popliteal region  - Unable to anticoagulate due to severe bleeding from ulcer; IR consulted s/p IVC filter placement on 3/20  - will need OP follow up with vascular medicine    Leukocytosis  - UCX negative. Blood culture negative. CXR without PNA. CT AP with concern for metastatic disease  - Persists despite 7d course of empiric abx --> suspect due to malignancy/stress dose steroids  - fever on 3/19 but no clinical signs or symptoms of infection. COVID/Flu negative. CXR unrevealing. No recurrence of fever.  Monitor wbc/fever curve as work up thus far unrevealing.     PAF, new onset  - suspect triggered by acute illness; continue bb. Not on OAC due to GIB    HASMUKH on CKD2  - Baseline Cr 1-1.2; Cr peaked at 1.6, now improved to 1.4    HTN  - continue coreg; hydralazine, lasix held due to shock and BP now normal, will restart if BP rises    Stage IV breast cancer   - history of right breast carcinoma first diagnosed in 1991 status post breast conserving surgery, radiation and tamoxifen who then went on to have a recurrence in 2012 and had complete mastectomy. She then had breast cancer in her left breast and had a left mastectomy followed by radiation and trastuzumab. Finally she had another recurrence in November 2021 and was on anastrozole and palbociclib.   - a large disease burden in her abdomen including diffuse LAD and several liver masses suspicious for malignancy. She was due for a PET scan after an 11/20/2024 CT showed possible progression in her pelvic lymph nodes however she did not follow-up. Needs OP follow up with oncology vs hospice    Recent UTI  - Urine culture 3/1 with E Coli; Repeat Ucx on 3/11 negative.        Malnutrition Diagnosis Status: New  Malnutrition Diagnosis: Severe malnutrition related to chronic disease or condition  Related to: pt with 8% weight decrease over past four months, suspect oral intake less than 75% of estimated energy requirements for greater than one month, visualized areas of depleted adipose tissues and skeletal tissue wasting     I agree with the dietitian's malnutrition diagnosis.    DVT Prophylaxis: SCDs    Disposition: continue to monitor inpatient, await consultant recommendations, await test results, and await clinical improvement    Electronically signed by Davidson Santiago DO on 03/20/25 at 7:09 PM     Subjective:      Interval History:   Vitals and chart notes from overnight reviewed.   No acute issues overnight.   Patient seen and evaluated at bedside.   Seen after IVC  filter placement and EGD. No fevers or chills. No chest pain or shortness of breath. No cough. No nausea, vomiting, diarrhea. No urinary symptoms.     Review of Systems:   Other than patient's chronic conditions and those complaints in the history above, the rest of the 10 systems review were done and were negative.     Current medications:  Scheduled Meds:anastrozole, 1 mg, oral, Daily  [Held by provider] aspirin, 81 mg, oral, Daily  carvedilol, 6.25 mg, oral, BID  [Held by provider] gabapentin, 300 mg, oral, BID  [Held by provider] hydrALAZINE, 100 mg, oral, BID  insulin lispro, 0-10 Units, subcutaneous, q4h  pantoprazole, 40 mg, intravenous, BID  sennosides, 2 tablet, oral, BID      Continuous Infusions:   PRN Meds:PRN medications: acetaminophen **OR** acetaminophen **OR** acetaminophen, alteplase, dextrose, dextrose, glucagon, glucagon, melatonin, ondansetron ODT **OR** ondansetron, oxyCODONE, oxygen      Objective:     Heart Rate:  [79-96]   Temp:  [36.6 °C (97.9 °F)-38.6 °C (101.5 °F)]   Resp:  [16-20]   BP: (123-147)/(52-69)   Height:  [152.4 cm (5')]   Weight:  [72.5 kg (159 lb 13.3 oz)]   SpO2:  [97 %-99 %] on RA    Physical Exam  Vitals and nursing note reviewed.   HENT:      Mouth/Throat:      Mouth: Mucous membranes are moist.      Pharynx: Oropharynx is clear.   Cardiovascular:      Rate and Rhythm: Normal rate and regular rhythm.   Pulmonary:      Effort: Pulmonary effort is normal.   Abdominal:      General: There is no distension.      Palpations: Abdomen is soft.      Tenderness: There is no abdominal tenderness.   Musculoskeletal:      Right lower leg: No edema.      Left lower leg: No edema.   Neurological:      Mental Status: She is alert.         Labs:   Lab Results   Component Value Date     03/20/2025    K 4.2 03/20/2025     03/20/2025    CO2 23 03/20/2025    BUN 61 (H) 03/20/2025    CREATININE 1.41 (H) 03/20/2025    GLUCOSE 86 03/20/2025    CALCIUM 7.6 (L) 03/20/2025    PROT 7.4  03/10/2025    BILITOT 1.0 03/10/2025    ALKPHOS 54 03/10/2025    AST 20 03/10/2025    ALT 9 03/10/2025       Lab Results   Component Value Date    WBC 24.8 (H) 03/20/2025    HGB 8.2 (L) 03/20/2025    HCT 25.2 (L) 03/20/2025    MCV 88 03/20/2025     03/20/2025

## 2025-03-20 NOTE — PROGRESS NOTES
Physical Therapy    Physical Therapy Treatment    Patient Name: Elizabeth Buckner  MRN: [id number]  Department: [facility] [id number]  Room: [address] [place] [address]  Today's Date: 3/20/2025  Time Calculation  Start Time: 1337  Stop Time: 1352  Time Calculation (min): 15 min         Assessment/Plan   PT Assessment  PT Assessment Results: Decreased strength, Decreased range of motion, Decreased endurance, Impaired balance, Decreased mobility, Pain  Rehab Prognosis: Good  Barriers to Discharge Home: Physical needs, Caregiver assistance  Strengths: Ability to acquire knowledge, Access to adaptive/assistive products, Attitude of self  End of Session Communication: Bedside nurse  Assessment Comment: PT treatment completed. Pt demonstrates decreased mobility and activity tolerance secondary to morning procedures(IVC filter).  Pt continues to demonstrate decreased endurance and deconditioning.  End of Session Patient Position: Bed, 3 rail up, Alarm on  PT Plan  Inpatient/Swing Bed or Outpatient: Inpatient  PT Plan  Treatment/Interventions: Bed mobility, Transfer training, Gait training, Stair training, Balance training, Strengthening, Endurance training, Range of motion, Therapeutic exercise, Therapeutic activity, Home exercise program  PT Plan: Ongoing PT  PT Frequency: 3 times per week  PT Discharge Recommendations: Moderate intensity level of continued care  Equipment Recommended upon Discharge: Wheeled walker  PT Recommended Transfer Status: Assist x2  PT - OK to Discharge: Yes      General Visit Information:   PT  Visit  PT Received On: 03/20/25  Response to Previous Treatment: Patient with no complaints from previous session.  General  Reason for Referral: To ED with increased groin pain, unresolved UTI, and AMS. CT (+) for R LE DVT. Transferred to ICU for acute hemorrhagic shock due to GIB with hematemesis and melena requiring blood transfusions, Kcentra for eliquis reversal, IVF resuscitation and vasopressor support.  Transferred to ICU for ongoing  management.  Referred By: Danita Barlow MD  Past Medical History Relevant to Rehab: stage IV breast cancer s/p bilateral mastectomy and lymph node dissection  PT Missed Visit: Yes  Missed Visit Reason: Patient in a medical procedure, Other (Comment) (Pt in procedure and unavailable for PT treatment.)  Family/Caregiver Present: Yes  Caregiver Feedback: Pt's brother  Prior to Session Communication: Bedside nurse  Patient Position Received: Bed, 3 rail up, Alarm on  Preferred Learning Style: auditory, kinesthetic  General Comment: Pt agreeable to therapy tolerance, had procedure this morning and states she is very tired.    Subjective   Precautions:  Precautions  Medical Precautions: Fall precautions     Date/Time Vitals Session Patient Position Pulse Resp SpO2 BP MAP (mmHg)    03/20/25 1242 --  --  85  --  98 %  139/44  65                 Objective   Pain:  Pain Assessment  Pain Assessment: 0-10  0-10 (Numeric) Pain Score: 0 - No pain  Cognition:  Cognition  Overall Cognitive Status: Within Functional Limits     Postural Control:  Static Sitting Balance  Static Sitting-Balance Support: Feet supported, Bilateral upper extremity supported  Static Sitting-Level of Assistance: Close supervision  Dynamic Sitting Balance  Dynamic Sitting-Balance Support: Feet supported, Bilateral upper extremity supported  Dynamic Sitting-Level of Assistance: Close supervision       Activity Tolerance:  Activity Tolerance  Endurance: Tolerates less than 10 min exercise, no significant change in vital signs  Treatments:  Therapeutic Exercise  Therapeutic Exercise Performed: Yes  Therapeutic Exercise Activity 1: Pt completed x10 ea R/L of AP, hip flexion,and LAQ         Bed Mobility  Bed Mobility: Yes  Bed Mobility 1  Bed Mobility 1: Supine to sitting  Level of Assistance 1: Minimum assistance  Bed Mobility Comments 1: HOB elevated, increased time required  Bed Mobility 2  Bed Mobility  2: Sitting to supine  Level of Assistance 2: Moderate  assistance  Bed Mobility Comments 2: Assist at [facility]'s    Ambulation/Gait Training  Ambulation/Gait Training Performed: No  Transfers  Transfer: No    Outcome Measures:  [facility] Basic Mobility  Turning from your back to your side while in a flat bed without using bedrails: A little  Moving from lying on your back to sitting on the side of a flat bed without using bedrails: A little  Moving to and from bed to chair (including a wheelchair): A lot  Standing up from a chair using your arms (e.g. wheelchair or bedside chair): A lot  To walk in hospital room: A lot  Climbing 3-5 steps with railing: Total  Basic Mobility - Total Score: 13    Education Documentation  Body Mechanics, taught by Brooke Alegria, PT at 3/20/2025  2:00 PM.  Learner: Patient  Readiness: Acceptance  Method: Explanation  Response: Verbalizes Understanding, Needs Reinforcement    Mobility Training, taught by Brooke Alegria, PT at 3/20/2025  2:00 PM.  Learner: Patient  Readiness: Acceptance  Method: Explanation  Response: Verbalizes Understanding, Needs Reinforcement    Education Comments  No comments found.        OP EDUCATION:       Encounter Problems       Encounter Problems (Active)       Balance       complete all mobility with normal balance while dual tasking, negotiating in a dynamic environment, carrying items, etc., with proactive and reactive static and dynamic standing and sitting tasks, with mod I and RW, >15 minutes.   (Not Progressing)       Start:  03/14/25    Expected End:  03/28/25               Mobility       STG - Patient will ambulate 150 ft mod I with a RW (Not Progressing)       Start:  03/14/25    Expected End:  03/28/25               PT Transfers       STG - Patient will perform bed mobility independently.  (Progressing)       Start:  03/14/25    Expected End:  03/28/25            STG - Patient will transfer sit to and from stand mod I with a RW (Not Progressing)       Start:  03/14/25    Expected End:  03/28/25                Pain - Adult             Encounter Problems (Resolved)       Pain - Adult

## 2025-03-20 NOTE — POST-PROCEDURE NOTE
Interventional Radiology Brief Postprocedure Note    Attending: Yazan Linda    Diagnosis: DVT    Description of procedure: Right internal jugular approach IVC filter placement     Anesthesia:  Local and MAC Moderate    Complications: None    Estimated Blood Loss: minimal    Medications  As of 03/20/25 1001      lactated Ringer's bolus 500 mL (mL/hr) Total volume:  Not documented* Dosing weight:  64.6   *Total volume has not been documented. View each administration to see the amount administered.     Date/Time Rate/Dose/Volume Action       03/10/25  2004 500 mL - 500 mL/hr (over 60 min) New Bag      2112  (over 60 min) Stopped               lactated Ringer's bolus 1,000 mL (mL/hr) Total volume:  1,000 mL* Dosing weight:  64.6   *From user-documented volume     Date/Time Rate/Dose/Volume Action       03/15/25  1531 1,000 mL - 2,000 mL/hr (over 30 min) New Bag      1533  (over 30 min) Stopped      1700 1,000 mL                lactated Ringer's bolus 1,000 mL (mL/hr) Total volume:  1,000 mL* Dosing weight:  64.6   *From user-documented volume     Date/Time Rate/Dose/Volume Action       03/15/25  1740 1,000 mL - 500 mL/hr (over 120 min) New Bag      1800 1,000 mL       2039  (over 120 min) Stopped               lactated Ringer's bolus 1,000 mL (mL/hr) Total volume:  Not documented* Dosing weight:  64.6   *Total volume has not been documented. View each administration to see the amount administered.     Date/Time Rate/Dose/Volume Action       03/15/25  2047 1,000 mL - 500 mL/hr (over 120 min) New Bag      2248  (over 120 min) Stopped               lactated Ringer's bolus 500 mL (mL/hr) Total volume:  500 mL* Dosing weight:  73.9   *From user-documented volume     Date/Time Rate/Dose/Volume Action       03/16/25  1008 500 mL - 250 mL/hr (over 120 min) New Bag      1030 500 mL       1234  (over 120 min) Stopped               magnesium sulfate 2 g in sterile water for injection 50 mL (mL/hr) Total volume:  Cannot be  calculated* Dosing weight:  64.6   *Total user-documented volume 99.17 mL may contain volume from other administrations     Date/Time Rate/Dose/Volume Action       03/10/25  2335 2 g - 25 mL/hr (over 120 min) New Bag     03/11/25  0138  (over 120 min) Stopped               magnesium sulfate 2 g in sterile water for injection 50 mL (mL/hr) Total volume:  Cannot be calculated* Dosing weight:  73.9   *Total user-documented volume 99.17 mL may contain volume from other administrations     Date/Time Rate/Dose/Volume Action       03/17/25  0537 2 g - 25 mL/hr (over 120 min) New Bag      0732  (over 120 min) Stopped               cefepime (Maxipime) 1 g in dextrose 5% IV 50 mL (g) Total dose:  1 g Dosing weight:  64.6      Date/Time Rate/Dose/Volume Action       03/11/25  0043 1 g (over 30 min) New Bag      0133  (over 30 min) Stopped               vancomycin (Vancocin) 1,250 mg in dextrose 5%  mL (mL/hr) Total volume:  Not documented* Dosing weight:  64.6   *Total volume has not been documented. View each administration to see the amount administered.     Date/Time Rate/Dose/Volume Action       03/11/25  0136 1,250 mg - 200 mL/hr (over 75 min) New Bag      0323  (over 75 min) Stopped               anastrozole (Arimidex) tablet 1 mg (mg) Total dose:  9 mg      Date/Time Rate/Dose/Volume Action       03/11/25  0823 1 mg Given     03/12/25  0956 1 mg Given     03/13/25  0853 1 mg Given     03/14/25  0921 1 mg Given     03/15/25  1017 1 mg Given      1452 *Not included in total MAR Hold      1454 *Not included in total MAR Unhold     03/16/25  0929 1 mg Given     03/17/25  1157 1 mg Given      2122 *Not included in total MAR Hold      2213 *Not included in total MAR Unhold     03/18/25  0848 1 mg Given     03/19/25  0856 1 mg Given               aspirin EC tablet 81 mg (mg) Total dose:  Cannot be calculated*   *Administration dose not documented     Date/Time Rate/Dose/Volume Action       03/11/25  0444 *Not included in  total Held by provider      0900 *Not included in total Automatically Held     03/12/25  0900 *81 mg Missed     03/13/25  0900 *Not included in total Automatically Held     03/14/25  0900 *81 mg Missed     03/15/25  0900 *Not included in total Automatically Held     03/16/25  0900 *81 mg Missed     03/17/25  0900 *81 mg Missed     03/18/25  0900 *81 mg Missed     03/19/25  0900 *81 mg Missed     03/20/25  0900 *Not included in total Automatically Held               carvedilol (Coreg) tablet 12.5 mg (mg) Total dose:  100 mg*   *Administration not included in total     Date/Time Rate/Dose/Volume Action       03/11/25  0823 12.5 mg Given      2019 *12.5 mg Missed     03/12/25  1049 12.5 mg Given      2025 12.5 mg Given     03/13/25  0852 12.5 mg Given      2134 12.5 mg Given     03/14/25  0921 12.5 mg Given      2229 12.5 mg Given     03/15/25  1017 12.5 mg Given      1452 *Not included in total MAR Hold      1454 *Not included in total MAR Unhold      1731 *Not included in total Held by provider      2100 *12.5 mg Missed     03/16/25 0900 *12.5 mg Missed      2100 *12.5 mg Missed      2134 *Not included in total Unheld by provider               hydrALAZINE (Apresoline) tablet 100 mg (mg) Total dose:  100 mg*   *Administration not included in total     Date/Time Rate/Dose/Volume Action       03/11/25  0822 100 mg Given      2019 *100 mg Missed     03/12/25  1009 *Not included in total Held by provider      1021 *100 mg Missed      2100 *Not included in total Automatically Held     03/13/25  0900 *Not included in total Automatically Held      2100 *100 mg Missed     03/14/25  0900 *100 mg Missed      2100 *100 mg Missed     03/15/25  0900 *Not included in total Automatically Held      2100 *100 mg Missed     03/16/25  0900 *100 mg Missed      2100 *100 mg Missed     03/17/25  0900 *100 mg Missed      2100 *100 mg Missed     03/18/25  0900 *100 mg Missed      2100 *100 mg Missed     03/19/25  0900 *100 mg Missed       2100 *100 mg Missed     03/20/25  0900 *Not included in total Automatically Held               sodium chloride 0.9% infusion (mL/hr) Total volume:  1,005 mL* Dosing weight:  64.6   *From user-documented volume     Date/Time Rate/Dose/Volume Action       03/11/25  0507 100 mL/hr New Bag      1848 100 mL/hr - 1,000 mL Rate Verify      2121 100 mL/hr - 0 mL Rate Verify      2124 100 mL/hr - 5 mL Rate Verify      2336 100 mL/hr - 0 mL Rate Verify     03/12/25  0219 100 mL/hr - 0 mL Rate Verify      0555 0 mL       1004 100 mL/hr New Bag      1616  Stopped               acetaminophen (Tylenol) tablet 650 mg (mg) Total dose:  650 mg Dosing weight:  64.6      Date/Time Rate/Dose/Volume Action       03/11/25  1109 650 mg Given               acetaminophen (Tylenol) tablet 975 mg (mg) Total dose:  10,725 mg* Dosing weight:  64.6   *Administration not included in total     Date/Time Rate/Dose/Volume Action       03/11/25  1714 975 mg Given      2017 975 mg Given     03/12/25  0956 975 mg Given      1511 *975 mg Missed      2026 975 mg Given     03/13/25  0853 975 mg Given      1529 975 mg Given      2133 975 mg Given     03/14/25  0921 975 mg Given      1521 *975 mg Missed      2228 975 mg Given     03/15/25  1016 975 mg Given      1452 *Not included in total MAR Hold      1454 *Not included in total MAR Unhold      1530 *975 mg Missed      2048 975 mg Given     03/16/25  0914 *975 mg Missed               acetaminophen (Tylenol) tablet 650 mg (mg) Total dose:  1,950 mg Dosing weight:  64.6      Date/Time Rate/Dose/Volume Action       03/17/25  2122 *Not included in total MAR Hold      2213 *Not included in total MAR Unhold      2242 650 mg Given     03/18/25  2040 650 mg Given     03/19/25  0923 650 mg Given               ondansetron ODT (Zofran-ODT) disintegrating tablet 4 mg (mg) Total dose:  Cannot be calculated* Dosing weight:  64.6   *Administration dose not documented     Date/Time Rate/Dose/Volume Action       03/11/25   1822 *Not included in total See Alternative     03/14/25  1857 *Not included in total See Alternative     03/15/25  1452 *Not included in total MAR Hold      1454 *Not included in total MAR Unhold     03/17/25 2122 *Not included in total MAR Hold      2213 *Not included in total MAR Unhold               ondansetron (Zofran) injection 4 mg (mg) Total dose:  12 mg Dosing weight:  64.6      Date/Time Rate/Dose/Volume Action       03/11/25  1822 4 mg Given     03/14/25  1857 4 mg Given      2229 4 mg Given     03/15/25  1452 *Not included in total MAR Hold      1454 *Not included in total MAR Unhold     03/17/25 2122 *Not included in total MAR Hold      2213 *Not included in total MAR Unhold               melatonin tablet 3 mg (mg) Total dose:  6 mg Dosing weight:  64.6      Date/Time Rate/Dose/Volume Action       03/11/25 2016 3 mg Given     03/15/25  1452 *Not included in total MAR Hold      1454 *Not included in total MAR Unhold     03/17/25 2122 *Not included in total MAR Hold      2213 *Not included in total MAR Unhold      2242 3 mg Given               sennosides (Senokot) tablet 17.2 mg (mg) Total dose:  18 tablet* Dosing weight:  64.6   *Administration not included in total     Date/Time Rate/Dose/Volume Action       03/11/25  0823 17.2 mg Given      2017 17.2 mg Given     03/12/25  1005 *17.2 mg Missed      2025 17.2 mg Given     03/13/25  0853 17.2 mg Given      2134 17.2 mg Given     03/14/25  0921 17.2 mg Given      2228 17.2 mg Given     03/15/25  1027 *17.2 mg Missed      1452 *Not included in total MAR Hold      1454 *Not included in total MAR Unhold      2049 *17.2 mg Missed     03/16/25  0914 *17.2 mg Missed      2019 *17.2 mg Missed     03/17/25  0900 *17.2 mg Missed      2020 *17.2 mg Missed      2122 *Not included in total MAR Hold      2213 *Not included in total MAR Unhold     03/18/25  0854 *17.2 mg Missed      2040 17.2 mg Given     03/19/25  0925 *17.2 mg Missed      2122 17.2 mg Given                gabapentin (Neurontin) capsule 300 mg (mg) Total dose:  2,700 mg*   *Administration not included in total     Date/Time Rate/Dose/Volume Action       03/11/25  0823 300 mg Given      2016 300 mg Given     03/12/25  0955 300 mg Given      2026 300 mg Given     03/13/25  0854 300 mg Given      2134 300 mg Given     03/14/25  0921 300 mg Given      2229 300 mg Given     03/15/25  1017 300 mg Given      1452 *Not included in total MAR Hold      1454 *Not included in total MAR Unhold      1731 *Not included in total Held by provider      2100 *300 mg Missed     03/16/25  0900 *300 mg Missed      2100 *300 mg Missed     03/17/25  0900 *300 mg Missed      2100 *300 mg Missed     03/18/25  0900 *300 mg Missed      2100 *300 mg Missed     03/19/25  0900 *300 mg Missed      2100 *300 mg Missed     03/20/25  0900 *Not included in total Automatically Held               cefTRIAXone (Rocephin) 1 g in dextrose (iso) IV 50 mL (mL/hr) Total dose:  4 g* Dosing weight:  64.6   *From user-documented volume     Date/Time Rate/Dose/Volume Action       03/11/25  1713 1 g - 100 mL/hr (over 30 min) New Bag      1749 50 mL Stopped     03/12/25  0958 1 g - 100 mL/hr (over 30 min) New Bag      1048  (over 30 min) Stopped     03/13/25  0852 1 g - 100 mL/hr (over 30 min) New Bag      0922 100 mL Stopped     03/14/25  1012 1 g - 100 mL/hr (over 30 min) New Bag      1051  (over 30 min) Stopped     03/15/25  1023 1 g - 100 mL/hr (over 30 min) New Bag      1054  (over 30 min) Stopped     03/16/25  0928 1 g - 100 mL/hr (over 30 min) New Bag      1024 50 mL Stopped               gadoterate meglumine (Dotarem) 0.5 mmol/mL contrast injection 13 mL (mL) Total volume:  13 mL Dosing weight:  64.6      Date/Time Rate/Dose/Volume Action       03/11/25  0937 13 mL Given               oxyCODONE (Roxicodone) immediate release tablet 2.5 mg (mg) Total dose:  7.5 mg* Dosing weight:  64.6   *Administration not included in total     Date/Time  Rate/Dose/Volume Action       03/12/25  1747 2.5 mg Given     03/13/25  0500 2.5 mg Given      1040 *2.5 mg Missed      1842 2.5 mg Given     03/15/25  1452 *Not included in total MAR Hold      1454 *Not included in total MAR Unhold               oxyCODONE (Roxicodone) immediate release tablet 5 mg (mg) Total dose:  35 mg Dosing weight:  73.9      Date/Time Rate/Dose/Volume Action       03/16/25  1727 5 mg Given     03/17/25  1311 5 mg Given      2122 *Not included in total MAR Hold      2213 *Not included in total MAR Unhold      2346 5 mg Given     03/18/25  1830 5 mg Given     03/19/25  0008 5 mg Given      1108 5 mg Given      1715 5 mg Given               apixaban (Eliquis) tablet 10 mg (mg) Total dose:  70 mg* Dosing weight:  64.6   *Administration not included in total     Date/Time Rate/Dose/Volume Action       03/11/25  1714 10 mg Given     03/12/25  0955 10 mg Given      2034 10 mg Given     03/13/25  0853 10 mg Given      2134 10 mg Given     03/14/25  0921 10 mg Given      2229 10 mg Given     03/15/25  1054 *10 mg Missed               pantoprazole (ProtoNix) injection 40 mg (mg) Total dose:  400 mg Dosing weight:  64.6      Date/Time Rate/Dose/Volume Action       03/15/25  1220 40 mg Given      1452 *Not included in total MAR Hold      1454 *Not included in total MAR Unhold      2048 40 mg Given     03/16/25  0929 40 mg Given      2020 40 mg Given     03/17/25  1157 40 mg Given      2016 40 mg Given      2122 *Not included in total MAR Hold      2213 *Not included in total MAR Unhold     03/18/25  0848 40 mg Given      2040 40 mg Given     03/19/25  0856 40 mg Given      2122 40 mg Given               norepinephrine in dextrose 5 % (Levophed) infusion 32 mcg/mL  - Omnicell Override Pull Total dose:  Cannot be calculated*   *Total user-documented volume 257.13 mL may contain volume from other administrations     Date/Time Rate/Dose/Volume Action       03/15/25  1523 *Not included in total Missed                norepinephrine (Levophed) 8 mg in dextrose 5% 250 mL (0.032 mg/mL) infusion (premix) (mcg/kg/min) Total dose:  Cannot be calculated* Dosing weight:  64.6   *Total user-documented volume 257.13 mL may contain volume from other administrations     Date/Time Rate/Dose/Volume Action       03/15/25  1523 *Not included in total Missed      1600 0.01 mcg/kg/min - 1.21 mL/hr Block Chart Start      1840 0.27 mcg/kg/min - 32.7 mL/hr Block Chart Stop      1930 0.27 mcg/kg/min - 32.7 mL/hr Rate Verify      1945 0.28 mcg/kg/min - 33.9 mL/hr Rate Change      2005 0.3 mcg/kg/min - 36.3 mL/hr Rate Change      2107 0.29 mcg/kg/min - 35.1 mL/hr Rate Change      2306 0.28 mcg/kg/min - 33.9 mL/hr New Bag     03/16/25  0012 0.27 mcg/kg/min - 32.7 mL/hr Rate Change      0221 0.26 mcg/kg/min - 31.5 mL/hr Rate Change      0301 0.25 mcg/kg/min - 30.3 mL/hr Rate Change      0330 0.2 mcg/kg/min - 24.2 mL/hr Rate Change      0356 0.18 mcg/kg/min - 21.8 mL/hr Rate Change      0420 0.16 mcg/kg/min - 19.38 mL/hr Rate Change      0440 0.14 mcg/kg/min - 16.96 mL/hr Rate Change      0454 0.1 mcg/kg/min - 12.11 mL/hr Rate Change      0516 0.09 mcg/kg/min - 10.9 mL/hr Rate Change      0530 0.1 mcg/kg/min - 12.11 mL/hr Rate Change      0541 0.12 mcg/kg/min - 14.54 mL/hr Rate Change      0608 0.14 mcg/kg/min - 16.96 mL/hr Rate Change      0620 0.16 mcg/kg/min - 19.38 mL/hr Rate Change      0632 0.18 mcg/kg/min - 21.8 mL/hr Rate Change      0927 0.17 mcg/kg/min - 20.6 mL/hr New Bag      0930 0.17 mcg/kg/min - 20.6 mL/hr Block Chart Start      1300 0.09 mcg/kg/min - 10.9 mL/hr Block Chart Stop      1515 0.08 mcg/kg/min - 9.69 mL/hr Rate Change      1600 0.07 mcg/kg/min - 8.48 mL/hr Rate Change      1615 0.06 mcg/kg/min - 7.27 mL/hr Rate Change      1745 0.04 mcg/kg/min - 4.85 mL/hr Rate Change      1830 0.02 mcg/kg/min - 2.42 mL/hr Rate Change      1845 0.01 mcg/kg/min - 1.21 mL/hr Rate Change      1850  Stopped      2000  Stopped               four  factor human prothrombin complex concentrate (Kcentra) 2,480 Units in 80 mL infusion (mL/hr) Total dose:  2,480 Units* Dosing weight:  64.6   *From user-documented volume     Date/Time Rate/Dose/Volume Action       03/15/25  1452 *Not included in total MAR Hold      1529 2,480 Units - 468 mL/hr New Bag      1529 *Not included in total MAR Unhold      1700 80 mL [vol]                propofol (Diprivan) injection 10 mg/mL  - Omnicell Override Pull (mcg/kg/min) Total dose:  Not documented*   *Total volume has not been documented. View each administration to see the amount administered.     Date/Time Rate/Dose/Volume Action       03/15/25  1533 15 mcg/kg/min - 5.81 mL/hr New Bag               propofol (Diprivan) infusion (mcg/kg/min) Total dose:  310,000 mcg* Dosing weight:  64.6   *From user-documented volume     Date/Time Rate/Dose/Volume Action       03/15/25  1533 15 mcg/kg/min - 5.81 mL/hr New Bag      1755 30 mcg/kg/min - 11.63 mL/hr Rate Change      1930 30 mcg/kg/min - 11.63 mL/hr Rate Verify      2001 25 mcg/kg/min - 9.69 mL/hr Rate Change      2007 20 mcg/kg/min - 7.75 mL/hr Rate Change      2305 20 mcg/kg/min - 7.75 mL/hr New Bag     03/16/25  0630  Stopped      0646 10 mcg/kg/min - 3.88 mL/hr Restarted      0940  Stopped      1024 *Not included in total Missed               alteplase (Cathflo Activase) injection 2 mg (mg) Total dose:  Cannot be calculated* Dosing weight:  64.6   *Administration dose not documented     Date/Time Rate/Dose/Volume Action       03/17/25  2122 *Not included in total MAR Hold      2213 *Not included in total MAR Unhold               rocuronium (ZeMuron) injection 50 mg (mg) Total dose:  50 mg Dosing weight:  64.6      Date/Time Rate/Dose/Volume Action       03/15/25  1530 50 mg Given               etomidate (Amidate) injection 20 mg (mg) Total dose:  20 mg Dosing weight:  64.6      Date/Time Rate/Dose/Volume Action       03/15/25  1530 20 mg Given               oxygen (O2) therapy  (percent) Total dose:  Not documented* Dosing weight:  64.6   *Total volume has not been documented. View each administration to see the amount administered.     Date/Time Rate/Dose/Volume Action       03/15/25  1453 80 percent Start      1547 50 percent Rate Change Medical Gas      1907 40 percent Rate Change Medical Gas      2310 40 percent Rate Verify Medical Gas     03/16/25  0305 40 percent Rate Verify Medical Gas      0725 30 percent Rate Change Medical Gas               oxygen (O2) therapy (L/min) Total volume:  Not documented* Dosing weight:  73.9   *Total volume has not been documented. View each administration to see the amount administered.     Date/Time Rate/Dose/Volume Action       03/16/25  1230 3 L/min - 180,000 mL/hr Start               oxygen (O2) therapy (L/min) Total volume:  Not documented*   *Total volume has not been documented. View each administration to see the amount administered.     Date/Time Rate/Dose/Volume Action       03/20/25  0834 2 L/min - 120,000 mL/hr New Bag               metoclopramide (Reglan) injection 10 mg (mg) Total dose:  10 mg Dosing weight:  64.6      Date/Time Rate/Dose/Volume Action       03/15/25  1740 10 mg Given               acetaminophen (Tylenol) oral liquid 650 mg (mg) Total dose:  Cannot be calculated* Dosing weight:  64.6   *Administration dose not documented     Date/Time Rate/Dose/Volume Action       03/17/25 2122 *Not included in total MAR Hold      2213 *Not included in total MAR Unhold      2242 *Not included in total See Alternative     03/18/25  2040 *Not included in total See Alternative     03/19/25  0923 *Not included in total See Alternative               acetaminophen (Tylenol) suppository 650 mg (mg) Total dose:  Cannot be calculated* Dosing weight:  64.6   *Administration dose not documented     Date/Time Rate/Dose/Volume Action       03/17/25 2122 *Not included in total MAR Hold      2213 *Not included in total MAR Unhold      2242 *Not  included in total See Alternative     03/18/25  2040 *Not included in total See Alternative     03/19/25  0923 *Not included in total See Alternative               vasopressin (Vasostrict) 0.2 unit/mL in 5% dextrose 100 mL IV (Units/min) Total dose:  1.5 Units* Dosing weight:  64.6   *From user-documented volume     Date/Time Rate/Dose/Volume Action       03/15/25  1740 0.03 Units/min - 9 mL/hr New Bag     03/16/25  0137 0.03 Units/min - 9 mL/hr New Bag      0516  Stopped               hydrocortisone sodium succinate (PF) (Solu-CORTEF) injection 50 mg (mg) Total dose:  350 mg Dosing weight:  64.6      Date/Time Rate/Dose/Volume Action       03/15/25  1740 50 mg Given     03/16/25  0026 50 mg Given      0504 50 mg Given      1253 50 mg Given      1725 50 mg Given     03/17/25  0008 50 mg Given      0537 50 mg Given               hydrocortisone sodium succinate (PF) (Solu-CORTEF) injection 50 mg (mg) Total dose:  100 mg* Dosing weight:  72.5   *Administration not included in total     Date/Time Rate/Dose/Volume Action       03/17/25  1736 50 mg Given      2122 *Not included in total MAR Hold      2213 *Not included in total MAR Unhold     03/18/25  0528 50 mg Given      1756 *50 mg Missed               insulin lispro injection 0-10 Units (Units) Total dose:  6 Units Dosing weight:  64.6      Date/Time Rate/Dose/Volume Action       03/15/25  2053 2 Units Given     03/16/25  0024 2 Units Given      0449 2 Units Given      0821 *Not included in total Missed      1234 *Not included in total Missed      1717 *Not included in total Missed      2015 *Not included in total Missed     03/17/25  0005 *Not included in total Missed      0326 *Not included in total Missed      0732 *Not included in total Missed      1146 *Not included in total Missed      1712 *Not included in total Missed      1913 *Not included in total Missed      2122 *Not included in total MAR Hold      2213 *Not included in total MAR Unhold     03/18/25   0005 *Not included in total Missed      0411 *Not included in total Missed      0806 *Not included in total Missed      1302 *Not included in total Missed      1629 *Not included in total Missed      2048 *Not included in total Missed     03/19/25  0008 *Not included in total Missed      0406 *Not included in total Missed      0916 *Not included in total Missed      1216 *Not included in total Missed      1632 *Not included in total Missed      2203 *Not included in total Missed      2357 *Not included in total Missed     03/20/25  0416 *Not included in total Missed               glucagon (Glucagen) injection 1 mg (mg) Total dose:  Cannot be calculated* Dosing weight:  64.6   *Administration dose not documented     Date/Time Rate/Dose/Volume Action       03/17/25 2122 *Not included in total MAR Hold      2213 *Not included in total MAR Unhold               glucagon (Glucagen) injection 1 mg (mg) Total dose:  Cannot be calculated* Dosing weight:  64.6   *Administration dose not documented     Date/Time Rate/Dose/Volume Action       03/17/25 2122 *Not included in total MAR Hold      2213 *Not included in total MAR Unhold               dextrose 50 % injection 25 g (g) Total dose:  Cannot be calculated* Dosing weight:  64.6   *Administration dose not documented     Date/Time Rate/Dose/Volume Action       03/17/25 2122 *Not included in total MAR Hold      2213 *Not included in total MAR Unhold               dextrose 50 % injection 12.5 g (g) Total dose:  Cannot be calculated* Dosing weight:  64.6   *Administration dose not documented     Date/Time Rate/Dose/Volume Action       03/17/25 2122 *Not included in total MAR Hold      2213 *Not included in total MAR Unhold               albumin human 5 % infusion 25 g (mL/hr) Total dose:  25 g* Dosing weight:  73.9   *From user-documented volume     Date/Time Rate/Dose/Volume Action       03/16/25  1820 25 g - 250 mL/hr (over 120 min) New Bag      1845  (over 120 min)  Stopped      1850 500 mL                carvedilol (Coreg) tablet 6.25 mg (mg) Total dose:  43.75 mg      Date/Time Rate/Dose/Volume Action       03/15/25  1731 *Not included in total Held by provider     03/16/25 2134 *Not included in total Unheld by provider      2154 6.25 mg Given     03/17/25  1157 6.25 mg Given      2021 6.25 mg Given      2122 *Not included in total MAR Hold      2213 *Not included in total MAR Unhold     03/18/25  0849 6.25 mg Given      2040 6.25 mg Given     03/19/25  0856 6.25 mg Given      2122 6.25 mg Given               potassium phosphates 21 mmol in dextrose 5% 250 mL IV (mL/hr) Total volume:  Not documented* Dosing weight:  72.5   *Total volume has not been documented. View each administration to see the amount administered.     Date/Time Rate/Dose/Volume Action       03/17/25  1059 21 mmol - 42.8 mL/hr (over 360 min) New Bag      1711  (over 360 min) Stopped               EPINEPHrine (Adrenalin) 1 mg/10mL injection (mg) Total dose:  1 mg      Date/Time Rate/Dose/Volume Action       03/17/25  0955 1 mg Given               calcium gluconate 2 g in sodium chloride (iso)  mL (mL/hr) Total volume:  Not documented* Dosing weight:  72.5   *Total volume has not been documented. View each administration to see the amount administered.     Date/Time Rate/Dose/Volume Action       03/19/25  1023 2 g - 100 mL/hr (over 60 min) New Bag      1124  (over 60 min) Stopped               midazolam (Versed) injection (mg) Total dose:  0.5 mg      Date/Time Rate/Dose/Volume Action       03/20/25  0850 0.5 mg Given               fentaNYL PF (Sublimaze) injection (mcg) Total dose:  25 mcg      Date/Time Rate/Dose/Volume Action       03/20/25  0850 25 mcg Given               lidocaine PF (Xylocaine) 10 mg/mL (1 %) injection (mL) Total volume:  5 mL      Date/Time Rate/Dose/Volume Action       03/20/25  0851 5 mL Given               iohexol (OMNIPaque) 350 mg iodine/mL solution (mL) Total volume:  45  mL      Date/Time Rate/Dose/Volume Action       03/20/25  0910 45 mL Given                   No specimens collected      See detailed result report with images in PACS.    The patient tolerated the procedure well without incident or complication and is in stable condition.

## 2025-03-20 NOTE — ANESTHESIA PREPROCEDURE EVALUATION
"Patient: Elizabeth Buckner    Procedure Information       Date/Time: 03/20/25 0700    Scheduled providers: Mitchell Vega MD; SAYDA Zuñiga    Procedure: EGD    Location: [facility]            Relevant Problems   Anesthesia (within normal limits)      Cardiac   (+) Hypertension   (+) Other hyperlipidemia      Neuro  Confusion  Weakness     (+) Anxiety   (+) Depression   (+) Drug-induced polyneuropathy (Multi)      GI  Recent wt loss  Hematemesis hx   (+) Esophageal reflux   (+) GI bleed   (+) PUD (peptic ulcer disease)      /Renal   (+) Chronic renal insufficiency   (+) UTI (urinary tract infection)   (+) Urinary tract infection      Liver   (+) Gallbladder stone without cholecystitis or obstruction   (+) Hepatobiliary cancer (Multi) (\"Possible\" per chart)      Endocrine   (+) Exogenous obesity      Hematology   (+) Anemia of chronic disease   (+) Deep vein thrombosis (DVT) of lower extremity (Multi)   (+) History of blood transfusion      Musculoskeletal   (+) Osteoarthritis of knee   (+) Osteoarthritis, knee      ID   (+) COVID-19   (+) Flu syndrome   (+) UTI (urinary tract infection)   (+) Urinary tract infection   (+) Viral URI with cough      GYN   (+) Breast cancer (Multi)       Clinical information reviewed:                   NPO Detail:  No data recorded     Physical Exam    Airway  Mallampati: II     Cardiovascular    Dental    Pulmonary    Abdominal            Anesthesia Plan    History of general anesthesia?: yes  History of complications of general anesthesia?: no    ASA 3     MAC     intravenous induction   Anesthetic plan and risks discussed with patient.      "

## 2025-03-20 NOTE — CARE PLAN
The patient's goals for the shift include Pt. will have a safe, restful and uneventful evening    The clinical goals for the shift include pt. remain hds    Over the shift, the patient did not make progress toward the following goals. Barriers to progression include . Recommendations to address these barriers include .

## 2025-03-20 NOTE — ANESTHESIA POSTPROCEDURE EVALUATION
Patient: Elizabeth Buckner    Procedure Summary       Date: 03/20/25 Room / Location: [facility]    Anesthesia Start: 1046 Anesthesia Stop: 1116    Procedure: EGD Diagnosis:       Gastrointestinal hemorrhage associated with gastric ulcer      Melena    Scheduled Providers: Timur Che MD, MS; Mitchell Vega MD; JOSELO Zuñiga-CRNA Responsible Provider: Mitchell Vega MD    Anesthesia Type: MAC ASA Status: 3            Anesthesia Type: MAC    Vitals Value Taken Time   /56 03/20/25 1127   Temp 36.4 °C (97.5 °F) 03/20/25 1112   Pulse 91 03/20/25 1127   Resp 15 03/20/25 1127   SpO2 100 % 03/20/25 1127       Anesthesia Post Evaluation    Patient location during evaluation: bedside  Patient participation: complete - patient cannot participate  Level of consciousness: awake  Pain score: 0  Pain management: adequate  Airway patency: patent  Cardiovascular status: acceptable and hemodynamically stable  Respiratory status: acceptable and nonlabored ventilation  Hydration status: acceptable  Postoperative Nausea and Vomiting: none        No notable events documented.

## 2025-03-20 NOTE — SIGNIFICANT EVENT
EGD done  The upper third of the esophagus, middle third of the esophagus and lower third of the esophagus appeared normal.  Single 7 mm x 8 mm cratered, benign-appearing ulcer in the fundus of the stomach with clean base (Gary III); performed cold forceps biopsy from ulcer edge to rule out malignancy; placed 3 clips successfully and 1 clip got dislodged after placement (clips are MRI conditional and through-the-scope); hemostasis achieved  The duodenal bulb and 2nd part of the duodenum appeared normal.    -diet as tolerated  -ok to resume anticoagulation tomorrow  - EGD to repeat in 3 month  D/w Dr EMETERIO Che, gi will sign off, please call if any questions or further assistance needed

## 2025-03-20 NOTE — CARE PLAN
The patient's goals for the shift include Pt. will have a safe, restful and uneventful evening    The clinical goals for the shift include monitor for bleeding    Over the shift, the patient did not make progress toward the following goals. Barriers to progression include . Recommendations to address these barriers include   Problem: Nutrition  Goal: Nutrient intake appropriate for maintaining nutritional needs  Outcome: Progressing     Problem: Fall/Injury  Goal: Verbalize understanding of personal risk factors for fall in the hospital  Outcome: Progressing  Goal: Verbalize understanding of risk factor reduction measures to prevent injury from fall in the home  Outcome: Progressing  Goal: Use assistive devices by end of the shift  Outcome: Progressing     Problem: Skin  Goal: Decreased wound size/increased tissue granulation at next dressing change  Outcome: Progressing  Goal: Participates in plan/prevention/treatment measures  Outcome: Progressing  Goal: Prevent/manage excess moisture  Outcome: Progressing  Goal: Prevent/minimize sheer/friction injuries  Outcome: Progressing  Goal: Promote/optimize nutrition  Outcome: Progressing  Goal: Promote skin healing  Outcome: Progressing     Problem: Pain  Goal: Takes deep breaths with improved pain control throughout the shift  Outcome: Progressing  Goal: Turns in bed with improved pain control throughout the shift  Outcome: Progressing  Goal: Walks with improved pain control throughout the shift  Outcome: Progressing  Goal: Performs ADL's with improved pain control throughout shift  Outcome: Progressing  Goal: Participates in PT with improved pain control throughout the shift  Outcome: Progressing  Goal: Free from opioid side effects throughout the shift  Outcome: Progressing  Goal: Free from acute confusion related to pain meds throughout the shift  Outcome: Progressing   .

## 2025-03-20 NOTE — DISCHARGE INSTRUCTIONS
Inferior Vena Cava Filter (IV Filter)- Patient and Family Education    What is an Inferior Vena Cava Filter?  The inferior vena cava filter is a filter that is placed in a large vein in your abdomen. It traps  large blood clots on the way to the lung. This filter is placed in Radiology by a Radiologist using  a small tube that goes into a vein in your neck or groin.    Before the Test:  ? Do Not Drink or Eat Anything after midnight the day before the test. You should drink  sips of water with medications only.  ? Take your daily medications, especially heart medication, and high blood pressure  medications. If you have, diabetes ask your Radiologist or Radiology Nurse if you  should take your medicine before the test.  ? Notify your doctor if you have any allergies to X-Ray dye, your doctor may prescribe  special pills to take before the test.  ? You will wear a hospital gown for the test. Watch, rings, and glasses may be worn. (If  you are having studies done above your neck, you must remove necklaces, earrings,  dentures and metal objects in your hair).  ? Go to the bathroom before you leave your room for the test.  ? A family member may go with you and stay in the X-ray Department waiting room. You  will be kept 1 to 2 hours after the test if you have the test done as an outpatient.  ? An intravenous (IV) line will be placed in your arm.    During the Test:  ? You will lie on a cushioned X-ray table.  ? You may be given medicine that will help you relax during the test.  ? Part of your groin or neck will be clipped if hair is present and washed to lessen the risk  of infection.  ? Your groin or neck will be numbed with a medication before your doctor inserts the tube.  The X-Ray dye is then injected through the tube. You may feel a flushing or burning  feeling for a few seconds.  ? You will be awake during the test and your doctor will ask you to follow simple  commands. You may ask questions during the test.  ?  After the X-Ray dye is injected, pictures will be taken and the filter will be placed. When  the test is done, pressure is held on your procedure site for 10 to 15 minutes to prevent  bleeding. A bandage will be placed on the site.    If You go Home After the Test:  ? You Must have someone drive you home after the test. You will not be allowed to drive  or travel home alone in a cab or on a bus.  ? You should not drive for 24 hours after the test.  ? Someone should stay with you through the night.  ? Resume your regular diet and medicines  ? Rest until morning and avoid strenuous activities (like jogging or lifting objects that  weigh more than 10 pounds) for the next 2 days. You may feel slight discomfort where  the catheter was inserted in your groin or neck.  ? If you take blood thinning medication, ask your doctor if you should stop the medicine  for 1 or 2 days after the test.    Sedation:  If you received medication for sedation, it will be active in your body for approximately 24  hours. So you may feel a little sleepy. Therefore, for the next 24 hours:  ? DO NOT drive a car, operate machinery or power tools. It is recommended that a   responsible adult be with you for the first 24 hours.  ? DO NOT drink any alcoholic beverages or take any non-prescriptive medications that   contain alcohol.  ? DO NOT make any important decisions or sign any legal/important documents.    Call your Doctor Right Away if you have:  ? You cannot find a pulse in your groin. Your nurse will show you how to feel for a pulse.  ? Your leg or foot becomes numb or tingles, or if you have severe pain.  ? You have bleeding at the site.  ? You have increased pain at the procedure site.  ? You have dizziness or fainting.  Also, Call if:  ? There is an increase in swelling or discoloration at the site.  ? There is redness, heat, or oozing at the site.  ? You have any questions.     How to Reach your Doctor:    Call [phone] with problems  or questions.    This info is a general resource. It is not meant to replace your health care provider’s advice.  Ask your doctor or health care team any questions. Always follow their instructions.

## 2025-03-20 NOTE — PROGRESS NOTES
03/20/25 1204   Discharge Planning   Expected Discharge Disposition SNF        Patient went for IVC filter and she will have EGD done today. GI is following. Spoke to patient's  and explained PT/OT rec for mod and SNF placement. He agreed.  had SNF list and his choices are: 1. [facility] and 2. [facility]. Requested from [facility] to place referrals to SNFs.  Will continue to follow for acceptance and discharge needs.

## 2025-03-20 NOTE — PROGRESS NOTES
Physical Therapy                 Therapy Communication Note    Patient Name: Elizabeth Buckner  MRN: [id number]  Department: [facility] GI [facility]  Room: 411/411-A  Today's Date: 3/20/2025     Discipline: Physical Therapy    PT Missed Visit: Yes     Missed Visit Reason: Missed Visit Reason: Patient in a medical procedure, Other (Comment) (Pt in procedure and unavailable for PT treatment.)    Missed Time: Hold at 0954

## 2025-03-21 ENCOUNTER — APPOINTMENT (OUTPATIENT)
Dept: VASCULAR MEDICINE | Facility: HOSPITAL | Age: 85
DRG: 299 | End: 2025-03-21
Payer: MEDICARE

## 2025-03-21 LAB
ANION GAP SERPL CALC-SCNC: 12 MMOL/L (ref 10–20)
BASOPHILS # BLD MANUAL: 0 X10*3/UL (ref 0–0.1)
BASOPHILS NFR BLD MANUAL: 0 %
BUN SERPL-MCNC: 49 MG/DL (ref 6–23)
BURR CELLS BLD QL SMEAR: ABNORMAL
CALCIUM SERPL-MCNC: 7.4 MG/DL (ref 8.6–10.3)
CHLORIDE SERPL-SCNC: 106 MMOL/L (ref 98–107)
CO2 SERPL-SCNC: 22 MMOL/L (ref 21–32)
CREAT SERPL-MCNC: 1.29 MG/DL (ref 0.5–1.05)
EGFRCR SERPLBLD CKD-EPI 2021: 41 ML/MIN/1.73M*2
EOSINOPHIL # BLD MANUAL: 0 X10*3/UL (ref 0–0.4)
EOSINOPHIL NFR BLD MANUAL: 0 %
ERYTHROCYTE [DISTWIDTH] IN BLOOD BY AUTOMATED COUNT: 15.6 % (ref 11.5–14.5)
GLUCOSE BLD MANUAL STRIP-MCNC: 107 MG/DL (ref 74–99)
GLUCOSE BLD MANUAL STRIP-MCNC: 111 MG/DL (ref 74–99)
GLUCOSE BLD MANUAL STRIP-MCNC: 92 MG/DL (ref 74–99)
GLUCOSE BLD MANUAL STRIP-MCNC: 95 MG/DL (ref 74–99)
GLUCOSE BLD MANUAL STRIP-MCNC: 98 MG/DL (ref 74–99)
GLUCOSE SERPL-MCNC: 97 MG/DL (ref 74–99)
HCT VFR BLD AUTO: 24.7 % (ref 36–46)
HGB BLD-MCNC: 8.1 G/DL (ref 12–16)
HOLD SPECIMEN: NORMAL
HOLD SPECIMEN: NORMAL
IMM GRANULOCYTES # BLD AUTO: 0.58 X10*3/UL (ref 0–0.5)
IMM GRANULOCYTES NFR BLD AUTO: 2.7 % (ref 0–0.9)
LYMPHOCYTES # BLD MANUAL: 0.22 X10*3/UL (ref 0.8–3)
LYMPHOCYTES NFR BLD MANUAL: 1 %
MAGNESIUM SERPL-MCNC: 2.27 MG/DL (ref 1.6–2.4)
MCH RBC QN AUTO: 29.2 PG (ref 26–34)
MCHC RBC AUTO-ENTMCNC: 32.8 G/DL (ref 32–36)
MCV RBC AUTO: 89 FL (ref 80–100)
MONOCYTES # BLD MANUAL: 0.65 X10*3/UL (ref 0.05–0.8)
MONOCYTES NFR BLD MANUAL: 3 %
NEUTROPHILS # BLD MANUAL: 20.73 X10*3/UL (ref 1.6–5.5)
NEUTS BAND # BLD MANUAL: 0.86 X10*3/UL (ref 0–0.5)
NEUTS BAND NFR BLD MANUAL: 4 %
NEUTS SEG # BLD MANUAL: 19.87 X10*3/UL (ref 1.6–5)
NEUTS SEG NFR BLD MANUAL: 92 %
NRBC BLD-RTO: 0 /100 WBCS (ref 0–0)
OVALOCYTES BLD QL SMEAR: ABNORMAL
PLATELET # BLD AUTO: 311 X10*3/UL (ref 150–450)
POTASSIUM SERPL-SCNC: 3.8 MMOL/L (ref 3.5–5.3)
RBC # BLD AUTO: 2.77 X10*6/UL (ref 4–5.2)
RBC MORPH BLD: ABNORMAL
SODIUM SERPL-SCNC: 136 MMOL/L (ref 136–145)
TOTAL CELLS COUNTED BLD: 100
UFH PPP CHRO-ACNC: 1.4 IU/ML
WBC # BLD AUTO: 21.6 X10*3/UL (ref 4.4–11.3)

## 2025-03-21 PROCEDURE — 85027 COMPLETE CBC AUTOMATED: CPT | Performed by: HOSPITALIST

## 2025-03-21 PROCEDURE — 36415 COLL VENOUS BLD VENIPUNCTURE: CPT | Performed by: HOSPITALIST

## 2025-03-21 PROCEDURE — 85520 HEPARIN ASSAY: CPT | Performed by: HOSPITALIST

## 2025-03-21 PROCEDURE — 85007 BL SMEAR W/DIFF WBC COUNT: CPT | Performed by: HOSPITALIST

## 2025-03-21 PROCEDURE — 2500000001 HC RX 250 WO HCPCS SELF ADMINISTERED DRUGS (ALT 637 FOR MEDICARE OP): Performed by: NURSE PRACTITIONER

## 2025-03-21 PROCEDURE — 83735 ASSAY OF MAGNESIUM: CPT | Performed by: HOSPITALIST

## 2025-03-21 PROCEDURE — 93971 EXTREMITY STUDY: CPT

## 2025-03-21 PROCEDURE — 99232 SBSQ HOSP IP/OBS MODERATE 35: CPT | Performed by: HOSPITALIST

## 2025-03-21 PROCEDURE — 82947 ASSAY GLUCOSE BLOOD QUANT: CPT

## 2025-03-21 PROCEDURE — 2500000004 HC RX 250 GENERAL PHARMACY W/ HCPCS (ALT 636 FOR OP/ED): Performed by: NURSE PRACTITIONER

## 2025-03-21 PROCEDURE — 2500000004 HC RX 250 GENERAL PHARMACY W/ HCPCS (ALT 636 FOR OP/ED): Performed by: HOSPITALIST

## 2025-03-21 PROCEDURE — 99233 SBSQ HOSP IP/OBS HIGH 50: CPT | Performed by: NURSE PRACTITIONER

## 2025-03-21 PROCEDURE — 93971 EXTREMITY STUDY: CPT | Performed by: INTERNAL MEDICINE

## 2025-03-21 PROCEDURE — 1100000001 HC PRIVATE ROOM DAILY

## 2025-03-21 PROCEDURE — 80048 BASIC METABOLIC PNL TOTAL CA: CPT | Performed by: NURSE PRACTITIONER

## 2025-03-21 RX ORDER — HEPARIN SODIUM 10000 [USP'U]/100ML
0-4500 INJECTION, SOLUTION INTRAVENOUS CONTINUOUS
Status: DISCONTINUED | OUTPATIENT
Start: 2025-03-21 | End: 2025-03-23

## 2025-03-21 RX ADMIN — HEPARIN SODIUM 1300 UNITS/HR: 10000 INJECTION, SOLUTION INTRAVENOUS at 14:46

## 2025-03-21 RX ADMIN — OXYCODONE HYDROCHLORIDE 5 MG: 5 TABLET ORAL at 21:03

## 2025-03-21 RX ADMIN — SENNOSIDES 17.2 MG: 8.6 TABLET ORAL at 21:03

## 2025-03-21 RX ADMIN — PANTOPRAZOLE SODIUM 40 MG: 40 INJECTION, POWDER, FOR SOLUTION INTRAVENOUS at 21:03

## 2025-03-21 RX ADMIN — OXYCODONE HYDROCHLORIDE 5 MG: 5 TABLET ORAL at 13:04

## 2025-03-21 RX ADMIN — SENNOSIDES 17.2 MG: 8.6 TABLET ORAL at 08:53

## 2025-03-21 RX ADMIN — CARVEDILOL 6.25 MG: 6.25 TABLET, FILM COATED ORAL at 08:53

## 2025-03-21 RX ADMIN — PANTOPRAZOLE SODIUM 40 MG: 40 INJECTION, POWDER, FOR SOLUTION INTRAVENOUS at 08:55

## 2025-03-21 RX ADMIN — CARVEDILOL 6.25 MG: 6.25 TABLET, FILM COATED ORAL at 21:03

## 2025-03-21 RX ADMIN — ANASTROZOLE 1 MG: 1 TABLET, COATED ORAL at 08:53

## 2025-03-21 ASSESSMENT — PAIN SCALES - GENERAL
PAINLEVEL_OUTOF10: 3
PAINLEVEL_OUTOF10: 10 - WORST POSSIBLE PAIN
PAINLEVEL_OUTOF10: 4
PAINLEVEL_OUTOF10: 9
PAINLEVEL_OUTOF10: 3

## 2025-03-21 ASSESSMENT — PAIN DESCRIPTION - ORIENTATION: ORIENTATION: RIGHT

## 2025-03-21 ASSESSMENT — COGNITIVE AND FUNCTIONAL STATUS - GENERAL
DAILY ACTIVITIY SCORE: 19
HELP NEEDED FOR BATHING: A LITTLE
MOBILITY SCORE: 20
MOVING TO AND FROM BED TO CHAIR: A LITTLE
DRESSING REGULAR UPPER BODY CLOTHING: A LITTLE
DAILY ACTIVITIY SCORE: 20
MOVING TO AND FROM BED TO CHAIR: A LITTLE
MOVING FROM LYING ON BACK TO SITTING ON SIDE OF FLAT BED WITH BEDRAILS: A LITTLE
WALKING IN HOSPITAL ROOM: A LITTLE
WALKING IN HOSPITAL ROOM: A LITTLE
DRESSING REGULAR LOWER BODY CLOTHING: A LITTLE
CLIMB 3 TO 5 STEPS WITH RAILING: A LITTLE
PERSONAL GROOMING: A LITTLE
HELP NEEDED FOR BATHING: A LITTLE
DRESSING REGULAR LOWER BODY CLOTHING: A LITTLE
TOILETING: A LITTLE
MOBILITY SCORE: 18
MOVING FROM LYING ON BACK TO SITTING ON SIDE OF FLAT BED WITH BEDRAILS: A LITTLE
TURNING FROM BACK TO SIDE WHILE IN FLAT BAD: A LITTLE
TOILETING: A LITTLE
TURNING FROM BACK TO SIDE WHILE IN FLAT BAD: A LITTLE
STANDING UP FROM CHAIR USING ARMS: A LITTLE
DRESSING REGULAR UPPER BODY CLOTHING: A LITTLE

## 2025-03-21 ASSESSMENT — PAIN - FUNCTIONAL ASSESSMENT
PAIN_FUNCTIONAL_ASSESSMENT: 0-10

## 2025-03-21 ASSESSMENT — PAIN DESCRIPTION - LOCATION: LOCATION: KNEE

## 2025-03-21 ASSESSMENT — PAIN DESCRIPTION - DESCRIPTORS: DESCRIPTORS: ACHING;SHARP;STABBING

## 2025-03-21 NOTE — CARE PLAN
The patient's goals for the shift include Pt. will have a safe, restful and uneventful evening    The clinical goals for the shift include remain hds

## 2025-03-21 NOTE — PROGRESS NOTES
"Subjective   Interval History: has no complaint of Chest pain or SOB. Right leg edema. S/p IVC filter placement yesterday. EGD completed without signs of active bleed.      Objective   Vital signs in last 24 hours:  BP (!) 129/47 (BP Location: Right arm, Patient Position: Lying)   Pulse 87   Temp 37 °C (98.6 °F) (Temporal)   Resp 19   Wt 72.5 kg (159 lb 13.3 oz)   SpO2 98%     Intake/Output last 3 shifts:  I/O last 3 completed shifts:  In: 360 (5 mL/kg) [P.O.:360]  Out: 655 (9 mL/kg) [Urine:650 (0.2 mL/kg/hr); Blood:5]  Weight: 72.5 kg   Intake/Output this shift:  No intake/output data recorded.    Physical Exam  Neuro: oriented to person, place, time, and general circumstances  HEENT: normocephalic, atraumatic  Pulm: normal  Cardiac: Regular rate and rhythm  Abdomen: soft, nontender, normal bowel sounds  Pulses: 2+ and symmetric    Relevant Results  LABS:  Lab Results   Component Value Date    WBC 21.6 (H) 03/21/2025    HGB 8.1 (L) 03/21/2025    HCT 24.7 (L) 03/21/2025    MCV 89 03/21/2025     03/21/2025      Results from last 72 hours   Lab Units 03/21/25  0642   SODIUM mmol/L 136   POTASSIUM mmol/L 3.8   CHLORIDE mmol/L 106   CO2 mmol/L 22   BUN mg/dL 49*   CREATININE mg/dL 1.29*   GLUCOSE mg/dL 97   CALCIUM mg/dL 7.4*   ANION GAP mmol/L 12   EGFR mL/min/1.73m*2 41*     Results from last 72 hours   Lab Units 03/20/25  0500   ALBUMIN g/dL 2.4*           No lab exists for component: \"PT\"    MICRO:  No results found for the last 14 days.      IMAGING:  Vascular US upper extremity venous duplex right         IR intervention filter placement   Final Result   1. Uncomplicated and technically successful placement of a   retrievable ALN inferior vena cava filter in an infrarenal IVC   location. The IVC filter may be retrieved as clinically indicated.   Optimal recovery period is less than 90 days post-placement. However,   longer periods have been reported and are possible.   2. Patent inferior vena cava and " common iliac vein reflux without   evidence of thrombus, stenosis, occlusion, or anatomic variation.   3. Please contact interventional radiology if filtration is no longer   indicated.        Performed and dictated at [facility].        MACRO:   None.        Signed by: Carroll Linda 3/20/2025 10:06 AM   Dictation workstation:   [id number]      XR chest 1 view   Final Result   Tubes and lines have been removed.        Hypoventilatory exam with mild elevation of the right diaphragm. No   mass or pneumonia in either lung.        Mild cardiomegaly.        Interval placement of a clip in the proximal stomach.        MACRO:   None        Signed by: Carroll Corley 3/19/2025 3:00 PM   Dictation workstation:   [id number]      XR chest 1 view   Final Result   Addendum (preliminary) 1 of 1   Potentially critical findings are discussed with and acknowledged by   Yael Garcia RN at 7:38 PM Eastern time on 3/15/2025.   Signed by Shavonne Ye DO      Final   1.Endotracheal tube with the tip at or less than 1 cm above the   level of the fabrizio. Repositioning is recommended.  This could be   pulled back about 4 to 5 cm.   2.Enteric tube and right internal jugular line in place.   3.No pneumothorax.   4.Normal heart size with no radiographic signs of active   pulmonary parenchymal infiltration.  Known pleural and parenchymal   nodules worrisome for metastatic disease seen on CT are not well   visualized radiographically.   Signed by Shavonne Ye DO      MR brain w and wo IV contrast   Final Result   1. No acute infarct, hemorrhage or mass is noted. No abnormalities of   the 4th ventricle are noted.        2. The cerebellar tonsils are low-lying consistent with mild Chiari 1   type malformation.        MACRO:   None        Signed by: Pete Rice 3/11/2025 12:21 PM   Dictation workstation:   [id number]      US abdomen complete   Final Result   Obscuration the spleen and right kidney by  bowel gas. Previous   cholecystectomy.        Mildly small left kidney. The left kidney was otherwise   sonographically unremarkable.        There were 3 identifiable hypoechoic solid lesions in the liver.   These are nonspecific and could be atypical hemangiomas or metastatic   lesions. Recommend liver MRI in follow-up.        MACRO:   None        Signed by: Carroll Corley 3/11/2025 8:04 AM   Dictation workstation:   [id number]      Lower extremity venous duplex right   Final Result   1. Acute deep vein thrombosis at the right lower extremity from the   inguinal region to the popliteal region. The right posterior tibial   and peroneal veins were not visualized on this exam.             MACRO:   Noa Adamson discussed the significance and urgency of this   critical finding by telephone with  CYNTHIA HOWARD on 3/10/2025 at 11:24   pm.  (**-RCF-**) Findings:  See findings.             Signed by: Noa Adamson 3/10/2025 11:39 PM   Dictation workstation:   [id number]      CT head wo IV contrast   Final Result   Mass effect upon the 4th ventricle centrally. Further evaluation with   contrast-enhanced MRI is recommended for better assessment.        No evidence for acute cortical infarction or acute intracranial   hemorrhage is seen.        MACRO:   Critical Finding:  See findings. Notification was initiated on   3/10/2025 at 7:46 pm by  Esvin Philippe.  (**-YCF-**)        Signed by: Esvin Philippe 3/10/2025 7:46 PM   Dictation workstation:   [id number]      CT abdomen pelvis wo IV contrast   Final Result   1.  Extensive retroperitoneal and bilateral pelvic lymphadenopathy as   above concerning for lymphoma or metastatic disease.   2. Suspect acute deep venous thrombosis within the right common   femoral vein and right external iliac vein. Correlation with   ultrasound findings recommended.   3. Numerous liver masses suggestive of metastatic liver disease.   Correlation with ultrasound findings can be performed as  clinically   warranted.   4. Bilateral renal lesions, nonspecific in etiology and may represent   cysts but incompletely characterized in this unenhanced exam.   Correlation with ultrasound findings can be performed as clinically   warranted.   5. Air in the bladder which may be due to bladder instrumentation.   Correlation with urinalysis recommended.   6. Additional detailed findings as above.   Critical Finding:  See findings. Notification was initiated on   3/10/2025 at 7:54 pm by  Esvin Philippe.  (**-YCF-**) Instructions:        7.             Signed by: Esvin Philippe 3/10/2025 7:54 PM   Dictation workstation:   [id number]      XR chest 2 views   Final Result   No acute process.   Signed by Kamaljit Alex MD      Follow-Up Consult to Interventional Radiology    (Results Pending)       Assessment/Plan   This is a 84 y.o. female presenting with weakness. History of right breast carcinoma first diagnosed in 1991 with eventual recurrence bilaterally requiring bilateral mastectomies, radiation, and adjvant chemotherapy. Notes right leg swelling for over 1 month duration.      Workup in the ED included duplex US which was reviewed and demonstrates extensive RLE DVT throughout the common fem to the popliteal region. CT A/P was also reviewed notable for numerous liver masses, extensive rertoperitoneal lymphadenopathy consistent with metastasis.      She was started on Eliquis for DVT, unfortunately suffered an upper GI bleed necessitating ICU admission for hemorrhagic shock. EGD positive for 8mm gastric ulcer was noted with clot, clips and epi applied. IR was consulted for placement of an IVC filter in the setting of DVT with GIB. Repeat EGD was without bleed.     IVC filter was placed yesterday 3/20/25. Patient tolerated well. We will set an 8 week phone visit for further follow-up of her filter. May be lifelong pending her ability to tolerate AC and oncologic prognosis.      LOS: 10 days     Melia Rm,  APRN-CNP

## 2025-03-21 NOTE — PROGRESS NOTES
03/21/25 1412   Discharge Planning   Expected Discharge Disposition SNF         Patient had IVC filter placed yesterday and she also had repeat EGD and 3 clips were placed. SW is working on getting FOC from . Patient will need auth. Will continue to follow for discharge needs.

## 2025-03-21 NOTE — PROGRESS NOTES
03/21/25 1110   Discharge Planning   Expected Discharge Disposition SNF     Spoke with patient's  via phone. Discussed accepting facilities- [facility] and [facility].  stated that his cousin is going to visit the facilities prior to identifying FOC, will have a decision by end of day, then auth will be requested. SW will follow.    Addendum: Met with patient's  and family at bedside. [facility] identified as F[facility]. Facility will start auth

## 2025-03-21 NOTE — CARE PLAN
The patient's goals for the shift include Pt. will have a safe, restful and uneventful evening    The clinical goals for the shift include pt. remain hds    Over the shift, the patient did not make progress toward the following goals. Barriers to progression include   Problem: Fall/Injury  Goal: Verbalize understanding of personal risk factors for fall in the hospital  Outcome: Progressing     Problem: Fall/Injury  Goal: Verbalize understanding of risk factor reduction measures to prevent injury from fall in the home  Outcome: Progressing     Problem: Skin  Goal: Decreased wound size/increased tissue granulation at next dressing change  Outcome: Progressing     Problem: Skin  Goal: Participates in plan/prevention/treatment measures  Outcome: Progressing     Problem: Skin  Goal: Prevent/manage excess moisture  Outcome: Progressing     Problem: Skin  Goal: Prevent/minimize sheer/friction injuries  Outcome: Progressing     Problem: Skin  Goal: Promote/optimize nutrition  Outcome: Progressing     Problem: Skin  Goal: Promote skin healing  Outcome: Progressing     Problem: Pain  Goal: Takes deep breaths with improved pain control throughout the shift  Outcome: Progressing     Problem: Pain  Goal: Turns in bed with improved pain control throughout the shift  Outcome: Progressing     Problem: Pain  Goal: Walks with improved pain control throughout the shift  Outcome: Progressing     Problem: Pain  Goal: Performs ADL's with improved pain control throughout shift  Outcome: Progressing     Problem: Pain  Goal: Free from opioid side effects throughout the shift  Outcome: Progressing     Problem: Pain  Goal: Free from acute confusion related to pain meds throughout the shift  Outcome: Progressing     Problem: Chronic Conditions and Co-morbidities  Goal: Patient's chronic conditions and co-morbidity symptoms are monitored and maintained or improved  Outcome: Progressing     Problem: Nutrition  Goal: Nutrient intake appropriate  for maintaining nutritional needs  Outcome: Progressing     Problem: Safety - Medical Restraint  Goal: Remains free of injury from restraints (Restraint for Interference with Medical Device)  Outcome: Progressing   . Recommendations to address these barriers include .

## 2025-03-21 NOTE — PROGRESS NOTES
Between 7AM-7PM please message me via Epic Secure Chat.  After 7PM please page Nocturnist on call.    [facility] Hospitalist Progress Note      Elizabeth Buckner    :  1940(84 y.o.)    MRN:  [id number]  Date: 25     Assessment and Plan:     GI bleed due to cratered ulcer in the fundus of the stomach  - EGD on 3/17 showed Single 8 mm cratered ulcer in the fundus of the stomach with adherent clot (Gary IIB); placed MRI conditional clips; injected 7 mL of epinephrine to address bleeding; hemostasis achieved. The mucosa surrounding the clot was injected with epinephrine.  - EGD on 3/20 showed Single 7 mm x 8 mm cratered, benign-appearing ulcer in the fundus of the stomach with clean base (Gary III); performed cold forceps biopsy from ulcer edge to rule out malignancy; placed 3 clips successfully and 1 clip got dislodged after placement (clips are MRI conditional and through-the-scope); hemostasis achieved.   - H Pylori stool antigen ordered, awaiting RN to collect  - PPI bid for 3 months, then once daily; repeat EGD in 3 months    ABLA  Hypotension 2/2 acute hemorrhagic shock   - due to UGIB; s/p 4 units PRBCs and IVF resuscitation. Initially was refractory and needed vasopressor support. Now resolved, no longer needing vasopressor support; transferred out of ICU on 3/17    Acute RLE DVT  - LE Duplex with acute deep vein thrombosis at the right lower extremity from the inguinal region to the popliteal region  - Unable to anticoagulate due to severe bleeding from ulcer; IR consulted s/p IVC filter placement on 3/20  - will need OP follow up with vascular medicine    Acute RUE DVT  - likely provoked from recent CVC  - New onset RUE edema noted on 3/12. US showed cute non-occlusive deep vein thrombosis visualized in the right internal jugular vein. Discussed with GI, ok to challenge with heparin gtt and eventually place on OAC    Leukocytosis  - UCX negative. Blood culture negative. CXR without  PNA. CT AP with concern for metastatic disease  - Persists despite 7d course of empiric abx --> suspect due to malignancy/stress dose steroids  - fever on 3/19 but no clinical signs or symptoms of infection. COVID/Flu negative. CXR unrevealing. No recurrence of fever. Monitor wbc/fever curve as work up thus far unrevealing.     PAF, new onset  - suspect triggered by acute illness; continue bb.  - back on heparin gtt due to RUE DVT but if bleeds again will need to hold    HASMUKH on CKD2  - Baseline Cr 1-1.2; Cr peaked at 1.6, now improved to 1.29    HTN  - continue coreg; hydralazine, lasix held due to shock and BP now normal, will restart if BP rises    Stage IV breast cancer   - history of right breast carcinoma first diagnosed in 1991 status post breast conserving surgery, radiation and tamoxifen who then went on to have a recurrence in 2012 and had complete mastectomy. She then had breast cancer in her left breast and had a left mastectomy followed by radiation and trastuzumab. Finally she had another recurrence in November 2021 and was on anastrozole and palbociclib.   - a large disease burden in her abdomen including diffuse LAD and several liver masses suspicious for malignancy. She was due for a PET scan after an 11/20/2024 CT showed possible progression in her pelvic lymph nodes however she did not follow-up. Needs OP follow up with oncology vs hospice    Recent UTI  - Urine culture 3/1 with E Coli; Repeat Ucx on 3/11 negative.        Malnutrition Diagnosis Status: New  Malnutrition Diagnosis: Severe malnutrition related to chronic disease or condition  Related to: pt with 8% weight decrease over past four months, suspect oral intake less than 75% of estimated energy requirements for greater than one month, visualized areas of depleted adipose tissues and skeletal tissue wasting     I agree with the dietitian's malnutrition diagnosis.    DVT Prophylaxis: SCDs    Disposition: continue to monitor inpatient, await  consultant recommendations, await test results, and await clinical improvement    Electronically signed by Davidson Santiago DO on 03/21/25 at 3:06 PM     Subjective:      Interval History:   Vitals and chart notes from overnight reviewed.   No acute issues overnight.   Patient seen and evaluated at bedside.   Seen after IVC filter placement and EGD. No fevers or chills. No chest pain or shortness of breath. No cough. No nausea, vomiting, diarrhea. No urinary symptoms.     Review of Systems:   Other than patient's chronic conditions and those complaints in the history above, the rest of the 10 systems review were done and were negative.     Current medications:  Scheduled Meds:anastrozole, 1 mg, oral, Daily  carvedilol, 6.25 mg, oral, BID  [Held by provider] gabapentin, 300 mg, oral, BID  [Held by provider] hydrALAZINE, 100 mg, oral, BID  insulin lispro, 0-10 Units, subcutaneous, q4h  pantoprazole, 40 mg, intravenous, BID  sennosides, 2 tablet, oral, BID      Continuous Infusions:heparin, 0-4,500 Units/hr, Last Rate: 1,300 Units/hr (03/21/25 1446)      PRN Meds:PRN medications: acetaminophen **OR** acetaminophen **OR** acetaminophen, alteplase, dextrose, dextrose, glucagon, glucagon, heparin, melatonin, ondansetron ODT **OR** ondansetron, oxyCODONE, oxygen      Objective:     Heart Rate:  [79-96]   Temp:  [36.6 °C (97.9 °F)-38.6 °C (101.5 °F)]   Resp:  [16-20]   BP: (123-147)/(52-69)   Height:  [152.4 cm (5')]   Weight:  [72.5 kg (159 lb 13.3 oz)]   SpO2:  [97 %-99 %] on RA    Physical Exam  Vitals and nursing note reviewed.   HENT:      Mouth/Throat:      Mouth: Mucous membranes are moist.      Pharynx: Oropharynx is clear.   Cardiovascular:      Rate and Rhythm: Normal rate and regular rhythm.   Pulmonary:      Effort: Pulmonary effort is normal.   Abdominal:      General: There is no distension.      Palpations: Abdomen is soft.      Tenderness: There is no abdominal tenderness.   Musculoskeletal:      Comments: New  onset RUE edema. No LUE Edema   Neurological:      Mental Status: She is alert.         Labs:   Lab Results   Component Value Date     03/21/2025    K 3.8 03/21/2025     03/21/2025    CO2 22 03/21/2025    BUN 49 (H) 03/21/2025    CREATININE 1.29 (H) 03/21/2025    GLUCOSE 97 03/21/2025    CALCIUM 7.4 (L) 03/21/2025    PROT 7.4 03/10/2025    BILITOT 1.0 03/10/2025    ALKPHOS 54 03/10/2025    AST 20 03/10/2025    ALT 9 03/10/2025       Lab Results   Component Value Date    WBC 21.6 (H) 03/21/2025    HGB 8.1 (L) 03/21/2025    HCT 24.7 (L) 03/21/2025    MCV 89 03/21/2025     03/21/2025

## 2025-03-22 LAB
ANION GAP SERPL CALC-SCNC: 14 MMOL/L (ref 10–20)
BASOPHILS # BLD AUTO: 0.03 X10*3/UL (ref 0–0.1)
BASOPHILS NFR BLD AUTO: 0.2 %
BUN SERPL-MCNC: 49 MG/DL (ref 6–23)
CALCIUM SERPL-MCNC: 7.2 MG/DL (ref 8.6–10.3)
CHLORIDE SERPL-SCNC: 105 MMOL/L (ref 98–107)
CO2 SERPL-SCNC: 21 MMOL/L (ref 21–32)
CREAT SERPL-MCNC: 1.3 MG/DL (ref 0.5–1.05)
EGFRCR SERPLBLD CKD-EPI 2021: 40 ML/MIN/1.73M*2
EOSINOPHIL # BLD AUTO: 0.06 X10*3/UL (ref 0–0.4)
EOSINOPHIL NFR BLD AUTO: 0.3 %
ERYTHROCYTE [DISTWIDTH] IN BLOOD BY AUTOMATED COUNT: 15.8 % (ref 11.5–14.5)
GLUCOSE BLD MANUAL STRIP-MCNC: 113 MG/DL (ref 74–99)
GLUCOSE BLD MANUAL STRIP-MCNC: 118 MG/DL (ref 74–99)
GLUCOSE BLD MANUAL STRIP-MCNC: 89 MG/DL (ref 74–99)
GLUCOSE BLD MANUAL STRIP-MCNC: 97 MG/DL (ref 74–99)
GLUCOSE SERPL-MCNC: 86 MG/DL (ref 74–99)
HCT VFR BLD AUTO: 26.9 % (ref 36–46)
HGB BLD-MCNC: 8.6 G/DL (ref 12–16)
IMM GRANULOCYTES # BLD AUTO: 0.39 X10*3/UL (ref 0–0.5)
IMM GRANULOCYTES NFR BLD AUTO: 2 % (ref 0–0.9)
LYMPHOCYTES # BLD AUTO: 0.86 X10*3/UL (ref 0.8–3)
LYMPHOCYTES NFR BLD AUTO: 4.4 %
MAGNESIUM SERPL-MCNC: 2.21 MG/DL (ref 1.6–2.4)
MCH RBC QN AUTO: 29 PG (ref 26–34)
MCHC RBC AUTO-ENTMCNC: 32 G/DL (ref 32–36)
MCV RBC AUTO: 91 FL (ref 80–100)
MONOCYTES # BLD AUTO: 1.51 X10*3/UL (ref 0.05–0.8)
MONOCYTES NFR BLD AUTO: 7.7 %
NEUTROPHILS # BLD AUTO: 16.72 X10*3/UL (ref 1.6–5.5)
NEUTROPHILS NFR BLD AUTO: 85.4 %
NRBC BLD-RTO: 0 /100 WBCS (ref 0–0)
PLATELET # BLD AUTO: 356 X10*3/UL (ref 150–450)
POTASSIUM SERPL-SCNC: 4.2 MMOL/L (ref 3.5–5.3)
RBC # BLD AUTO: 2.97 X10*6/UL (ref 4–5.2)
SODIUM SERPL-SCNC: 136 MMOL/L (ref 136–145)
UFH PPP CHRO-ACNC: 0.3 IU/ML
UFH PPP CHRO-ACNC: 0.5 IU/ML
UFH PPP CHRO-ACNC: 0.7 IU/ML
WBC # BLD AUTO: 19.6 X10*3/UL (ref 4.4–11.3)

## 2025-03-22 PROCEDURE — 83735 ASSAY OF MAGNESIUM: CPT | Performed by: HOSPITALIST

## 2025-03-22 PROCEDURE — 85025 COMPLETE CBC W/AUTO DIFF WBC: CPT | Performed by: HOSPITALIST

## 2025-03-22 PROCEDURE — 2500000001 HC RX 250 WO HCPCS SELF ADMINISTERED DRUGS (ALT 637 FOR MEDICARE OP): Performed by: STUDENT IN AN ORGANIZED HEALTH CARE EDUCATION/TRAINING PROGRAM

## 2025-03-22 PROCEDURE — 36415 COLL VENOUS BLD VENIPUNCTURE: CPT | Performed by: HOSPITALIST

## 2025-03-22 PROCEDURE — 80048 BASIC METABOLIC PNL TOTAL CA: CPT | Performed by: NURSE PRACTITIONER

## 2025-03-22 PROCEDURE — 85520 HEPARIN ASSAY: CPT | Performed by: HOSPITALIST

## 2025-03-22 PROCEDURE — 1200000002 HC GENERAL ROOM WITH TELEMETRY DAILY

## 2025-03-22 PROCEDURE — 85520 HEPARIN ASSAY: CPT | Performed by: STUDENT IN AN ORGANIZED HEALTH CARE EDUCATION/TRAINING PROGRAM

## 2025-03-22 PROCEDURE — 82947 ASSAY GLUCOSE BLOOD QUANT: CPT

## 2025-03-22 PROCEDURE — 87449 NOS EACH ORGANISM AG IA: CPT | Performed by: STUDENT IN AN ORGANIZED HEALTH CARE EDUCATION/TRAINING PROGRAM

## 2025-03-22 PROCEDURE — 2500000004 HC RX 250 GENERAL PHARMACY W/ HCPCS (ALT 636 FOR OP/ED): Performed by: STUDENT IN AN ORGANIZED HEALTH CARE EDUCATION/TRAINING PROGRAM

## 2025-03-22 PROCEDURE — 99232 SBSQ HOSP IP/OBS MODERATE 35: CPT | Performed by: HOSPITALIST

## 2025-03-22 RX ADMIN — PANTOPRAZOLE SODIUM 40 MG: 40 INJECTION, POWDER, FOR SOLUTION INTRAVENOUS at 09:57

## 2025-03-22 RX ADMIN — HEPARIN SODIUM 1000 UNITS/HR: 10000 INJECTION, SOLUTION INTRAVENOUS at 13:17

## 2025-03-22 RX ADMIN — ANASTROZOLE 1 MG: 1 TABLET, COATED ORAL at 09:57

## 2025-03-22 RX ADMIN — CARVEDILOL 6.25 MG: 6.25 TABLET, FILM COATED ORAL at 09:57

## 2025-03-22 RX ADMIN — OXYCODONE HYDROCHLORIDE 5 MG: 5 TABLET ORAL at 20:20

## 2025-03-22 RX ADMIN — SENNOSIDES 17.2 MG: 8.6 TABLET ORAL at 10:01

## 2025-03-22 RX ADMIN — CARVEDILOL 6.25 MG: 6.25 TABLET, FILM COATED ORAL at 21:05

## 2025-03-22 RX ADMIN — SENNOSIDES 17.2 MG: 8.6 TABLET ORAL at 21:06

## 2025-03-22 RX ADMIN — PANTOPRAZOLE SODIUM 40 MG: 40 INJECTION, POWDER, FOR SOLUTION INTRAVENOUS at 21:05

## 2025-03-22 ASSESSMENT — COGNITIVE AND FUNCTIONAL STATUS - GENERAL
CLIMB 3 TO 5 STEPS WITH RAILING: A LOT
DAILY ACTIVITIY SCORE: 19
DRESSING REGULAR UPPER BODY CLOTHING: A LITTLE
DRESSING REGULAR UPPER BODY CLOTHING: A LITTLE
HELP NEEDED FOR BATHING: A LITTLE
STANDING UP FROM CHAIR USING ARMS: A LITTLE
MOVING TO AND FROM BED TO CHAIR: A LITTLE
TOILETING: A LITTLE
MOVING FROM LYING ON BACK TO SITTING ON SIDE OF FLAT BED WITH BEDRAILS: A LITTLE
MOVING TO AND FROM BED TO CHAIR: A LITTLE
PERSONAL GROOMING: A LITTLE
TOILETING: A LITTLE
TURNING FROM BACK TO SIDE WHILE IN FLAT BAD: A LITTLE
DRESSING REGULAR LOWER BODY CLOTHING: A LITTLE
MOBILITY SCORE: 17
DRESSING REGULAR LOWER BODY CLOTHING: A LITTLE
STANDING UP FROM CHAIR USING ARMS: A LITTLE
MOBILITY SCORE: 17
CLIMB 3 TO 5 STEPS WITH RAILING: A LOT
PERSONAL GROOMING: A LITTLE
WALKING IN HOSPITAL ROOM: A LITTLE
HELP NEEDED FOR BATHING: A LITTLE
MOVING FROM LYING ON BACK TO SITTING ON SIDE OF FLAT BED WITH BEDRAILS: A LITTLE
WALKING IN HOSPITAL ROOM: A LITTLE
TURNING FROM BACK TO SIDE WHILE IN FLAT BAD: A LITTLE
DAILY ACTIVITIY SCORE: 19

## 2025-03-22 ASSESSMENT — PAIN DESCRIPTION - LOCATION
LOCATION: FOOT
LOCATION: FOOT

## 2025-03-22 ASSESSMENT — PAIN SCALES - GENERAL
PAINLEVEL_OUTOF10: 8
PAINLEVEL_OUTOF10: 8

## 2025-03-22 ASSESSMENT — PAIN DESCRIPTION - ORIENTATION
ORIENTATION: RIGHT;LEFT
ORIENTATION: RIGHT;LEFT

## 2025-03-22 NOTE — PROGRESS NOTES
Between 7AM-7PM please message me via Epic Secure Chat.  After 7PM please page Nocturnist on call.    [facility] Hospitalist Progress Note      Elizabeth Buckner    :  1940(84 y.o.)    MRN:  [id number]  Date: 25     Assessment and Plan:     GI bleed due to cratered ulcer in the fundus of the stomach  - EGD on 3/17 showed Single 8 mm cratered ulcer in the fundus of the stomach with adherent clot (Gary IIB); placed MRI conditional clips; injected 7 mL of epinephrine to address bleeding; hemostasis achieved. The mucosa surrounding the clot was injected with epinephrine.  - EGD on 3/20 showed Single 7 mm x 8 mm cratered, benign-appearing ulcer in the fundus of the stomach with clean base (Gary III); performed cold forceps biopsy from ulcer edge to rule out malignancy; placed 3 clips successfully and 1 clip got dislodged after placement (clips are MRI conditional and through-the-scope); hemostasis achieved.   - H Pylori stool antigen ordered, awaiting RN to collect  - PPI bid for 3 months, then once daily; repeat EGD in 3 months    ABLA  Hypotension 2/2 acute hemorrhagic shock   - due to UGIB; s/p 4 units PRBCs and IVF resuscitation. Initially was refractory and needed vasopressor support. Now resolved, no longer needing vasopressor support; transferred out of ICU on 3/17    Acute RLE DVT  - LE Duplex with acute deep vein thrombosis at the right lower extremity from the inguinal region to the popliteal region  - Unable to anticoagulate due to severe bleeding from ulcer; IR consulted s/p IVC filter placement on 3/20  - will need OP follow up with vascular medicine    Acute RUE DVT  - likely provoked from recent CVC  - New onset RUE edema noted on 3/21. US showed cute non-occlusive deep vein thrombosis visualized in the right internal jugular vein. Discussed with GI, ok to challenge with heparin gtt. Hgb stable, no melena or BRBPR. Switch to OAC tomorrow AM    Leukocytosis  - UCX negative. Blood  culture negative. CXR without PNA. CT AP with concern for metastatic disease  - Persists despite 7d course of empiric abx --> suspect due to malignancy/stress dose steroids  - fever on 3/19 but no clinical signs or symptoms of infection. COVID/Flu negative. CXR unrevealing. No recurrence of fever. Monitor wbc/fever curve as work up thus far unrevealing.     PAF, new onset  - suspect triggered by acute illness; continue bb.  - back on heparin gtt due to RUE DVT but if bleeds again will need to hold    HASMUKH on CKD2  - Baseline Cr 1-1.2; Cr peaked at 1.6, now improved to 1.3    HTN  - continue coreg; hydralazine, lasix held due to shock and BP now normal, will restart if BP rises    Stage IV breast cancer   - history of right breast carcinoma first diagnosed in 1991 status post breast conserving surgery, radiation and tamoxifen who then went on to have a recurrence in 2012 and had complete mastectomy. She then had breast cancer in her left breast and had a left mastectomy followed by radiation and trastuzumab. Finally she had another recurrence in November 2021 and was on anastrozole and palbociclib.   - a large disease burden in her abdomen including diffuse LAD and several liver masses suspicious for malignancy. She was due for a PET scan after an 11/20/2024 CT showed possible progression in her pelvic lymph nodes however she did not follow-up. Needs OP follow up with oncology vs hospice    Recent UTI  - Urine culture 3/1 with E Coli; Repeat Ucx on 3/11 negative.        Malnutrition Diagnosis Status: New  Malnutrition Diagnosis: Severe malnutrition related to chronic disease or condition  Related to: pt with 8% weight decrease over past four months, suspect oral intake less than 75% of estimated energy requirements for greater than one month, visualized areas of depleted adipose tissues and skeletal tissue wasting     I agree with the dietitian's malnutrition diagnosis.    DVT Prophylaxis: SCDs    Disposition:  continue to monitor inpatient, await consultant recommendations, await test results, and await clinical improvement    Electronically signed by Davidson Santiago DO on 03/22/25 at 3:58 PM     Subjective:      Interval History:   Vitals and chart notes from overnight reviewed.   No acute issues overnight.   Patient seen and evaluated at bedside.   Seen after IVC filter placement and EGD. No fevers or chills. No chest pain or shortness of breath. No cough. No nausea, vomiting, diarrhea. No urinary symptoms.     Review of Systems:   Other than patient's chronic conditions and those complaints in the history above, the rest of the 10 systems review were done and were negative.     Current medications:  Scheduled Meds:anastrozole, 1 mg, oral, Daily  carvedilol, 6.25 mg, oral, BID  [Held by provider] gabapentin, 300 mg, oral, BID  [Held by provider] hydrALAZINE, 100 mg, oral, BID  insulin lispro, 0-10 Units, subcutaneous, q4h  pantoprazole, 40 mg, intravenous, BID  sennosides, 2 tablet, oral, BID      Continuous Infusions:heparin, 0-4,500 Units/hr, Last Rate: 1,000 Units/hr (03/22/25 1317)      PRN Meds:PRN medications: acetaminophen **OR** acetaminophen **OR** acetaminophen, alteplase, dextrose, dextrose, glucagon, glucagon, heparin, melatonin, ondansetron ODT **OR** ondansetron, oxyCODONE, oxygen      Objective:     Heart Rate:  [79-96]   Temp:  [36.6 °C (97.9 °F)-38.6 °C (101.5 °F)]   Resp:  [16-20]   BP: (123-147)/(52-69)   Height:  [152.4 cm (5')]   Weight:  [72.5 kg (159 lb 13.3 oz)]   SpO2:  [97 %-99 %] on RA    Physical Exam  Vitals and nursing note reviewed.   HENT:      Mouth/Throat:      Mouth: Mucous membranes are moist.      Pharynx: Oropharynx is clear.   Cardiovascular:      Rate and Rhythm: Normal rate and regular rhythm.   Pulmonary:      Effort: Pulmonary effort is normal.   Abdominal:      General: There is no distension.      Palpations: Abdomen is soft.      Tenderness: There is no abdominal tenderness.    Musculoskeletal:      Comments: RUE edema. No LUE Edema   Neurological:      Mental Status: She is alert.         Labs:   Lab Results   Component Value Date     03/22/2025    K 4.2 03/22/2025     03/22/2025    CO2 21 03/22/2025    BUN 49 (H) 03/22/2025    CREATININE 1.30 (H) 03/22/2025    GLUCOSE 86 03/22/2025    CALCIUM 7.2 (L) 03/22/2025    PROT 7.4 03/10/2025    BILITOT 1.0 03/10/2025    ALKPHOS 54 03/10/2025    AST 20 03/10/2025    ALT 9 03/10/2025       Lab Results   Component Value Date    WBC 19.6 (H) 03/22/2025    HGB 8.6 (L) 03/22/2025    HCT 26.9 (L) 03/22/2025    MCV 91 03/22/2025     03/22/2025

## 2025-03-22 NOTE — CARE PLAN
The patient's goals for the shift include Pt. will have a safe, restful and uneventful evening    The clinical goals for the shift include pain management by end of shift

## 2025-03-22 NOTE — NURSING NOTE
Report given to med surg RN Hector. Receiving RN aware of currently paused heparin drip due to elevated assay.

## 2025-03-23 ENCOUNTER — APPOINTMENT (OUTPATIENT)
Dept: RADIOLOGY | Facility: HOSPITAL | Age: 85
DRG: 299 | End: 2025-03-23
Payer: MEDICARE

## 2025-03-23 VITALS
TEMPERATURE: 99.6 F | SYSTOLIC BLOOD PRESSURE: 126 MMHG | OXYGEN SATURATION: 97 % | WEIGHT: 159.83 LBS | HEIGHT: 60 IN | HEART RATE: 86 BPM | DIASTOLIC BLOOD PRESSURE: 67 MMHG | RESPIRATION RATE: 18 BRPM | BODY MASS INDEX: 31.38 KG/M2

## 2025-03-23 LAB
ACANTHOCYTES BLD QL SMEAR: ABNORMAL
ANION GAP SERPL CALC-SCNC: 12 MMOL/L (ref 10–20)
BASOPHILS # BLD MANUAL: 0 X10*3/UL (ref 0–0.1)
BASOPHILS NFR BLD MANUAL: 0 %
BUN SERPL-MCNC: 50 MG/DL (ref 6–23)
CALCIUM SERPL-MCNC: 7 MG/DL (ref 8.6–10.3)
CHLORIDE SERPL-SCNC: 103 MMOL/L (ref 98–107)
CO2 SERPL-SCNC: 23 MMOL/L (ref 21–32)
CREAT SERPL-MCNC: 1.44 MG/DL (ref 0.5–1.05)
EGFRCR SERPLBLD CKD-EPI 2021: 36 ML/MIN/1.73M*2
EOSINOPHIL # BLD MANUAL: 0.21 X10*3/UL (ref 0–0.4)
EOSINOPHIL NFR BLD MANUAL: 1 %
ERYTHROCYTE [DISTWIDTH] IN BLOOD BY AUTOMATED COUNT: 15.1 % (ref 11.5–14.5)
ERYTHROCYTE [DISTWIDTH] IN BLOOD BY AUTOMATED COUNT: 15.4 % (ref 11.5–14.5)
ERYTHROCYTE [DISTWIDTH] IN BLOOD BY AUTOMATED COUNT: 15.4 % (ref 11.5–14.5)
GLUCOSE BLD MANUAL STRIP-MCNC: 103 MG/DL (ref 74–99)
GLUCOSE BLD MANUAL STRIP-MCNC: 115 MG/DL (ref 74–99)
GLUCOSE BLD MANUAL STRIP-MCNC: 95 MG/DL (ref 74–99)
GLUCOSE BLD MANUAL STRIP-MCNC: 98 MG/DL (ref 74–99)
GLUCOSE SERPL-MCNC: 97 MG/DL (ref 74–99)
HCT VFR BLD AUTO: 20.3 % (ref 36–46)
HCT VFR BLD AUTO: 24.7 % (ref 36–46)
HCT VFR BLD AUTO: 26 % (ref 36–46)
HGB BLD-MCNC: 6.7 G/DL (ref 12–16)
HGB BLD-MCNC: 7.9 G/DL (ref 12–16)
HGB BLD-MCNC: 8.8 G/DL (ref 12–16)
IMM GRANULOCYTES # BLD AUTO: 0.36 X10*3/UL (ref 0–0.5)
IMM GRANULOCYTES NFR BLD AUTO: 1.7 % (ref 0–0.9)
LYMPHOCYTES # BLD MANUAL: 0 X10*3/UL (ref 0.8–3)
LYMPHOCYTES NFR BLD MANUAL: 0 %
MAGNESIUM SERPL-MCNC: 2.16 MG/DL (ref 1.6–2.4)
MCH RBC QN AUTO: 28.4 PG (ref 26–34)
MCH RBC QN AUTO: 28.4 PG (ref 26–34)
MCH RBC QN AUTO: 28.8 PG (ref 26–34)
MCHC RBC AUTO-ENTMCNC: 32 G/DL (ref 32–36)
MCHC RBC AUTO-ENTMCNC: 33 G/DL (ref 32–36)
MCHC RBC AUTO-ENTMCNC: 33.8 G/DL (ref 32–36)
MCV RBC AUTO: 85 FL (ref 80–100)
MCV RBC AUTO: 86 FL (ref 80–100)
MCV RBC AUTO: 89 FL (ref 80–100)
MONOCYTES # BLD MANUAL: 0.64 X10*3/UL (ref 0.05–0.8)
MONOCYTES NFR BLD MANUAL: 3 %
NEUTROPHILS # BLD MANUAL: 20.45 X10*3/UL (ref 1.6–5.5)
NEUTS BAND # BLD MANUAL: 2.13 X10*3/UL (ref 0–0.5)
NEUTS BAND NFR BLD MANUAL: 10 %
NEUTS SEG # BLD MANUAL: 18.32 X10*3/UL (ref 1.6–5)
NEUTS SEG NFR BLD MANUAL: 86 %
NEUTS VAC BLD QL SMEAR: PRESENT
NRBC BLD-RTO: 0 /100 WBCS (ref 0–0)
OVALOCYTES BLD QL SMEAR: ABNORMAL
PLATELET # BLD AUTO: 305 X10*3/UL (ref 150–450)
PLATELET # BLD AUTO: 381 X10*3/UL (ref 150–450)
PLATELET # BLD AUTO: 409 X10*3/UL (ref 150–450)
POTASSIUM SERPL-SCNC: 3.7 MMOL/L (ref 3.5–5.3)
RBC # BLD AUTO: 2.36 X10*6/UL (ref 4–5.2)
RBC # BLD AUTO: 2.78 X10*6/UL (ref 4–5.2)
RBC # BLD AUTO: 3.06 X10*6/UL (ref 4–5.2)
RBC MORPH BLD: ABNORMAL
SCHISTOCYTES BLD QL SMEAR: ABNORMAL
SODIUM SERPL-SCNC: 134 MMOL/L (ref 136–145)
TOTAL CELLS COUNTED BLD: 100
UFH PPP CHRO-ACNC: 0.7 IU/ML
WBC # BLD AUTO: 20.7 X10*3/UL (ref 4.4–11.3)
WBC # BLD AUTO: 21.3 X10*3/UL (ref 4.4–11.3)
WBC # BLD AUTO: 22.1 X10*3/UL (ref 4.4–11.3)

## 2025-03-23 PROCEDURE — 2500000004 HC RX 250 GENERAL PHARMACY W/ HCPCS (ALT 636 FOR OP/ED): Performed by: STUDENT IN AN ORGANIZED HEALTH CARE EDUCATION/TRAINING PROGRAM

## 2025-03-23 PROCEDURE — 2500000001 HC RX 250 WO HCPCS SELF ADMINISTERED DRUGS (ALT 637 FOR MEDICARE OP): Performed by: HOSPITALIST

## 2025-03-23 PROCEDURE — 85027 COMPLETE CBC AUTOMATED: CPT | Performed by: HOSPITALIST

## 2025-03-23 PROCEDURE — 2500000001 HC RX 250 WO HCPCS SELF ADMINISTERED DRUGS (ALT 637 FOR MEDICARE OP): Performed by: STUDENT IN AN ORGANIZED HEALTH CARE EDUCATION/TRAINING PROGRAM

## 2025-03-23 PROCEDURE — 85520 HEPARIN ASSAY: CPT | Performed by: HOSPITALIST

## 2025-03-23 PROCEDURE — 36415 COLL VENOUS BLD VENIPUNCTURE: CPT | Performed by: HOSPITALIST

## 2025-03-23 PROCEDURE — 74176 CT ABD & PELVIS W/O CONTRAST: CPT | Performed by: RADIOLOGY

## 2025-03-23 PROCEDURE — 82947 ASSAY GLUCOSE BLOOD QUANT: CPT

## 2025-03-23 PROCEDURE — 1200000002 HC GENERAL ROOM WITH TELEMETRY DAILY

## 2025-03-23 PROCEDURE — 80048 BASIC METABOLIC PNL TOTAL CA: CPT | Performed by: HOSPITALIST

## 2025-03-23 PROCEDURE — 85007 BL SMEAR W/DIFF WBC COUNT: CPT | Performed by: HOSPITALIST

## 2025-03-23 PROCEDURE — 74176 CT ABD & PELVIS W/O CONTRAST: CPT

## 2025-03-23 PROCEDURE — 83735 ASSAY OF MAGNESIUM: CPT | Performed by: STUDENT IN AN ORGANIZED HEALTH CARE EDUCATION/TRAINING PROGRAM

## 2025-03-23 PROCEDURE — 99232 SBSQ HOSP IP/OBS MODERATE 35: CPT | Performed by: HOSPITALIST

## 2025-03-23 RX ORDER — INSULIN LISPRO 100 [IU]/ML
0-5 INJECTION, SOLUTION INTRAVENOUS; SUBCUTANEOUS
Status: DISCONTINUED | OUTPATIENT
Start: 2025-03-23 | End: 2025-03-23

## 2025-03-23 RX ADMIN — HEPARIN SODIUM 10 UNITS/HR: 10000 INJECTION, SOLUTION INTRAVENOUS at 17:41

## 2025-03-23 RX ADMIN — ANASTROZOLE 1 MG: 1 TABLET, COATED ORAL at 09:39

## 2025-03-23 RX ADMIN — CARVEDILOL 6.25 MG: 6.25 TABLET, FILM COATED ORAL at 09:39

## 2025-03-23 RX ADMIN — SENNOSIDES 17.2 MG: 8.6 TABLET ORAL at 20:31

## 2025-03-23 RX ADMIN — APIXABAN 10 MG: 5 TABLET, FILM COATED ORAL at 20:31

## 2025-03-23 RX ADMIN — PANTOPRAZOLE SODIUM 40 MG: 40 INJECTION, POWDER, FOR SOLUTION INTRAVENOUS at 09:39

## 2025-03-23 RX ADMIN — CARVEDILOL 6.25 MG: 6.25 TABLET, FILM COATED ORAL at 20:31

## 2025-03-23 RX ADMIN — PANTOPRAZOLE SODIUM 40 MG: 40 INJECTION, POWDER, FOR SOLUTION INTRAVENOUS at 20:30

## 2025-03-23 RX ADMIN — OXYCODONE HYDROCHLORIDE 5 MG: 5 TABLET ORAL at 17:41

## 2025-03-23 RX ADMIN — OXYCODONE HYDROCHLORIDE 5 MG: 5 TABLET ORAL at 11:36

## 2025-03-23 ASSESSMENT — COGNITIVE AND FUNCTIONAL STATUS - GENERAL
WALKING IN HOSPITAL ROOM: A LOT
TURNING FROM BACK TO SIDE WHILE IN FLAT BAD: A LITTLE
TOILETING: A LITTLE
MOVING FROM LYING ON BACK TO SITTING ON SIDE OF FLAT BED WITH BEDRAILS: A LITTLE
CLIMB 3 TO 5 STEPS WITH RAILING: A LOT
DAILY ACTIVITIY SCORE: 19
MOVING TO AND FROM BED TO CHAIR: A LITTLE
MOBILITY SCORE: 15
HELP NEEDED FOR BATHING: A LITTLE
DRESSING REGULAR UPPER BODY CLOTHING: A LITTLE
PERSONAL GROOMING: A LITTLE
STANDING UP FROM CHAIR USING ARMS: A LOT
DRESSING REGULAR LOWER BODY CLOTHING: A LITTLE

## 2025-03-23 ASSESSMENT — PAIN - FUNCTIONAL ASSESSMENT
PAIN_FUNCTIONAL_ASSESSMENT: 0-10

## 2025-03-23 ASSESSMENT — PAIN SCALES - GENERAL
PAINLEVEL_OUTOF10: 4
PAINLEVEL_OUTOF10: 5 - MODERATE PAIN
PAINLEVEL_OUTOF10: 7
PAINLEVEL_OUTOF10: 8
PAINLEVEL_OUTOF10: 5 - MODERATE PAIN
PAINLEVEL_OUTOF10: 0 - NO PAIN

## 2025-03-23 NOTE — CARE PLAN
Problem: Fall/Injury  Goal: Verbalize understanding of personal risk factors for fall in the hospital  Outcome: Progressing  Goal: Verbalize understanding of risk factor reduction measures to prevent injury from fall in the home  Outcome: Progressing  Goal: Use assistive devices by end of the shift  Outcome: Progressing     Problem: Skin  Goal: Decreased wound size/increased tissue granulation at next dressing change  Outcome: Progressing  Goal: Participates in plan/prevention/treatment measures  Outcome: Progressing  Goal: Prevent/manage excess moisture  Outcome: Progressing  Goal: Prevent/minimize sheer/friction injuries  Outcome: Progressing  Goal: Promote/optimize nutrition  Outcome: Progressing  Goal: Promote skin healing  Outcome: Progressing     Problem: Pain  Goal: Takes deep breaths with improved pain control throughout the shift  Outcome: Progressing  Goal: Turns in bed with improved pain control throughout the shift  Outcome: Progressing  Goal: Walks with improved pain control throughout the shift  Outcome: Progressing  Goal: Performs ADL's with improved pain control throughout shift  Outcome: Progressing  Goal: Participates in PT with improved pain control throughout the shift  Outcome: Progressing  Goal: Free from opioid side effects throughout the shift  Outcome: Progressing  Goal: Free from acute confusion related to pain meds throughout the shift  Outcome: Progressing     Problem: Discharge Planning  Goal: Discharge to home or other facility with appropriate resources  Outcome: Progressing     Problem: Chronic Conditions and Co-morbidities  Goal: Patient's chronic conditions and co-morbidity symptoms are monitored and maintained or improved  Outcome: Progressing     Problem: Nutrition  Goal: Nutrient intake appropriate for maintaining nutritional needs  Outcome: Progressing     Problem: Safety - Medical Restraint  Goal: Remains free of injury from restraints (Restraint for Interference with Medical  Device)  Outcome: Progressing  Goal: Free from restraint(s) (Restraint for Interference with Medical Device)  Outcome: Progressing   The patient's goals for the shift include Pt. will have a safe, restful and uneventful evening    The clinical goals for the shift include HDS this shift, no new s/s of bleeding this shift

## 2025-03-23 NOTE — PROGRESS NOTES
03/23/25 1311   Discharge Planning   Expected Discharge Disposition SNF     Message sent to Meme Ross this AM- auth still pending. Still on heparin gtt, per md not med ready.

## 2025-03-23 NOTE — PROGRESS NOTES
Between 7AM-7PM please message me via Epic Secure Chat.  After 7PM please page Nocturnist on call.    [facility] Hospitalist Progress Note      Elizabeth Buckner    :  1940(84 y.o.)    MRN:  [id number]  Date: 25     Assessment and Plan:     GI bleed due to cratered ulcer in the fundus of the stomach  - EGD on 3/17 showed Single 8 mm cratered ulcer in the fundus of the stomach with adherent clot (Gary IIB); placed MRI conditional clips; injected 7 mL of epinephrine to address bleeding; hemostasis achieved. The mucosa surrounding the clot was injected with epinephrine.  - EGD on 3/20 showed Single 7 mm x 8 mm cratered, benign-appearing ulcer in the fundus of the stomach with clean base (Gary III); performed cold forceps biopsy from ulcer edge to rule out malignancy; placed 3 clips successfully and 1 clip got dislodged after placement (clips are MRI conditional and through-the-scope); hemostasis achieved.   - H Pylori stool antigen ordered, awaiting RN to collect  - PPI bid for 3 months, then once daily; repeat EGD in 3 months    ABLA  Hypotension 2/2 acute hemorrhagic shock   - due to UGIB; s/p 4 units PRBCs and IVF resuscitation. Initially was refractory and needed vasopressor support. Now resolved, no longer needing vasopressor support; transferred out of ICU on 3/17    Acute RLE DVT  - LE Duplex with acute deep vein thrombosis at the right lower extremity from the inguinal region to the popliteal region  - Unable to anticoagulate due to severe bleeding from ulcer; IR consulted s/p IVC filter placement on 3/20  - will need OP follow up with vascular medicine    Acute RUE DVT  - likely provoked from recent CVC  - New onset RUE edema noted on 3/21. US showed cute non-occlusive deep vein thrombosis visualized in the right internal jugular vein. Discussed with GI, ok to challenge with heparin gtt. Hgb stable, no melena or BRBPR. Hgb 6.7, repeat 7.9. Continue heparin gtt. Repeat CBC in afternoon  before deciding on OAC vs continue gtt.     Leukocytosis  - UCX negative. Blood culture negative. CXR without PNA. CT AP with concern for metastatic disease  - Persists despite 7d course of empiric abx --> suspect due to malignancy/stress dose steroids  - fever on 3/19 but no clinical signs or symptoms of infection. COVID/Flu negative. CXR unrevealing. No recurrence of fever. Monitor wbc/fever curve as work up thus far unrevealing.     PAF, new onset  - suspect triggered by acute illness; continue bb.  - back on heparin gtt due to RUE DVT but if bleeds again will need to hold    HASMUKH on CKD2  - Baseline Cr 1-1.2; Cr peaked at 1.6, now improved to 1.3    HTN  - continue coreg; hydralazine, lasix held due to shock and BP now normal, will restart if BP rises    Stage IV breast cancer   - history of right breast carcinoma first diagnosed in 1991 status post breast conserving surgery, radiation and tamoxifen who then went on to have a recurrence in 2012 and had complete mastectomy. She then had breast cancer in her left breast and had a left mastectomy followed by radiation and trastuzumab. Finally she had another recurrence in November 2021 and was on anastrozole and palbociclib.   - a large disease burden in her abdomen including diffuse LAD and several liver masses suspicious for malignancy. She was due for a PET scan after an 11/20/2024 CT showed possible progression in her pelvic lymph nodes however she did not follow-up. Needs OP follow up with oncology vs hospice    Recent UTI  - Urine culture 3/1 with E Coli; Repeat Ucx on 3/11 negative.        Malnutrition Diagnosis Status: New  Malnutrition Diagnosis: Severe malnutrition related to chronic disease or condition  Related to: pt with 8% weight decrease over past four months, suspect oral intake less than 75% of estimated energy requirements for greater than one month, visualized areas of depleted adipose tissues and skeletal tissue wasting     I agree with the  dietitian's malnutrition diagnosis.    DVT Prophylaxis: SCDs    Disposition: continue to monitor inpatient, await consultant recommendations, await test results, and await clinical improvement    Electronically signed by Davidson Santiago DO on 03/23/25 at 3:59 PM     Addendum: Repeat Hgb this afternoon 8.8. no melena or bright red blood per rectum per discussion with bedside RN.  Will transition from heparin drip to oral Eliquis.  Electronically signed by Davidson Santiago DO on 03/23/25 at 6:18 PM     Subjective:      Interval History:   Vitals and chart notes from overnight reviewed.   No acute issues overnight.   Patient seen and evaluated at bedside.   Hgb 6.7, no melena or BRBPR. Repeat Hgb without transfusion 7.9.   Reports some epigastric abd pain but on further questioning states it's not new. Has been ongoing for a few days.     Review of Systems:   Other than patient's chronic conditions and those complaints in the history above, the rest of the 10 systems review were done and were negative.     Current medications:  Scheduled Meds:anastrozole, 1 mg, oral, Daily  carvedilol, 6.25 mg, oral, BID  [Held by provider] gabapentin, 300 mg, oral, BID  [Held by provider] hydrALAZINE, 100 mg, oral, BID  pantoprazole, 40 mg, intravenous, BID  sennosides, 2 tablet, oral, BID      Continuous Infusions:heparin, 0-4,500 Units/hr, Last Rate: 10 Units/hr (03/23/25 1254)      PRN Meds:PRN medications: acetaminophen **OR** acetaminophen **OR** acetaminophen, alteplase, dextrose, dextrose, glucagon, glucagon, heparin, melatonin, ondansetron ODT **OR** ondansetron, oxyCODONE, oxygen      Objective:     Heart Rate:  [79-96]   Temp:  [36.6 °C (97.9 °F)-38.6 °C (101.5 °F)]   Resp:  [16-20]   BP: (123-147)/(52-69)   Height:  [152.4 cm (5')]   Weight:  [72.5 kg (159 lb 13.3 oz)]   SpO2:  [97 %-99 %] on RA    Physical Exam  Vitals and nursing note reviewed.   HENT:      Mouth/Throat:      Mouth: Mucous membranes are moist.      Pharynx:  Oropharynx is clear.   Cardiovascular:      Rate and Rhythm: Normal rate and regular rhythm.   Pulmonary:      Effort: Pulmonary effort is normal.   Abdominal:      General: There is no distension.      Palpations: Abdomen is soft.      Tenderness: There is no abdominal tenderness.   Musculoskeletal:      Comments: RUE edema. No LUE Edema   Neurological:      Mental Status: She is alert.         Labs:   Lab Results   Component Value Date     (L) 03/23/2025    K 3.7 03/23/2025     03/23/2025    CO2 23 03/23/2025    BUN 50 (H) 03/23/2025    CREATININE 1.44 (H) 03/23/2025    GLUCOSE 97 03/23/2025    CALCIUM 7.0 (L) 03/23/2025    PROT 7.4 03/10/2025    BILITOT 1.0 03/10/2025    ALKPHOS 54 03/10/2025    AST 20 03/10/2025    ALT 9 03/10/2025       Lab Results   Component Value Date    WBC 20.7 (H) 03/23/2025    HGB 7.9 (L) 03/23/2025    HCT 24.7 (L) 03/23/2025    MCV 89 03/23/2025     03/23/2025

## 2025-03-24 LAB
ANION GAP SERPL CALC-SCNC: 14 MMOL/L (ref 10–20)
ATRIAL RATE: 124 BPM
BASOPHILS # BLD MANUAL: 0 X10*3/UL (ref 0–0.1)
BASOPHILS NFR BLD MANUAL: 0 %
BUN SERPL-MCNC: 53 MG/DL (ref 6–23)
BURR CELLS BLD QL SMEAR: ABNORMAL
CALCIUM SERPL-MCNC: 6.6 MG/DL (ref 8.6–10.3)
CHLORIDE SERPL-SCNC: 104 MMOL/L (ref 98–107)
CO2 SERPL-SCNC: 20 MMOL/L (ref 21–32)
CREAT SERPL-MCNC: 1.41 MG/DL (ref 0.5–1.05)
DACRYOCYTES BLD QL SMEAR: ABNORMAL
EGFRCR SERPLBLD CKD-EPI 2021: 37 ML/MIN/1.73M*2
EOSINOPHIL # BLD MANUAL: 0 X10*3/UL (ref 0–0.4)
EOSINOPHIL NFR BLD MANUAL: 0 %
ERYTHROCYTE [DISTWIDTH] IN BLOOD BY AUTOMATED COUNT: 15.2 % (ref 11.5–14.5)
ERYTHROCYTE [DISTWIDTH] IN BLOOD BY AUTOMATED COUNT: 15.4 % (ref 11.5–14.5)
GLUCOSE SERPL-MCNC: 80 MG/DL (ref 74–99)
HCT VFR BLD AUTO: 22.9 % (ref 36–46)
HCT VFR BLD AUTO: 25.1 % (ref 36–46)
HGB BLD-MCNC: 7.5 G/DL (ref 12–16)
HGB BLD-MCNC: 8.2 G/DL (ref 12–16)
HYPOCHROMIA BLD QL SMEAR: ABNORMAL
IMM GRANULOCYTES # BLD AUTO: 0.25 X10*3/UL (ref 0–0.5)
IMM GRANULOCYTES NFR BLD AUTO: 1.3 % (ref 0–0.9)
LYMPHOCYTES # BLD MANUAL: 0.77 X10*3/UL (ref 0.8–3)
LYMPHOCYTES NFR BLD MANUAL: 4 %
MAGNESIUM SERPL-MCNC: 2.05 MG/DL (ref 1.6–2.4)
MCH RBC QN AUTO: 28 PG (ref 26–34)
MCH RBC QN AUTO: 29.1 PG (ref 26–34)
MCHC RBC AUTO-ENTMCNC: 32.7 G/DL (ref 32–36)
MCHC RBC AUTO-ENTMCNC: 32.8 G/DL (ref 32–36)
MCV RBC AUTO: 85 FL (ref 80–100)
MCV RBC AUTO: 89 FL (ref 80–100)
MONOCYTES # BLD MANUAL: 1.34 X10*3/UL (ref 0.05–0.8)
MONOCYTES NFR BLD MANUAL: 7 %
NEUTROPHILS # BLD MANUAL: 17.08 X10*3/UL (ref 1.6–5.5)
NEUTS BAND # BLD MANUAL: 1.34 X10*3/UL (ref 0–0.5)
NEUTS BAND NFR BLD MANUAL: 7 %
NEUTS SEG # BLD MANUAL: 15.74 X10*3/UL (ref 1.6–5)
NEUTS SEG NFR BLD MANUAL: 82 %
NRBC BLD-RTO: 0 /100 WBCS (ref 0–0)
NRBC BLD-RTO: 0 /100 WBCS (ref 0–0)
OVALOCYTES BLD QL SMEAR: ABNORMAL
P AXIS: 42 DEGREES
P OFFSET: 191 MS
P ONSET: 150 MS
PLATELET # BLD AUTO: 386 X10*3/UL (ref 150–450)
PLATELET # BLD AUTO: 388 X10*3/UL (ref 150–450)
POLYCHROMASIA BLD QL SMEAR: ABNORMAL
POTASSIUM SERPL-SCNC: 4 MMOL/L (ref 3.5–5.3)
PR INTERVAL: 138 MS
Q ONSET: 219 MS
QRS COUNT: 21 BEATS
QRS DURATION: 78 MS
QT INTERVAL: 316 MS
QTC CALCULATION(BAZETT): 453 MS
QTC FREDERICIA: 402 MS
R AXIS: -19 DEGREES
RBC # BLD AUTO: 2.68 X10*6/UL (ref 4–5.2)
RBC # BLD AUTO: 2.82 X10*6/UL (ref 4–5.2)
RBC MORPH BLD: ABNORMAL
SODIUM SERPL-SCNC: 134 MMOL/L (ref 136–145)
T AXIS: 58 DEGREES
T OFFSET: 377 MS
TOTAL CELLS COUNTED BLD: 100
VENTRICULAR RATE: 124 BPM
WBC # BLD AUTO: 19.2 X10*3/UL (ref 4.4–11.3)
WBC # BLD AUTO: 19.4 X10*3/UL (ref 4.4–11.3)

## 2025-03-24 PROCEDURE — 80048 BASIC METABOLIC PNL TOTAL CA: CPT | Performed by: HOSPITALIST

## 2025-03-24 PROCEDURE — 99232 SBSQ HOSP IP/OBS MODERATE 35: CPT | Performed by: HOSPITALIST

## 2025-03-24 PROCEDURE — 2500000001 HC RX 250 WO HCPCS SELF ADMINISTERED DRUGS (ALT 637 FOR MEDICARE OP): Performed by: STUDENT IN AN ORGANIZED HEALTH CARE EDUCATION/TRAINING PROGRAM

## 2025-03-24 PROCEDURE — 2500000001 HC RX 250 WO HCPCS SELF ADMINISTERED DRUGS (ALT 637 FOR MEDICARE OP): Performed by: HOSPITALIST

## 2025-03-24 PROCEDURE — 36415 COLL VENOUS BLD VENIPUNCTURE: CPT | Performed by: HOSPITALIST

## 2025-03-24 PROCEDURE — 83735 ASSAY OF MAGNESIUM: CPT | Performed by: STUDENT IN AN ORGANIZED HEALTH CARE EDUCATION/TRAINING PROGRAM

## 2025-03-24 PROCEDURE — 1200000002 HC GENERAL ROOM WITH TELEMETRY DAILY

## 2025-03-24 PROCEDURE — 2500000004 HC RX 250 GENERAL PHARMACY W/ HCPCS (ALT 636 FOR OP/ED): Performed by: STUDENT IN AN ORGANIZED HEALTH CARE EDUCATION/TRAINING PROGRAM

## 2025-03-24 PROCEDURE — 85007 BL SMEAR W/DIFF WBC COUNT: CPT | Performed by: HOSPITALIST

## 2025-03-24 PROCEDURE — 85027 COMPLETE CBC AUTOMATED: CPT | Performed by: HOSPITALIST

## 2025-03-24 PROCEDURE — 97530 THERAPEUTIC ACTIVITIES: CPT | Mod: GP,CQ

## 2025-03-24 RX ADMIN — CARVEDILOL 6.25 MG: 6.25 TABLET, FILM COATED ORAL at 20:16

## 2025-03-24 RX ADMIN — PANTOPRAZOLE SODIUM 40 MG: 40 INJECTION, POWDER, FOR SOLUTION INTRAVENOUS at 09:09

## 2025-03-24 RX ADMIN — ANASTROZOLE 1 MG: 1 TABLET, COATED ORAL at 09:08

## 2025-03-24 RX ADMIN — APIXABAN 10 MG: 5 TABLET, FILM COATED ORAL at 20:16

## 2025-03-24 RX ADMIN — PANTOPRAZOLE SODIUM 40 MG: 40 INJECTION, POWDER, FOR SOLUTION INTRAVENOUS at 20:16

## 2025-03-24 RX ADMIN — CARVEDILOL 6.25 MG: 6.25 TABLET, FILM COATED ORAL at 09:08

## 2025-03-24 RX ADMIN — ONDANSETRON 4 MG: 2 INJECTION, SOLUTION INTRAMUSCULAR; INTRAVENOUS at 22:31

## 2025-03-24 RX ADMIN — APIXABAN 10 MG: 5 TABLET, FILM COATED ORAL at 09:08

## 2025-03-24 RX ADMIN — OXYCODONE HYDROCHLORIDE 5 MG: 5 TABLET ORAL at 22:29

## 2025-03-24 RX ADMIN — SENNOSIDES 17.2 MG: 8.6 TABLET ORAL at 09:08

## 2025-03-24 RX ADMIN — OXYCODONE HYDROCHLORIDE 5 MG: 5 TABLET ORAL at 12:21

## 2025-03-24 RX ADMIN — SENNOSIDES 17.2 MG: 8.6 TABLET ORAL at 20:16

## 2025-03-24 ASSESSMENT — COGNITIVE AND FUNCTIONAL STATUS - GENERAL
CLIMB 3 TO 5 STEPS WITH RAILING: TOTAL
HELP NEEDED FOR BATHING: A LITTLE
STANDING UP FROM CHAIR USING ARMS: A LOT
MOBILITY SCORE: 15
DRESSING REGULAR LOWER BODY CLOTHING: A LITTLE
WALKING IN HOSPITAL ROOM: A LOT
MOVING TO AND FROM BED TO CHAIR: A LITTLE
MOBILITY SCORE: 14
MOVING TO AND FROM BED TO CHAIR: A LOT
DAILY ACTIVITIY SCORE: 19
MOVING FROM LYING ON BACK TO SITTING ON SIDE OF FLAT BED WITH BEDRAILS: A LITTLE
STANDING UP FROM CHAIR USING ARMS: A LITTLE
MOVING FROM LYING ON BACK TO SITTING ON SIDE OF FLAT BED WITH BEDRAILS: A LITTLE
TURNING FROM BACK TO SIDE WHILE IN FLAT BAD: A LITTLE
PERSONAL GROOMING: A LITTLE
DRESSING REGULAR UPPER BODY CLOTHING: A LITTLE
TURNING FROM BACK TO SIDE WHILE IN FLAT BAD: A LITTLE
TOILETING: A LITTLE
CLIMB 3 TO 5 STEPS WITH RAILING: A LOT
WALKING IN HOSPITAL ROOM: A LOT

## 2025-03-24 ASSESSMENT — PAIN SCALES - GENERAL
PAINLEVEL_OUTOF10: 0 - NO PAIN
PAINLEVEL_OUTOF10: 8
PAINLEVEL_OUTOF10: 0 - NO PAIN
PAINLEVEL_OUTOF10: 8
PAINLEVEL_OUTOF10: 0 - NO PAIN

## 2025-03-24 ASSESSMENT — PAIN - FUNCTIONAL ASSESSMENT
PAIN_FUNCTIONAL_ASSESSMENT: 0-10
PAIN_FUNCTIONAL_ASSESSMENT: UNABLE TO SELF-REPORT

## 2025-03-24 ASSESSMENT — PAIN DESCRIPTION - LOCATION: LOCATION: LEG

## 2025-03-24 ASSESSMENT — PAIN DESCRIPTION - ORIENTATION: ORIENTATION: RIGHT;LEFT

## 2025-03-24 NOTE — CARE PLAN
The patient's goals for the shift include Pt. will have a safe, restful and uneventful evening    The clinical goals for the shift include Remain safe, HDS, maintain skin integrity, comfortable, and pain free throughout shift      Problem: Discharge Planning  Goal: Discharge to home or other facility with appropriate resources  Outcome: Progressing     Problem: Chronic Conditions and Co-morbidities  Goal: Patient's chronic conditions and co-morbidity symptoms are monitored and maintained or improved  Outcome: Progressing     Problem: Nutrition  Goal: Nutrient intake appropriate for maintaining nutritional needs  Outcome: Progressing     Problem: Fall/Injury  Goal: Verbalize understanding of personal risk factors for fall in the hospital  Outcome: Progressing  Goal: Verbalize understanding of risk factor reduction measures to prevent injury from fall in the home  Outcome: Progressing  Goal: Use assistive devices by end of the shift  Outcome: Progressing     Problem: Skin  Goal: Decreased wound size/increased tissue granulation at next dressing change  Outcome: Progressing  Flowsheets (Taken 3/24/2025 0938)  Decreased wound size/increased tissue granulation at next dressing change:   Promote sleep for wound healing   Protective dressings over bony prominences  Goal: Participates in plan/prevention/treatment measures  Outcome: Progressing  Flowsheets (Taken 3/24/2025 0938)  Participates in plan/prevention/treatment measures:   Discuss with provider PT/OT consult   Elevate heels   Increase activity/out of bed for meals  Goal: Prevent/manage excess moisture  Outcome: Progressing  Flowsheets (Taken 3/24/2025 0938)  Prevent/manage excess moisture:   Cleanse incontinence/protect with barrier cream   Moisturize dry skin  Goal: Prevent/minimize sheer/friction injuries  Outcome: Progressing  Flowsheets (Taken 3/24/2025 0938)  Prevent/minimize sheer/friction injuries:   Increase activity/out of bed for meals   Use pull sheet    HOB 30 degrees or less   Turn/reposition every 2 hours/use positioning/transfer devices  Goal: Promote/optimize nutrition  Outcome: Progressing  Flowsheets (Taken 3/24/2025 0938)  Promote/optimize nutrition:   Monitor/record intake including meals   Consume > 50% meals/supplements  Goal: Promote skin healing  Outcome: Progressing  Flowsheets (Taken 3/24/2025 0938)  Promote skin healing:   Assess skin/pad under line(s)/device(s)   Protective dressings over bony prominences   Turn/reposition every 2 hours/use positioning/transfer devices   Ensure correct size (line/device) and apply per  instructions   Rotate device position/do not position patient on device     Problem: Pain  Goal: Takes deep breaths with improved pain control throughout the shift  Outcome: Progressing  Goal: Turns in bed with improved pain control throughout the shift  Outcome: Progressing  Goal: Walks with improved pain control throughout the shift  Outcome: Progressing  Goal: Performs ADL's with improved pain control throughout shift  Outcome: Progressing  Goal: Participates in PT with improved pain control throughout the shift  Outcome: Progressing  Goal: Free from opioid side effects throughout the shift  Outcome: Progressing  Goal: Free from acute confusion related to pain meds throughout the shift  Outcome: Progressing

## 2025-03-24 NOTE — PROGRESS NOTES
Occupational Therapy                 Therapy Communication Note    Patient Name: Elizabeth Buckner  MRN: [id number]  Department: [facility] A 7  Room: [address]  Today's Date: 3/24/2025     Discipline: Occupational Therapy    OT Missed Visit: Yes     Missed Visit Reason:  Pt declining OT due to pain and fatigue, pt requesting to rest. Pt repositioned in bed for comfort. RN notified.     Missed Time: Attempt

## 2025-03-24 NOTE — CARE PLAN
Problem: Fall/Injury  Goal: Verbalize understanding of personal risk factors for fall in the hospital  Outcome: Progressing  Goal: Verbalize understanding of risk factor reduction measures to prevent injury from fall in the home  Outcome: Progressing  Goal: Use assistive devices by end of the shift  Outcome: Progressing     Problem: Skin  Goal: Decreased wound size/increased tissue granulation at next dressing change  Outcome: Progressing  Goal: Participates in plan/prevention/treatment measures  Outcome: Progressing  Goal: Prevent/manage excess moisture  Outcome: Progressing  Goal: Prevent/minimize sheer/friction injuries  Outcome: Progressing  Goal: Promote/optimize nutrition  Outcome: Progressing  Goal: Promote skin healing  Outcome: Progressing     Problem: Pain  Goal: Takes deep breaths with improved pain control throughout the shift  Outcome: Progressing  Goal: Turns in bed with improved pain control throughout the shift  Outcome: Progressing  Goal: Walks with improved pain control throughout the shift  Outcome: Progressing  Goal: Performs ADL's with improved pain control throughout shift  Outcome: Progressing  Goal: Participates in PT with improved pain control throughout the shift  Outcome: Progressing  Goal: Free from opioid side effects throughout the shift  Outcome: Progressing  Goal: Free from acute confusion related to pain meds throughout the shift  Outcome: Progressing     Problem: Discharge Planning  Goal: Discharge to home or other facility with appropriate resources  Outcome: Progressing     Problem: Chronic Conditions and Co-morbidities  Goal: Patient's chronic conditions and co-morbidity symptoms are monitored and maintained or improved  Outcome: Progressing     Problem: Nutrition  Goal: Nutrient intake appropriate for maintaining nutritional needs  Outcome: Progressing     Problem: Safety - Medical Restraint  Goal: Remains free of injury from restraints (Restraint for Interference with Medical  Device)  Outcome: Progressing  Goal: Free from restraint(s) (Restraint for Interference with Medical Device)  Outcome: Progressing   The patient's goals for the shift include Pt. will have a safe, restful and uneventful evening    The clinical goals for the shift include pt will remain HDS during shift

## 2025-03-24 NOTE — PROGRESS NOTES
Between 7AM-7PM please message me via Epic Secure Chat.  After 7PM please page Nocturnist on call.    [facility] Hospitalist Progress Note      Elizabeth Buckner    :  1940(84 y.o.)    MRN:  [id number]  Date: 25     Assessment and Plan:     GI bleed due to cratered ulcer in the fundus of the stomach  - EGD on 3/17 showed Single 8 mm cratered ulcer in the fundus of the stomach with adherent clot (Gary IIB); placed MRI conditional clips; injected 7 mL of epinephrine to address bleeding; hemostasis achieved. The mucosa surrounding the clot was injected with epinephrine.  - EGD on 3/20 showed Single 7 mm x 8 mm cratered, benign-appearing ulcer in the fundus of the stomach with clean base (Gary III); performed cold forceps biopsy from ulcer edge to rule out malignancy; placed 3 clips successfully and 1 clip got dislodged after placement (clips are MRI conditional and through-the-scope); hemostasis achieved.   - H Pylori stool antigen ordered, awaiting RN to collect  - PPI bid for 3 months, then once daily; repeat EGD in 3 months    ABLA  Hypotension 2/2 acute hemorrhagic shock   - due to UGIB; s/p 4 units PRBCs and IVF resuscitation. Initially was refractory and needed vasopressor support. Now resolved, no longer needing vasopressor support; transferred out of ICU on 3/17    Acute RLE DVT  - LE Duplex with acute deep vein thrombosis at the right lower extremity from the inguinal region to the popliteal region  - Unable to anticoagulate due to severe bleeding from ulcer; IR consulted s/p IVC filter placement on 3/20  - will need OP follow up with vascular medicine    Acute RUE DVT  - likely provoked from recent CVC  - New onset RUE edema noted on 3/21. US showed cute non-occlusive deep vein thrombosis visualized in the right internal jugular vein. Discussed with GI, ok to challenge with heparin gtt. Hgb stable, no melena or BRBPR. Hgb 6.7, repeat 7.9. Transitioned from heparin gtt to OAC but Hgb  labile, continue to monitor.    Leukocytosis  - UCX negative. Blood culture negative. CXR without PNA. CT AP with concern for metastatic disease  - Persists despite 7d course of empiric abx --> suspect due to malignancy/stress dose steroids  - fever on 3/19 but no clinical signs or symptoms of infection. COVID/Flu negative. CXR unrevealing. No recurrence of fever. Monitor wbc/fever curve as work up thus far unrevealing.     PAF, new onset  - suspect triggered by acute illness; continue bb.  - back on heparin gtt due to RUE DVT but if bleeds again will need to hold    HASMUKH on CKD2  - Baseline Cr 1-1.2; Cr peaked at 1.6, now improved to 1.3    HTN  - continue coreg; hydralazine, lasix held due to shock and BP now normal, will restart if BP rises    Stage IV breast cancer   - history of right breast carcinoma first diagnosed in 1991 status post breast conserving surgery, radiation and tamoxifen who then went on to have a recurrence in 2012 and had complete mastectomy. She then had breast cancer in her left breast and had a left mastectomy followed by radiation and trastuzumab. Finally she had another recurrence in November 2021 and was on anastrozole and palbociclib.   - a large disease burden in her abdomen including diffuse LAD and several liver masses suspicious for malignancy. She was due for a PET scan after an 11/20/2024 CT showed possible progression in her pelvic lymph nodes however she did not follow-up. Needs OP follow up with oncology vs hospice    Recent UTI  - Urine culture 3/1 with E Coli; Repeat Ucx on 3/11 negative.        Malnutrition Diagnosis Status: New  Malnutrition Diagnosis: Severe malnutrition related to chronic disease or condition  Related to: pt with 8% weight decrease over past four months, suspect oral intake less than 75% of estimated energy requirements for greater than one month, visualized areas of depleted adipose tissues and skeletal tissue wasting     I agree with the dietitian's  malnutrition diagnosis.    DVT Prophylaxis: SCDs    Disposition: continue to monitor inpatient, await consultant recommendations, await test results, and await clinical improvement    Electronically signed by Davidson Santiago DO on 03/24/25 at 5:16 PM     Addendum: Repeat Hgb this afternoon 8.8. no melena or bright red blood per rectum per discussion with bedside RN.  Will transition from heparin drip to oral Eliquis.  Electronically signed by Davidson Santiago DO on 03/24/25 at 5:16 PM     Subjective:      Interval History:   Vitals and chart notes from overnight reviewed.   No acute issues overnight.   Patient seen and evaluated at bedside.   Abd distention overnight. CT AP showed gaseous distention of the colon and fluid in the descending colon. Abd pain improved this afternoon. Will see how much po intake she tolerates. If not improving will consider repeat GI eval.    Review of Systems:   Other than patient's chronic conditions and those complaints in the history above, the rest of the 10 systems review were done and were negative.     Current medications:  Scheduled Meds:anastrozole, 1 mg, oral, Daily  apixaban, 10 mg, oral, BID   Followed by  [START ON 3/30/2025] apixaban, 5 mg, oral, BID  carvedilol, 6.25 mg, oral, BID  [Held by provider] gabapentin, 300 mg, oral, BID  [Held by provider] hydrALAZINE, 100 mg, oral, BID  pantoprazole, 40 mg, intravenous, BID  sennosides, 2 tablet, oral, BID      Continuous Infusions:     PRN Meds:PRN medications: acetaminophen **OR** acetaminophen **OR** acetaminophen, alteplase, dextrose, dextrose, glucagon, glucagon, melatonin, ondansetron ODT **OR** ondansetron, oxyCODONE, oxygen      Objective:     Heart Rate:  [79-96]   Temp:  [36.6 °C (97.9 °F)-38.6 °C (101.5 °F)]   Resp:  [16-20]   BP: (123-147)/(52-69)   Height:  [152.4 cm (5')]   Weight:  [72.5 kg (159 lb 13.3 oz)]   SpO2:  [97 %-99 %] on RA    Physical Exam  Vitals and nursing note reviewed.   HENT:      Mouth/Throat:       Mouth: Mucous membranes are moist.      Pharynx: Oropharynx is clear.   Cardiovascular:      Rate and Rhythm: Normal rate and regular rhythm.   Pulmonary:      Effort: Pulmonary effort is normal.   Abdominal:      General: There is distension.      Palpations: Abdomen is soft.      Tenderness: There is no abdominal tenderness.   Musculoskeletal:      Comments: RUE edema. No LUE Edema   Neurological:      Mental Status: She is alert.         Labs:   Lab Results   Component Value Date     (L) 03/24/2025    K 4.0 03/24/2025     03/24/2025    CO2 20 (L) 03/24/2025    BUN 53 (H) 03/24/2025    CREATININE 1.41 (H) 03/24/2025    GLUCOSE 80 03/24/2025    CALCIUM 6.6 (L) 03/24/2025    PROT 7.4 03/10/2025    BILITOT 1.0 03/10/2025    ALKPHOS 54 03/10/2025    AST 20 03/10/2025    ALT 9 03/10/2025       Lab Results   Component Value Date    WBC 19.4 (H) 03/24/2025    HGB 8.2 (L) 03/24/2025    HCT 25.1 (L) 03/24/2025    MCV 89 03/24/2025     03/24/2025

## 2025-03-24 NOTE — NURSING NOTE
Patient's cousin, Cris, called unit and wanted an update. Did call Cris back and updated her with the minimal information I have on this patient, which is monitoring her blood counts and determining what we will be doing going forward in regard to patients CT abdomen results.

## 2025-03-24 NOTE — NURSING NOTE
I asked if patient wanted breakfast, as her tray was to the right side of her, she declined. Patient did request to have more ice water and to mix gingerale with the ice water. I asked patient again if she was sure she didn't want to eat breakfast, still declined to eat.

## 2025-03-24 NOTE — NURSING NOTE
Family is at bedside ( & relative Gila) and is aware that patient has been refusing to eat all day. Family requested if patient can get ensures or boosts, because they convinced patient that she needs some kind of nutrients if she's not going to physically eat anything. Secure chat Dr. Santiago informing him about patient's poor appetite throughout the day with the request or oral supplements (such as boosts or ensure). Order was put in immediately and dietary was called. Dietary will bring patient 2 bottles of ensure in place of lunch & dinner.

## 2025-03-24 NOTE — PROGRESS NOTES
03/24/25 1214   Discharge Planning   Who is requesting discharge planning? Provider   Home or Post Acute Services Post acute facilities (Rehab/SNF/etc)   Type of Post Acute Facility Services Skilled nursing   Expected Discharge Disposition SNF   Does the patient need discharge transport arranged? Yes   RoundTrip coordination needed? Yes   Has discharge transport been arranged? No   Intensity of Service   Intensity of Service 0-30 min     3/24/25 1214  H/H drop from 8.8/26.0 to 7.5/22.9.  Patient was started on eliquis.  NMR.  Auth still pending for [facility].  Need updated PT/OT notes sent to facility for auth.  Jaleesa Mahajan RN [facility]

## 2025-03-24 NOTE — PROGRESS NOTES
Physical Therapy    Physical Therapy Treatment    Patient Name: Elizabeth Buckner  MRN: [id number]  Department: [facility] A   Room: [address] [place] [address]  Today's Date: 3/24/2025  Time Calculation  Start Time: 1044  Stop Time: 1108  Time Calculation (min): 24 min         Assessment/Plan   PT Assessment  Barriers to Discharge Home: Physical needs, Caregiver assistance  Caregiver Assistance: Caregiver assistance needed per identified barriers - however, level of patient's required assistance exceeds assistance available at home  End of Session Communication: Bedside nurse  Assessment Comment: pt able to get up to chair this tx  End of Session Patient Position: Alarm on, Up in chair  PT Plan  Inpatient/Swing Bed or Outpatient: Inpatient  PT Plan  Treatment/Interventions: Bed mobility, Transfer training, Gait training  PT Plan: Ongoing PT  PT Frequency: 3 times per week  PT Discharge Recommendations: Moderate intensity level of continued care  Equipment Recommended upon Discharge: Wheeled walker  PT Recommended Transfer Status: Assist x1  PT - OK to Discharge: Yes (per POC)      General Visit Information:   PT  Visit  PT Received On: 03/24/25  General  Reason for Referral: To ED with increased groin pain, unresolved UTI, and AMS. CT (+) for R LE DVT. Transferred to ICU for acute hemorrhagic shock due to GIB with hematemesis and melena requiring blood transfusions, [employer]ra for eliquis reversal, IVF resuscitation and vasopressor support.  Transferred to ICU for ongoing management.  Referred By: Danita Barlow MD  Past Medical History Relevant to Rehab: stage IV breast cancer s/p bilateral mastectomy and lymph node dissection  Prior to Session Communication: Bedside nurse  Patient Position Received: Bed, 3 rail up, Alarm on  Preferred Learning Style: auditory, kinesthetic  General Comment: pt agreeable to tx (pt agreeable to tx)    Subjective   Precautions:                 Objective   Pain:  Pain Assessment  0-10 (Numeric) Pain Score: 0 - No  pain  Cognition:  Cognition  Orientation Level: Oriented X4  Coordination:     Postural Control:  Static Sitting Balance  Static Sitting-Comment/Number of Minutes: pt performed at EOB with SBA, pt performed for extended period of time  Static Standing Balance  Static Standing-Comment/Number of Minutes: pt performed static standing balance with UE support at RW and  min assist,  x1-2 min         Bed Mobility  Bed Mobility: Yes  Bed Mobility 1  Bed Mobility 1: Supine to sitting  Level of Assistance 1: Minimum assistance  Bed Mobility Comments 1: HOB elevated, assist for trunk rotation and to reach for bed rail.  increased time req to complete bed mobility    Ambulation/Gait Training  Ambulation/Gait Training Performed: Yes  Ambulation/Gait Training 1  Surface 1: Level tile  Device 1: Rolling walker  Gait Support Devices: Gait belt  Assistance 1: Contact guard  Comments/Distance (ft) 1: 4x1 (pt able to take small steps over to chair, decreased step height and length.  no overt LOB)  Transfer 1  Technique 1: Sit to stand, Stand to sit  Transfer Device 1: Walker  Transfer Level of Assistance 1: Minimum assistance, Maximum assistance  Trials/Comments 1: vc/tc for hand placment on solid sitting surface and not RW. max assist on first trial with both hands on bed. min assist with 1 hand on RW and 1 on bed.    Outcome Measures:  [facility] Basic Mobility  Turning from your back to your side while in a flat bed without using bedrails: A little  Moving from lying on your back to sitting on the side of a flat bed without using bedrails: A little  Moving to and from bed to chair (including a wheelchair): A little  Standing up from a chair using your arms (e.g. wheelchair or bedside chair): A little  To walk in hospital room: A lot  Climbing 3-5 steps with railing: Total  Basic Mobility - Total Score: 15    Education Documentation  Mobility Training, taught by Dionisio Velazco PTA at 3/24/2025  3:33 PM.  Learner: Patient  Readiness:  Acceptance  Method: Explanation  Response: Verbalizes Understanding    Education Comments  No comments found.        OP EDUCATION:  Outpatient Education  Education Comment: educated pt on importance of OOB activity    Encounter Problems       Encounter Problems (Active)       Balance       complete all mobility with normal balance while dual tasking, negotiating in a dynamic environment, carrying items, etc., with proactive and reactive static and dynamic standing and sitting tasks, with mod I and RW, >15 minutes.   (Not Progressing)       Start:  03/14/25    Expected End:  03/28/25               Mobility       STG - Patient will ambulate 150 ft mod I with a RW (Progressing)       Start:  03/14/25    Expected End:  03/28/25               PT Transfers       STG - Patient will perform bed mobility independently.  (Progressing)       Start:  03/14/25    Expected End:  03/28/25            STG - Patient will transfer sit to and from stand mod I with a RW (Progressing)       Start:  03/14/25    Expected End:  03/28/25               Pain - Adult             Encounter Problems (Resolved)       Pain - Adult

## 2025-03-24 NOTE — PROGRESS NOTES
Music Therapy Note    Elizabeth Buckner was referred by REBECCA Francis MD    Therapy Session  Referral Type: New referral this admission  Visit Type: Follow-up visit  Session Start Time: 1254  Session End Time: 1257  Intervention Delivery: In-person  Conflict of Service: None  Number of family members present: 1  Family Participation: Supportive     Pre-assessment  Mood/Affect: Calm, Cooperative  Verbalized Emotional State: Acceptance         Treatment/Interventions  Music Therapy Interventions: Assessment    Post-assessment  Total Session Time (min): 3 minutes    Narrative  Assessment Detail: Pt was lying in bed, alert and welcoming to the MT. One visitor present at bedside. Upon assessment pt reported no complaints and discussed her hospitalization. MT introduced services and offered music interventions to assist with normalization and coping this admission. Pt declined session for today but was agreeable to f/u tomorrow.  Follow-up: Will reattempt as able.    Education Documentation  Coping Strategies, taught by CHAO Guerin at 3/24/2025  1:30 PM.  Learner: Patient  Readiness: Acceptance  Method: Explanation  Response: Verbalizes Understanding

## 2025-03-25 LAB
ANION GAP SERPL CALC-SCNC: 14 MMOL/L (ref 10–20)
BASOPHILS # BLD AUTO: 0.03 X10*3/UL (ref 0–0.1)
BASOPHILS NFR BLD AUTO: 0.2 %
BUN SERPL-MCNC: 55 MG/DL (ref 6–23)
CALCIUM SERPL-MCNC: 6.9 MG/DL (ref 8.6–10.3)
CHLORIDE SERPL-SCNC: 102 MMOL/L (ref 98–107)
CO2 SERPL-SCNC: 21 MMOL/L (ref 21–32)
CREAT SERPL-MCNC: 1.45 MG/DL (ref 0.5–1.05)
EGFRCR SERPLBLD CKD-EPI 2021: 35 ML/MIN/1.73M*2
EOSINOPHIL # BLD AUTO: 0.07 X10*3/UL (ref 0–0.4)
EOSINOPHIL NFR BLD AUTO: 0.4 %
ERYTHROCYTE [DISTWIDTH] IN BLOOD BY AUTOMATED COUNT: 14.6 % (ref 11.5–14.5)
GLUCOSE SERPL-MCNC: 91 MG/DL (ref 74–99)
H PYLORI AG STL QL IA: NEGATIVE
HCT VFR BLD AUTO: 23.7 % (ref 36–46)
HGB BLD-MCNC: 8.4 G/DL (ref 12–16)
IMM GRANULOCYTES # BLD AUTO: 0.21 X10*3/UL (ref 0–0.5)
IMM GRANULOCYTES NFR BLD AUTO: 1.2 % (ref 0–0.9)
LYMPHOCYTES # BLD AUTO: 0.81 X10*3/UL (ref 0.8–3)
LYMPHOCYTES NFR BLD AUTO: 4.5 %
MCH RBC QN AUTO: 28.8 PG (ref 26–34)
MCHC RBC AUTO-ENTMCNC: 35.4 G/DL (ref 32–36)
MCV RBC AUTO: 81 FL (ref 80–100)
MONOCYTES # BLD AUTO: 1.44 X10*3/UL (ref 0.05–0.8)
MONOCYTES NFR BLD AUTO: 8.1 %
NEUTROPHILS # BLD AUTO: 15.26 X10*3/UL (ref 1.6–5.5)
NEUTROPHILS NFR BLD AUTO: 85.6 %
NRBC BLD-RTO: 0 /100 WBCS (ref 0–0)
PLATELET # BLD AUTO: 386 X10*3/UL (ref 150–450)
POTASSIUM SERPL-SCNC: 3.9 MMOL/L (ref 3.5–5.3)
RBC # BLD AUTO: 2.92 X10*6/UL (ref 4–5.2)
SODIUM SERPL-SCNC: 133 MMOL/L (ref 136–145)
WBC # BLD AUTO: 17.8 X10*3/UL (ref 4.4–11.3)

## 2025-03-25 PROCEDURE — 2500000001 HC RX 250 WO HCPCS SELF ADMINISTERED DRUGS (ALT 637 FOR MEDICARE OP): Performed by: STUDENT IN AN ORGANIZED HEALTH CARE EDUCATION/TRAINING PROGRAM

## 2025-03-25 PROCEDURE — 99233 SBSQ HOSP IP/OBS HIGH 50: CPT | Performed by: INTERNAL MEDICINE

## 2025-03-25 PROCEDURE — 36415 COLL VENOUS BLD VENIPUNCTURE: CPT | Performed by: HOSPITALIST

## 2025-03-25 PROCEDURE — 80048 BASIC METABOLIC PNL TOTAL CA: CPT | Performed by: HOSPITALIST

## 2025-03-25 PROCEDURE — 85025 COMPLETE CBC W/AUTO DIFF WBC: CPT | Performed by: HOSPITALIST

## 2025-03-25 PROCEDURE — 2500000001 HC RX 250 WO HCPCS SELF ADMINISTERED DRUGS (ALT 637 FOR MEDICARE OP): Performed by: HOSPITALIST

## 2025-03-25 PROCEDURE — 2500000004 HC RX 250 GENERAL PHARMACY W/ HCPCS (ALT 636 FOR OP/ED): Performed by: STUDENT IN AN ORGANIZED HEALTH CARE EDUCATION/TRAINING PROGRAM

## 2025-03-25 PROCEDURE — 1100000001 HC PRIVATE ROOM DAILY

## 2025-03-25 RX ADMIN — APIXABAN 10 MG: 5 TABLET, FILM COATED ORAL at 08:55

## 2025-03-25 RX ADMIN — CARVEDILOL 6.25 MG: 6.25 TABLET, FILM COATED ORAL at 08:55

## 2025-03-25 RX ADMIN — SENNOSIDES 17.2 MG: 8.6 TABLET ORAL at 08:55

## 2025-03-25 RX ADMIN — PANTOPRAZOLE SODIUM 40 MG: 40 INJECTION, POWDER, FOR SOLUTION INTRAVENOUS at 20:30

## 2025-03-25 RX ADMIN — OXYCODONE HYDROCHLORIDE 5 MG: 5 TABLET ORAL at 16:10

## 2025-03-25 RX ADMIN — ANASTROZOLE 1 MG: 1 TABLET, COATED ORAL at 08:57

## 2025-03-25 RX ADMIN — CARVEDILOL 6.25 MG: 6.25 TABLET, FILM COATED ORAL at 20:30

## 2025-03-25 RX ADMIN — APIXABAN 10 MG: 5 TABLET, FILM COATED ORAL at 20:30

## 2025-03-25 RX ADMIN — SENNOSIDES 17.2 MG: 8.6 TABLET ORAL at 20:30

## 2025-03-25 RX ADMIN — PANTOPRAZOLE SODIUM 40 MG: 40 INJECTION, POWDER, FOR SOLUTION INTRAVENOUS at 08:55

## 2025-03-25 ASSESSMENT — COGNITIVE AND FUNCTIONAL STATUS - GENERAL
DAILY ACTIVITIY SCORE: 17
MOVING TO AND FROM BED TO CHAIR: A LOT
MOBILITY SCORE: 14
TOILETING: A LITTLE
STANDING UP FROM CHAIR USING ARMS: A LOT
MOVING FROM LYING ON BACK TO SITTING ON SIDE OF FLAT BED WITH BEDRAILS: A LITTLE
DRESSING REGULAR LOWER BODY CLOTHING: A LOT
EATING MEALS: A LITTLE
HELP NEEDED FOR BATHING: A LITTLE
PERSONAL GROOMING: A LITTLE
WALKING IN HOSPITAL ROOM: A LOT
CLIMB 3 TO 5 STEPS WITH RAILING: A LOT
TURNING FROM BACK TO SIDE WHILE IN FLAT BAD: A LITTLE
DRESSING REGULAR UPPER BODY CLOTHING: A LITTLE

## 2025-03-25 ASSESSMENT — PAIN - FUNCTIONAL ASSESSMENT
PAIN_FUNCTIONAL_ASSESSMENT: 0-10

## 2025-03-25 ASSESSMENT — PAIN SCALES - GENERAL
PAINLEVEL_OUTOF10: 5 - MODERATE PAIN
PAINLEVEL_OUTOF10: 0 - NO PAIN
PAINLEVEL_OUTOF10: 0 - NO PAIN
PAINLEVEL_OUTOF10: 8

## 2025-03-25 ASSESSMENT — PAIN SCALES - WONG BAKER: WONGBAKER_NUMERICALRESPONSE: HURTS WHOLE LOT

## 2025-03-25 ASSESSMENT — PAIN DESCRIPTION - LOCATION: LOCATION: ABDOMEN

## 2025-03-25 ASSESSMENT — PAIN DESCRIPTION - ORIENTATION: ORIENTATION: MID

## 2025-03-25 NOTE — PROGRESS NOTES
Music Therapy Note    Elizabeth Buckner was referred by REBECCA Francis MD    Therapy Session  Referral Type: New referral this admission  Visit Type: Follow-up visit  Session Start Time: 1413  Session End Time: 1417  Intervention Delivery: In-person  Conflict of Service: None     Pre-assessment  Mood/Affect: Calm, Cooperative  Verbalized Emotional State: Acceptance         Treatment/Interventions  Music Therapy Interventions: Assessment    Post-assessment  Total Session Time (min): 4 minutes    Narrative  Assessment Detail: Pt was lying in bed, alert and welcoming to the MT. Upon assessment pt reported no complaints. MT re-introduced services and offered music interventions to support pt's admission. Pt politely declined session at this time, stating she was waiting for PT/OT to arrive. Pt thanked the MT for the visit.  Follow-up: MT staff will reattempt as needed    Education Documentation  No documentation found.

## 2025-03-25 NOTE — PROGRESS NOTES
Occupational Therapy    Occupational Therapy Treatment ATTEMPT    Name: Elizabeth Buckner  MRN: [id number]  Department: [facility] A 7  Room: [address] [place] [address]  Date: 03/25/25       Assessment:  OT Assessment: Upon entry to room, pt sitting up in chair with face resting in pt hands and leaning on table tray. Family present. Pt states she tried drinking part of her ensure and became nauseous.  Cold cloth offered to pt to assist with comfort.  Pt agreeable to initiate functional activity. With positional changes, pt started to dry heave without ceasing.  Emesis bag presented to pt and nursing directly notified of pt current condition by therapist and family member.  Pt unable to participate in therapy session.  Family with pt.  Evaluation/Treatment Tolerance:  (Participated limited by nausea and actively dry heaving.)  Medical Staff Made Aware: Yes  End of Session Communication: Bedside nurse  End of Session Patient Position: Alarm on, Up in chair  Plan:  Treatment Interventions: ADL retraining, UE strengthening/ROM, Endurance training, Compensatory technique education  OT Frequency: 3 times per week  OT Discharge Recommendations: Moderate intensity level of continued care  Equipment Recommended upon Discharge: Wheeled walker  OT Recommended Transfer Status: Assist of 1  OT - OK to Discharge: Yes    Subjective   Previous Visit Info:  OT Last Visit  OT Received On: 03/25/25  General:  General  Reason for Referral: OT for ADL assessment; followup treatment session per POC and to aide in discharge planning  Referred By: Danita Barlow MD  Past Medical History Relevant to Rehab: 84 YOF Hypercalcemia, metastasis to liver; generalized weakness; DVT; AMS.  HX: stage IV breast cancer s/p bilateral mastectomy and lymph node dissection  Family/Caregiver Present: Yes  Caregiver Feedback: Pt brother and god-daughter  Prior to Session Communication: Bedside nurse  Patient Position Received: Up in chair, Alarm on  Preferred Learning Style: verbal,  visual  General Comment: Pt revisited for treatment session. Nursing previously assisted pt to bedside chair and states it was Min A for transfer with FWW.    Pain Assessment: 0/10

## 2025-03-25 NOTE — CARE PLAN
The patient's goals for the shift include pt will attempt to get up and work with therapy    The clinical goals for the shift include maintain safety and remain HDS    Over the shift, the patient did  make progress toward the following goals.

## 2025-03-25 NOTE — PROGRESS NOTES
"Occupational Therapy                 Therapy Communication Note    Patient Name: Elizabeth Buckner  MRN: [id number]  Department: [facility] A 7  Room: [facility]  Today's Date: 3/25/2025     Discipline: Occupational Therapy    Missed Visit Reason: Missed Visit Reason:  (Pt declines OT this date.Educated on goals and benefits of therapy.Pt demos understanding and politely declines.She states,\"Not now, I'm spending time with the Lord and asking him to speak to my soul.\"Communicated with nurse via SecureChat)    Missed Time: Attempt      "

## 2025-03-25 NOTE — PROGRESS NOTES
Elizabeth Buckner is a 84 y.o. female on day 14 of admission presenting with General weakness.      Subjective   Pt seen and examined.        Objective     Last Recorded Vitals  /64   Pulse 84   Temp 36.5 °C (97.7 °F)   Resp 17   Wt 72.5 kg (159 lb 13.3 oz)   SpO2 99%   Intake/Output last 3 Shifts:    Intake/Output Summary (Last 24 hours) at 3/25/2025 1153  Last data filed at 3/25/2025 0822  Gross per 24 hour   Intake 360 ml   Output 450 ml   Net -90 ml       Admission Weight  Weight: 73.9 kg (162 lb 14.7 oz) (03/16/25 0600)    Daily Weight  03/20/25 : 72.5 kg (159 lb 13.3 oz)      Physical Exam  HENT:      Mouth/Throat:      Mouth: Mucous membranes are moist.      Pharynx: Oropharynx is clear.   Cardiovascular:      Rate and Rhythm: Normal rate and regular rhythm.   Pulmonary:      Effort: Pulmonary effort is normal.   Abdominal:      General: There is distension.      Palpations: Abdomen is soft.      Tenderness: There is no abdominal tenderness.   Musculoskeletal:      Comments: RUE edema. No LUE Edema   Neurological:      Mental Status: She is alert.   Relevant Results  Results for orders placed or performed during the hospital encounter of 03/10/25 (from the past 24 hours)   CBC   Result Value Ref Range    WBC 19.4 (H) 4.4 - 11.3 x10*3/uL    nRBC 0.0 0.0 - 0.0 /100 WBCs    RBC 2.82 (L) 4.00 - 5.20 x10*6/uL    Hemoglobin 8.2 (L) 12.0 - 16.0 g/dL    Hematocrit 25.1 (L) 36.0 - 46.0 %    MCV 89 80 - 100 fL    MCH 29.1 26.0 - 34.0 pg    MCHC 32.7 32.0 - 36.0 g/dL    RDW 15.4 (H) 11.5 - 14.5 %    Platelets 388 150 - 450 x10*3/uL   CBC and Auto Differential   Result Value Ref Range    WBC 17.8 (H) 4.4 - 11.3 x10*3/uL    nRBC 0.0 0.0 - 0.0 /100 WBCs    RBC 2.92 (L) 4.00 - 5.20 x10*6/uL    Hemoglobin 8.4 (L) 12.0 - 16.0 g/dL    Hematocrit 23.7 (L) 36.0 - 46.0 %    MCV 81 80 - 100 fL    MCH 28.8 26.0 - 34.0 pg    MCHC 35.4 32.0 - 36.0 g/dL    RDW 14.6 (H) 11.5 - 14.5 %    Platelets 386 150 - 450 x10*3/uL     Neutrophils % 85.6 40.0 - 80.0 %    Immature Granulocytes %, Automated 1.2 (H) 0.0 - 0.9 %    Lymphocytes % 4.5 13.0 - 44.0 %    Monocytes % 8.1 2.0 - 10.0 %    Eosinophils % 0.4 0.0 - 6.0 %    Basophils % 0.2 0.0 - 2.0 %    Neutrophils Absolute 15.26 (H) 1.60 - 5.50 x10*3/uL    Immature Granulocytes Absolute, Automated 0.21 0.00 - 0.50 x10*3/uL    Lymphocytes Absolute 0.81 0.80 - 3.00 x10*3/uL    Monocytes Absolute 1.44 (H) 0.05 - 0.80 x10*3/uL    Eosinophils Absolute 0.07 0.00 - 0.40 x10*3/uL    Basophils Absolute 0.03 0.00 - 0.10 x10*3/uL   Basic Metabolic Panel   Result Value Ref Range    Glucose 91 74 - 99 mg/dL    Sodium 133 (L) 136 - 145 mmol/L    Potassium 3.9 3.5 - 5.3 mmol/L    Chloride 102 98 - 107 mmol/L    Bicarbonate 21 21 - 32 mmol/L    Anion Gap 14 10 - 20 mmol/L    Urea Nitrogen 55 (H) 6 - 23 mg/dL    Creatinine 1.45 (H) 0.50 - 1.05 mg/dL    eGFR 35 (L) >60 mL/min/1.73m*2    Calcium 6.9 (L) 8.6 - 10.3 mg/dL     *Note: Due to a large number of results and/or encounters for the requested time period, some results have not been displayed. A complete set of results can be found in Results Review.        CT abdomen pelvis wo IV contrast   Final Result   Limited examination secondary to lack of intravenous contrast,   patient motion and inferior position of the patient's arms.        There is redemonstration of abdominal and pelvic lymphadenopathy with   largest conglomerate lymph node/mass measuring 7.3 cm x 3.3 cm in the   right lateral pelvis. There is evidence of interval decrease in size   of multiple of the enlarged lymph nodes in comparison to the prior   examination. 2.1 cm mass in the right hepatic lobe also appears   decreased in size. Additional hepatic masses are not well assessed on   the current examination.        Gaseous distention of the colon and fluid in the descending colon;   please correlate for diarrhea/colitis. Underdistention versus wall   thickening of the sigmoid colon which may  be infectious or   inflammatory in etiology.        Asymmetric dilatation of right common femoral vein and right lower   extremity edema; patient has known deep vein thrombosis.        MACRO:   None        Signed by: Mitchell Hay 3/23/2025 10:51 PM   Dictation workstation:   [id number]      Vascular US upper extremity venous duplex right   Final Result      IR intervention filter placement   Final Result   1. Uncomplicated and technically successful placement of a   retrievable ALN inferior vena cava filter in an infrarenal IVC   location. The IVC filter may be retrieved as clinically indicated.   Optimal recovery period is less than 90 days post-placement. However,   longer periods have been reported and are possible.   2. Patent inferior vena cava and common iliac vein reflux without   evidence of thrombus, stenosis, occlusion, or anatomic variation.   3. Please contact interventional radiology if filtration is no longer   indicated.        Performed and dictated at [facility].        MACRO:   None.        Signed by: Carroll Linda 3/20/2025 10:06 AM   Dictation workstation:   [id number]      XR chest 1 view   Final Result   Tubes and lines have been removed.        Hypoventilatory exam with mild elevation of the right diaphragm. No   mass or pneumonia in either lung.        Mild cardiomegaly.        Interval placement of a clip in the proximal stomach.        MACRO:   None        Signed by: Carroll Corley 3/19/2025 3:00 PM   Dictation workstation:   [id number]      XR chest 1 view   Final Result   Addendum (preliminary) 1 of 1   Potentially critical findings are discussed with and acknowledged by   Yael Garcia RN at 7:38 PM Eastern time on 3/15/2025.   Signed by Shavonne Ye DO      Final   1.Endotracheal tube with the tip at or less than 1 cm above the   level of the fabrizio. Repositioning is recommended.  This could be   pulled back about 4 to 5 cm.   2.Enteric tube  and right internal jugular line in place.   3.No pneumothorax.   4.Normal heart size with no radiographic signs of active   pulmonary parenchymal infiltration.  Known pleural and parenchymal   nodules worrisome for metastatic disease seen on CT are not well   visualized radiographically.   Signed by Shavonne Ye DO      MR brain w and wo IV contrast   Final Result   1. No acute infarct, hemorrhage or mass is noted. No abnormalities of   the 4th ventricle are noted.        2. The cerebellar tonsils are low-lying consistent with mild Chiari 1   type malformation.        MACRO:   None        Signed by: Pete Rice 3/11/2025 12:21 PM   Dictation workstation:   [id number]      US abdomen complete   Final Result   Obscuration the spleen and right kidney by bowel gas. Previous   cholecystectomy.        Mildly small left kidney. The left kidney was otherwise   sonographically unremarkable.        There were 3 identifiable hypoechoic solid lesions in the liver.   These are nonspecific and could be atypical hemangiomas or metastatic   lesions. Recommend liver MRI in follow-up.        MACRO:   None        Signed by: Carroll Corley 3/11/2025 8:04 AM   Dictation workstation:   [id number]      Lower extremity venous duplex right   Final Result   1. Acute deep vein thrombosis at the right lower extremity from the   inguinal region to the popliteal region. The right posterior tibial   and peroneal veins were not visualized on this exam.             MACRO:   Noa Adamson discussed the significance and urgency of this   critical finding by telephone with  CYNTHIA HOWARD on 3/10/2025 at 11:24   pm.  (**-RCF-**) Findings:  See findings.             Signed by: Noa Adamson 3/10/2025 11:39 PM   Dictation workstation:   [id number]      CT head wo IV contrast   Final Result   Mass effect upon the 4th ventricle centrally. Further evaluation with   contrast-enhanced MRI is recommended for better assessment.        No evidence for  acute cortical infarction or acute intracranial   hemorrhage is seen.        MACRO:   Critical Finding:  See findings. Notification was initiated on   3/10/2025 at 7:46 pm by  Esvin Philippe.  (**-YCF-**)        Signed by: Esvin Philippe 3/10/2025 7:46 PM   Dictation workstation:   [id number]      CT abdomen pelvis wo IV contrast   Final Result   1.  Extensive retroperitoneal and bilateral pelvic lymphadenopathy as   above concerning for lymphoma or metastatic disease.   2. Suspect acute deep venous thrombosis within the right common   femoral vein and right external iliac vein. Correlation with   ultrasound findings recommended.   3. Numerous liver masses suggestive of metastatic liver disease.   Correlation with ultrasound findings can be performed as clinically   warranted.   4. Bilateral renal lesions, nonspecific in etiology and may represent   cysts but incompletely characterized in this unenhanced exam.   Correlation with ultrasound findings can be performed as clinically   warranted.   5. Air in the bladder which may be due to bladder instrumentation.   Correlation with urinalysis recommended.   6. Additional detailed findings as above.   Critical Finding:  See findings. Notification was initiated on   3/10/2025 at 7:54 pm by  Esvin Philippe.  (**-YCF-**) Instructions:        7.             Signed by: Esvin Philippe 3/10/2025 7:54 PM   Dictation workstation:   [id number]      XR chest 2 views   Final Result   No acute process.   Signed by Kamaljit Alex MD      Follow-Up Consult to Interventional Radiology    (Results Pending)       Scheduled medications  anastrozole, 1 mg, oral, Daily  apixaban, 10 mg, oral, BID   Followed by  [START ON 3/30/2025] apixaban, 5 mg, oral, BID  carvedilol, 6.25 mg, oral, BID  [Held by provider] gabapentin, 300 mg, oral, BID  [Held by provider] hydrALAZINE, 100 mg, oral, BID  pantoprazole, 40 mg, intravenous, BID  sennosides, 2 tablet, oral, BID      Continuous medications     PRN  medications  PRN medications: acetaminophen **OR** acetaminophen **OR** acetaminophen, alteplase, dextrose, dextrose, glucagon, glucagon, melatonin, ondansetron ODT **OR** ondansetron, oxyCODONE, oxygen         Assessment/Plan                  Assessment & Plan  History of blood transfusion    PUD (peptic ulcer disease)    Hepatobiliary cancer (Multi)    GI bleed due to cratered ulcer in the fundus of the stomach  - EGD on 3/17 showed Single 8 mm cratered ulcer in the fundus of the stomach with adherent clot (Gary IIB); placed MRI conditional clips; injected 7 mL of epinephrine to address bleeding; hemostasis achieved. The mucosa surrounding the clot was injected with epinephrine.  - EGD on 3/20 showed Single 7 mm x 8 mm cratered, benign-appearing ulcer in the fundus of the stomach with clean base (Gary III); performed cold forceps biopsy from ulcer edge to rule out malignancy; placed 3 clips successfully and 1 clip got dislodged after placement (clips are MRI conditional and through-the-scope); hemostasis achieved.   - H Pylori stool antigen ordered, awaiting RN to collect  - PPI bid for 3 months, then once daily; repeat EGD in 3 months     ABLA  Hypotension 2/2 acute hemorrhagic shock   - due to UGIB; s/p 4 units PRBCs and IVF resuscitation. Initially was refractory and needed vasopressor support. Now resolved, no longer needing vasopressor support; transferred out of ICU on 3/17     Acute RLE DVT  - LE Duplex with acute deep vein thrombosis at the right lower extremity from the inguinal region to the popliteal region  - Unable to anticoagulate due to severe bleeding from ulcer; IR consulted s/p IVC filter placement on 3/20  - will need OP follow up with vascular medicine     Acute RUE DVT  - likely provoked from recent CVC  - New onset RUE edema noted on 3/21. US showed cute non-occlusive deep vein thrombosis visualized in the right internal jugular vein. Discussed with GI, ok to challenge with heparin gtt.  Hgb stable, no melena or BRBPR. Hgb 6.7, repeat 7.9. Transitioned from heparin gtt to OAC but Hgb labile, continue to monitor.  -transitioned to eliquis     Leukocytosis  - UCX negative. Blood culture negative. CXR without PNA. CT AP with concern for metastatic disease  - Persists despite 7d course of empiric abx --> suspect due to malignancy/stress dose steroids  - fever on 3/19 but no clinical signs or symptoms of infection. COVID/Flu negative. CXR unrevealing. No recurrence of fever. Monitor wbc/fever curve as work up thus far unrevealing.      PAF, new onset  - suspect triggered by acute illness; continue bb.  - back on heparin gtt due to RUE DVT but if bleeds again will need to hold     HASMUKH on CKD2  - Baseline Cr 1-1.2; Cr peaked at 1.6, now improved to 1.3     HTN  - continue coreg; hydralazine, lasix held due to shock and BP now normal, will restart if BP rises     Stage IV breast cancer   - history of right breast carcinoma first diagnosed in 1991 status post breast conserving surgery, radiation and tamoxifen who then went on to have a recurrence in 2012 and had complete mastectomy. She then had breast cancer in her left breast and had a left mastectomy followed by radiation and trastuzumab. Finally she had another recurrence in November 2021 and was on anastrozole and palbociclib.   - a large disease burden in her abdomen including diffuse LAD and several liver masses suspicious for malignancy. She was due for a PET scan after an 11/20/2024 CT showed possible progression in her pelvic lymph nodes however she did not follow-up. Needs OP follow up with oncology vs hospice     Recent UTI  - Urine culture 3/1 with E Coli; Repeat Ucx on 3/11 negative.      Malnutrition Diagnosis Status: New  Malnutrition Diagnosis: Severe malnutrition related to chronic disease or condition  Related to: pt with 8% weight decrease over past four months, suspect oral intake less than 75% of estimated energy requirements for  greater than one month, visualized areas of depleted adipose tissues and skeletal tissue wasting  I agree with the dietitian's malnutrition diagnosis.     DVT Prophylaxis: Eliquis     Disposition: SNF placement         Yonas Nicole MD

## 2025-03-25 NOTE — CARE PLAN
Problem: Fall/Injury  Goal: Verbalize understanding of personal risk factors for fall in the hospital  Outcome: Progressing  Goal: Verbalize understanding of risk factor reduction measures to prevent injury from fall in the home  Outcome: Progressing  Goal: Use assistive devices by end of the shift  Outcome: Progressing     Problem: Skin  Goal: Decreased wound size/increased tissue granulation at next dressing change  Outcome: Progressing  Flowsheets (Taken 3/24/2025 2127)  Decreased wound size/increased tissue granulation at next dressing change: Promote sleep for wound healing  Goal: Participates in plan/prevention/treatment measures  Outcome: Progressing  Flowsheets (Taken 3/24/2025 2127)  Participates in plan/prevention/treatment measures: Elevate heels  Goal: Prevent/manage excess moisture  Outcome: Progressing  Flowsheets (Taken 3/24/2025 2127)  Prevent/manage excess moisture: Moisturize dry skin  Goal: Prevent/minimize sheer/friction injuries  Outcome: Progressing  Flowsheets (Taken 3/24/2025 2127)  Prevent/minimize sheer/friction injuries: Use pull sheet  Goal: Promote/optimize nutrition  Outcome: Progressing  Flowsheets (Taken 3/24/2025 2127)  Promote/optimize nutrition: Monitor/record intake including meals  Goal: Promote skin healing  Outcome: Progressing  Flowsheets (Taken 3/24/2025 2127)  Promote skin healing: Ensure correct size (line/device) and apply per  instructions     Problem: Pain  Goal: Takes deep breaths with improved pain control throughout the shift  Outcome: Progressing  Goal: Turns in bed with improved pain control throughout the shift  Outcome: Progressing  Goal: Walks with improved pain control throughout the shift  Outcome: Progressing  Goal: Performs ADL's with improved pain control throughout shift  Outcome: Progressing  Goal: Participates in PT with improved pain control throughout the shift  Outcome: Progressing  Goal: Free from opioid side effects throughout the  shift  Outcome: Progressing  Goal: Free from acute confusion related to pain meds throughout the shift  Outcome: Progressing     Problem: Discharge Planning  Goal: Discharge to home or other facility with appropriate resources  Outcome: Progressing     Problem: Chronic Conditions and Co-morbidities  Goal: Patient's chronic conditions and co-morbidity symptoms are monitored and maintained or improved  Outcome: Progressing     Problem: Nutrition  Goal: Nutrient intake appropriate for maintaining nutritional needs  Outcome: Progressing     Problem: Safety - Medical Restraint  Goal: Remains free of injury from restraints (Restraint for Interference with Medical Device)  Outcome: Progressing  Goal: Free from restraint(s) (Restraint for Interference with Medical Device)  Outcome: Progressing

## 2025-03-25 NOTE — PROGRESS NOTES
03/25/25 1336   Discharge Planning   Who is requesting discharge planning? Provider   Home or Post Acute Services Post acute facilities (Rehab/SNF/etc)   Type of Post Acute Facility Services Skilled nursing   Expected Discharge Disposition SNF   Does the patient need discharge transport arranged? Yes   RoundTrip coordination needed? Yes   Has discharge transport been arranged? No   Intensity of Service   Intensity of Service 0-30 min     3/25/25 1337  Patient declined to work with OT yesterday and today.  Spoke with patient and explained that she needs to work with therapy in order for insurance to approve her going to SNF.  She agreed to work with OT if they come back today.  OT and bedside nurse notified.  Meme Ross requesting updated PT/OT notes for auth.  Yesterday's PT note sent.  Waiting on new OT note.  Auth pending.  Jaleesa Mahajan RN [facility]

## 2025-03-26 LAB
ANION GAP SERPL CALC-SCNC: 15 MMOL/L (ref 10–20)
BASOPHILS # BLD AUTO: 0.02 X10*3/UL (ref 0–0.1)
BASOPHILS NFR BLD AUTO: 0.2 %
BUN SERPL-MCNC: 59 MG/DL (ref 6–23)
BURR CELLS BLD QL SMEAR: NORMAL
CALCIUM SERPL-MCNC: 6.4 MG/DL (ref 8.6–10.3)
CHLORIDE SERPL-SCNC: 103 MMOL/L (ref 98–107)
CO2 SERPL-SCNC: 19 MMOL/L (ref 21–32)
CREAT SERPL-MCNC: 1.53 MG/DL (ref 0.5–1.05)
EGFRCR SERPLBLD CKD-EPI 2021: 33 ML/MIN/1.73M*2
EOSINOPHIL # BLD AUTO: 0.04 X10*3/UL (ref 0–0.4)
EOSINOPHIL NFR BLD AUTO: 0.3 %
ERYTHROCYTE [DISTWIDTH] IN BLOOD BY AUTOMATED COUNT: 15.6 % (ref 11.5–14.5)
GLUCOSE SERPL-MCNC: 89 MG/DL (ref 74–99)
HCT VFR BLD AUTO: 26.7 % (ref 36–46)
HGB BLD-MCNC: 8.3 G/DL (ref 12–16)
IMM GRANULOCYTES # BLD AUTO: 0.11 X10*3/UL (ref 0–0.5)
IMM GRANULOCYTES NFR BLD AUTO: 0.9 % (ref 0–0.9)
LYMPHOCYTES # BLD AUTO: 0.78 X10*3/UL (ref 0.8–3)
LYMPHOCYTES NFR BLD AUTO: 6.6 %
MCH RBC QN AUTO: 27.9 PG (ref 26–34)
MCHC RBC AUTO-ENTMCNC: 31.1 G/DL (ref 32–36)
MCV RBC AUTO: 90 FL (ref 80–100)
MONOCYTES # BLD AUTO: 1.53 X10*3/UL (ref 0.05–0.8)
MONOCYTES NFR BLD AUTO: 12.9 %
NEUTROPHILS # BLD AUTO: 9.34 X10*3/UL (ref 1.6–5.5)
NEUTROPHILS NFR BLD AUTO: 79.1 %
NRBC BLD-RTO: 0 /100 WBCS (ref 0–0)
OVALOCYTES BLD QL SMEAR: NORMAL
PLATELET # BLD AUTO: 370 X10*3/UL (ref 150–450)
POTASSIUM SERPL-SCNC: 4.1 MMOL/L (ref 3.5–5.3)
RBC # BLD AUTO: 2.97 X10*6/UL (ref 4–5.2)
RBC MORPH BLD: NORMAL
SODIUM SERPL-SCNC: 133 MMOL/L (ref 136–145)
WBC # BLD AUTO: 11.8 X10*3/UL (ref 4.4–11.3)

## 2025-03-26 PROCEDURE — 85025 COMPLETE CBC W/AUTO DIFF WBC: CPT | Performed by: HOSPITALIST

## 2025-03-26 PROCEDURE — 80048 BASIC METABOLIC PNL TOTAL CA: CPT | Performed by: HOSPITALIST

## 2025-03-26 PROCEDURE — 2500000001 HC RX 250 WO HCPCS SELF ADMINISTERED DRUGS (ALT 637 FOR MEDICARE OP): Performed by: STUDENT IN AN ORGANIZED HEALTH CARE EDUCATION/TRAINING PROGRAM

## 2025-03-26 PROCEDURE — 2500000004 HC RX 250 GENERAL PHARMACY W/ HCPCS (ALT 636 FOR OP/ED): Performed by: STUDENT IN AN ORGANIZED HEALTH CARE EDUCATION/TRAINING PROGRAM

## 2025-03-26 PROCEDURE — 97535 SELF CARE MNGMENT TRAINING: CPT | Mod: GO

## 2025-03-26 PROCEDURE — 99233 SBSQ HOSP IP/OBS HIGH 50: CPT | Performed by: INTERNAL MEDICINE

## 2025-03-26 PROCEDURE — 97530 THERAPEUTIC ACTIVITIES: CPT | Mod: GP,CQ

## 2025-03-26 PROCEDURE — 2500000001 HC RX 250 WO HCPCS SELF ADMINISTERED DRUGS (ALT 637 FOR MEDICARE OP): Performed by: HOSPITALIST

## 2025-03-26 PROCEDURE — 1200000002 HC GENERAL ROOM WITH TELEMETRY DAILY

## 2025-03-26 PROCEDURE — 36415 COLL VENOUS BLD VENIPUNCTURE: CPT | Performed by: HOSPITALIST

## 2025-03-26 RX ADMIN — CARVEDILOL 6.25 MG: 6.25 TABLET, FILM COATED ORAL at 21:31

## 2025-03-26 RX ADMIN — PANTOPRAZOLE SODIUM 40 MG: 40 INJECTION, POWDER, FOR SOLUTION INTRAVENOUS at 21:51

## 2025-03-26 RX ADMIN — APIXABAN 10 MG: 5 TABLET, FILM COATED ORAL at 09:08

## 2025-03-26 RX ADMIN — OXYCODONE HYDROCHLORIDE 5 MG: 5 TABLET ORAL at 13:35

## 2025-03-26 RX ADMIN — SENNOSIDES 17.2 MG: 8.6 TABLET ORAL at 21:31

## 2025-03-26 RX ADMIN — PANTOPRAZOLE SODIUM 40 MG: 40 INJECTION, POWDER, FOR SOLUTION INTRAVENOUS at 09:08

## 2025-03-26 RX ADMIN — APIXABAN 10 MG: 5 TABLET, FILM COATED ORAL at 21:31

## 2025-03-26 RX ADMIN — CARVEDILOL 6.25 MG: 6.25 TABLET, FILM COATED ORAL at 09:08

## 2025-03-26 RX ADMIN — SENNOSIDES 17.2 MG: 8.6 TABLET ORAL at 09:08

## 2025-03-26 RX ADMIN — ANASTROZOLE 1 MG: 1 TABLET, COATED ORAL at 09:08

## 2025-03-26 ASSESSMENT — PAIN SCALES - GENERAL
PAINLEVEL_OUTOF10: 0 - NO PAIN
PAINLEVEL_OUTOF10: 8
PAINLEVEL_OUTOF10: 0 - NO PAIN
PAINLEVEL_OUTOF10: 5 - MODERATE PAIN
PAINLEVEL_OUTOF10: 0 - NO PAIN
PAINLEVEL_OUTOF10: 0 - NO PAIN

## 2025-03-26 ASSESSMENT — COGNITIVE AND FUNCTIONAL STATUS - GENERAL
STANDING UP FROM CHAIR USING ARMS: A LITTLE
MOVING FROM LYING ON BACK TO SITTING ON SIDE OF FLAT BED WITH BEDRAILS: A LITTLE
WALKING IN HOSPITAL ROOM: A LOT
DRESSING REGULAR UPPER BODY CLOTHING: A LITTLE
CLIMB 3 TO 5 STEPS WITH RAILING: TOTAL
WALKING IN HOSPITAL ROOM: A LOT
TURNING FROM BACK TO SIDE WHILE IN FLAT BAD: A LITTLE
HELP NEEDED FOR BATHING: A LITTLE
PERSONAL GROOMING: A LITTLE
DRESSING REGULAR LOWER BODY CLOTHING: A LOT
TOILETING: TOTAL
MOBILITY SCORE: 13
EATING MEALS: A LITTLE
MOVING TO AND FROM BED TO CHAIR: A LOT
DAILY ACTIVITIY SCORE: 14
MOVING TO AND FROM BED TO CHAIR: A LITTLE
DAILY ACTIVITIY SCORE: 17
TURNING FROM BACK TO SIDE WHILE IN FLAT BAD: A LITTLE
TOILETING: A LOT
TOILETING: A LITTLE
DAILY ACTIVITIY SCORE: 15
DRESSING REGULAR UPPER BODY CLOTHING: A LITTLE
HELP NEEDED FOR BATHING: A LOT
EATING MEALS: A LITTLE
PERSONAL GROOMING: A LITTLE
WALKING IN HOSPITAL ROOM: A LITTLE
TURNING FROM BACK TO SIDE WHILE IN FLAT BAD: A LITTLE
DRESSING REGULAR LOWER BODY CLOTHING: A LOT
CLIMB 3 TO 5 STEPS WITH RAILING: A LOT
MOBILITY SCORE: 16
MOVING TO AND FROM BED TO CHAIR: A LITTLE
DRESSING REGULAR LOWER BODY CLOTHING: A LOT
DRESSING REGULAR UPPER BODY CLOTHING: A LITTLE
MOVING FROM LYING ON BACK TO SITTING ON SIDE OF FLAT BED WITH BEDRAILS: A LITTLE
MOVING FROM LYING ON BACK TO SITTING ON SIDE OF FLAT BED WITH BEDRAILS: A LITTLE
PERSONAL GROOMING: A LITTLE
HELP NEEDED FOR BATHING: A LOT
MOBILITY SCORE: 16
EATING MEALS: A LITTLE
CLIMB 3 TO 5 STEPS WITH RAILING: TOTAL
STANDING UP FROM CHAIR USING ARMS: A LOT
STANDING UP FROM CHAIR USING ARMS: A LITTLE

## 2025-03-26 ASSESSMENT — PAIN - FUNCTIONAL ASSESSMENT
PAIN_FUNCTIONAL_ASSESSMENT: 0-10

## 2025-03-26 ASSESSMENT — PAIN DESCRIPTION - LOCATION: LOCATION: FOOT

## 2025-03-26 ASSESSMENT — PAIN SCALES - PAIN ASSESSMENT IN ADVANCED DEMENTIA (PAINAD): TOTALSCORE: MEDICATION (SEE MAR);REST

## 2025-03-26 ASSESSMENT — PAIN DESCRIPTION - ORIENTATION: ORIENTATION: RIGHT;LEFT

## 2025-03-26 ASSESSMENT — ACTIVITIES OF DAILY LIVING (ADL): HOME_MANAGEMENT_TIME_ENTRY: 25

## 2025-03-26 NOTE — CARE PLAN
The patient's goals for the shift include pt will participate with therapy and have an uneventful day     The clinical goals for the shift include remain HDS and remain safe    Over the shift, the patient is working on making progress toward the following goals.

## 2025-03-26 NOTE — PROGRESS NOTES
Nutrition Follow-up Note  Nutrition Assessment       Admitted with general weakness & GIB identified.   Patient with c/o Ensure Plus making her feel nauseated, so not drinking.  Willing to try Mighty Shake instead.    History:  Energy Intake: Fair 50-75 %, Poor < 50 %  Food and Nutrient History: Reports no appetite today 2/2 stress about her husbands health.  Denies any discomfort with eating.  Willing to try Mighty Shakes.  Anthropometrics:  Height: 152.4 cm (5')  Weight: 72.5 kg (159 lb 13.3 oz)  BMI (Calculated): 31.22  Need current weight.  Weight History / % Weight Change: 68.9 kg 3/5/25, 75 kg 11/13/24, 77.1 kg 6/18/24, 75.9 kg 4/30/24.  Significant Weight Loss: Yes (8% weight decrease over past 4 months based on 3/5/25 wt)  Interpretation of Weight Loss: >7.5% in 3 months  Significant Weight Gain: Fluid related  IBW/kg (Dietitian Calculated): 45.5 kg  Percent of IBW: 159 %     Energy Needs:  Method for Estimating Needs: 7974-8083 kcal/day (22-25 kcal/kg using 68.9 kg 3/5/25 wt)    Method for Estimating 24 Hour Protein Needs: 45-60 g/d protein ( 1.0-1.3 g/kg IBW)    Method for Estimating 24 Hour Fluid Needs: 1700 ml/day or as per MD  Patient on Order Fluid Restriction: No     Dietary Orders (From admission, onward)  Adult diet Regular    Oral nutritional supplements    Select supplement: Mighty Shake TID    Labs: 03/26/25 06:46  GLUCOSE: 89  SODIUM: 133 (L)  POTASSIUM: 4.1  CHLORIDE: 103  Bicarbonate: 19 (L)  Anion Gap: 15  Blood Urea Nitrogen: 59 (H)  Creatinine: 1.53 (H)  EGFR: 33 (L)  Calcium: 6.4 (L)    Nutrition Diagnosis   Malnutrition Diagnosis  Patient has Malnutrition Diagnosis: Yes  Diagnosis Status: Active  Malnutrition Diagnosis: Severe malnutrition related to chronic disease or condition  Related to: catabolic illness  As Evidenced by: pt with 8% weight decrease over past four months, suspect oral intake less than 75% of estimated energy requirements for greater than one month, visualized areas of  depleted adipose tissues and skeletal tissue wasting  Additional Assessment Information: suspect edema may be masking accurate wt    Patient has Nutrition Diagnosis: Yes  Nutrition Diagnosis 1: Inadequate protein energy intake  Diagnosis Status (1): New  Related to (1): GI bleed  As Evidenced by (1): intake <75% of meals during admit.       Nutrition Interventions/Recommendations   Nutrition Prescription: Nutrition prescription for oral nutrition  Individualized Nutrition Prescription Provided for : continue diet as prescribed.  Modify Ensure Plus to Mighty shakes.  MD to manage IVF as indicated.  Check Magnesium level, has been trending down, phophorus level, replete calcium as indicated.  On going poor appetite, may benefit from appetite stimulant.    Food and/or Nutrient Delivery Interventions  Meals and Snacks: General healthful diet  Goal: intake meets >75% estimated nutrient needs.   Medical Food Supplement: Commercial beverage medical food supplement therapy    Nutrition Monitoring and Evaluation   Food and Nutrient Related History   Intake / Amount of food: Consumes at least 50% or more of meals/snacks/supplements, Consumes > or equal to 70% of supplement  Eating Behavior: Refusing food  Criteria: monitor daily    Anthropometrics: Body Composition/Growth/Weight History  Body Weight: Body weight - Maintain stable weight  Criteria: weekly    Biochemical Data, Medical Tests and Procedures  Electrolyte and Renal Panel: BUN, Calcium, serum, Chloride, Creatinine, Magnesium, Phosphorus, Potassium, Sodium  Criteria: as indicated    Glucose/Endocrine Profile: Glucose within normal limits ( mg/dL)  Criteria: as indicated    Nutritional Anemia Profile: Folate, serum, Hematocrit, Hemoglobin, Iron, serum  Criteria: as indicated    Vitamin Profile: Vitamin D, 25 hydroxy, Other (Comment)  Criteria: as indicated    Nutrition Focused Physical Findings   Digestive System Finding: Anorexia, Constipation, Diarrhea, Early  satiety, Nausea, Vomiting, Abdominal pain  Criteria: daily    Last Date of Nutrition Visit: 03/26/25  Nutrition Follow-Up Needed?: Dietitian to reassess per policy  Follow up Comment: svr PCMN/poor po-TR

## 2025-03-26 NOTE — PROGRESS NOTES
03/26/25 1008   Discharge Planning   Assistance Needed received caLl from [facility] that patients spouse was admitted last night, critical condition, in icu on vent. Wanted to know  if patient was able to make decisions. I went to see patient,  she is a/o x 3.i did inform her of her patients condition.. She asked that  she make decisions for spouse, with assistance of listed relative matt soliz. I attempted to call this person; left vm     Elizabeth Buckner is a 84 y.o. female on day 15 of admission presenting with General weakness.    Gema Landrum RN

## 2025-03-26 NOTE — PROGRESS NOTES
Physical Therapy    Physical Therapy Treatment    Patient Name: Elizabeth Buckner  MRN: [id number]  Department: [facility]   Room: [address] [place] [address]  Today's Date: 3/26/2025  Time Calculation  Start Time: 1028  Stop Time: 1041  Time Calculation (min): 13 min         Assessment/Plan   PT Assessment  Rehab Prognosis: Good  Barriers to Discharge Home: Physical needs, Caregiver assistance  Caregiver Assistance: Caregiver assistance needed per identified barriers - however, level of patient's required assistance exceeds assistance available at home  Physical Needs: Stair navigation into home limited by function/safety  End of Session Communication: Bedside nurse  Assessment Comment: pt able to take steps at side of bed  End of Session Patient Position: Alarm on, Bed, 3 rail up  PT Plan  Inpatient/Swing Bed or Outpatient: Inpatient  PT Plan  Treatment/Interventions: Bed mobility, Transfer training, Gait training  PT Plan: Ongoing PT  PT Frequency: 3 times per week  PT Discharge Recommendations: Moderate intensity level of continued care  Equipment Recommended upon Discharge: Wheeled walker  PT Recommended Transfer Status: Assist x1  PT - OK to Discharge: Yes (per POC)      General Visit Information:   PT  Visit  PT Received On: 03/26/25  General  Reason for Referral: To ED with increased groin pain, unresolved UTI, and AMS. CT (+) for R LE DVT.  Referred By: Danita Barlow MD  Prior to Session Communication: Bedside nurse  Patient Position Received: Bed, 3 rail up, Alarm on  Preferred Learning Style: verbal, visual  General Comment: pt agreeable to sit EOB    Subjective   Precautions:  Precautions  Medical Precautions: Fall precautions     Date/Time Vitals Session Patient Position Pulse Resp SpO2 BP MAP (mmHg)    03/26/25 1136 --  --  83  19  98 %  114/52  86                 Objective   Pain:  Pain Assessment  0-10 (Numeric) Pain Score: 0 - No pain  Cognition:  Cognition  Orientation Level: Oriented X4  Coordination:     Postural  Control:  Static Sitting Balance  Static Sitting-Comment/Number of Minutes: pt performed at EOB with SBA, pt performed x5 min      Bed Mobility  Bed Mobility: Yes  Bed Mobility 1  Bed Mobility 1: Supine to sitting, Sitting to supine  Level of Assistance 1: Minimum assistance  Bed Mobility Comments 1: HOB elevated supine >sitting. HOB flat sitting > supine.  min vc for use of bed rail and technqiue.    Ambulation/Gait Training  Ambulation/Gait Training Performed: Yes  Ambulation/Gait Training 1  Surface 1: Level tile  Device 1: Rolling walker  Gait Support Devices: Gait belt  Assistance 1: Contact guard  Comments/Distance (ft) 1: 4' toward HOB (pt able to take small steps toward HOB declined being up in chair at this time)  Transfer 1  Technique 1: Sit to stand, Stand to sit  Transfer Device 1: Walker  Transfer Level of Assistance 1: Minimum assistance  Trials/Comments 1: vc/tc for hand placment on solid sitting surface and not RW.    Outcome Measures:  [facility] Basic Mobility  Turning from your back to your side while in a flat bed without using bedrails: A little  Moving from lying on your back to sitting on the side of a flat bed without using bedrails: A little  Moving to and from bed to chair (including a wheelchair): A little  Standing up from a chair using your arms (e.g. wheelchair or bedside chair): A little  To walk in hospital room: A little  Climbing 3-5 steps with railing: Total  Basic Mobility - Total Score: 16    Education Documentation  Body Mechanics, taught by Dionisio Velazco PTA at 3/26/2025 11:43 AM.  Learner: Patient  Readiness: Acceptance  Method: Explanation  Response: Verbalizes Understanding    Mobility Training, taught by Dionisio Velazco PTA at 3/26/2025 11:43 AM.  Learner: Patient  Readiness: Acceptance  Method: Explanation  Response: Verbalizes Understanding    Education Comments  No comments found.        OP EDUCATION:       Encounter Problems       Encounter Problems (Active)        Balance       complete all mobility with normal balance while dual tasking, negotiating in a dynamic environment, carrying items, etc., with proactive and reactive static and dynamic standing and sitting tasks, with mod I and RW, >15 minutes.   (Not Progressing)       Start:  03/14/25    Expected End:  03/28/25               Mobility       STG - Patient will ambulate 150 ft mod I with a RW (Progressing)       Start:  03/14/25    Expected End:  03/28/25               PT Transfers       STG - Patient will perform bed mobility independently.  (Progressing)       Start:  03/14/25    Expected End:  03/28/25            STG - Patient will transfer sit to and from stand mod I with a RW (Progressing)       Start:  03/14/25    Expected End:  03/28/25               Pain - Adult             Encounter Problems (Resolved)       Pain - Adult

## 2025-03-26 NOTE — PROGRESS NOTES
03/26/25 1406   Discharge Planning   Who is requesting discharge planning? Provider   Home or Post Acute Services Post acute facilities (Rehab/SNF/etc)   Type of Post Acute Facility Services Skilled nursing   Expected Discharge Disposition SNF   Does the patient need discharge transport arranged? Yes   RoundTrip coordination needed? Yes   Has discharge transport been arranged? No   Intensity of Service   Intensity of Service 0-30 min     3/26/25 1406  Updated PT/OT notes sent to [facility].  Waiting on auth that was started on 3/21/25.  Jaleesa Mahajan RN [facility]

## 2025-03-26 NOTE — PROGRESS NOTES
03/26/25 1132   Discharge Planning   Expected Discharge Disposition SNF     Spoke with patient's cousin, Cris via phone. Patient's  is currently in ICU at [facility]. Cris had discussed with HALEIGH AGUERO about getting POA for . SW confirmed that family understands that POA cannot be decided unless  is alert and oriented to sign (he is not at this time). Family would also like patient to sign POA for herself. SW spoke with patient at bedside and she stated that she would like more time to think about it. SW explained this to family. Cris stated that she would be at bedside to discuss this with patient today and requested that SW attempt again tomorrow AM. SW will continue to follow.

## 2025-03-26 NOTE — PROGRESS NOTES
Elizabeth Buckner is a 84 y.o. female on day 15 of admission presenting with General weakness.      Subjective   Pt seen and examined.        Objective     Last Recorded Vitals  /52 (BP Location: Left arm, Patient Position: Lying)   Pulse 83   Temp 37.2 °C (99 °F) (Temporal)   Resp 19   Wt 72.5 kg (159 lb 13.3 oz)   SpO2 98%   Intake/Output last 3 Shifts:    Intake/Output Summary (Last 24 hours) at 3/26/2025 1139  Last data filed at 3/26/2025 1136  Gross per 24 hour   Intake 460 ml   Output 800 ml   Net -340 ml       Admission Weight  Weight: 73.9 kg (162 lb 14.7 oz) (03/16/25 0600)    Daily Weight  03/20/25 : 72.5 kg (159 lb 13.3 oz)      Physical Exam  HENT:      Mouth/Throat:      Mouth: Mucous membranes are moist.      Pharynx: Oropharynx is clear.   Cardiovascular:      Rate and Rhythm: Normal rate and regular rhythm.   Pulmonary:      Effort: Pulmonary effort is normal.   Abdominal:      General: There is distension.      Palpations: Abdomen is soft.      Tenderness: There is no abdominal tenderness.   Musculoskeletal:      Comments: RUE edema. No LUE Edema   Neurological:      Mental Status: She is alert.   Relevant Results  Results for orders placed or performed during the hospital encounter of 03/10/25 (from the past 24 hours)   CBC and Auto Differential   Result Value Ref Range    WBC 11.8 (H) 4.4 - 11.3 x10*3/uL    nRBC 0.0 0.0 - 0.0 /100 WBCs    RBC 2.97 (L) 4.00 - 5.20 x10*6/uL    Hemoglobin 8.3 (L) 12.0 - 16.0 g/dL    Hematocrit 26.7 (L) 36.0 - 46.0 %    MCV 90 80 - 100 fL    MCH 27.9 26.0 - 34.0 pg    MCHC 31.1 (L) 32.0 - 36.0 g/dL    RDW 15.6 (H) 11.5 - 14.5 %    Platelets 370 150 - 450 x10*3/uL    Neutrophils % 79.1 40.0 - 80.0 %    Immature Granulocytes %, Automated 0.9 0.0 - 0.9 %    Lymphocytes % 6.6 13.0 - 44.0 %    Monocytes % 12.9 2.0 - 10.0 %    Eosinophils % 0.3 0.0 - 6.0 %    Basophils % 0.2 0.0 - 2.0 %    Neutrophils Absolute 9.34 (H) 1.60 - 5.50 x10*3/uL    Immature Granulocytes  Absolute, Automated 0.11 0.00 - 0.50 x10*3/uL    Lymphocytes Absolute 0.78 (L) 0.80 - 3.00 x10*3/uL    Monocytes Absolute 1.53 (H) 0.05 - 0.80 x10*3/uL    Eosinophils Absolute 0.04 0.00 - 0.40 x10*3/uL    Basophils Absolute 0.02 0.00 - 0.10 x10*3/uL   Basic Metabolic Panel   Result Value Ref Range    Glucose 89 74 - 99 mg/dL    Sodium 133 (L) 136 - 145 mmol/L    Potassium 4.1 3.5 - 5.3 mmol/L    Chloride 103 98 - 107 mmol/L    Bicarbonate 19 (L) 21 - 32 mmol/L    Anion Gap 15 10 - 20 mmol/L    Urea Nitrogen 59 (H) 6 - 23 mg/dL    Creatinine 1.53 (H) 0.50 - 1.05 mg/dL    eGFR 33 (L) >60 mL/min/1.73m*2    Calcium 6.4 (L) 8.6 - 10.3 mg/dL   Morphology   Result Value Ref Range    RBC Morphology See Below     Ovalocytes Few     Luis Alberto Cells Few      *Note: Due to a large number of results and/or encounters for the requested time period, some results have not been displayed. A complete set of results can be found in Results Review.        CT abdomen pelvis wo IV contrast   Final Result   Limited examination secondary to lack of intravenous contrast,   patient motion and inferior position of the patient's arms.        There is redemonstration of abdominal and pelvic lymphadenopathy with   largest conglomerate lymph node/mass measuring 7.3 cm x 3.3 cm in the   right lateral pelvis. There is evidence of interval decrease in size   of multiple of the enlarged lymph nodes in comparison to the prior   examination. 2.1 cm mass in the right hepatic lobe also appears   decreased in size. Additional hepatic masses are not well assessed on   the current examination.        Gaseous distention of the colon and fluid in the descending colon;   please correlate for diarrhea/colitis. Underdistention versus wall   thickening of the sigmoid colon which may be infectious or   inflammatory in etiology.        Asymmetric dilatation of right common femoral vein and right lower   extremity edema; patient has known deep vein thrombosis.         MACRO:   None        Signed by: Mitchell Hay 3/23/2025 10:51 PM   Dictation workstation:   [id number]      Vascular US upper extremity venous duplex right   Final Result      IR intervention filter placement   Final Result   1. Uncomplicated and technically successful placement of a   retrievable ALN inferior vena cava filter in an infrarenal IVC   location. The IVC filter may be retrieved as clinically indicated.   Optimal recovery period is less than 90 days post-placement. However,   longer periods have been reported and are possible.   2. Patent inferior vena cava and common iliac vein reflux without   evidence of thrombus, stenosis, occlusion, or anatomic variation.   3. Please contact interventional radiology if filtration is no longer   indicated.        Performed and dictated at [facility].        MACRO:   None.        Signed by: Carroll Linda 3/20/2025 10:06 AM   Dictation workstation:   [id number]      XR chest 1 view   Final Result   Tubes and lines have been removed.        Hypoventilatory exam with mild elevation of the right diaphragm. No   mass or pneumonia in either lung.        Mild cardiomegaly.        Interval placement of a clip in the proximal stomach.        MACRO:   None        Signed by: Carroll Corley 3/19/2025 3:00 PM   Dictation workstation:   [id number]      XR chest 1 view   Final Result   Addendum (preliminary) 1 of 1   Potentially critical findings are discussed with and acknowledged by   Yael Garcia RN at 7:38 PM Eastern time on 3/15/2025.   Signed by Shavonne Ye DO      Final   1.Endotracheal tube with the tip at or less than 1 cm above the   level of the fabrizio. Repositioning is recommended.  This could be   pulled back about 4 to 5 cm.   2.Enteric tube and right internal jugular line in place.   3.No pneumothorax.   4.Normal heart size with no radiographic signs of active   pulmonary parenchymal infiltration.  Known pleural and  parenchymal   nodules worrisome for metastatic disease seen on CT are not well   visualized radiographically.   Signed by Shavonne Ye DO      MR brain w and wo IV contrast   Final Result   1. No acute infarct, hemorrhage or mass is noted. No abnormalities of   the 4th ventricle are noted.        2. The cerebellar tonsils are low-lying consistent with mild Chiari 1   type malformation.        MACRO:   None        Signed by: Pete Rice 3/11/2025 12:21 PM   Dictation workstation:   [id number]      US abdomen complete   Final Result   Obscuration the spleen and right kidney by bowel gas. Previous   cholecystectomy.        Mildly small left kidney. The left kidney was otherwise   sonographically unremarkable.        There were 3 identifiable hypoechoic solid lesions in the liver.   These are nonspecific and could be atypical hemangiomas or metastatic   lesions. Recommend liver MRI in follow-up.        MACRO:   None        Signed by: Carroll Corley 3/11/2025 8:04 AM   Dictation workstation:   [id number]      Lower extremity venous duplex right   Final Result   1. Acute deep vein thrombosis at the right lower extremity from the   inguinal region to the popliteal region. The right posterior tibial   and peroneal veins were not visualized on this exam.             MACRO:   Noa Adamson discussed the significance and urgency of this   critical finding by telephone with  CYNTHIA HOWARD on 3/10/2025 at 11:24   pm.  (**-RCF-**) Findings:  See findings.             Signed by: Noa Adamson 3/10/2025 11:39 PM   Dictation workstation:   [id number]      CT head wo IV contrast   Final Result   Mass effect upon the 4th ventricle centrally. Further evaluation with   contrast-enhanced MRI is recommended for better assessment.        No evidence for acute cortical infarction or acute intracranial   hemorrhage is seen.        MACRO:   Critical Finding:  See findings. Notification was initiated on   3/10/2025 at 7:46 pm by  Esvin  Jose Martin.  (**-YCF-**)        Signed by: Esvin Philippe 3/10/2025 7:46 PM   Dictation workstation:   [id number]      CT abdomen pelvis wo IV contrast   Final Result   1.  Extensive retroperitoneal and bilateral pelvic lymphadenopathy as   above concerning for lymphoma or metastatic disease.   2. Suspect acute deep venous thrombosis within the right common   femoral vein and right external iliac vein. Correlation with   ultrasound findings recommended.   3. Numerous liver masses suggestive of metastatic liver disease.   Correlation with ultrasound findings can be performed as clinically   warranted.   4. Bilateral renal lesions, nonspecific in etiology and may represent   cysts but incompletely characterized in this unenhanced exam.   Correlation with ultrasound findings can be performed as clinically   warranted.   5. Air in the bladder which may be due to bladder instrumentation.   Correlation with urinalysis recommended.   6. Additional detailed findings as above.   Critical Finding:  See findings. Notification was initiated on   3/10/2025 at 7:54 pm by  Esvin Philippe.  (**-YCF-**) Instructions:        7.             Signed by: Esvin Philippe 3/10/2025 7:54 PM   Dictation workstation:   [id number]      XR chest 2 views   Final Result   No acute process.   Signed by Kamaljit Alex MD      Follow-Up Consult to Interventional Radiology    (Results Pending)       Scheduled medications  anastrozole, 1 mg, oral, Daily  apixaban, 10 mg, oral, BID   Followed by  [START ON 3/30/2025] apixaban, 5 mg, oral, BID  carvedilol, 6.25 mg, oral, BID  [Held by provider] gabapentin, 300 mg, oral, BID  [Held by provider] hydrALAZINE, 100 mg, oral, BID  pantoprazole, 40 mg, intravenous, BID  sennosides, 2 tablet, oral, BID      Continuous medications     PRN medications  PRN medications: acetaminophen **OR** acetaminophen **OR** acetaminophen, alteplase, dextrose, dextrose, glucagon, glucagon, melatonin, ondansetron ODT **OR** ondansetron,  oxyCODONE, oxygen         Assessment/Plan                  Assessment & Plan  History of blood transfusion    PUD (peptic ulcer disease)    Hepatobiliary cancer (Multi)    GI bleed due to cratered ulcer in the fundus of the stomach  - EGD on 3/17 showed Single 8 mm cratered ulcer in the fundus of the stomach with adherent clot (Gary IIB); placed MRI conditional clips; injected 7 mL of epinephrine to address bleeding; hemostasis achieved. The mucosa surrounding the clot was injected with epinephrine.  - EGD on 3/20 showed Single 7 mm x 8 mm cratered, benign-appearing ulcer in the fundus of the stomach with clean base (Gary III); performed cold forceps biopsy from ulcer edge to rule out malignancy; placed 3 clips successfully and 1 clip got dislodged after placement (clips are MRI conditional and through-the-scope); hemostasis achieved.   - H Pylori stool antigen ordered, awaiting RN to collect  - PPI bid for 3 months, then once daily; repeat EGD in 3 months     ABLA  Hypotension 2/2 acute hemorrhagic shock   - due to UGIB; s/p 4 units PRBCs and IVF resuscitation. Initially was refractory and needed vasopressor support. Now resolved, no longer needing vasopressor support; transferred out of ICU on 3/17     Acute RLE DVT  - LE Duplex with acute deep vein thrombosis at the right lower extremity from the inguinal region to the popliteal region  - Unable to anticoagulate due to severe bleeding from ulcer; IR consulted s/p IVC filter placement on 3/20  - will need OP follow up with vascular medicine     Acute RUE DVT  - likely provoked from recent CVC  - New onset RUE edema noted on 3/21. US showed cute non-occlusive deep vein thrombosis visualized in the right internal jugular vein. Discussed with GI, ok to challenge with heparin gtt. Hgb stable, no melena or BRBPR. Hgb 6.7, repeat 7.9. Transitioned from heparin gtt to OAC but Hgb labile, continue to monitor.  -transitioned to eliquis     Leukocytosis  - UCX  negative. Blood culture negative. CXR without PNA. CT AP with concern for metastatic disease  - Persists despite 7d course of empiric abx --> suspect due to malignancy/stress dose steroids  - fever on 3/19 but no clinical signs or symptoms of infection. COVID/Flu negative. CXR unrevealing. No recurrence of fever. Monitor wbc/fever curve as work up thus far unrevealing.      PAF, new onset  - suspect triggered by acute illness; continue bb.  - back on heparin gtt due to RUE DVT but if bleeds again will need to hold     HASMUKH on CKD2  - Baseline Cr 1-1.2; Cr peaked at 1.6, now improved to 1.3     HTN  - continue coreg; hydralazine, lasix held due to shock and BP now normal, will restart if BP rises     Stage IV breast cancer   - history of right breast carcinoma first diagnosed in 1991 status post breast conserving surgery, radiation and tamoxifen who then went on to have a recurrence in 2012 and had complete mastectomy. She then had breast cancer in her left breast and had a left mastectomy followed by radiation and trastuzumab. Finally she had another recurrence in November 2021 and was on anastrozole and palbociclib.   - a large disease burden in her abdomen including diffuse LAD and several liver masses suspicious for malignancy. She was due for a PET scan after an 11/20/2024 CT showed possible progression in her pelvic lymph nodes however she did not follow-up. Needs OP follow up with oncology vs hospice     Recent UTI  - Urine culture 3/1 with E Coli; Repeat Ucx on 3/11 negative.      Malnutrition Diagnosis Status: New  Malnutrition Diagnosis: Severe malnutrition related to chronic disease or condition  Related to: pt with 8% weight decrease over past four months, suspect oral intake less than 75% of estimated energy requirements for greater than one month, visualized areas of depleted adipose tissues and skeletal tissue wasting  I agree with the dietitian's malnutrition diagnosis.     DVT Prophylaxis: Eliquis      Disposition: SNF placement         Yonas Nicole MD

## 2025-03-26 NOTE — CARE PLAN
Problem: Fall/Injury  Goal: Verbalize understanding of personal risk factors for fall in the hospital  Outcome: Progressing  Goal: Verbalize understanding of risk factor reduction measures to prevent injury from fall in the home  Outcome: Progressing  Goal: Use assistive devices by end of the shift  Outcome: Progressing     Problem: Skin  Goal: Decreased wound size/increased tissue granulation at next dressing change  Outcome: Progressing  Flowsheets (Taken 3/25/2025 2039)  Decreased wound size/increased tissue granulation at next dressing change: Promote sleep for wound healing  Goal: Participates in plan/prevention/treatment measures  Outcome: Progressing  Flowsheets (Taken 3/25/2025 2039)  Participates in plan/prevention/treatment measures: Elevate heels  Goal: Prevent/manage excess moisture  Outcome: Progressing  Flowsheets (Taken 3/25/2025 2039)  Prevent/manage excess moisture: Moisturize dry skin  Goal: Prevent/minimize sheer/friction injuries  Outcome: Progressing  Flowsheets (Taken 3/25/2025 2039)  Prevent/minimize sheer/friction injuries: Use pull sheet  Goal: Promote/optimize nutrition  Outcome: Progressing  Flowsheets (Taken 3/25/2025 2039)  Promote/optimize nutrition:   Assist with feeding   Monitor/record intake including meals  Goal: Promote skin healing  Outcome: Progressing  Flowsheets (Taken 3/25/2025 2039)  Promote skin healing: Turn/reposition every 2 hours/use positioning/transfer devices     Problem: Pain  Goal: Takes deep breaths with improved pain control throughout the shift  Outcome: Progressing  Goal: Turns in bed with improved pain control throughout the shift  Outcome: Progressing  Goal: Walks with improved pain control throughout the shift  Outcome: Progressing  Goal: Performs ADL's with improved pain control throughout shift  Outcome: Progressing  Goal: Participates in PT with improved pain control throughout the shift  Outcome: Progressing  Goal: Free from opioid side effects  throughout the shift  Outcome: Progressing  Goal: Free from acute confusion related to pain meds throughout the shift  Outcome: Progressing     Problem: Discharge Planning  Goal: Discharge to home or other facility with appropriate resources  Outcome: Progressing     Problem: Chronic Conditions and Co-morbidities  Goal: Patient's chronic conditions and co-morbidity symptoms are monitored and maintained or improved  Outcome: Progressing     Problem: Nutrition  Goal: Nutrient intake appropriate for maintaining nutritional needs  Outcome: Progressing     Problem: Safety - Medical Restraint  Goal: Remains free of injury from restraints (Restraint for Interference with Medical Device)  Outcome: Progressing  Goal: Free from restraint(s) (Restraint for Interference with Medical Device)  Outcome: Progressing

## 2025-03-26 NOTE — PROGRESS NOTES
Occupational Therapy    Occupational Therapy Treatment    Name: Elizabeth Buckner  MRN: [id number]  : 1940  Date: 25  Room: [address] [place] [address]      Time Calculation  Start Time: 923  Stop Time: 948  Time Calculation (min): 25 min    Assessment:  OT Assessment: Pt progressing towards goals this date, continues to function below baseline for ADLs and functional mobility. Pt limited by nausea and dry heaving at times during session. Continue to recommend mod intensity therapies at discharge.  Prognosis: Fair  Barriers to Discharge Home: Caregiver assistance, Physical needs  Caregiver Assistance: Caregiver assistance needed per identified barriers - however, level of patient's required assistance exceeds assistance available at home  Physical Needs: 24hr mobility assistance needed, Intermittent ADL assistance needed, High falls risk due to function or environment  Evaluation/Treatment Tolerance: Patient tolerated treatment well  Medical Staff Made Aware: Yes  End of Session Communication: Bedside nurse  End of Session Patient Position: Bed, 3 rail up, Alarm on  Plan:  Treatment Interventions: ADL retraining, UE strengthening/ROM, Endurance training, Compensatory technique education  OT Frequency: 3 times per week  OT Discharge Recommendations: Moderate intensity level of continued care  Equipment Recommended upon Discharge: Wheeled walker  OT Recommended Transfer Status: Minimal assist  OT - OK to Discharge: Yes (per OT POC)    Subjective   General:  OT Last Visit  OT Received On: 25  Reason for Referral: To ED with increased groin pain, unresolved UTI, and AMS. CT (+) for R LE DVT.  Past Medical History Relevant to Rehab:   Past Medical History:   Diagnosis Date    Anxiety     Breast cancer (Multi)     CKD (chronic kidney disease)     Depression     DVT (deep venous thrombosis) (Multi) 03/10/2025    Right leg    Endometrial cancer (Multi)     GERD (gastroesophageal reflux disease)     HTN (hypertension)      Hypothyroidism        Prior to Session Communication: Bedside nurse  Patient Position Received: Bed, 3 rail up, Alarm on  Family/Caregiver Present: No  General Comment: pt supine in bed upon entry, agreeable to therapy   Precautions:  Medical Precautions: Fall precautions      Lines/Tubes/Drains:  External Urinary Catheter Female (Active)   Number of days: 8       Cognition:  Orientation Level: Oriented X4    Pain Assessment:  Pain Assessment  Pain Assessment: 0-10  0-10 (Numeric) Pain Score: 0 - No pain     Objective   Activities of Daily Living:   Feeding  Feeding Level of Assistance: Setup  Feeding Where Assessed: Bed level  Grooming  Grooming Level of Assistance: Setup  Grooming Where Assessed: Edge of bed  Grooming Comments: pt able to complete oral hygiene and facial hygiene with verbal cues for sequencing and to initiate and end task.           LE Dressing  LE Dressing: Yes  Sock Level of Assistance: Dependent  LE Dressing Where Assessed: Edge of bed  LE Dressing Comments: pt sitting EOB for increased time attempting to don bilateral socks, nausea and dry heaving, breaks taken as needed. dependent assist to don and doff bilateral socks       Functional Standing Tolerance:  Functional Mobility  Functional Mobility Performed: Yes  Functional Mobility 1  Surface 1: Level tile  Device 1: Rolling walker  Functional Mobility Support Devices: Gait belt  Assistance 1: Contact guard  Comments 1: able to take side steps towards HOB with use of FWW for balance  Bed Mobility/Transfers:   Bed Mobility  Bed Mobility: Yes  Bed Mobility 1  Bed Mobility 1: Supine to sitting  Level of Assistance 1: Minimum assistance  Bed Mobility Comments 1: hand held assist for trunk elevation, verbal cues for sequencing, pt able to advance bilateral LE towards EOB and use bed rail to assist  Bed Mobility 2  Bed Mobility  2: Sitting to supine  Level of Assistance 2: Moderate assistance  Bed Mobility Comments 2: assist to bring BLE back into  bed  Bed Mobility 3  Bed Mobility 3: Scooting  Level of Assistance 3: Minimum assistance  Bed Mobility Comments 3: seated scoot towards EOB  Transfers  Transfer: Yes  Transfer 1  Transfer From 1: Sit to, Stand to  Transfer to 1: Sit, Stand  Technique 1: Sit to stand, Stand to sit  Transfer Device 1: Walker  Transfer Level of Assistance 1: Minimum assistance  Trials/Comments 1: VC for hand placement and to scoot towards EOB for proper body mechanics.                Balance:  Dynamic Sitting Balance  Dynamic Sitting-Balance Support: Feet supported  Dynamic Sitting-Level of Assistance: Minimum assistance  Dynamic Standing Balance  Dynamic Standing-Balance Support: Bilateral upper extremity supported  Dynamic Standing-Level of Assistance: Contact guard  Static Sitting Balance  Static Sitting-Balance Support: Feet supported  Static Sitting-Level of Assistance: Close supervision  Static Standing Balance  Static Standing-Balance Support: Bilateral upper extremity supported  Static Standing-Level of Assistance: Contact guard           Outcome Measures:  [facility] Daily Activity  Putting on and taking off regular lower body clothing: A lot  Bathing (including washing, rinsing, drying): A lot  Putting on and taking off regular upper body clothing: A little  Toileting, which includes using toilet, bedpan or urinal: Total  Taking care of personal grooming such as brushing teeth: A little  Eating Meals: A little  Daily Activity - Total Score: 14     Education Documentation  Body Mechanics, taught by Janine Kendall OT at 3/26/2025 10:08 AM.  Learner: Patient  Readiness: Acceptance  Method: Explanation  Response: Verbalizes Understanding  Comment: educated pt on adaptive techniques for completing ADLs, proper body mechanics for bed mobility and transfers to increase safety and independence    ADL Training, taught by Janine Kendall OT at 3/26/2025 10:08 AM.  Learner: Patient  Readiness: Acceptance  Method: Explanation  Response:  Verbalizes Understanding  Comment: educated pt on adaptive techniques for completing ADLs, proper body mechanics for bed mobility and transfers to increase safety and independence    Education Comments  No comments found.        Goals:  Encounter Problems       Encounter Problems (Active)       ADLs       Patient will perform UB and LB bathing with set-up level of assistance. (Progressing)       Start:  03/17/25    Expected End:  03/31/25            Patient with complete upper body dressing with set-up level of assistance donning and doffing all UE clothes. (Progressing)       Start:  03/17/25    Expected End:  03/31/25            Patient with complete lower body dressing with set-up level of assistance donning and doffing all LE clothes. (Progressing)       Start:  03/17/25    Expected End:  03/31/25            Patient will complete daily grooming tasks brushing teeth and washing face/hair with modified independent level of assistance and PRN adaptive equipment. (Progressing)       Start:  03/17/25    Expected End:  03/31/25               MOBILITY       Patient will perform Functional mobility min Household distances/Community Distances with stand by assist level of assistance and least restrictive device in order to improve safety and functional mobility. (Progressing)       Start:  03/17/25    Expected End:  03/31/25               TRANSFERS       Patient will perform bed mobility stand by assist level of assistance in order to improve safety and independence with mobility (Progressing)       Start:  03/17/25    Expected End:  03/31/25 03/26/25 at 10:10 AM   ZAIN RODRIGUEZ, OT

## 2025-03-27 ENCOUNTER — APPOINTMENT (OUTPATIENT)
Dept: CARDIOLOGY | Facility: HOSPITAL | Age: 85
DRG: 299 | End: 2025-03-27
Payer: MEDICARE

## 2025-03-27 LAB
ANION GAP SERPL CALC-SCNC: 15 MMOL/L (ref 10–20)
ATRIAL RATE: 87 BPM
BUN SERPL-MCNC: 62 MG/DL (ref 6–23)
CALCIUM SERPL-MCNC: 6.6 MG/DL (ref 8.6–10.3)
CHLORIDE SERPL-SCNC: 100 MMOL/L (ref 98–107)
CO2 SERPL-SCNC: 19 MMOL/L (ref 21–32)
CREAT SERPL-MCNC: 1.6 MG/DL (ref 0.5–1.05)
EGFRCR SERPLBLD CKD-EPI 2021: 31 ML/MIN/1.73M*2
ERYTHROCYTE [DISTWIDTH] IN BLOOD BY AUTOMATED COUNT: 15 % (ref 11.5–14.5)
GLUCOSE SERPL-MCNC: 96 MG/DL (ref 74–99)
HCT VFR BLD AUTO: 26.2 % (ref 36–46)
HGB BLD-MCNC: 8.7 G/DL (ref 12–16)
HOLD SPECIMEN: NORMAL
MCH RBC QN AUTO: 28.5 PG (ref 26–34)
MCHC RBC AUTO-ENTMCNC: 33.2 G/DL (ref 32–36)
MCV RBC AUTO: 86 FL (ref 80–100)
NRBC BLD-RTO: 0 /100 WBCS (ref 0–0)
P AXIS: 13 DEGREES
P OFFSET: 200 MS
P ONSET: 148 MS
PLATELET # BLD AUTO: 399 X10*3/UL (ref 150–450)
POTASSIUM SERPL-SCNC: 3.9 MMOL/L (ref 3.5–5.3)
PR INTERVAL: 146 MS
Q ONSET: 221 MS
QRS COUNT: 14 BEATS
QRS DURATION: 84 MS
QT INTERVAL: 390 MS
QTC CALCULATION(BAZETT): 469 MS
QTC FREDERICIA: 441 MS
R AXIS: -14 DEGREES
RBC # BLD AUTO: 3.05 X10*6/UL (ref 4–5.2)
SODIUM SERPL-SCNC: 130 MMOL/L (ref 136–145)
T AXIS: 40 DEGREES
T OFFSET: 416 MS
VENTRICULAR RATE: 87 BPM
WBC # BLD AUTO: 10.6 X10*3/UL (ref 4.4–11.3)

## 2025-03-27 PROCEDURE — 93005 ELECTROCARDIOGRAM TRACING: CPT

## 2025-03-27 PROCEDURE — 1200000002 HC GENERAL ROOM WITH TELEMETRY DAILY

## 2025-03-27 PROCEDURE — 2500000001 HC RX 250 WO HCPCS SELF ADMINISTERED DRUGS (ALT 637 FOR MEDICARE OP): Performed by: STUDENT IN AN ORGANIZED HEALTH CARE EDUCATION/TRAINING PROGRAM

## 2025-03-27 PROCEDURE — 2500000004 HC RX 250 GENERAL PHARMACY W/ HCPCS (ALT 636 FOR OP/ED): Performed by: STUDENT IN AN ORGANIZED HEALTH CARE EDUCATION/TRAINING PROGRAM

## 2025-03-27 PROCEDURE — 99232 SBSQ HOSP IP/OBS MODERATE 35: CPT | Performed by: INTERNAL MEDICINE

## 2025-03-27 PROCEDURE — 92610 EVALUATE SWALLOWING FUNCTION: CPT | Mod: GN

## 2025-03-27 PROCEDURE — 2500000004 HC RX 250 GENERAL PHARMACY W/ HCPCS (ALT 636 FOR OP/ED): Performed by: INTERNAL MEDICINE

## 2025-03-27 PROCEDURE — 2500000001 HC RX 250 WO HCPCS SELF ADMINISTERED DRUGS (ALT 637 FOR MEDICARE OP): Performed by: HOSPITALIST

## 2025-03-27 PROCEDURE — 36415 COLL VENOUS BLD VENIPUNCTURE: CPT | Performed by: INTERNAL MEDICINE

## 2025-03-27 PROCEDURE — 85027 COMPLETE CBC AUTOMATED: CPT | Performed by: INTERNAL MEDICINE

## 2025-03-27 PROCEDURE — 80048 BASIC METABOLIC PNL TOTAL CA: CPT | Performed by: INTERNAL MEDICINE

## 2025-03-27 RX ORDER — SODIUM CHLORIDE 9 MG/ML
75 INJECTION, SOLUTION INTRAVENOUS CONTINUOUS
Status: ACTIVE | OUTPATIENT
Start: 2025-03-27 | End: 2025-03-27

## 2025-03-27 RX ADMIN — APIXABAN 10 MG: 5 TABLET, FILM COATED ORAL at 09:01

## 2025-03-27 RX ADMIN — SODIUM CHLORIDE 75 ML/HR: 0.9 INJECTION, SOLUTION INTRAVENOUS at 09:23

## 2025-03-27 RX ADMIN — PANTOPRAZOLE SODIUM 40 MG: 40 INJECTION, POWDER, FOR SOLUTION INTRAVENOUS at 09:01

## 2025-03-27 RX ADMIN — APIXABAN 10 MG: 5 TABLET, FILM COATED ORAL at 20:03

## 2025-03-27 RX ADMIN — ONDANSETRON 4 MG: 2 INJECTION, SOLUTION INTRAMUSCULAR; INTRAVENOUS at 20:12

## 2025-03-27 RX ADMIN — OXYCODONE HYDROCHLORIDE 5 MG: 5 TABLET ORAL at 04:00

## 2025-03-27 RX ADMIN — ACETAMINOPHEN 650 MG: 160 SOLUTION ORAL at 19:55

## 2025-03-27 RX ADMIN — PANTOPRAZOLE SODIUM 40 MG: 40 INJECTION, POWDER, FOR SOLUTION INTRAVENOUS at 20:12

## 2025-03-27 RX ADMIN — ACETAMINOPHEN 650 MG: 160 SOLUTION ORAL at 04:14

## 2025-03-27 RX ADMIN — SENNOSIDES 17.2 MG: 8.6 TABLET ORAL at 20:05

## 2025-03-27 RX ADMIN — OXYCODONE HYDROCHLORIDE 5 MG: 5 TABLET ORAL at 19:55

## 2025-03-27 RX ADMIN — CARVEDILOL 6.25 MG: 6.25 TABLET, FILM COATED ORAL at 20:03

## 2025-03-27 RX ADMIN — ANASTROZOLE 1 MG: 1 TABLET, COATED ORAL at 09:01

## 2025-03-27 RX ADMIN — SENNOSIDES 17.2 MG: 8.6 TABLET ORAL at 09:01

## 2025-03-27 ASSESSMENT — PAIN SCALES - GENERAL
PAINLEVEL_OUTOF10: 9
PAINLEVEL_OUTOF10: 8
PAINLEVEL_OUTOF10: 0 - NO PAIN

## 2025-03-27 ASSESSMENT — COGNITIVE AND FUNCTIONAL STATUS - GENERAL
EATING MEALS: A LITTLE
MOVING TO AND FROM BED TO CHAIR: A LOT
MOVING FROM LYING ON BACK TO SITTING ON SIDE OF FLAT BED WITH BEDRAILS: A LITTLE
STANDING UP FROM CHAIR USING ARMS: A LOT
PERSONAL GROOMING: A LITTLE
DRESSING REGULAR LOWER BODY CLOTHING: A LOT
WALKING IN HOSPITAL ROOM: A LOT
DRESSING REGULAR UPPER BODY CLOTHING: A LITTLE
CLIMB 3 TO 5 STEPS WITH RAILING: A LOT
DAILY ACTIVITIY SCORE: 15
TOILETING: A LOT
MOBILITY SCORE: 14
HELP NEEDED FOR BATHING: A LOT
TURNING FROM BACK TO SIDE WHILE IN FLAT BAD: A LITTLE

## 2025-03-27 ASSESSMENT — PAIN DESCRIPTION - LOCATION
LOCATION: LEG
LOCATION: LEG

## 2025-03-27 ASSESSMENT — PAIN DESCRIPTION - ORIENTATION
ORIENTATION: RIGHT
ORIENTATION: RIGHT;LEFT

## 2025-03-27 ASSESSMENT — PAIN - FUNCTIONAL ASSESSMENT
PAIN_FUNCTIONAL_ASSESSMENT: 0-10

## 2025-03-27 NOTE — PROGRESS NOTES
03/27/25 1224   Discharge Planning   Who is requesting discharge planning? Provider   Home or Post Acute Services Post acute facilities (Rehab/SNF/etc)   Type of Post Acute Facility Services Skilled nursing   Expected Discharge Disposition SNF   Does the patient need discharge transport arranged? Yes   RoundTrip coordination needed? Yes   Has discharge transport been arranged? No   Intensity of Service   Intensity of Service 0-30 min     3/27/25 1224  Auth obtained for [facility].  Dr Nelson notified.  Waiting on medical clearance.  Jaleesa Mahajan RN [facility]    3/27/25 1333  Per Dr Nicole, plan on discharge tomorrow to [facility].  Jaleesa Mahajan RN [facility]

## 2025-03-27 NOTE — CARE PLAN
The patient's goals for the shift include Pt. will have a safe, restful and uneventful evening    The clinical goals for the shift include patient pain to be controlled for shift      Problem: Fall/Injury  Goal: Verbalize understanding of personal risk factors for fall in the hospital  Outcome: Progressing  Goal: Verbalize understanding of risk factor reduction measures to prevent injury from fall in the home  Outcome: Progressing  Goal: Use assistive devices by end of the shift  Outcome: Progressing     Problem: Skin  Goal: Decreased wound size/increased tissue granulation at next dressing change  Outcome: Progressing  Goal: Participates in plan/prevention/treatment measures  Outcome: Progressing  Goal: Prevent/manage excess moisture  Outcome: Progressing  Goal: Prevent/minimize sheer/friction injuries  3/27/2025 0216 by Alex Duff LPN  Flowsheets (Taken 3/27/2025 0216)  Prevent/minimize sheer/friction injuries:   HOB 30 degrees or less   Turn/reposition every 2 hours/use positioning/transfer devices   Use pull sheet  3/27/2025 0215 by Alex Duff LPN  Outcome: Progressing  Goal: Promote/optimize nutrition  Outcome: Progressing  Goal: Promote skin healing  Outcome: Progressing     Problem: Pain  Goal: Takes deep breaths with improved pain control throughout the shift  Outcome: Progressing  Goal: Turns in bed with improved pain control throughout the shift  Outcome: Progressing  Goal: Walks with improved pain control throughout the shift  Outcome: Progressing  Goal: Performs ADL's with improved pain control throughout shift  Outcome: Progressing  Goal: Participates in PT with improved pain control throughout the shift  Outcome: Progressing  Goal: Free from opioid side effects throughout the shift  Outcome: Progressing  Goal: Free from acute confusion related to pain meds throughout the shift  Outcome: Progressing     Problem: Discharge Planning  Goal: Discharge to home or other  facility with appropriate resources  Outcome: Progressing     Problem: Chronic Conditions and Co-morbidities  Goal: Patient's chronic conditions and co-morbidity symptoms are monitored and maintained or improved  Outcome: Progressing     Problem: Nutrition  Goal: Nutrient intake appropriate for maintaining nutritional needs  Outcome: Progressing     Problem: Safety - Medical Restraint  Goal: Remains free of injury from restraints (Restraint for Interference with Medical Device)  Outcome: Progressing  Goal: Free from restraint(s) (Restraint for Interference with Medical Device)  Outcome: Progressing

## 2025-03-27 NOTE — PROGRESS NOTES
"Speech-Language Pathology    Speech-Language Pathology  Adult Inpatient Clinical Bedside Swallow Evaluation    Patient Name: Elizabeth Buckner  MRN: [id number]  Today's Date: 03/27/25   Time Calculation  Start Time: 1445  Stop Time: 1506  Time Calculation (min): 21 min        History of Present Illness: \"84 y.o. female with stage IV breast cancer s/p bilateral mastectomy and lymph node dissection presenting with confusion and RLE edema.  Pt is accompanied by her  in the ED, however neither can provide a detailed history.   Ms Buckner states she is here for \"A problem with my legs and arms\".   When asked specifically about her RLE edema, she thinks it has been present for 6 months.   She is very slow to respond.   She denies CP, SOB, HA, cough, N/V/D.   Recently treated by Dr Espitia for a UTI.      According to the ED, she is here for confusion, however, pt is oriented x 3, and her  denies any recent confusion. \"      Assessment:   Clinical bedside swallow evaluation completed. Patient seated upright in bed prior to presentation of po trials to assess swallowing tolerance. Family members at bedside. Vocal quality clear with no dysarthria and/or OM weakness identified. Velar movement appeared adequate during phonation. Patient denies difficulty with deglutition at baseline. Reduced oral intake noted since admission. Patient displaying adequate tolerance of thin liquids x3 oz continuously via straw, puree, and solid boluses without overt s/s aspiration. Mastication of solids prolonged 2/2 incomplete dentition, yet adequate oral clearance achieved. Patient reports consuming solids with upper dentures, which are not present. Patient agreeable to modification of diet to allow for greater ease of mastication. Encouragement required to increase oral intake. Question of nutritional supplements would be beneficial if po intake remains low. No further SLP services required at this time.    Recommendations:  -SOFT & BITE SIZED " diet (IDDSI Level 6)  -THIN liquids (IDDSI Level 0)  -Upright at 90 degrees for all po intake    *NO FURTHER SLP SERVICES REQUIRED AT THIS TIME.         Plan:  SLP Services Indicated: No    SLP - OK to Discharge? : Yes    Pain:   Patient exhibits no s/s of pain or discomfort during exam.       Inpatient Education:  Extensive education provided to patient and family members present  regarding current swallow function, recommendations/results, and POC.      Consultations/Referrals/Coordination of Services:   N/A

## 2025-03-27 NOTE — PROGRESS NOTES
Elizabeth Buckner is a 84 y.o. female on day 16 of admission presenting with General weakness.      Subjective   Pt seen and examined.        Objective     Last Recorded Vitals  BP 96/59   Pulse 81   Temp 36.2 °C (97.1 °F)   Resp 18   Wt 72.5 kg (159 lb 13.3 oz)   SpO2 99%   Intake/Output last 3 Shifts:    Intake/Output Summary (Last 24 hours) at 3/27/2025 1113  Last data filed at 3/27/2025 0203  Gross per 24 hour   Intake 120 ml   Output 500 ml   Net -380 ml       Admission Weight  Weight: 73.9 kg (162 lb 14.7 oz) (03/16/25 0600)    Daily Weight  03/20/25 : 72.5 kg (159 lb 13.3 oz)      Physical Exam  HENT:      Mouth/Throat:      Mouth: Mucous membranes are moist.      Pharynx: Oropharynx is clear.   Cardiovascular:      Rate and Rhythm: Normal rate and regular rhythm.   Pulmonary:      Effort: Pulmonary effort is normal.   Abdominal:      General: There is distension.      Palpations: Abdomen is soft.      Tenderness: There is no abdominal tenderness.   Musculoskeletal:      Comments: RUE edema. No LUE Edema   Neurological:      Mental Status: She is alert.   Relevant Results  Results for orders placed or performed during the hospital encounter of 03/10/25 (from the past 24 hours)   Electrocardiogram, 12-lead PRN ACS symptoms   Result Value Ref Range    Ventricular Rate 87 BPM    Atrial Rate 87 BPM    RI Interval 146 ms    QRS Duration 84 ms    QT Interval 390 ms    QTC Calculation(Bazett) 469 ms    P Axis 13 degrees    R Axis -14 degrees    T Axis 40 degrees    QRS Count 14 beats    Q Onset 221 ms    P Onset 148 ms    P Offset 200 ms    T Offset 416 ms    QTC Fredericia 441 ms   CBC   Result Value Ref Range    WBC 10.6 4.4 - 11.3 x10*3/uL    nRBC 0.0 0.0 - 0.0 /100 WBCs    RBC 3.05 (L) 4.00 - 5.20 x10*6/uL    Hemoglobin 8.7 (L) 12.0 - 16.0 g/dL    Hematocrit 26.2 (L) 36.0 - 46.0 %    MCV 86 80 - 100 fL    MCH 28.5 26.0 - 34.0 pg    MCHC 33.2 32.0 - 36.0 g/dL    RDW 15.0 (H) 11.5 - 14.5 %    Platelets 399 150 - 450  x10*3/uL   Basic Metabolic Panel   Result Value Ref Range    Glucose 96 74 - 99 mg/dL    Sodium 130 (L) 136 - 145 mmol/L    Potassium 3.9 3.5 - 5.3 mmol/L    Chloride 100 98 - 107 mmol/L    Bicarbonate 19 (L) 21 - 32 mmol/L    Anion Gap 15 10 - 20 mmol/L    Urea Nitrogen 62 (H) 6 - 23 mg/dL    Creatinine 1.60 (H) 0.50 - 1.05 mg/dL    eGFR 31 (L) >60 mL/min/1.73m*2    Calcium 6.6 (L) 8.6 - 10.3 mg/dL   SST TOP   Result Value Ref Range    Extra Tube Hold for add-ons.      *Note: Due to a large number of results and/or encounters for the requested time period, some results have not been displayed. A complete set of results can be found in Results Review.        CT abdomen pelvis wo IV contrast   Final Result   Limited examination secondary to lack of intravenous contrast,   patient motion and inferior position of the patient's arms.        There is redemonstration of abdominal and pelvic lymphadenopathy with   largest conglomerate lymph node/mass measuring 7.3 cm x 3.3 cm in the   right lateral pelvis. There is evidence of interval decrease in size   of multiple of the enlarged lymph nodes in comparison to the prior   examination. 2.1 cm mass in the right hepatic lobe also appears   decreased in size. Additional hepatic masses are not well assessed on   the current examination.        Gaseous distention of the colon and fluid in the descending colon;   please correlate for diarrhea/colitis. Underdistention versus wall   thickening of the sigmoid colon which may be infectious or   inflammatory in etiology.        Asymmetric dilatation of right common femoral vein and right lower   extremity edema; patient has known deep vein thrombosis.        MACRO:   None        Signed by: Mitchell Hay 3/23/2025 10:51 PM   Dictation workstation:   [id number]      Vascular US upper extremity venous duplex right   Final Result      IR intervention filter placement   Final Result   1. Uncomplicated and technically successful placement  of a   retrievable ALN inferior vena cava filter in an infrarenal IVC   location. The IVC filter may be retrieved as clinically indicated.   Optimal recovery period is less than 90 days post-placement. However,   longer periods have been reported and are possible.   2. Patent inferior vena cava and common iliac vein reflux without   evidence of thrombus, stenosis, occlusion, or anatomic variation.   3. Please contact interventional radiology if filtration is no longer   indicated.        Performed and dictated at [facility].        MACRO:   None.        Signed by: Carroll Linda 3/20/2025 10:06 AM   Dictation workstation:   [id number]      XR chest 1 view   Final Result   Tubes and lines have been removed.        Hypoventilatory exam with mild elevation of the right diaphragm. No   mass or pneumonia in either lung.        Mild cardiomegaly.        Interval placement of a clip in the proximal stomach.        MACRO:   None        Signed by: Carroll Corley 3/19/2025 3:00 PM   Dictation workstation:   [id number]      XR chest 1 view   Final Result   Addendum (preliminary) 1 of 1   Potentially critical findings are discussed with and acknowledged by   Yael Garcia RN at 7:38 PM Eastern time on 3/15/2025.   Signed by Shavonne Ye DO      Final   1.Endotracheal tube with the tip at or less than 1 cm above the   level of the fabrizio. Repositioning is recommended.  This could be   pulled back about 4 to 5 cm.   2.Enteric tube and right internal jugular line in place.   3.No pneumothorax.   4.Normal heart size with no radiographic signs of active   pulmonary parenchymal infiltration.  Known pleural and parenchymal   nodules worrisome for metastatic disease seen on CT are not well   visualized radiographically.   Signed by Shavonne Ye DO      MR brain w and wo IV contrast   Final Result   1. No acute infarct, hemorrhage or mass is noted. No abnormalities of   the 4th ventricle are  noted.        2. The cerebellar tonsils are low-lying consistent with mild Chiari 1   type malformation.        MACRO:   None        Signed by: Pete Rice 3/11/2025 12:21 PM   Dictation workstation:   [id number]      US abdomen complete   Final Result   Obscuration the spleen and right kidney by bowel gas. Previous   cholecystectomy.        Mildly small left kidney. The left kidney was otherwise   sonographically unremarkable.        There were 3 identifiable hypoechoic solid lesions in the liver.   These are nonspecific and could be atypical hemangiomas or metastatic   lesions. Recommend liver MRI in follow-up.        MACRO:   None        Signed by: Carroll Corley 3/11/2025 8:04 AM   Dictation workstation:   [id number]      Lower extremity venous duplex right   Final Result   1. Acute deep vein thrombosis at the right lower extremity from the   inguinal region to the popliteal region. The right posterior tibial   and peroneal veins were not visualized on this exam.             MACRO:   Noa Adamson discussed the significance and urgency of this   critical finding by telephone with  CYNTHIA HOWARD on 3/10/2025 at 11:24   pm.  (**-RCF-**) Findings:  See findings.             Signed by: Noa Adamson 3/10/2025 11:39 PM   Dictation workstation:   [id number]      CT head wo IV contrast   Final Result   Mass effect upon the 4th ventricle centrally. Further evaluation with   contrast-enhanced MRI is recommended for better assessment.        No evidence for acute cortical infarction or acute intracranial   hemorrhage is seen.        MACRO:   Critical Finding:  See findings. Notification was initiated on   3/10/2025 at 7:46 pm by  Esvin Philippe.  (**-YCF-**)        Signed by: Esvin Philippe 3/10/2025 7:46 PM   Dictation workstation:   [id number]      CT abdomen pelvis wo IV contrast   Final Result   1.  Extensive retroperitoneal and bilateral pelvic lymphadenopathy as   above concerning for lymphoma or metastatic  disease.   2. Suspect acute deep venous thrombosis within the right common   femoral vein and right external iliac vein. Correlation with   ultrasound findings recommended.   3. Numerous liver masses suggestive of metastatic liver disease.   Correlation with ultrasound findings can be performed as clinically   warranted.   4. Bilateral renal lesions, nonspecific in etiology and may represent   cysts but incompletely characterized in this unenhanced exam.   Correlation with ultrasound findings can be performed as clinically   warranted.   5. Air in the bladder which may be due to bladder instrumentation.   Correlation with urinalysis recommended.   6. Additional detailed findings as above.   Critical Finding:  See findings. Notification was initiated on   3/10/2025 at 7:54 pm by  Esvin Philippe.  (**-YCF-**) Instructions:        7.             Signed by: Esvin Philippe 3/10/2025 7:54 PM   Dictation workstation:   [id number]      XR chest 2 views   Final Result   No acute process.   Signed by Kamaljit Alex MD      Follow-Up Consult to Interventional Radiology    (Results Pending)       Scheduled medications  anastrozole, 1 mg, oral, Daily  apixaban, 10 mg, oral, BID   Followed by  [START ON 3/30/2025] apixaban, 5 mg, oral, BID  carvedilol, 6.25 mg, oral, BID  [Held by provider] gabapentin, 300 mg, oral, BID  [Held by provider] hydrALAZINE, 100 mg, oral, BID  pantoprazole, 40 mg, intravenous, BID  sennosides, 2 tablet, oral, BID      Continuous medications  sodium chloride 0.9%, 75 mL/hr, Last Rate: 75 mL/hr (03/27/25 0923)      PRN medications  PRN medications: acetaminophen **OR** acetaminophen **OR** acetaminophen, alteplase, dextrose, dextrose, glucagon, glucagon, melatonin, ondansetron ODT **OR** ondansetron, oxyCODONE, oxygen         Assessment/Plan                  Assessment & Plan  History of blood transfusion    PUD (peptic ulcer disease)    Hepatobiliary cancer (Multi)    GI bleed due to cratered ulcer in the  fundus of the stomach  - EGD on 3/17 showed Single 8 mm cratered ulcer in the fundus of the stomach with adherent clot (Gary IIB); placed MRI conditional clips; injected 7 mL of epinephrine to address bleeding; hemostasis achieved. The mucosa surrounding the clot was injected with epinephrine.  - EGD on 3/20 showed Single 7 mm x 8 mm cratered, benign-appearing ulcer in the fundus of the stomach with clean base (Gary III); performed cold forceps biopsy from ulcer edge to rule out malignancy; placed 3 clips successfully and 1 clip got dislodged after placement (clips are MRI conditional and through-the-scope); hemostasis achieved.   - H Pylori stool antigen ordered, awaiting RN to collect  - PPI bid for 3 months, then once daily; repeat EGD in 3 months     ABLA  Hypotension 2/2 acute hemorrhagic shock   - due to UGIB; s/p 4 units PRBCs and IVF resuscitation. Initially was refractory and needed vasopressor support. Now resolved, no longer needing vasopressor support; transferred out of ICU on 3/17     Acute RLE DVT  - LE Duplex with acute deep vein thrombosis at the right lower extremity from the inguinal region to the popliteal region  - Unable to anticoagulate due to severe bleeding from ulcer; IR consulted s/p IVC filter placement on 3/20  - will need OP follow up with vascular medicine     Acute RUE DVT  - likely provoked from recent CVC  - New onset RUE edema noted on 3/21. US showed cute non-occlusive deep vein thrombosis visualized in the right internal jugular vein. Discussed with GI, ok to challenge with heparin gtt. Hgb stable, no melena or BRBPR. Hgb 6.7, repeat 7.9. Transitioned from heparin gtt to OAC but Hgb labile, continue to monitor.  -transitioned to eliquis     Leukocytosis  - UCX negative. Blood culture negative. CXR without PNA. CT AP with concern for metastatic disease  - Persists despite 7d course of empiric abx --> suspect due to malignancy/stress dose steroids  - fever on 3/19 but no  clinical signs or symptoms of infection. COVID/Flu negative. CXR unrevealing. No recurrence of fever. Monitor wbc/fever curve as work up thus far unrevealing.      PAF, new onset  - suspect triggered by acute illness; continue bb.  - back on heparin gtt due to RUE DVT but if bleeds again will need to hold     HASMUKH on CKD2  - Baseline Cr 1-1.2; Cr peaked at 1.6, now improved to 1.3     HTN  - continue coreg; hydralazine, lasix held due to shock and BP now normal, will restart if BP rises     Stage IV breast cancer   - history of right breast carcinoma first diagnosed in 1991 status post breast conserving surgery, radiation and tamoxifen who then went on to have a recurrence in 2012 and had complete mastectomy. She then had breast cancer in her left breast and had a left mastectomy followed by radiation and trastuzumab. Finally she had another recurrence in November 2021 and was on anastrozole and palbociclib.   - a large disease burden in her abdomen including diffuse LAD and several liver masses suspicious for malignancy. She was due for a PET scan after an 11/20/2024 CT showed possible progression in her pelvic lymph nodes however she did not follow-up. Needs OP follow up with oncology vs hospice     Recent UTI  - Urine culture 3/1 with E Coli; Repeat Ucx on 3/11 negative.      Malnutrition Diagnosis Status: New  Malnutrition Diagnosis: Severe malnutrition related to chronic disease or condition  Related to: pt with 8% weight decrease over past four months, suspect oral intake less than 75% of estimated energy requirements for greater than one month, visualized areas of depleted adipose tissues and skeletal tissue wasting  I agree with the dietitian's malnutrition diagnosis.     DVT Prophylaxis: Eliquis     Disposition: SNF placement         Yonas Nicole MD

## 2025-03-27 NOTE — CARE PLAN
The patient's goals for the shift include Pt. will have a safe, restful and uneventful evening    The clinical goals for the shift include patient pain to be controlled for shift      Problem: Fall/Injury  Goal: Verbalize understanding of personal risk factors for fall in the hospital  Outcome: Progressing  Goal: Verbalize understanding of risk factor reduction measures to prevent injury from fall in the home  Outcome: Progressing  Goal: Use assistive devices by end of the shift  Outcome: Progressing     Problem: Skin  Goal: Decreased wound size/increased tissue granulation at next dressing change  Outcome: Progressing  Goal: Participates in plan/prevention/treatment measures  Outcome: Progressing  Goal: Prevent/manage excess moisture  Outcome: Progressing  Goal: Prevent/minimize sheer/friction injuries  Outcome: Progressing  Goal: Promote/optimize nutrition  Outcome: Progressing  Goal: Promote skin healing  Outcome: Progressing     Problem: Pain  Goal: Takes deep breaths with improved pain control throughout the shift  Outcome: Progressing  Goal: Turns in bed with improved pain control throughout the shift  Outcome: Progressing  Goal: Walks with improved pain control throughout the shift  Outcome: Progressing  Goal: Performs ADL's with improved pain control throughout shift  Outcome: Progressing  Goal: Participates in PT with improved pain control throughout the shift  Outcome: Progressing  Goal: Free from opioid side effects throughout the shift  Outcome: Progressing  Goal: Free from acute confusion related to pain meds throughout the shift  Outcome: Progressing     Problem: Discharge Planning  Goal: Discharge to home or other facility with appropriate resources  Outcome: Progressing     Problem: Chronic Conditions and Co-morbidities  Goal: Patient's chronic conditions and co-morbidity symptoms are monitored and maintained or improved  Outcome: Progressing     Problem: Nutrition  Goal: Nutrient intake appropriate  for maintaining nutritional needs  Outcome: Progressing     Problem: Safety - Medical Restraint  Goal: Remains free of injury from restraints (Restraint for Interference with Medical Device)  Outcome: Progressing  Goal: Free from restraint(s) (Restraint for Interference with Medical Device)  Outcome: Progressing

## 2025-03-27 NOTE — CARE PLAN
The patient's goals for the shift include sitting up in the chair, increasing the PO intake    The clinical goals for the shift include patient pain to be controlled for shift    Over the shift, the patient is working on making  progress toward the following goals.

## 2025-03-28 ENCOUNTER — APPOINTMENT (OUTPATIENT)
Dept: RADIOLOGY | Facility: HOSPITAL | Age: 85
DRG: 299 | End: 2025-03-28
Payer: MEDICARE

## 2025-03-28 ENCOUNTER — APPOINTMENT (OUTPATIENT)
Dept: CARDIOLOGY | Facility: HOSPITAL | Age: 85
DRG: 299 | End: 2025-03-28
Payer: MEDICARE

## 2025-03-28 LAB
ANION GAP SERPL CALC-SCNC: 17 MMOL/L (ref 10–20)
BUN SERPL-MCNC: 69 MG/DL (ref 6–23)
CALCIUM SERPL-MCNC: 6.4 MG/DL (ref 8.6–10.3)
CHLORIDE SERPL-SCNC: 101 MMOL/L (ref 98–107)
CO2 SERPL-SCNC: 17 MMOL/L (ref 21–32)
CREAT SERPL-MCNC: 1.87 MG/DL (ref 0.5–1.05)
EGFRCR SERPLBLD CKD-EPI 2021: 26 ML/MIN/1.73M*2
ERYTHROCYTE [DISTWIDTH] IN BLOOD BY AUTOMATED COUNT: 15.6 % (ref 11.5–14.5)
GLUCOSE SERPL-MCNC: 101 MG/DL (ref 74–99)
HCT VFR BLD AUTO: 30.1 % (ref 36–46)
HGB BLD-MCNC: 9.3 G/DL (ref 12–16)
MCH RBC QN AUTO: 28.8 PG (ref 26–34)
MCHC RBC AUTO-ENTMCNC: 30.9 G/DL (ref 32–36)
MCV RBC AUTO: 93 FL (ref 80–100)
NRBC BLD-RTO: 0.1 /100 WBCS (ref 0–0)
PLATELET # BLD AUTO: 334 X10*3/UL (ref 150–450)
POTASSIUM SERPL-SCNC: 4.1 MMOL/L (ref 3.5–5.3)
RBC # BLD AUTO: 3.23 X10*6/UL (ref 4–5.2)
SODIUM SERPL-SCNC: 131 MMOL/L (ref 136–145)
WBC # BLD AUTO: 13.8 X10*3/UL (ref 4.4–11.3)

## 2025-03-28 PROCEDURE — 2500000001 HC RX 250 WO HCPCS SELF ADMINISTERED DRUGS (ALT 637 FOR MEDICARE OP): Performed by: HOSPITALIST

## 2025-03-28 PROCEDURE — 97535 SELF CARE MNGMENT TRAINING: CPT | Mod: GO

## 2025-03-28 PROCEDURE — 2500000004 HC RX 250 GENERAL PHARMACY W/ HCPCS (ALT 636 FOR OP/ED): Performed by: INTERNAL MEDICINE

## 2025-03-28 PROCEDURE — 74176 CT ABD & PELVIS W/O CONTRAST: CPT

## 2025-03-28 PROCEDURE — 74018 RADEX ABDOMEN 1 VIEW: CPT | Performed by: RADIOLOGY

## 2025-03-28 PROCEDURE — 93005 ELECTROCARDIOGRAM TRACING: CPT

## 2025-03-28 PROCEDURE — 2500000001 HC RX 250 WO HCPCS SELF ADMINISTERED DRUGS (ALT 637 FOR MEDICARE OP): Performed by: STUDENT IN AN ORGANIZED HEALTH CARE EDUCATION/TRAINING PROGRAM

## 2025-03-28 PROCEDURE — 36415 COLL VENOUS BLD VENIPUNCTURE: CPT | Performed by: INTERNAL MEDICINE

## 2025-03-28 PROCEDURE — 80048 BASIC METABOLIC PNL TOTAL CA: CPT | Performed by: INTERNAL MEDICINE

## 2025-03-28 PROCEDURE — 99233 SBSQ HOSP IP/OBS HIGH 50: CPT | Performed by: INTERNAL MEDICINE

## 2025-03-28 PROCEDURE — 2500000004 HC RX 250 GENERAL PHARMACY W/ HCPCS (ALT 636 FOR OP/ED): Performed by: STUDENT IN AN ORGANIZED HEALTH CARE EDUCATION/TRAINING PROGRAM

## 2025-03-28 PROCEDURE — 1200000002 HC GENERAL ROOM WITH TELEMETRY DAILY

## 2025-03-28 PROCEDURE — 74176 CT ABD & PELVIS W/O CONTRAST: CPT | Performed by: RADIOLOGY

## 2025-03-28 PROCEDURE — 74018 RADEX ABDOMEN 1 VIEW: CPT

## 2025-03-28 PROCEDURE — 85027 COMPLETE CBC AUTOMATED: CPT | Performed by: INTERNAL MEDICINE

## 2025-03-28 PROCEDURE — 93010 ELECTROCARDIOGRAM REPORT: CPT | Performed by: INTERNAL MEDICINE

## 2025-03-28 RX ORDER — GABAPENTIN 300 MG/1
300 CAPSULE ORAL 2 TIMES DAILY
Start: 2025-03-28 | End: 2025-03-31 | Stop reason: HOSPADM

## 2025-03-28 RX ORDER — BISACODYL 10 MG/1
10 SUPPOSITORY RECTAL DAILY
Status: DISCONTINUED | OUTPATIENT
Start: 2025-03-28 | End: 2025-03-31 | Stop reason: HOSPADM

## 2025-03-28 RX ORDER — SENNOSIDES 8.6 MG/1
1 TABLET ORAL 2 TIMES DAILY
Status: DISCONTINUED | OUTPATIENT
Start: 2025-03-28 | End: 2025-03-28

## 2025-03-28 RX ORDER — PANTOPRAZOLE SODIUM 40 MG/1
40 TABLET, DELAYED RELEASE ORAL 2 TIMES DAILY
Qty: 60 TABLET | Refills: 2 | Status: SHIPPED | OUTPATIENT
Start: 2025-03-28 | End: 2025-03-31 | Stop reason: HOSPADM

## 2025-03-28 RX ORDER — SODIUM CHLORIDE 9 MG/ML
100 INJECTION, SOLUTION INTRAVENOUS CONTINUOUS
Status: ACTIVE | OUTPATIENT
Start: 2025-03-28 | End: 2025-03-29

## 2025-03-28 RX ADMIN — BISACODYL 10 MG: 10 SUPPOSITORY RECTAL at 11:36

## 2025-03-28 RX ADMIN — APIXABAN 10 MG: 5 TABLET, FILM COATED ORAL at 11:36

## 2025-03-28 RX ADMIN — SODIUM CHLORIDE 100 ML/HR: 0.9 INJECTION, SOLUTION INTRAVENOUS at 16:00

## 2025-03-28 RX ADMIN — ONDANSETRON 4 MG: 2 INJECTION, SOLUTION INTRAMUSCULAR; INTRAVENOUS at 12:04

## 2025-03-28 RX ADMIN — SENNOSIDES 17.2 MG: 8.6 TABLET ORAL at 11:36

## 2025-03-28 RX ADMIN — CARVEDILOL 6.25 MG: 6.25 TABLET, FILM COATED ORAL at 11:36

## 2025-03-28 RX ADMIN — PANTOPRAZOLE SODIUM 40 MG: 40 INJECTION, POWDER, FOR SOLUTION INTRAVENOUS at 23:11

## 2025-03-28 RX ADMIN — ANASTROZOLE 1 MG: 1 TABLET, COATED ORAL at 12:07

## 2025-03-28 RX ADMIN — PANTOPRAZOLE SODIUM 40 MG: 40 INJECTION, POWDER, FOR SOLUTION INTRAVENOUS at 11:37

## 2025-03-28 ASSESSMENT — COGNITIVE AND FUNCTIONAL STATUS - GENERAL
TURNING FROM BACK TO SIDE WHILE IN FLAT BAD: A LITTLE
HELP NEEDED FOR BATHING: A LOT
TOILETING: A LOT
MOVING FROM LYING ON BACK TO SITTING ON SIDE OF FLAT BED WITH BEDRAILS: A LITTLE
DRESSING REGULAR LOWER BODY CLOTHING: A LOT
PERSONAL GROOMING: A LITTLE
HELP NEEDED FOR BATHING: A LOT
DAILY ACTIVITIY SCORE: 15
DAILY ACTIVITIY SCORE: 15
HELP NEEDED FOR BATHING: A LOT
WALKING IN HOSPITAL ROOM: A LOT
DAILY ACTIVITIY SCORE: 14
DRESSING REGULAR LOWER BODY CLOTHING: A LOT
MOVING FROM LYING ON BACK TO SITTING ON SIDE OF FLAT BED WITH BEDRAILS: A LITTLE
DRESSING REGULAR UPPER BODY CLOTHING: A LITTLE
TOILETING: A LOT
PERSONAL GROOMING: A LITTLE
WALKING IN HOSPITAL ROOM: TOTAL
TOILETING: TOTAL
EATING MEALS: A LITTLE
MOBILITY SCORE: 13
PERSONAL GROOMING: A LITTLE
EATING MEALS: A LITTLE
MOVING TO AND FROM BED TO CHAIR: A LOT
TURNING FROM BACK TO SIDE WHILE IN FLAT BAD: A LITTLE
CLIMB 3 TO 5 STEPS WITH RAILING: TOTAL
DRESSING REGULAR UPPER BODY CLOTHING: A LITTLE
TURNING FROM BACK TO SIDE WHILE IN FLAT BAD: A LITTLE
TOILETING: A LOT
STANDING UP FROM CHAIR USING ARMS: A LOT
EATING MEALS: A LITTLE
MOBILITY SCORE: 12
DRESSING REGULAR LOWER BODY CLOTHING: A LOT
STANDING UP FROM CHAIR USING ARMS: A LOT
STANDING UP FROM CHAIR USING ARMS: A LOT
EATING MEALS: A LITTLE
MOVING FROM LYING ON BACK TO SITTING ON SIDE OF FLAT BED WITH BEDRAILS: A LITTLE
HELP NEEDED FOR BATHING: A LOT
PERSONAL GROOMING: A LITTLE
MOBILITY SCORE: 13
CLIMB 3 TO 5 STEPS WITH RAILING: TOTAL
DAILY ACTIVITIY SCORE: 15
MOVING TO AND FROM BED TO CHAIR: A LOT
DRESSING REGULAR LOWER BODY CLOTHING: A LOT
CLIMB 3 TO 5 STEPS WITH RAILING: TOTAL
WALKING IN HOSPITAL ROOM: A LOT
DRESSING REGULAR UPPER BODY CLOTHING: A LITTLE
DRESSING REGULAR UPPER BODY CLOTHING: A LITTLE
MOVING TO AND FROM BED TO CHAIR: A LOT

## 2025-03-28 ASSESSMENT — ACTIVITIES OF DAILY LIVING (ADL)
HOME_MANAGEMENT_TIME_ENTRY: 30
EFFECT OF PAIN ON DAILY ACTIVITIES: OT TO TOLERANCE

## 2025-03-28 ASSESSMENT — PAIN SCALES - GENERAL
PAINLEVEL_OUTOF10: 0 - NO PAIN
PAINLEVEL_OUTOF10: 8

## 2025-03-28 ASSESSMENT — PAIN DESCRIPTION - LOCATION: LOCATION: LEG

## 2025-03-28 ASSESSMENT — PAIN DESCRIPTION - ORIENTATION: ORIENTATION: RIGHT

## 2025-03-28 ASSESSMENT — PAIN - FUNCTIONAL ASSESSMENT: PAIN_FUNCTIONAL_ASSESSMENT: 0-10

## 2025-03-28 NOTE — PROGRESS NOTES
Elizabeth Buckner is a 84 y.o. female on day 17 of admission presenting with General weakness.      Subjective   Pt seen and examined.        Objective     Last Recorded Vitals  BP 98/58 (BP Location: Left arm, Patient Position: Lying)   Pulse 87   Temp 37.2 °C (99 °F) (Temporal)   Resp 18   Wt 72.5 kg (159 lb 13.3 oz)   SpO2 98%   Intake/Output last 3 Shifts:    Intake/Output Summary (Last 24 hours) at 3/28/2025 1420  Last data filed at 3/28/2025 0900  Gross per 24 hour   Intake 240 ml   Output 400 ml   Net -160 ml       Admission Weight  Weight: 73.9 kg (162 lb 14.7 oz) (03/16/25 0600)    Daily Weight  03/20/25 : 72.5 kg (159 lb 13.3 oz)      Physical Exam  HENT:      Mouth/Throat:      Mouth: Mucous membranes are moist.      Pharynx: Oropharynx is clear.   Cardiovascular:      Rate and Rhythm: Normal rate and regular rhythm.   Pulmonary:      Effort: Pulmonary effort is normal.   Abdominal:      General: There is distension.      Palpations: Abdomen is soft.      Tenderness: There is no abdominal tenderness.   Musculoskeletal:      Comments: RUE edema. No LUE Edema   Neurological:      Mental Status: She is alert.   Relevant Results  Results for orders placed or performed during the hospital encounter of 03/10/25 (from the past 24 hours)   CBC   Result Value Ref Range    WBC 13.8 (H) 4.4 - 11.3 x10*3/uL    nRBC 0.1 (H) 0.0 - 0.0 /100 WBCs    RBC 3.23 (L) 4.00 - 5.20 x10*6/uL    Hemoglobin 9.3 (L) 12.0 - 16.0 g/dL    Hematocrit 30.1 (L) 36.0 - 46.0 %    MCV 93 80 - 100 fL    MCH 28.8 26.0 - 34.0 pg    MCHC 30.9 (L) 32.0 - 36.0 g/dL    RDW 15.6 (H) 11.5 - 14.5 %    Platelets 334 150 - 450 x10*3/uL   Basic Metabolic Panel   Result Value Ref Range    Glucose 101 (H) 74 - 99 mg/dL    Sodium 131 (L) 136 - 145 mmol/L    Potassium 4.1 3.5 - 5.3 mmol/L    Chloride 101 98 - 107 mmol/L    Bicarbonate 17 (L) 21 - 32 mmol/L    Anion Gap 17 10 - 20 mmol/L    Urea Nitrogen 69 (H) 6 - 23 mg/dL    Creatinine 1.87 (H) 0.50 - 1.05  mg/dL    eGFR 26 (L) >60 mL/min/1.73m*2    Calcium 6.4 (L) 8.6 - 10.3 mg/dL     *Note: Due to a large number of results and/or encounters for the requested time period, some results have not been displayed. A complete set of results can be found in Results Review.        XR abdomen 1 view   Final Result   Gaseous distention of colon corresponding to findings seen on prior   CT abdomen pelvis 03/23/2025.        MACRO:   None.        Signed by: Evan Finkelstein 3/28/2025 6:31 AM   Dictation workstation:   [id number]      CT abdomen pelvis wo IV contrast   Final Result   Limited examination secondary to lack of intravenous contrast,   patient motion and inferior position of the patient's arms.        There is redemonstration of abdominal and pelvic lymphadenopathy with   largest conglomerate lymph node/mass measuring 7.3 cm x 3.3 cm in the   right lateral pelvis. There is evidence of interval decrease in size   of multiple of the enlarged lymph nodes in comparison to the prior   examination. 2.1 cm mass in the right hepatic lobe also appears   decreased in size. Additional hepatic masses are not well assessed on   the current examination.        Gaseous distention of the colon and fluid in the descending colon;   please correlate for diarrhea/colitis. Underdistention versus wall   thickening of the sigmoid colon which may be infectious or   inflammatory in etiology.        Asymmetric dilatation of right common femoral vein and right lower   extremity edema; patient has known deep vein thrombosis.        MACRO:   None        Signed by: Mitchell Hay 3/23/2025 10:51 PM   Dictation workstation:   [id number]      Vascular US upper extremity venous duplex right   Final Result      IR intervention filter placement   Final Result   1. Uncomplicated and technically successful placement of a   retrievable ALN inferior vena cava filter in an infrarenal IVC   location. The IVC filter may be retrieved as clinically indicated.    Optimal recovery period is less than 90 days post-placement. However,   longer periods have been reported and are possible.   2. Patent inferior vena cava and common iliac vein reflux without   evidence of thrombus, stenosis, occlusion, or anatomic variation.   3. Please contact interventional radiology if filtration is no longer   indicated.        Performed and dictated at [facility].        MACRO:   None.        Signed by: Carroll Linda 3/20/2025 10:06 AM   Dictation workstation:   [id number]      XR chest 1 view   Final Result   Tubes and lines have been removed.        Hypoventilatory exam with mild elevation of the right diaphragm. No   mass or pneumonia in either lung.        Mild cardiomegaly.        Interval placement of a clip in the proximal stomach.        MACRO:   None        Signed by: Carroll Corley 3/19/2025 3:00 PM   Dictation workstation:   [id number]      XR chest 1 view   Final Result   Addendum (preliminary) 1 of 1   Potentially critical findings are discussed with and acknowledged by   Yael Garcia RN at 7:38 PM Eastern time on 3/15/2025.   Signed by Shavonne Ye DO      Final   1.Endotracheal tube with the tip at or less than 1 cm above the   level of the fabrizio. Repositioning is recommended.  This could be   pulled back about 4 to 5 cm.   2.Enteric tube and right internal jugular line in place.   3.No pneumothorax.   4.Normal heart size with no radiographic signs of active   pulmonary parenchymal infiltration.  Known pleural and parenchymal   nodules worrisome for metastatic disease seen on CT are not well   visualized radiographically.   Signed by Shavonne Ye DO      MR brain w and wo IV contrast   Final Result   1. No acute infarct, hemorrhage or mass is noted. No abnormalities of   the 4th ventricle are noted.        2. The cerebellar tonsils are low-lying consistent with mild Chiari 1   type malformation.        MACRO:   None        Signed  by: Pete Rice 3/11/2025 12:21 PM   Dictation workstation:   [id number]      US abdomen complete   Final Result   Obscuration the spleen and right kidney by bowel gas. Previous   cholecystectomy.        Mildly small left kidney. The left kidney was otherwise   sonographically unremarkable.        There were 3 identifiable hypoechoic solid lesions in the liver.   These are nonspecific and could be atypical hemangiomas or metastatic   lesions. Recommend liver MRI in follow-up.        MACRO:   None        Signed by: Carroll Corley 3/11/2025 8:04 AM   Dictation workstation:   [id number]      Lower extremity venous duplex right   Final Result   1. Acute deep vein thrombosis at the right lower extremity from the   inguinal region to the popliteal region. The right posterior tibial   and peroneal veins were not visualized on this exam.             MACRO:   Noa Adamson discussed the significance and urgency of this   critical finding by telephone with  CYNTHIA HOWARD on 3/10/2025 at 11:24   pm.  (**-RCF-**) Findings:  See findings.             Signed by: Noa Adamson 3/10/2025 11:39 PM   Dictation workstation:   [id number]      CT head wo IV contrast   Final Result   Mass effect upon the 4th ventricle centrally. Further evaluation with   contrast-enhanced MRI is recommended for better assessment.        No evidence for acute cortical infarction or acute intracranial   hemorrhage is seen.        MACRO:   Critical Finding:  See findings. Notification was initiated on   3/10/2025 at 7:46 pm by  Esvin Philippe.  (**-YCF-**)        Signed by: Esvin Philippe 3/10/2025 7:46 PM   Dictation workstation:   [id number]      CT abdomen pelvis wo IV contrast   Final Result   1.  Extensive retroperitoneal and bilateral pelvic lymphadenopathy as   above concerning for lymphoma or metastatic disease.   2. Suspect acute deep venous thrombosis within the right common   femoral vein and right external iliac vein. Correlation with    ultrasound findings recommended.   3. Numerous liver masses suggestive of metastatic liver disease.   Correlation with ultrasound findings can be performed as clinically   warranted.   4. Bilateral renal lesions, nonspecific in etiology and may represent   cysts but incompletely characterized in this unenhanced exam.   Correlation with ultrasound findings can be performed as clinically   warranted.   5. Air in the bladder which may be due to bladder instrumentation.   Correlation with urinalysis recommended.   6. Additional detailed findings as above.   Critical Finding:  See findings. Notification was initiated on   3/10/2025 at 7:54 pm by  Esvin Philippe.  (**-YCF-**) Instructions:        7.             Signed by: Esvin Philippe 3/10/2025 7:54 PM   Dictation workstation:   [id number]      XR chest 2 views   Final Result   No acute process.   Signed by Kamaljit Alex MD      Follow-Up Consult to Interventional Radiology    (Results Pending)       Scheduled medications  anastrozole, 1 mg, oral, Daily  apixaban, 10 mg, oral, BID   Followed by  [START ON 3/30/2025] apixaban, 5 mg, oral, BID  bisacodyl, 10 mg, rectal, Daily  carvedilol, 6.25 mg, oral, BID  [Held by provider] gabapentin, 300 mg, oral, BID  [Held by provider] hydrALAZINE, 100 mg, oral, BID  pantoprazole, 40 mg, intravenous, BID  sennosides, 2 tablet, oral, BID      Continuous medications  sodium chloride 0.9%, 100 mL/hr      PRN medications  PRN medications: acetaminophen **OR** acetaminophen **OR** acetaminophen, alteplase, dextrose, dextrose, glucagon, glucagon, melatonin, ondansetron ODT **OR** ondansetron, oxyCODONE, oxygen         Assessment/Plan                  Assessment & Plan  History of blood transfusion    PUD (peptic ulcer disease)    Hepatobiliary cancer (Multi)      HASMUKH  -Cr elevated  -IV fluids  -recheck in am    Abdominal distension  -KUB shows colonic gaseous distention  - patient did not have bowel movement for the past couple days  -  Dulcolax suppository given today  - will try soapsuds enema    GI bleed due to cratered ulcer in the fundus of the stomach  - EGD on 3/17 showed Single 8 mm cratered ulcer in the fundus of the stomach with adherent clot (Gary IIB); placed MRI conditional clips; injected 7 mL of epinephrine to address bleeding; hemostasis achieved. The mucosa surrounding the clot was injected with epinephrine.  - EGD on 3/20 showed Single 7 mm x 8 mm cratered, benign-appearing ulcer in the fundus of the stomach with clean base (Gary III); performed cold forceps biopsy from ulcer edge to rule out malignancy; placed 3 clips successfully and 1 clip got dislodged after placement (clips are MRI conditional and through-the-scope); hemostasis achieved.   - H Pylori stool antigen ordered, awaiting RN to collect  - PPI bid for 3 months, then once daily; repeat EGD in 3 months     ABLA  Hypotension 2/2 acute hemorrhagic shock   - due to UGIB; s/p 4 units PRBCs and IVF resuscitation. Initially was refractory and needed vasopressor support. Now resolved, no longer needing vasopressor support; transferred out of ICU on 3/17     Acute RLE DVT  - LE Duplex with acute deep vein thrombosis at the right lower extremity from the inguinal region to the popliteal region  - Unable to anticoagulate due to severe bleeding from ulcer; IR consulted s/p IVC filter placement on 3/20  - will need OP follow up with vascular medicine     Acute RUE DVT  - likely provoked from recent CVC  - New onset RUE edema noted on 3/21. US showed cute non-occlusive deep vein thrombosis visualized in the right internal jugular vein. Discussed with GI, ok to challenge with heparin gtt. Hgb stable, no melena or BRBPR. Hgb 6.7, repeat 7.9. Transitioned from heparin gtt to OAC but Hgb labile, continue to monitor.  -transitioned to eliquis     Leukocytosis  - UCX negative. Blood culture negative. CXR without PNA. CT AP with concern for metastatic disease  - Persists despite 7d  course of empiric abx --> suspect due to malignancy/stress dose steroids  - fever on 3/19 but no clinical signs or symptoms of infection. COVID/Flu negative. CXR unrevealing. No recurrence of fever. Monitor wbc/fever curve as work up thus far unrevealing.      PAF, new onset  - suspect triggered by acute illness; continue bb.  - back on heparin gtt due to RUE DVT but if bleeds again will need to hold     HASMUKH on CKD2  - Baseline Cr 1-1.2; Cr peaked at 1.6, now improved to 1.3     HTN  - continue coreg; hydralazine, lasix held due to shock and BP now normal, will restart if BP rises     Stage IV breast cancer   - history of right breast carcinoma first diagnosed in 1991 status post breast conserving surgery, radiation and tamoxifen who then went on to have a recurrence in 2012 and had complete mastectomy. She then had breast cancer in her left breast and had a left mastectomy followed by radiation and trastuzumab. Finally she had another recurrence in November 2021 and was on anastrozole and palbociclib.   - a large disease burden in her abdomen including diffuse LAD and several liver masses suspicious for malignancy. She was due for a PET scan after an 11/20/2024 CT showed possible progression in her pelvic lymph nodes however she did not follow-up. Needs OP follow up with oncology vs hospice     Recent UTI  - Urine culture 3/1 with E Coli; Repeat Ucx on 3/11 negative.      Malnutrition Diagnosis Status: New  Malnutrition Diagnosis: Severe malnutrition related to chronic disease or condition  Related to: pt with 8% weight decrease over past four months, suspect oral intake less than 75% of estimated energy requirements for greater than one month, visualized areas of depleted adipose tissues and skeletal tissue wasting  I agree with the dietitian's malnutrition diagnosis.     DVT Prophylaxis: Eliquis     Disposition: SNF placement         Yonas Nicole MD

## 2025-03-28 NOTE — PROGRESS NOTES
Physical Therapy                 Therapy Communication Note    Patient Name: Elizabeth Buckner  MRN: [id number]  Department: [facility]  Room: [address] [place] [address]  Today's Date: 3/28/2025     Discipline: Physical Therapy    PT Missed Visit: Yes     Missed Visit Reason: Missed Visit Reason:  (pt with OT at this time)    Missed Time: Attempt    Comment:

## 2025-03-28 NOTE — PROGRESS NOTES
03/28/25 1006   Discharge Planning   Who is requesting discharge planning? Provider   Home or Post Acute Services Post acute facilities (Rehab/SNF/etc)   Type of Post Acute Facility Services Skilled nursing   Expected Discharge Disposition SNF   Does the patient need discharge transport arranged? Yes   RoundTrip coordination needed? Yes   Has discharge transport been arranged? Yes   What day is the transport expected? 03/28/25   What time is the transport expected? 1330   Intensity of Service   Intensity of Service 0-30 min     3/28/25 1006  Patient cleared for discharge to [facility].  Auth obtained yesterday.  Transport time 1330.  Report number [phone].  Bedside nurse, charge nurse, and  notified.  SW to notify patient/family.  Jaleesa Mahajan RN TCC    3/28/25 1237  Per family, patient has not had a BM in four days.  Dr Niocle requests to push transport back.  1330  canceled.   rescheduled for 1700.  Facility updated.  Jaleesa AGUERO    3/28/25 1428  Discharge canceled due to worsening kidney function and constipation.  SNF notified.  Jaleesa Mahajan RN TCC

## 2025-03-28 NOTE — CARE PLAN
The patient's goals for the shift include Pt. will have a safe, restful and uneventful evening    The clinical goals for the shift include Patient to remain comfortable for shift      Problem: Fall/Injury  Goal: Verbalize understanding of personal risk factors for fall in the hospital  Outcome: Progressing  Goal: Verbalize understanding of risk factor reduction measures to prevent injury from fall in the home  Outcome: Progressing  Goal: Use assistive devices by end of the shift  Outcome: Progressing     Problem: Skin  Goal: Decreased wound size/increased tissue granulation at next dressing change  Outcome: Progressing  Goal: Participates in plan/prevention/treatment measures  Outcome: Progressing  Goal: Prevent/manage excess moisture  Outcome: Progressing  Goal: Prevent/minimize sheer/friction injuries  Outcome: Progressing  Flowsheets (Taken 3/28/2025 0456)  Prevent/minimize sheer/friction injuries:   Turn/reposition every 2 hours/use positioning/transfer devices   HOB 30 degrees or less   Use pull sheet  Goal: Promote/optimize nutrition  Outcome: Progressing  Goal: Promote skin healing  Outcome: Progressing     Problem: Pain  Goal: Takes deep breaths with improved pain control throughout the shift  Outcome: Progressing  Goal: Turns in bed with improved pain control throughout the shift  Outcome: Progressing  Goal: Walks with improved pain control throughout the shift  Outcome: Progressing  Goal: Performs ADL's with improved pain control throughout shift  Outcome: Progressing  Goal: Participates in PT with improved pain control throughout the shift  Outcome: Progressing  Goal: Free from opioid side effects throughout the shift  Outcome: Progressing  Goal: Free from acute confusion related to pain meds throughout the shift  Outcome: Progressing     Problem: Discharge Planning  Goal: Discharge to home or other facility with appropriate resources  Outcome: Progressing     Problem: Chronic Conditions and  Co-morbidities  Goal: Patient's chronic conditions and co-morbidity symptoms are monitored and maintained or improved  Outcome: Progressing     Problem: Nutrition  Goal: Nutrient intake appropriate for maintaining nutritional needs  Outcome: Progressing     Problem: Safety - Medical Restraint  Goal: Remains free of injury from restraints (Restraint for Interference with Medical Device)  Outcome: Progressing  Goal: Free from restraint(s) (Restraint for Interference with Medical Device)  Outcome: Progressing

## 2025-03-28 NOTE — PROGRESS NOTES
Occupational Therapy    OT Treatment    Patient Name: Elizabeth Buckner  MRN: [id number]  Department: [place] [address]  Room: [facility]  Today's Date: 3/28/2025  Time Calculation  Start Time: 0853  Stop Time: 0925  Time Calculation (min): 32 min        Assessment:  OT Assessment: Pt fatigued with dry heaving upon position change, pt able to complete transfers with increased time. Pt limited by fatigue and feeling nauseous, resolving with returning pt to supine.  Evaluation/Treatment Tolerance: Patient limited by fatigue  Medical Staff Made Aware: Yes  End of Session Communication: Bedside nurse  End of Session Patient Position: Alarm on, Bed, 3 rail up  Evaluation/Treatment Tolerance: Patient limited by fatigue  Medical Staff Made Aware: Yes  Plan:  Treatment Interventions: ADL retraining, Functional transfer training, UE strengthening/ROM, Endurance training, Compensatory technique education  OT Frequency: 3 times per week  OT Discharge Recommendations: Moderate intensity level of continued care  Equipment Recommended upon Discharge: Wheeled walker  OT Recommended Transfer Status: Minimal assist, Assist of 1  OT - OK to Discharge: Yes  Treatment Interventions: ADL retraining, Functional transfer training, UE strengthening/ROM, Endurance training, Compensatory technique education    Subjective   Previous Visit Info:  OT Last Visit  OT Received On: 03/28/25  General:  General  Reason for Referral: To ED with increased groin pain, unresolved UTI, and AMS. CT (+) for R LE DVT.  Referred By: Danita Barlow MD  Past Medical History Relevant to Rehab:   Past Medical History:   Diagnosis Date    Anxiety     Breast cancer (Multi)     CKD (chronic kidney disease)     Depression     DVT (deep venous thrombosis) (Multi) 03/10/2025    Right leg    Endometrial cancer (Multi)     GERD (gastroesophageal reflux disease)     HTN (hypertension)     Hypothyroidism      Family/Caregiver Present: Yes  Caregiver Feedback: family present at end of OT session  providing support to pt  Prior to Session Communication: Bedside nurse  Patient Position Received: Bed, 3 rail up, Alarm on  Preferred Learning Style: verbal, visual  General Comment: pt agreeable to OT  Precautions:  Medical Precautions: Fall precautions    Pain:  Pain Assessment  Pain Assessment: 0-10  0-10 (Numeric) Pain Score: 0 - No pain  Effect of Pain on Daily Activities: OT to tolerance    Objective    Cognition:  Cognition  Overall Cognitive Status: Impaired  Processing Speed: Delayed (pt requiring increased time and options provided for answers due to decreased cognition)     Activities of Daily Living: Toileting  Toileting Level of Assistance: Dependent  Where Assessed: Bed level  Toileting Comments: total A to clean BM in bed, pt assisting to wipe nora area     Bed Mobility/Transfers: Bed Mobility  Bed Mobility: Yes  Bed Mobility 1  Bed Mobility 1: Rolling right, Rolling left  Level of Assistance 1: Moderate assistance  Bed Mobility 2  Bed Mobility  2: Supine to sitting, Sitting to supine  Level of Assistance 2: Moderate assistance  Bed Mobility Comments 2: assist to bring B LE's off bed and sit up EOB, pt holding onto therapist to elevate, pt reported not feeling well requesting to return to supine    Outcome Measures:[facility] Daily Activity  Putting on and taking off regular lower body clothing: A lot  Bathing (including washing, rinsing, drying): A lot  Putting on and taking off regular upper body clothing: A little  Toileting, which includes using toilet, bedpan or urinal: Total  Taking care of personal grooming such as brushing teeth: A little  Eating Meals: A little  Daily Activity - Total Score: 14    Education Documentation  Body Mechanics, taught by Stephanie Ugalde OT at 3/28/2025 11:05 AM.  Learner: Family, Patient  Readiness: Acceptance  Method: Explanation, Demonstration  Response: Verbalizes Understanding, Needs Reinforcement    ADL Training, taught by Stephanie Ugalde OT at 3/28/2025 11:05  AM.  Learner: Family, Patient  Readiness: Acceptance  Method: Explanation, Demonstration  Response: Verbalizes Understanding, Needs Reinforcement    Education Comments  No comments found.           Goals:  Encounter Problems       Encounter Problems (Active)       ADLs       Patient will perform UB and LB bathing with set-up level of assistance. (Not Progressing)       Start:  03/17/25    Expected End:  03/31/25            Patient with complete upper body dressing with set-up level of assistance donning and doffing all UE clothes. (Not Progressing)       Start:  03/17/25    Expected End:  03/31/25            Patient with complete lower body dressing with set-up level of assistance donning and doffing all LE clothes. (Not Progressing)       Start:  03/17/25    Expected End:  03/31/25            Patient will complete daily grooming tasks brushing teeth and washing face/hair with modified independent level of assistance and PRN adaptive equipment. (Not Progressing)       Start:  03/17/25    Expected End:  03/31/25               MOBILITY       Patient will perform Functional mobility min Household distances/Community Distances with stand by assist level of assistance and least restrictive device in order to improve safety and functional mobility. (Not Progressing)       Start:  03/17/25    Expected End:  03/31/25               TRANSFERS       Patient will perform bed mobility stand by assist level of assistance in order to improve safety and independence with mobility (Not Progressing)       Start:  03/17/25    Expected End:  03/31/25

## 2025-03-29 LAB — LACTATE SERPL-SCNC: 0.9 MMOL/L (ref 0.4–2)

## 2025-03-29 PROCEDURE — 2500000004 HC RX 250 GENERAL PHARMACY W/ HCPCS (ALT 636 FOR OP/ED): Performed by: INTERNAL MEDICINE

## 2025-03-29 PROCEDURE — 2500000004 HC RX 250 GENERAL PHARMACY W/ HCPCS (ALT 636 FOR OP/ED): Performed by: STUDENT IN AN ORGANIZED HEALTH CARE EDUCATION/TRAINING PROGRAM

## 2025-03-29 PROCEDURE — 2500000001 HC RX 250 WO HCPCS SELF ADMINISTERED DRUGS (ALT 637 FOR MEDICARE OP): Performed by: STUDENT IN AN ORGANIZED HEALTH CARE EDUCATION/TRAINING PROGRAM

## 2025-03-29 PROCEDURE — 99233 SBSQ HOSP IP/OBS HIGH 50: CPT | Performed by: INTERNAL MEDICINE

## 2025-03-29 PROCEDURE — 2500000001 HC RX 250 WO HCPCS SELF ADMINISTERED DRUGS (ALT 637 FOR MEDICARE OP): Performed by: HOSPITALIST

## 2025-03-29 PROCEDURE — 83605 ASSAY OF LACTIC ACID: CPT | Performed by: HOSPITALIST

## 2025-03-29 PROCEDURE — 2500000005 HC RX 250 GENERAL PHARMACY W/O HCPCS: Performed by: STUDENT IN AN ORGANIZED HEALTH CARE EDUCATION/TRAINING PROGRAM

## 2025-03-29 PROCEDURE — 36415 COLL VENOUS BLD VENIPUNCTURE: CPT | Performed by: HOSPITALIST

## 2025-03-29 PROCEDURE — 1100000001 HC PRIVATE ROOM DAILY

## 2025-03-29 RX ADMIN — APIXABAN 10 MG: 5 TABLET, FILM COATED ORAL at 08:09

## 2025-03-29 RX ADMIN — SENNOSIDES 17.2 MG: 8.6 TABLET ORAL at 00:46

## 2025-03-29 RX ADMIN — SENNOSIDES 17.2 MG: 8.6 TABLET ORAL at 08:10

## 2025-03-29 RX ADMIN — BISACODYL 10 MG: 10 SUPPOSITORY RECTAL at 08:10

## 2025-03-29 RX ADMIN — APIXABAN 10 MG: 5 TABLET, FILM COATED ORAL at 00:42

## 2025-03-29 RX ADMIN — ACETAMINOPHEN 650 MG: 325 TABLET, FILM COATED ORAL at 20:38

## 2025-03-29 RX ADMIN — SENNOSIDES 17.2 MG: 8.6 TABLET ORAL at 20:38

## 2025-03-29 RX ADMIN — PANTOPRAZOLE SODIUM 40 MG: 40 INJECTION, POWDER, FOR SOLUTION INTRAVENOUS at 08:10

## 2025-03-29 RX ADMIN — ONDANSETRON 4 MG: 2 INJECTION, SOLUTION INTRAMUSCULAR; INTRAVENOUS at 20:47

## 2025-03-29 RX ADMIN — PANTOPRAZOLE SODIUM 40 MG: 40 INJECTION, POWDER, FOR SOLUTION INTRAVENOUS at 20:38

## 2025-03-29 RX ADMIN — APIXABAN 10 MG: 5 TABLET, FILM COATED ORAL at 20:38

## 2025-03-29 RX ADMIN — CARVEDILOL 6.25 MG: 6.25 TABLET, FILM COATED ORAL at 08:09

## 2025-03-29 RX ADMIN — Medication 3 MG: at 20:38

## 2025-03-29 RX ADMIN — ANASTROZOLE 1 MG: 1 TABLET, COATED ORAL at 08:11

## 2025-03-29 RX ADMIN — SODIUM CHLORIDE 500 ML: 9 INJECTION, SOLUTION INTRAVENOUS at 16:40

## 2025-03-29 ASSESSMENT — PAIN SCALES - GENERAL
PAINLEVEL_OUTOF10: 0 - NO PAIN
PAINLEVEL_OUTOF10: 3
PAINLEVEL_OUTOF10: 0 - NO PAIN

## 2025-03-29 ASSESSMENT — COGNITIVE AND FUNCTIONAL STATUS - GENERAL
DRESSING REGULAR LOWER BODY CLOTHING: A LOT
EATING MEALS: A LOT
DRESSING REGULAR UPPER BODY CLOTHING: A LITTLE
HELP NEEDED FOR BATHING: A LOT
MOBILITY SCORE: 12
MOVING FROM LYING ON BACK TO SITTING ON SIDE OF FLAT BED WITH BEDRAILS: A LITTLE
TOILETING: A LOT
TOILETING: A LOT
CLIMB 3 TO 5 STEPS WITH RAILING: TOTAL
WALKING IN HOSPITAL ROOM: TOTAL
TURNING FROM BACK TO SIDE WHILE IN FLAT BAD: A LITTLE
WALKING IN HOSPITAL ROOM: TOTAL
DAILY ACTIVITIY SCORE: 12
MOBILITY SCORE: 12
EATING MEALS: A LITTLE
DRESSING REGULAR UPPER BODY CLOTHING: A LOT
STANDING UP FROM CHAIR USING ARMS: A LOT
MOVING TO AND FROM BED TO CHAIR: A LOT
MOVING FROM LYING ON BACK TO SITTING ON SIDE OF FLAT BED WITH BEDRAILS: A LITTLE
PERSONAL GROOMING: A LITTLE
DAILY ACTIVITIY SCORE: 15
MOVING TO AND FROM BED TO CHAIR: A LOT
HELP NEEDED FOR BATHING: A LOT
PERSONAL GROOMING: A LOT
STANDING UP FROM CHAIR USING ARMS: A LOT
CLIMB 3 TO 5 STEPS WITH RAILING: TOTAL
DRESSING REGULAR LOWER BODY CLOTHING: A LOT
TURNING FROM BACK TO SIDE WHILE IN FLAT BAD: A LITTLE

## 2025-03-29 ASSESSMENT — PAIN DESCRIPTION - DESCRIPTORS: DESCRIPTORS: ACHING

## 2025-03-29 ASSESSMENT — PAIN - FUNCTIONAL ASSESSMENT
PAIN_FUNCTIONAL_ASSESSMENT: 0-10

## 2025-03-29 NOTE — CARE PLAN
Problem: Fall/Injury  Goal: Verbalize understanding of personal risk factors for fall in the hospital  Outcome: Progressing     Problem: Skin  Goal: Promote skin healing  Outcome: Progressing  Flowsheets (Taken 3/29/2025 1947)  Promote skin healing:   Assess skin/pad under line(s)/device(s)   Protective dressings over bony prominences   Turn/reposition every 2 hours/use positioning/transfer devices     Problem: Discharge Planning  Goal: Discharge to home or other facility with appropriate resources  Outcome: Progressing     Problem: Chronic Conditions and Co-morbidities  Goal: Patient's chronic conditions and co-morbidity symptoms are monitored and maintained or improved  Outcome: Progressing     Problem: Nutrition  Goal: Nutrient intake appropriate for maintaining nutritional needs  Outcome: Progressing

## 2025-03-29 NOTE — PROGRESS NOTES
Elizabeth Buckner is a 84 y.o. female on day 18 of admission presenting with General weakness.      Subjective   Pt seen and examined.        Objective     Last Recorded Vitals  BP 90/53   Pulse 81   Temp 36.5 °C (97.7 °F)   Resp (!) 29   Wt 72.5 kg (159 lb 13.3 oz)   SpO2 96%   Intake/Output last 3 Shifts:    Intake/Output Summary (Last 24 hours) at 3/29/2025 1031  Last data filed at 3/29/2025 0826  Gross per 24 hour   Intake 120 ml   Output 200 ml   Net -80 ml       Admission Weight  Weight: 73.9 kg (162 lb 14.7 oz) (03/16/25 0600)    Daily Weight  03/20/25 : 72.5 kg (159 lb 13.3 oz)      Physical Exam  HENT:      Mouth/Throat:      Mouth: Mucous membranes are moist.      Pharynx: Oropharynx is clear.   Cardiovascular:      Rate and Rhythm: Normal rate and regular rhythm.   Pulmonary:      Effort: Pulmonary effort is normal.   Abdominal:      General: There is distension.      Palpations: Abdomen is soft.      Tenderness: There is no abdominal tenderness.   Musculoskeletal:      Comments: RUE edema. No LUE Edema   Neurological:      Mental Status: She is alert.   Relevant Results  Results for orders placed or performed during the hospital encounter of 03/10/25 (from the past 24 hours)   CBC   Result Value Ref Range    WBC 13.8 (H) 4.4 - 11.3 x10*3/uL    nRBC 0.1 (H) 0.0 - 0.0 /100 WBCs    RBC 3.23 (L) 4.00 - 5.20 x10*6/uL    Hemoglobin 9.3 (L) 12.0 - 16.0 g/dL    Hematocrit 30.1 (L) 36.0 - 46.0 %    MCV 93 80 - 100 fL    MCH 28.8 26.0 - 34.0 pg    MCHC 30.9 (L) 32.0 - 36.0 g/dL    RDW 15.6 (H) 11.5 - 14.5 %    Platelets 334 150 - 450 x10*3/uL   Basic Metabolic Panel   Result Value Ref Range    Glucose 101 (H) 74 - 99 mg/dL    Sodium 131 (L) 136 - 145 mmol/L    Potassium 4.1 3.5 - 5.3 mmol/L    Chloride 101 98 - 107 mmol/L    Bicarbonate 17 (L) 21 - 32 mmol/L    Anion Gap 17 10 - 20 mmol/L    Urea Nitrogen 69 (H) 6 - 23 mg/dL    Creatinine 1.87 (H) 0.50 - 1.05 mg/dL    eGFR 26 (L) >60 mL/min/1.73m*2    Calcium 6.4 (L)  8.6 - 10.3 mg/dL   Lactate   Result Value Ref Range    Lactate 0.9 0.4 - 2.0 mmol/L     *Note: Due to a large number of results and/or encounters for the requested time period, some results have not been displayed. A complete set of results can be found in Results Review.        XR abdomen 1 view   Final Result   Gaseous distention of colon corresponding to findings seen on prior   CT abdomen pelvis 03/23/2025.        MACRO:   None.        Signed by: Evan Finkelstein 3/28/2025 6:31 AM   Dictation workstation:   [id number]      CT abdomen pelvis wo IV contrast   Final Result   Limited examination secondary to lack of intravenous contrast,   patient motion and inferior position of the patient's arms.        There is redemonstration of abdominal and pelvic lymphadenopathy with   largest conglomerate lymph node/mass measuring 7.3 cm x 3.3 cm in the   right lateral pelvis. There is evidence of interval decrease in size   of multiple of the enlarged lymph nodes in comparison to the prior   examination. 2.1 cm mass in the right hepatic lobe also appears   decreased in size. Additional hepatic masses are not well assessed on   the current examination.        Gaseous distention of the colon and fluid in the descending colon;   please correlate for diarrhea/colitis. Underdistention versus wall   thickening of the sigmoid colon which may be infectious or   inflammatory in etiology.        Asymmetric dilatation of right common femoral vein and right lower   extremity edema; patient has known deep vein thrombosis.        MACRO:   None        Signed by: Mitchell Hay 3/23/2025 10:51 PM   Dictation workstation:   [id number]      Vascular US upper extremity venous duplex right   Final Result      IR intervention filter placement   Final Result   1. Uncomplicated and technically successful placement of a   retrievable ALN inferior vena cava filter in an infrarenal IVC   location. The IVC filter may be retrieved as clinically  indicated.   Optimal recovery period is less than 90 days post-placement. However,   longer periods have been reported and are possible.   2. Patent inferior vena cava and common iliac vein reflux without   evidence of thrombus, stenosis, occlusion, or anatomic variation.   3. Please contact interventional radiology if filtration is no longer   indicated.        Performed and dictated at [facility].        MACRO:   None.        Signed by: Carroll Linda 3/20/2025 10:06 AM   Dictation workstation:   [id number]      XR chest 1 view   Final Result   Tubes and lines have been removed.        Hypoventilatory exam with mild elevation of the right diaphragm. No   mass or pneumonia in either lung.        Mild cardiomegaly.        Interval placement of a clip in the proximal stomach.        MACRO:   None        Signed by: Carroll Corley 3/19/2025 3:00 PM   Dictation workstation:   [id number]      XR chest 1 view   Final Result   Addendum (preliminary) 1 of 1   Potentially critical findings are discussed with and acknowledged by   Yael Garcia RN at 7:38 PM Eastern time on 3/15/2025.   Signed by Shavonne Ye DO      Final   1.Endotracheal tube with the tip at or less than 1 cm above the   level of the fabrizio. Repositioning is recommended.  This could be   pulled back about 4 to 5 cm.   2.Enteric tube and right internal jugular line in place.   3.No pneumothorax.   4.Normal heart size with no radiographic signs of active   pulmonary parenchymal infiltration.  Known pleural and parenchymal   nodules worrisome for metastatic disease seen on CT are not well   visualized radiographically.   Signed by Shavonne Ye DO      MR brain w and wo IV contrast   Final Result   1. No acute infarct, hemorrhage or mass is noted. No abnormalities of   the 4th ventricle are noted.        2. The cerebellar tonsils are low-lying consistent with mild Chiari 1   type malformation.        MACRO:   None         Signed by: Pete Riec 3/11/2025 12:21 PM   Dictation workstation:   [id number]      US abdomen complete   Final Result   Obscuration the spleen and right kidney by bowel gas. Previous   cholecystectomy.        Mildly small left kidney. The left kidney was otherwise   sonographically unremarkable.        There were 3 identifiable hypoechoic solid lesions in the liver.   These are nonspecific and could be atypical hemangiomas or metastatic   lesions. Recommend liver MRI in follow-up.        MACRO:   None        Signed by: Carroll Corley 3/11/2025 8:04 AM   Dictation workstation:   [id number]      Lower extremity venous duplex right   Final Result   1. Acute deep vein thrombosis at the right lower extremity from the   inguinal region to the popliteal region. The right posterior tibial   and peroneal veins were not visualized on this exam.             MACRO:   Noa Adamson discussed the significance and urgency of this   critical finding by telephone with  CYNTHIA HOWARD on 3/10/2025 at 11:24   pm.  (**-RCF-**) Findings:  See findings.             Signed by: Noa Adamson 3/10/2025 11:39 PM   Dictation workstation:   [id number]      CT head wo IV contrast   Final Result   Mass effect upon the 4th ventricle centrally. Further evaluation with   contrast-enhanced MRI is recommended for better assessment.        No evidence for acute cortical infarction or acute intracranial   hemorrhage is seen.        MACRO:   Critical Finding:  See findings. Notification was initiated on   3/10/2025 at 7:46 pm by  Esvin Philippe.  (**-YCF-**)        Signed by: Esvin Philippe 3/10/2025 7:46 PM   Dictation workstation:   [id number]      CT abdomen pelvis wo IV contrast   Final Result   1.  Extensive retroperitoneal and bilateral pelvic lymphadenopathy as   above concerning for lymphoma or metastatic disease.   2. Suspect acute deep venous thrombosis within the right common   femoral vein and right external iliac vein. Correlation  with   ultrasound findings recommended.   3. Numerous liver masses suggestive of metastatic liver disease.   Correlation with ultrasound findings can be performed as clinically   warranted.   4. Bilateral renal lesions, nonspecific in etiology and may represent   cysts but incompletely characterized in this unenhanced exam.   Correlation with ultrasound findings can be performed as clinically   warranted.   5. Air in the bladder which may be due to bladder instrumentation.   Correlation with urinalysis recommended.   6. Additional detailed findings as above.   Critical Finding:  See findings. Notification was initiated on   3/10/2025 at 7:54 pm by  Esvin Philippe.  (**-YCF-**) Instructions:        7.             Signed by: Esvin Philippe 3/10/2025 7:54 PM   Dictation workstation:   [id number]      XR chest 2 views   Final Result   No acute process.   Signed by Kamaljit Alex MD      Follow-Up Consult to Interventional Radiology    (Results Pending)   CT abdomen pelvis wo IV contrast    (Results Pending)       Scheduled medications  anastrozole, 1 mg, oral, Daily  apixaban, 10 mg, oral, BID   Followed by  [START ON 3/30/2025] apixaban, 5 mg, oral, BID  bisacodyl, 10 mg, rectal, Daily  carvedilol, 6.25 mg, oral, BID  [Held by provider] gabapentin, 300 mg, oral, BID  [Held by provider] hydrALAZINE, 100 mg, oral, BID  pantoprazole, 40 mg, intravenous, BID  sennosides, 2 tablet, oral, BID      Continuous medications  sodium chloride 0.9%, 100 mL/hr, Last Rate: 100 mL/hr (03/28/25 1600)      PRN medications  PRN medications: acetaminophen **OR** acetaminophen **OR** acetaminophen, alteplase, dextrose, dextrose, glucagon, glucagon, melatonin, ondansetron ODT **OR** ondansetron, oxyCODONE, oxygen         Assessment/Plan                  Assessment & Plan  History of blood transfusion    PUD (peptic ulcer disease)    Hepatobiliary cancer (Multi)      HASMUKH  -Cr elevated  -IV fluids  -recheck in am    Abdominal distension  -KUB  shows colonic gaseous distention  - Dulcolax suppository given   - s/p soapsuds enema  -had bowel movement this morning  -CT abd /pel pending  -requested GI to re-assess the pt    GI bleed due to cratered ulcer in the fundus of the stomach  - EGD on 3/17 showed Single 8 mm cratered ulcer in the fundus of the stomach with adherent clot (Gary IIB); placed MRI conditional clips; injected 7 mL of epinephrine to address bleeding; hemostasis achieved. The mucosa surrounding the clot was injected with epinephrine.  - EGD on 3/20 showed Single 7 mm x 8 mm cratered, benign-appearing ulcer in the fundus of the stomach with clean base (Gary III); performed cold forceps biopsy from ulcer edge to rule out malignancy; placed 3 clips successfully and 1 clip got dislodged after placement (clips are MRI conditional and through-the-scope); hemostasis achieved.   - H Pylori stool antigen ordered, awaiting RN to collect  - PPI bid for 3 months, then once daily; repeat EGD in 3 months     ABLA  Hypotension 2/2 acute hemorrhagic shock   - due to UGIB; s/p 4 units PRBCs and IVF resuscitation. Initially was refractory and needed vasopressor support. Now resolved, no longer needing vasopressor support; transferred out of ICU on 3/17     Acute RLE DVT  - LE Duplex with acute deep vein thrombosis at the right lower extremity from the inguinal region to the popliteal region  - Unable to anticoagulate due to severe bleeding from ulcer; IR consulted s/p IVC filter placement on 3/20  - will need OP follow up with vascular medicine     Acute RUE DVT  - likely provoked from recent CVC  - New onset RUE edema noted on 3/21. US showed cute non-occlusive deep vein thrombosis visualized in the right internal jugular vein. Discussed with GI, ok to challenge with heparin gtt. Hgb stable, no melena or BRBPR. Hgb 6.7, repeat 7.9. Transitioned from heparin gtt to OAC but Hgb labile, continue to monitor.  -transitioned to eliquis     Leukocytosis  - UCX  negative. Blood culture negative. CXR without PNA. CT AP with concern for metastatic disease  - Persists despite 7d course of empiric abx --> suspect due to malignancy/stress dose steroids  - fever on 3/19 but no clinical signs or symptoms of infection. COVID/Flu negative. CXR unrevealing. No recurrence of fever. Monitor wbc/fever curve as work up thus far unrevealing.      PAF, new onset  - suspect triggered by acute illness; continue bb.  - back on heparin gtt due to RUE DVT but if bleeds again will need to hold     HASMUKH on CKD2  - Baseline Cr 1-1.2; Cr peaked at 1.6, now improved to 1.3     HTN  - continue coreg; hydralazine, lasix held due to shock and BP now normal, will restart if BP rises     Stage IV breast cancer   - history of right breast carcinoma first diagnosed in 1991 status post breast conserving surgery, radiation and tamoxifen who then went on to have a recurrence in 2012 and had complete mastectomy. She then had breast cancer in her left breast and had a left mastectomy followed by radiation and trastuzumab. Finally she had another recurrence in November 2021 and was on anastrozole and palbociclib.   - a large disease burden in her abdomen including diffuse LAD and several liver masses suspicious for malignancy. She was due for a PET scan after an 11/20/2024 CT showed possible progression in her pelvic lymph nodes however she did not follow-up. Needs OP follow up with oncology vs hospice     Recent UTI  - Urine culture 3/1 with E Coli; Repeat Ucx on 3/11 negative.      Malnutrition Diagnosis Status: New  Malnutrition Diagnosis: Severe malnutrition related to chronic disease or condition  Related to: pt with 8% weight decrease over past four months, suspect oral intake less than 75% of estimated energy requirements for greater than one month, visualized areas of depleted adipose tissues and skeletal tissue wasting  I agree with the dietitian's malnutrition diagnosis.     DVT Prophylaxis: Eliquis      Disposition: SNF placement         Yonas Nicole MD

## 2025-03-29 NOTE — CARE PLAN
The patient's goals for the shift include maintain safety.    The clinical goals for the shift include Patient to remain comfortable for shift    Over the shift, the patient did not make progress toward the following goals. Barriers to progression include maintain safety. Recommendations to address these barriers include safety maintain.

## 2025-03-30 VITALS
BODY MASS INDEX: 31.38 KG/M2 | OXYGEN SATURATION: 94 % | TEMPERATURE: 97.6 F | SYSTOLIC BLOOD PRESSURE: 72 MMHG | RESPIRATION RATE: 35 BRPM | HEIGHT: 60 IN | HEART RATE: 80 BPM | DIASTOLIC BLOOD PRESSURE: 40 MMHG | WEIGHT: 159.83 LBS

## 2025-03-30 LAB
ACANTHOCYTES BLD QL SMEAR: ABNORMAL
ANION GAP SERPL CALC-SCNC: 17 MMOL/L (ref 10–20)
BASOPHILS # BLD MANUAL: 0 X10*3/UL (ref 0–0.1)
BASOPHILS NFR BLD MANUAL: 0 %
BUN SERPL-MCNC: 85 MG/DL (ref 6–23)
CALCIUM SERPL-MCNC: 5.7 MG/DL (ref 8.6–10.3)
CHLORIDE SERPL-SCNC: 106 MMOL/L (ref 98–107)
CO2 SERPL-SCNC: 16 MMOL/L (ref 21–32)
CREAT SERPL-MCNC: 2.68 MG/DL (ref 0.5–1.05)
EGFRCR SERPLBLD CKD-EPI 2021: 17 ML/MIN/1.73M*2
EOSINOPHIL # BLD MANUAL: 0 X10*3/UL (ref 0–0.4)
EOSINOPHIL NFR BLD MANUAL: 0 %
ERYTHROCYTE [DISTWIDTH] IN BLOOD BY AUTOMATED COUNT: 16.1 % (ref 11.5–14.5)
GLUCOSE SERPL-MCNC: 90 MG/DL (ref 74–99)
HCT VFR BLD AUTO: 24.3 % (ref 36–46)
HGB BLD-MCNC: 7.8 G/DL (ref 12–16)
HOWELL-JOLLY BOD BLD QL SMEAR: PRESENT
IMM GRANULOCYTES # BLD AUTO: 0.26 X10*3/UL (ref 0–0.5)
IMM GRANULOCYTES NFR BLD AUTO: 1.7 % (ref 0–0.9)
LYMPHOCYTES # BLD MANUAL: 1.06 X10*3/UL (ref 0.8–3)
LYMPHOCYTES NFR BLD MANUAL: 7 %
MCH RBC QN AUTO: 28.9 PG (ref 26–34)
MCHC RBC AUTO-ENTMCNC: 32.1 G/DL (ref 32–36)
MCV RBC AUTO: 90 FL (ref 80–100)
METAMYELOCYTES # BLD MANUAL: 0.15 X10*3/UL
METAMYELOCYTES NFR BLD MANUAL: 1 %
MONOCYTES # BLD MANUAL: 0.61 X10*3/UL (ref 0.05–0.8)
MONOCYTES NFR BLD MANUAL: 4 %
NEUTROPHILS # BLD MANUAL: 13.38 X10*3/UL (ref 1.6–5.5)
NEUTS BAND # BLD MANUAL: 2.13 X10*3/UL (ref 0–0.5)
NEUTS BAND NFR BLD MANUAL: 14 %
NEUTS SEG # BLD MANUAL: 11.25 X10*3/UL (ref 1.6–5)
NEUTS SEG NFR BLD MANUAL: 74 %
NRBC BLD-RTO: 0.2 /100 WBCS (ref 0–0)
OVALOCYTES BLD QL SMEAR: ABNORMAL
PLATELET # BLD AUTO: 297 X10*3/UL (ref 150–450)
POLYCHROMASIA BLD QL SMEAR: ABNORMAL
POTASSIUM SERPL-SCNC: 4.8 MMOL/L (ref 3.5–5.3)
RBC # BLD AUTO: 2.7 X10*6/UL (ref 4–5.2)
RBC MORPH BLD: ABNORMAL
SODIUM SERPL-SCNC: 134 MMOL/L (ref 136–145)
TOTAL CELLS COUNTED BLD: 100
WBC # BLD AUTO: 15.2 X10*3/UL (ref 4.4–11.3)

## 2025-03-30 PROCEDURE — 99233 SBSQ HOSP IP/OBS HIGH 50: CPT | Performed by: INTERNAL MEDICINE

## 2025-03-30 PROCEDURE — 36415 COLL VENOUS BLD VENIPUNCTURE: CPT | Performed by: INTERNAL MEDICINE

## 2025-03-30 PROCEDURE — 2500000001 HC RX 250 WO HCPCS SELF ADMINISTERED DRUGS (ALT 637 FOR MEDICARE OP): Performed by: INTERNAL MEDICINE

## 2025-03-30 PROCEDURE — 2500000004 HC RX 250 GENERAL PHARMACY W/ HCPCS (ALT 636 FOR OP/ED): Performed by: INTERNAL MEDICINE

## 2025-03-30 PROCEDURE — 85027 COMPLETE CBC AUTOMATED: CPT | Performed by: INTERNAL MEDICINE

## 2025-03-30 PROCEDURE — 2500000001 HC RX 250 WO HCPCS SELF ADMINISTERED DRUGS (ALT 637 FOR MEDICARE OP): Performed by: HOSPITALIST

## 2025-03-30 PROCEDURE — 82374 ASSAY BLOOD CARBON DIOXIDE: CPT | Performed by: INTERNAL MEDICINE

## 2025-03-30 PROCEDURE — 1100000001 HC PRIVATE ROOM DAILY

## 2025-03-30 PROCEDURE — 2500000004 HC RX 250 GENERAL PHARMACY W/ HCPCS (ALT 636 FOR OP/ED): Performed by: STUDENT IN AN ORGANIZED HEALTH CARE EDUCATION/TRAINING PROGRAM

## 2025-03-30 PROCEDURE — 85007 BL SMEAR W/DIFF WBC COUNT: CPT | Performed by: INTERNAL MEDICINE

## 2025-03-30 PROCEDURE — 80048 BASIC METABOLIC PNL TOTAL CA: CPT | Performed by: INTERNAL MEDICINE

## 2025-03-30 RX ORDER — MORPHINE SULFATE 10 MG/.5ML
5 SOLUTION ORAL EVERY 6 HOURS PRN
Status: DISCONTINUED | OUTPATIENT
Start: 2025-03-30 | End: 2025-03-31

## 2025-03-30 RX ORDER — CEFTRIAXONE 1 G/50ML
1 INJECTION, SOLUTION INTRAVENOUS EVERY 24 HOURS
Status: DISCONTINUED | OUTPATIENT
Start: 2025-03-30 | End: 2025-03-31 | Stop reason: HOSPADM

## 2025-03-30 RX ORDER — SODIUM CHLORIDE 9 MG/ML
75 INJECTION, SOLUTION INTRAVENOUS CONTINUOUS
Status: ACTIVE | OUTPATIENT
Start: 2025-03-30 | End: 2025-03-31

## 2025-03-30 RX ADMIN — MORPHINE SULFATE 5 MG: 10 SOLUTION ORAL at 21:46

## 2025-03-30 RX ADMIN — PANTOPRAZOLE SODIUM 40 MG: 40 INJECTION, POWDER, FOR SOLUTION INTRAVENOUS at 10:36

## 2025-03-30 RX ADMIN — BISACODYL 10 MG: 10 SUPPOSITORY RECTAL at 10:37

## 2025-03-30 RX ADMIN — MORPHINE SULFATE 5 MG: 10 SOLUTION ORAL at 14:59

## 2025-03-30 SDOH — ECONOMIC STABILITY: FOOD INSECURITY: WITHIN THE PAST 12 MONTHS, THE FOOD YOU BOUGHT JUST DIDN'T LAST AND YOU DIDN'T HAVE MONEY TO GET MORE.: NEVER TRUE

## 2025-03-30 SDOH — SOCIAL STABILITY: SOCIAL INSECURITY: WITHIN THE LAST YEAR, HAVE YOU BEEN HUMILIATED OR EMOTIONALLY ABUSED IN OTHER WAYS BY YOUR PARTNER OR EX-PARTNER?: NO

## 2025-03-30 SDOH — ECONOMIC STABILITY: HOUSING INSECURITY: AT ANY TIME IN THE PAST 12 MONTHS, WERE YOU HOMELESS OR LIVING IN A SHELTER (INCLUDING NOW)?: NO

## 2025-03-30 SDOH — SOCIAL STABILITY: SOCIAL INSECURITY
WITHIN THE LAST YEAR, HAVE YOU BEEN RAPED OR FORCED TO HAVE ANY KIND OF SEXUAL ACTIVITY BY YOUR PARTNER OR EX-PARTNER?: NO

## 2025-03-30 SDOH — SOCIAL STABILITY: SOCIAL INSECURITY: DOES ANYONE TRY TO KEEP YOU FROM HAVING/CONTACTING OTHER FRIENDS OR DOING THINGS OUTSIDE YOUR HOME?: NO

## 2025-03-30 SDOH — SOCIAL STABILITY: SOCIAL INSECURITY: ARE YOU OR HAVE YOU BEEN THREATENED OR ABUSED PHYSICALLY, EMOTIONALLY, OR SEXUALLY BY ANYONE?: NO

## 2025-03-30 SDOH — ECONOMIC STABILITY: INCOME INSECURITY: IN THE PAST 12 MONTHS HAS THE ELECTRIC, GAS, OIL, OR WATER COMPANY THREATENED TO SHUT OFF SERVICES IN YOUR HOME?: NO

## 2025-03-30 SDOH — SOCIAL STABILITY: SOCIAL INSECURITY: ARE THERE ANY APPARENT SIGNS OF INJURIES/BEHAVIORS THAT COULD BE RELATED TO ABUSE/NEGLECT?: NO

## 2025-03-30 SDOH — SOCIAL STABILITY: SOCIAL INSECURITY
WITHIN THE LAST YEAR, HAVE YOU BEEN KICKED, HIT, SLAPPED, OR OTHERWISE PHYSICALLY HURT BY YOUR PARTNER OR EX-PARTNER?: NO

## 2025-03-30 SDOH — ECONOMIC STABILITY: FOOD INSECURITY: WITHIN THE PAST 12 MONTHS, YOU WORRIED THAT YOUR FOOD WOULD RUN OUT BEFORE YOU GOT THE MONEY TO BUY MORE.: NEVER TRUE

## 2025-03-30 SDOH — ECONOMIC STABILITY: FOOD INSECURITY
HOW HARD IS IT FOR YOU TO PAY FOR THE VERY BASICS LIKE FOOD, HOUSING, MEDICAL CARE, AND HEATING?: PATIENT UNABLE TO ANSWER

## 2025-03-30 SDOH — ECONOMIC STABILITY: TRANSPORTATION INSECURITY: IN THE PAST 12 MONTHS, HAS LACK OF TRANSPORTATION KEPT YOU FROM MEDICAL APPOINTMENTS OR FROM GETTING MEDICATIONS?: NO

## 2025-03-30 SDOH — SOCIAL STABILITY: SOCIAL INSECURITY: HAS ANYONE EVER THREATENED TO HURT YOUR FAMILY OR YOUR PETS?: NO

## 2025-03-30 SDOH — SOCIAL STABILITY: SOCIAL INSECURITY: DO YOU FEEL UNSAFE GOING BACK TO THE PLACE WHERE YOU ARE LIVING?: NO

## 2025-03-30 SDOH — SOCIAL STABILITY: SOCIAL INSECURITY: HAVE YOU HAD ANY THOUGHTS OF HARMING ANYONE ELSE?: NO

## 2025-03-30 SDOH — ECONOMIC STABILITY: HOUSING INSECURITY: IN THE PAST 12 MONTHS, HOW MANY TIMES HAVE YOU MOVED WHERE YOU WERE LIVING?: 1

## 2025-03-30 SDOH — SOCIAL STABILITY: SOCIAL INSECURITY: WITHIN THE LAST YEAR, HAVE YOU BEEN AFRAID OF YOUR PARTNER OR EX-PARTNER?: NO

## 2025-03-30 SDOH — ECONOMIC STABILITY: HOUSING INSECURITY
IN THE LAST 12 MONTHS, WAS THERE A TIME WHEN YOU WERE NOT ABLE TO PAY THE MORTGAGE OR RENT ON TIME?: PATIENT UNABLE TO ANSWER

## 2025-03-30 SDOH — SOCIAL STABILITY: SOCIAL INSECURITY: DO YOU FEEL ANYONE HAS EXPLOITED OR TAKEN ADVANTAGE OF YOU FINANCIALLY OR OF YOUR PERSONAL PROPERTY?: NO

## 2025-03-30 SDOH — SOCIAL STABILITY: SOCIAL INSECURITY: WERE YOU ABLE TO COMPLETE ALL THE BEHAVIORAL HEALTH SCREENINGS?: YES

## 2025-03-30 SDOH — SOCIAL STABILITY: SOCIAL INSECURITY: ABUSE: ADULT

## 2025-03-30 SDOH — SOCIAL STABILITY: SOCIAL INSECURITY: HAVE YOU HAD THOUGHTS OF HARMING ANYONE ELSE?: NO

## 2025-03-30 ASSESSMENT — COGNITIVE AND FUNCTIONAL STATUS - GENERAL
DRESSING REGULAR UPPER BODY CLOTHING: A LOT
EATING MEALS: A LOT
EATING MEALS: A LOT
MOVING TO AND FROM BED TO CHAIR: A LOT
MOVING TO AND FROM BED TO CHAIR: A LOT
DRESSING REGULAR LOWER BODY CLOTHING: A LOT
TOILETING: A LOT
WALKING IN HOSPITAL ROOM: TOTAL
MOVING FROM LYING ON BACK TO SITTING ON SIDE OF FLAT BED WITH BEDRAILS: A LITTLE
MOVING FROM LYING ON BACK TO SITTING ON SIDE OF FLAT BED WITH BEDRAILS: A LITTLE
TURNING FROM BACK TO SIDE WHILE IN FLAT BAD: A LITTLE
PERSONAL GROOMING: A LOT
STANDING UP FROM CHAIR USING ARMS: A LOT
STANDING UP FROM CHAIR USING ARMS: A LOT
DAILY ACTIVITIY SCORE: 12
WALKING IN HOSPITAL ROOM: TOTAL
DRESSING REGULAR UPPER BODY CLOTHING: A LOT
HELP NEEDED FOR BATHING: A LOT
DAILY ACTIVITIY SCORE: 12
PERSONAL GROOMING: A LOT
TOILETING: A LOT
CLIMB 3 TO 5 STEPS WITH RAILING: TOTAL
MOBILITY SCORE: 11
PATIENT BASELINE BEDBOUND: NO
HELP NEEDED FOR BATHING: A LOT
DRESSING REGULAR LOWER BODY CLOTHING: A LOT
MOBILITY SCORE: 12
TURNING FROM BACK TO SIDE WHILE IN FLAT BAD: A LOT
CLIMB 3 TO 5 STEPS WITH RAILING: TOTAL

## 2025-03-30 ASSESSMENT — PAIN - FUNCTIONAL ASSESSMENT
PAIN_FUNCTIONAL_ASSESSMENT: 0-10
PAIN_FUNCTIONAL_ASSESSMENT: 0-10

## 2025-03-30 ASSESSMENT — ACTIVITIES OF DAILY LIVING (ADL)
GROOMING: NEEDS ASSISTANCE
TOILETING: NEEDS ASSISTANCE
HEARING - LEFT EAR: FUNCTIONAL
HEARING - RIGHT EAR: FUNCTIONAL
LACK_OF_TRANSPORTATION: NO
FEEDING YOURSELF: NEEDS ASSISTANCE
DRESSING YOURSELF: NEEDS ASSISTANCE
PATIENT'S MEMORY ADEQUATE TO SAFELY COMPLETE DAILY ACTIVITIES?: UNABLE TO ASSESS
ADEQUATE_TO_COMPLETE_ADL: UNABLE TO ASSESS
BATHING: NEEDS ASSISTANCE
WALKS IN HOME: NEEDS ASSISTANCE
JUDGMENT_ADEQUATE_SAFELY_COMPLETE_DAILY_ACTIVITIES: UNABLE TO ASSESS

## 2025-03-30 ASSESSMENT — LIFESTYLE VARIABLES
HOW MANY STANDARD DRINKS CONTAINING ALCOHOL DO YOU HAVE ON A TYPICAL DAY: PATIENT DOES NOT DRINK
HOW OFTEN DO YOU HAVE A DRINK CONTAINING ALCOHOL: NEVER
AUDIT-C TOTAL SCORE: 0
SKIP TO QUESTIONS 9-10: 1
AUDIT-C TOTAL SCORE: 0
HOW OFTEN DO YOU HAVE 6 OR MORE DRINKS ON ONE OCCASION: NEVER

## 2025-03-30 ASSESSMENT — PAIN SCALES - GENERAL
PAINLEVEL_OUTOF10: 2
PAINLEVEL_OUTOF10: 1
PAINLEVEL_OUTOF10: 5 - MODERATE PAIN

## 2025-03-30 ASSESSMENT — PAIN DESCRIPTION - ORIENTATION: ORIENTATION: RIGHT;LEFT

## 2025-03-30 ASSESSMENT — PATIENT HEALTH QUESTIONNAIRE - PHQ9
1. LITTLE INTEREST OR PLEASURE IN DOING THINGS: NOT AT ALL
2. FEELING DOWN, DEPRESSED OR HOPELESS: NOT AT ALL
SUM OF ALL RESPONSES TO PHQ9 QUESTIONS 1 & 2: 0

## 2025-03-30 NOTE — PROGRESS NOTES
Elizabeth Buckner is a 84 y.o. female on day 19 of admission presenting with General weakness.      Subjective   Patient examined at bedside.  Reports that last BM was yesterday.  Did not have flatus today and belly is distended.       Objective     Last Recorded Vitals  BP (!) 91/49   Pulse 79   Temp 36.4 °C (97.5 °F) (Temporal)   Resp 18   Wt 72.5 kg (159 lb 13.3 oz)   SpO2 96%   Intake/Output last 3 Shifts:  No intake or output data in the 24 hours ending 03/30/25 1428    Admission Weight  Weight: 73.9 kg (162 lb 14.7 oz) (03/16/25 0600)    Daily Weight  03/20/25 : 72.5 kg (159 lb 13.3 oz)    Image Results  CT abdomen pelvis wo IV contrast  Narrative: Interpreted By:  Esvin Philippe,   STUDY:  CT ABDOMEN PELVIS WO IV CONTRAST;  3/28/2025 8:50 pm      INDICATION:  Signs/Symptoms:abdominal distension.      COMPARISON:  03/23/2025      ACCESSION NUMBER(S):  [id number]      ORDERING CLINICIAN:  KOFFI GAYLE      TECHNIQUE:  CT of the abdomen and pelvis was performed.  Standard contiguous  axial images were obtained at 3 mm slice thickness through the  abdomen and pelvis. Coronal and sagittal reconstructions at 3 mm  slice thickness were performed.      FINDINGS:  Limited study without IV contrast.      LOWER CHEST:  Trace amount of right pleural effusion and associated atelectasis or  infiltrate. Left lung bases clear..      ABDOMEN:      LIVER:  Low-attenuation lesion within the dome of the liver similar to prior  reaching up to 2 cm in size, stable. No additional liver lesions.  Evaluation however limited without IV contrast.      BILE DUCTS:  Biliary system is nondilated.      GALLBLADDER:  The gallbladder is surgically absent.      PANCREAS:  No focal lesions. No peripancreatic fat stranding.      SPLEEN:  The spleen is normal in size. No focal abnormalities are seen.      ADRENAL GLANDS:  The adrenal glands bilaterally within normal limits.      KIDNEYS AND URETERS:  Low-attenuation lesions within bilateral  kidneys suggestive of cysts  but too small to fully characterize particularly without IV contrast.  Findings similar to prior.      PELVIS:      BLADDER:  Within normal limits as distended. No bladder calculi or masses.      REPRODUCTIVE ORGANS:  The uterus is not seen and may have been surgically removed. Moderate  amount of free fluid in the cul-de-sac slightly increased in size  since previous exam.      BOWEL:  Markedly gas distended colon throughout the abdomen similar to  previous exam suggestive of adynamic colon, however, sigmoid  obstruction not excluded. It measures up to 8 cm in diameter in the  left hemiabdomen from 7.4 cm previously. Measures up to 7.8 cm in  diameter within the right colon from 5.6 cm previously.      The sigmoid is collapsed similar to prior. Minimal sigmoid  diverticulosis.      Normal caliber fluid distended loops of small bowel also seen which  may represent an ileus type pattern.      VESSELS:  Aorta and IVC within normal limits as visualized in this exam. No  signs of aortic aneurysm.      PERITONEUM/RETROPERITONEUM/LYMPH NODES:  Right retroperitoneal soft tissue thickening/enlarged lymph nodes  extending to the right iliac fossa similar to prior. Evaluation  markedly limited without IV contrast. Additional enlarged lymph nodes  within the left retroperitoneal space similar to previous exam      BONES AND ABDOMINAL WALL:  There is no evidence for destructive lytic or blastic bone lesions  identified.      Impression: 1. Markedly gas distended colon throughout the abdomen similar to  previous exam suggestive of adynamic colon, however, sigmoid  obstruction not excluded. It measures up to 8 cm in diameter in the  left hemiabdomen from 7.4 cm previously. Measures up to 7.8 cm in  diameter within the right colon from 5.6 cm previously. Findings have  progressed when compared to previous exam. Small bowel ileus also  seen, also worse when compared to previous exam. Recommend  clinical  correlation and follow-up to document resolution.      2. Interval worsening of right lung base infiltrate and right pleural  effusion.      3. Overall stable right subdiaphragmatic fluid.      4. Increase in size of free fluid in the pelvis. Clinical correlation  and follow-up recommended.      5. Soft tissue mass along the right pelvic wall similar to previous  exam, of uncertain etiology and can be related to lymphadenopathy,  hematoma amongst others. Evaluation limited without IV contrast.      6. Overall stable retroperitoneal lymphadenopathy. Recommend clinical  correlation and follow-up.      7. Additional detailed findings as above.      SUPPLEMENTAL INFORMATION:  Notifi message was left for KOFFI GAYLE regarding this exam by  Dr. Philippe on 3/30/2025 at approximately 09:28 hours.      Critical Finding:  See findings. Notification was initiated on  3/30/2025 at 9:28 am by  Esvin Philippe.  (**-YCF-**) Instructions:      Signed by: Esvin Philippe 3/30/2025 9:28 AM  Dictation workstation:   [id number]      Physical Exam  Vitals reviewed.   Constitutional:       Appearance: She is obese. She is toxic-appearing.   HENT:      Head: Atraumatic.   Cardiovascular:      Rate and Rhythm: Regular rhythm.   Pulmonary:      Effort: Pulmonary effort is normal.      Breath sounds: Normal breath sounds.   Abdominal:      General: Bowel sounds are decreased. There is distension.      Palpations: Abdomen is soft.      Tenderness: There is no abdominal tenderness. There is no guarding or rebound.   Musculoskeletal:         General: Normal range of motion.      Cervical back: Normal range of motion.   Skin:     General: Skin is warm and dry.   Neurological:      Mental Status: She is alert. She is disoriented.   Psychiatric:         Mood and Affect: Mood normal.         Behavior: Behavior normal.         Relevant Results             Scheduled medications  anastrozole, 1 mg, oral, Daily  apixaban, 10 mg, oral, BID    Followed by  apixaban, 5 mg, oral, BID  bisacodyl, 10 mg, rectal, Daily  carvedilol, 6.25 mg, oral, BID  cefTRIAXone, 1 g, intravenous, q24h  doxycycline, 100 mg, intravenous, q12h  [Held by provider] gabapentin, 300 mg, oral, BID  [Held by provider] hydrALAZINE, 100 mg, oral, BID  pantoprazole, 40 mg, intravenous, BID  sennosides, 2 tablet, oral, BID      Continuous medications  sodium chloride 0.9%, 75 mL/hr, Last Rate: Stopped (03/30/25 1036)      PRN medications  PRN medications: acetaminophen **OR** acetaminophen **OR** acetaminophen, alteplase, dextrose, dextrose, glucagon, glucagon, melatonin, morphine, ondansetron ODT **OR** ondansetron, oxyCODONE, oxygen   Results for orders placed or performed during the hospital encounter of 03/10/25 (from the past 24 hours)   Basic metabolic panel   Result Value Ref Range    Glucose 90 74 - 99 mg/dL    Sodium 134 (L) 136 - 145 mmol/L    Potassium 4.8 3.5 - 5.3 mmol/L    Chloride 106 98 - 107 mmol/L    Bicarbonate 16 (L) 21 - 32 mmol/L    Anion Gap 17 10 - 20 mmol/L    Urea Nitrogen 85 (H) 6 - 23 mg/dL    Creatinine 2.68 (H) 0.50 - 1.05 mg/dL    eGFR 17 (L) >60 mL/min/1.73m*2    Calcium 5.7 (L) 8.6 - 10.3 mg/dL   CBC and Auto Differential   Result Value Ref Range    WBC 15.2 (H) 4.4 - 11.3 x10*3/uL    nRBC 0.2 (H) 0.0 - 0.0 /100 WBCs    RBC 2.70 (L) 4.00 - 5.20 x10*6/uL    Hemoglobin 7.8 (L) 12.0 - 16.0 g/dL    Hematocrit 24.3 (L) 36.0 - 46.0 %    MCV 90 80 - 100 fL    MCH 28.9 26.0 - 34.0 pg    MCHC 32.1 32.0 - 36.0 g/dL    RDW 16.1 (H) 11.5 - 14.5 %    Platelets 297 150 - 450 x10*3/uL    Immature Granulocytes %, Automated 1.7 (H) 0.0 - 0.9 %    Immature Granulocytes Absolute, Automated 0.26 0.00 - 0.50 x10*3/uL   Manual Differential   Result Value Ref Range    Neutrophils %, Manual 74.0 40.0 - 80.0 %    Bands %, Manual 14.0 0.0 - 5.0 %    Lymphocytes %, Manual 7.0 13.0 - 44.0 %    Monocytes %, Manual 4.0 2.0 - 10.0 %    Eosinophils %, Manual 0.0 0.0 - 6.0 %    Basophils  %, Manual 0.0 0.0 - 2.0 %    Metamyelocytes %, Manual 1.0 0.0 - 0.0 %    Seg Neutrophils Absolute, Manual 11.25 (H) 1.60 - 5.00 x10*3/uL    Bands Absolute, Manual 2.13 (H) 0.00 - 0.50 x10*3/uL    Lymphocytes Absolute, Manual 1.06 0.80 - 3.00 x10*3/uL    Monocytes Absolute, Manual 0.61 0.05 - 0.80 x10*3/uL    Eosinophils Absolute, Manual 0.00 0.00 - 0.40 x10*3/uL    Basophils Absolute, Manual 0.00 0.00 - 0.10 x10*3/uL    Metamyelocytes Absolute, Manual 0.15 0.00 - 0.00 x10*3/uL    Total Cells Counted 100     Neutrophils Absolute, Manual 13.38 (H) 1.60 - 5.50 x10*3/uL    RBC Morphology See Below     Polychromasia Mild     Ovalocytes Few     Acanthocytes Few     Sultana-Jolly Bodies Present      *Note: Due to a large number of results and/or encounters for the requested time period, some results have not been displayed. A complete set of results can be found in Results Review.      Assessment/Plan                  Assessment & Plan  General weakness    History of blood transfusion    PUD (peptic ulcer disease)    Hepatobiliary cancer (Multi)    Elizabeth Buckner is a 84 y.o. female with PMHx s/f stage IV breast ca s/p bilateral mastectomy as well as treated with radiation and tamoxifen, anastrozole and palbociclib (2021), lymph node dissection, CRF cerebral, hepatic, pelvic, and brain mets who presented to [facility] [place] with complaints of RLE edema and altered mental status with generalized weakness progressively worsening over the past few days prior to admission.  Status post EGD x 2 during this admission.  GI reconsulted for abdominal distention and poor oral intake.  3/30/2025 CT abdomen pelvis wo IV contrast  IMPRESSION:  1. Markedly gas distended colon throughout the abdomen similar to previous exam suggestive of adynamic colon, however, sigmoid obstruction not excluded. It measures up to 8 cm in diameter in the left hemiabdomen from 7.4 cm previously. Measures up to 7.8 cm in  diameter within the right colon from 5.6 cm  previously. Findings have progressed when compared to previous exam. Small bowel ileus also seen, also worse when compared to previous exam. Recommend clinical correlation and follow-up to document resolution.   2. Interval worsening of right lung base infiltrate and right pleural effusion.   3. Overall stable right subdiaphragmatic fluid.   4. Increase in size of free fluid in the pelvis. Clinical correlation and follow-up recommended.   5. Soft tissue mass along the right pelvic wall similar to previous exam, of uncertain etiology and can be related to lymphadenopathy, hematoma amongst others. Evaluation limited without IV contrast.   6. Overall stable retroperitoneal lymphadenopathy. Recommend clinical correlation and follow-up.  Per chart last bowel movement yesterday morning.  Patient states that she has no flatus today.  Denies abdominal pain/nausea/vomiting.    -N.p.o.  -NG for decompression  -Out of bed as much as possible  -Avoid opioids  -Will order KUB with Gastrografin enema  -Could consider neostigmine if KUB with Gastrografin enema negative for obstruction  -GI will follow    Case discussed with Dr. Jad Gutiérrez, APRN-CNP

## 2025-03-30 NOTE — CARE PLAN
The patient's goals for the shift include Pt. will have a safe, restful and uneventful evening    The clinical goals for the shift include Maintain safety      Problem: Discharge Planning  Goal: Discharge to home or other facility with appropriate resources  Outcome: Progressing     Problem: Fall/Injury  Goal: Verbalize understanding of personal risk factors for fall in the hospital  Outcome: Progressing  Goal: Verbalize understanding of risk factor reduction measures to prevent injury from fall in the home  Outcome: Progressing  Goal: Use assistive devices by end of the shift  Outcome: Progressing     Problem: Pain  Goal: Takes deep breaths with improved pain control throughout the shift  Outcome: Progressing  Goal: Turns in bed with improved pain control throughout the shift  Outcome: Progressing  Goal: Walks with improved pain control throughout the shift  Outcome: Progressing  Goal: Performs ADL's with improved pain control throughout shift  Outcome: Progressing  Goal: Participates in PT with improved pain control throughout the shift  Outcome: Progressing  Goal: Free from opioid side effects throughout the shift  Outcome: Progressing  Goal: Free from acute confusion related to pain meds throughout the shift  Outcome: Progressing

## 2025-03-30 NOTE — NURSING NOTE
Nursing IV Access Note    This RN was called to assist in obtaining intravenous access for Elizabeth Buckner after multiple failed attempts by bedside staff. Unfortunately, this RN was also unsuccessful after 1 and 2 attempts.       Ultrasound Use: Yes    Attempted Insertion Site 1: left FA  Attempted Insertion Site 2:  left upper arm    Notified bedside nurse to inform primary attending of the unsuccessful attempts and to discuss the next steps. MD aware and family asked for a break from IV attempts.      Marquita Douglas RN

## 2025-03-30 NOTE — PROGRESS NOTES
Elizabeth Buckner is a 84 y.o. female on day 19 of admission presenting with General weakness.      Subjective   Pt seen and examined.        Objective     Last Recorded Vitals  BP 92/52   Pulse 65   Temp 36.4 °C (97.5 °F) (Temporal)   Resp 16   Wt 72.5 kg (159 lb 13.3 oz)   SpO2 96%   Intake/Output last 3 Shifts:  No intake or output data in the 24 hours ending 03/30/25 1027      Admission Weight  Weight: 73.9 kg (162 lb 14.7 oz) (03/16/25 0600)    Daily Weight  03/20/25 : 72.5 kg (159 lb 13.3 oz)      Physical Exam  HENT:      Mouth/Throat:      Mouth: Mucous membranes are moist.      Pharynx: Oropharynx is clear.   Cardiovascular:      Rate and Rhythm: Normal rate and regular rhythm.   Pulmonary:      Effort: Pulmonary effort is normal.   Abdominal:      General: There is distension.      Palpations: Abdomen is soft.      Tenderness: There is no abdominal tenderness.   Musculoskeletal:      Comments: RUE edema. No LUE Edema   Neurological:      Mental Status: She is alert.   Relevant Results  No results found. However, due to the size of the patient record, not all encounters were searched. Please check Results Review for a complete set of results.       CT abdomen pelvis wo IV contrast   Final Result   1. Markedly gas distended colon throughout the abdomen similar to   previous exam suggestive of adynamic colon, however, sigmoid   obstruction not excluded. It measures up to 8 cm in diameter in the   left hemiabdomen from 7.4 cm previously. Measures up to 7.8 cm in   diameter within the right colon from 5.6 cm previously. Findings have   progressed when compared to previous exam. Small bowel ileus also   seen, also worse when compared to previous exam. Recommend clinical   correlation and follow-up to document resolution.        2. Interval worsening of right lung base infiltrate and right pleural   effusion.        3. Overall stable right subdiaphragmatic fluid.        4. Increase in size of free fluid in the  pelvis. Clinical correlation   and follow-up recommended.        5. Soft tissue mass along the right pelvic wall similar to previous   exam, of uncertain etiology and can be related to lymphadenopathy,   hematoma amongst others. Evaluation limited without IV contrast.        6. Overall stable retroperitoneal lymphadenopathy. Recommend clinical   correlation and follow-up.        7. Additional detailed findings as above.        SUPPLEMENTAL INFORMATION:   Notifi message was left for KOFFI GAYLE regarding this exam by   Dr. Philippe on 3/30/2025 at approximately 09:28 hours.        Critical Finding:  See findings. Notification was initiated on   3/30/2025 at 9:28 am by  Esvin Philippe.  (**-YCF-**) Instructions:        Signed by: Esvin Philippe 3/30/2025 9:28 AM   Dictation workstation:   [id number]      XR abdomen 1 view   Final Result   Gaseous distention of colon corresponding to findings seen on prior   CT abdomen pelvis 03/23/2025.        MACRO:   None.        Signed by: Evan Finkelstein 3/28/2025 6:31 AM   Dictation workstation:   [id number]      CT abdomen pelvis wo IV contrast   Final Result   Limited examination secondary to lack of intravenous contrast,   patient motion and inferior position of the patient's arms.        There is redemonstration of abdominal and pelvic lymphadenopathy with   largest conglomerate lymph node/mass measuring 7.3 cm x 3.3 cm in the   right lateral pelvis. There is evidence of interval decrease in size   of multiple of the enlarged lymph nodes in comparison to the prior   examination. 2.1 cm mass in the right hepatic lobe also appears   decreased in size. Additional hepatic masses are not well assessed on   the current examination.        Gaseous distention of the colon and fluid in the descending colon;   please correlate for diarrhea/colitis. Underdistention versus wall   thickening of the sigmoid colon which may be infectious or   inflammatory in etiology.        Asymmetric  dilatation of right common femoral vein and right lower   extremity edema; patient has known deep vein thrombosis.        MACRO:   None        Signed by: Mitchell Hay 3/23/2025 10:51 PM   Dictation workstation:   [id number]      Vascular US upper extremity venous duplex right   Final Result      IR intervention filter placement   Final Result   1. Uncomplicated and technically successful placement of a   retrievable ALN inferior vena cava filter in an infrarenal IVC   location. The IVC filter may be retrieved as clinically indicated.   Optimal recovery period is less than 90 days post-placement. However,   longer periods have been reported and are possible.   2. Patent inferior vena cava and common iliac vein reflux without   evidence of thrombus, stenosis, occlusion, or anatomic variation.   3. Please contact interventional radiology if filtration is no longer   indicated.        Performed and dictated at [facility].        MACRO:   None.        Signed by: Carroll Linda 3/20/2025 10:06 AM   Dictation workstation:   [id number]      XR chest 1 view   Final Result   Tubes and lines have been removed.        Hypoventilatory exam with mild elevation of the right diaphragm. No   mass or pneumonia in either lung.        Mild cardiomegaly.        Interval placement of a clip in the proximal stomach.        MACRO:   None        Signed by: Carroll Corley 3/19/2025 3:00 PM   Dictation workstation:   [id number]      XR chest 1 view   Final Result   Addendum (preliminary) 1 of 1   Potentially critical findings are discussed with and acknowledged by   Yael Garcia RN at 7:38 PM Eastern time on 3/15/2025.   Signed by Shavonne Ye DO      Final   1.Endotracheal tube with the tip at or less than 1 cm above the   level of the fabrizio. Repositioning is recommended.  This could be   pulled back about 4 to 5 cm.   2.Enteric tube and right internal jugular line in place.   3.No pneumothorax.    4.Normal heart size with no radiographic signs of active   pulmonary parenchymal infiltration.  Known pleural and parenchymal   nodules worrisome for metastatic disease seen on CT are not well   visualized radiographically.   Signed by Shavonne Ye DO      MR brain w and wo IV contrast   Final Result   1. No acute infarct, hemorrhage or mass is noted. No abnormalities of   the 4th ventricle are noted.        2. The cerebellar tonsils are low-lying consistent with mild Chiari 1   type malformation.        MACRO:   None        Signed by: Pete Rice 3/11/2025 12:21 PM   Dictation workstation:   [id number]      US abdomen complete   Final Result   Obscuration the spleen and right kidney by bowel gas. Previous   cholecystectomy.        Mildly small left kidney. The left kidney was otherwise   sonographically unremarkable.        There were 3 identifiable hypoechoic solid lesions in the liver.   These are nonspecific and could be atypical hemangiomas or metastatic   lesions. Recommend liver MRI in follow-up.        MACRO:   None        Signed by: Carroll Corley 3/11/2025 8:04 AM   Dictation workstation:   [id number]      Lower extremity venous duplex right   Final Result   1. Acute deep vein thrombosis at the right lower extremity from the   inguinal region to the popliteal region. The right posterior tibial   and peroneal veins were not visualized on this exam.             MACRO:   Noa Adamson discussed the significance and urgency of this   critical finding by telephone with  CYNTHIA KARLAJESSE on 3/10/2025 at 11:24   pm.  (**-RCF-**) Findings:  See findings.             Signed by: Noa Adamson 3/10/2025 11:39 PM   Dictation workstation:   [id number]      CT head wo IV contrast   Final Result   Mass effect upon the 4th ventricle centrally. Further evaluation with   contrast-enhanced MRI is recommended for better assessment.        No evidence for acute cortical infarction or acute intracranial   hemorrhage is  seen.        MACRO:   Critical Finding:  See findings. Notification was initiated on   3/10/2025 at 7:46 pm by  Esvin Philippe.  (**-YCF-**)        Signed by: Esvin Philippe 3/10/2025 7:46 PM   Dictation workstation:   [id number]      CT abdomen pelvis wo IV contrast   Final Result   1.  Extensive retroperitoneal and bilateral pelvic lymphadenopathy as   above concerning for lymphoma or metastatic disease.   2. Suspect acute deep venous thrombosis within the right common   femoral vein and right external iliac vein. Correlation with   ultrasound findings recommended.   3. Numerous liver masses suggestive of metastatic liver disease.   Correlation with ultrasound findings can be performed as clinically   warranted.   4. Bilateral renal lesions, nonspecific in etiology and may represent   cysts but incompletely characterized in this unenhanced exam.   Correlation with ultrasound findings can be performed as clinically   warranted.   5. Air in the bladder which may be due to bladder instrumentation.   Correlation with urinalysis recommended.   6. Additional detailed findings as above.   Critical Finding:  See findings. Notification was initiated on   3/10/2025 at 7:54 pm by  Esvin Philippe.  (**-YCF-**) Instructions:        7.             Signed by: Esvin Philippe 3/10/2025 7:54 PM   Dictation workstation:   [id number]      XR chest 2 views   Final Result   No acute process.   Signed by Kamaljit Alex MD      Follow-Up Consult to Interventional Radiology    (Results Pending)       Scheduled medications  anastrozole, 1 mg, oral, Daily  apixaban, 10 mg, oral, BID   Followed by  apixaban, 5 mg, oral, BID  bisacodyl, 10 mg, rectal, Daily  carvedilol, 6.25 mg, oral, BID  cefTRIAXone, 1 g, intravenous, q24h  doxycycline, 100 mg, intravenous, q12h  [Held by provider] gabapentin, 300 mg, oral, BID  [Held by provider] hydrALAZINE, 100 mg, oral, BID  pantoprazole, 40 mg, intravenous, BID  sennosides, 2 tablet, oral,  BID      Continuous medications  sodium chloride 0.9%, 75 mL/hr      PRN medications  PRN medications: acetaminophen **OR** acetaminophen **OR** acetaminophen, alteplase, dextrose, dextrose, glucagon, glucagon, melatonin, ondansetron ODT **OR** ondansetron, oxyCODONE, oxygen         Assessment/Plan                  Assessment & Plan  History of blood transfusion    PUD (peptic ulcer disease)    Hepatobiliary cancer (Multi)      HASMUKH  -Cr elevated  -IV fluids  -recheck in am    Abdominal distension  -KUB shows colonic gaseous distention  - Dulcolax suppository given   - s/p soapsuds enema  -had bowel movement this morning  -CT abd /pel noted  -Discussed with GI team, recommended NGT placement  - patient could not tolerate NG tube    Pneumonia  -aspiration?  -started on ceftriaxone and doxycycline    GI bleed due to cratered ulcer in the fundus of the stomach  - EGD on 3/17 showed Single 8 mm cratered ulcer in the fundus of the stomach with adherent clot (Gary IIB); placed MRI conditional clips; injected 7 mL of epinephrine to address bleeding; hemostasis achieved. The mucosa surrounding the clot was injected with epinephrine.  - EGD on 3/20 showed Single 7 mm x 8 mm cratered, benign-appearing ulcer in the fundus of the stomach with clean base (Gary III); performed cold forceps biopsy from ulcer edge to rule out malignancy; placed 3 clips successfully and 1 clip got dislodged after placement (clips are MRI conditional and through-the-scope); hemostasis achieved.   - H Pylori stool antigen ordered, awaiting RN to collect  - PPI bid for 3 months, then once daily; repeat EGD in 3 months     ABLA  Hypotension 2/2 acute hemorrhagic shock   - due to UGIB; s/p 4 units PRBCs and IVF resuscitation. Initially was refractory and needed vasopressor support. Now resolved, no longer needing vasopressor support; transferred out of ICU on 3/17     Acute RLE DVT  - LE Duplex with acute deep vein thrombosis at the right lower  extremity from the inguinal region to the popliteal region  - Unable to anticoagulate due to severe bleeding from ulcer; IR consulted s/p IVC filter placement on 3/20  - will need OP follow up with vascular medicine     Acute RUE DVT  - likely provoked from recent CVC  - New onset RUE edema noted on 3/21. US showed cute non-occlusive deep vein thrombosis visualized in the right internal jugular vein. Discussed with GI, ok to challenge with heparin gtt. Hgb stable, no melena or BRBPR. Hgb 6.7, repeat 7.9. Transitioned from heparin gtt to OAC but Hgb labile, continue to monitor.  -transitioned to eliquis     Leukocytosis  - UCX negative. Blood culture negative. CXR without PNA. CT AP with concern for metastatic disease  - Persists despite 7d course of empiric abx --> suspect due to malignancy/stress dose steroids  - fever on 3/19 but no clinical signs or symptoms of infection. COVID/Flu negative. CXR unrevealing. No recurrence of fever. Monitor wbc/fever curve as work up thus far unrevealing.      PAF, new onset  - suspect triggered by acute illness; continue bb.  - back on heparin gtt due to RUE DVT but if bleeds again will need to hold     HASMUKH on CKD2  - Baseline Cr 1-1.2; Cr peaked at 1.6, now improved to 1.3     HTN  - continue coreg; hydralazine, lasix held due to shock and BP now normal, will restart if BP rises     Stage IV breast cancer   - history of right breast carcinoma first diagnosed in 1991 status post breast conserving surgery, radiation and tamoxifen who then went on to have a recurrence in 2012 and had complete mastectomy. She then had breast cancer in her left breast and had a left mastectomy followed by radiation and trastuzumab. Finally she had another recurrence in November 2021 and was on anastrozole and palbociclib.   - a large disease burden in her abdomen including diffuse LAD and several liver masses suspicious for malignancy. She was due for a PET scan after an 11/20/2024 CT showed possible  progression in her pelvic lymph nodes however she did not follow-up. Needs OP follow up with oncology vs hospice     Recent UTI  - Urine culture 3/1 with E Coli; Repeat Ucx on 3/11 negative.      Malnutrition Diagnosis Status: New  Malnutrition Diagnosis: Severe malnutrition related to chronic disease or condition  Related to: pt with 8% weight decrease over past four months, suspect oral intake less than 75% of estimated energy requirements for greater than one month, visualized areas of depleted adipose tissues and skeletal tissue wasting  I agree with the dietitian's malnutrition diagnosis.     DVT Prophylaxis: Eliquis     family at bedside.  Discussed the plan of care with family.  Refused NGT placement because patient could not tolerate.  Requested palliative care consult.  Also placed hospice consult.      Initiated discussion about CODE STATUS and inform them about the full code and DNR.  Family members at bedside we will reach out to other family members before making the final decision.  Patient remains to be full code for now     Disposition: Palliative care consult         Yonas Nicole MD

## 2025-03-30 NOTE — PROGRESS NOTES
03/30/25 1339   Discharge Planning   Expected Discharge Disposition SNF        Doctor placed consult for palliative care and for Hospice. Revisited patient at bedside and spoke with  Gila Davies ( [phone]) and family would like [facility]. Sent referral in Care Port to [facility] and documents attached.

## 2025-03-31 ENCOUNTER — HOSPITAL ENCOUNTER (INPATIENT)
Facility: HOSPITAL | Age: 85
LOS: 2 days | Discharge: HOSPICE | End: 2025-04-02
Attending: INTERNAL MEDICINE | Admitting: INTERNAL MEDICINE
Payer: OTHER MISCELLANEOUS

## 2025-03-31 ENCOUNTER — TELEPHONE (OUTPATIENT)
Dept: HEMATOLOGY/ONCOLOGY | Facility: HOSPITAL | Age: 85
End: 2025-03-31
Payer: OTHER MISCELLANEOUS

## 2025-03-31 VITALS
OXYGEN SATURATION: 100 % | TEMPERATURE: 98.3 F | SYSTOLIC BLOOD PRESSURE: 95 MMHG | HEART RATE: 82 BPM | RESPIRATION RATE: 16 BRPM | DIASTOLIC BLOOD PRESSURE: 61 MMHG | WEIGHT: 159.83 LBS | BODY MASS INDEX: 31.38 KG/M2 | HEIGHT: 60 IN

## 2025-03-31 DIAGNOSIS — C24.9 HEPATOBILIARY CANCER (MULTI): ICD-10-CM

## 2025-03-31 DIAGNOSIS — K92.2 GASTROINTESTINAL HEMORRHAGE, UNSPECIFIED GASTROINTESTINAL HEMORRHAGE TYPE: Primary | ICD-10-CM

## 2025-03-31 DIAGNOSIS — N18.9 ACUTE KIDNEY INJURY SUPERIMPOSED ON CKD: ICD-10-CM

## 2025-03-31 DIAGNOSIS — R53.1 GENERAL WEAKNESS: ICD-10-CM

## 2025-03-31 DIAGNOSIS — N17.9 ACUTE KIDNEY INJURY SUPERIMPOSED ON CKD: ICD-10-CM

## 2025-03-31 LAB
ATRIAL RATE: 104 BPM
GLUCOSE BLD MANUAL STRIP-MCNC: 81 MG/DL (ref 74–99)
P AXIS: 14 DEGREES
P OFFSET: 190 MS
P ONSET: 140 MS
PR INTERVAL: 152 MS
Q ONSET: 216 MS
QRS COUNT: 17 BEATS
QRS DURATION: 90 MS
QT INTERVAL: 374 MS
QTC CALCULATION(BAZETT): 491 MS
QTC FREDERICIA: 449 MS
R AXIS: -15 DEGREES
T AXIS: 49 DEGREES
T OFFSET: 403 MS
VENTRICULAR RATE: 104 BPM

## 2025-03-31 PROCEDURE — 2500000004 HC RX 250 GENERAL PHARMACY W/ HCPCS (ALT 636 FOR OP/ED): Performed by: INTERNAL MEDICINE

## 2025-03-31 PROCEDURE — 1150000001 HC HOSPICE PRIVATE ROOM DAILY

## 2025-03-31 PROCEDURE — 2500000001 HC RX 250 WO HCPCS SELF ADMINISTERED DRUGS (ALT 637 FOR MEDICARE OP): Performed by: INTERNAL MEDICINE

## 2025-03-31 PROCEDURE — 99233 SBSQ HOSP IP/OBS HIGH 50: CPT | Performed by: INTERNAL MEDICINE

## 2025-03-31 PROCEDURE — 82947 ASSAY GLUCOSE BLOOD QUANT: CPT

## 2025-03-31 PROCEDURE — 99497 ADVNCD CARE PLAN 30 MIN: CPT | Performed by: INTERNAL MEDICINE

## 2025-03-31 PROCEDURE — 99239 HOSP IP/OBS DSCHRG MGMT >30: CPT | Performed by: INTERNAL MEDICINE

## 2025-03-31 RX ORDER — LORAZEPAM 0.5 MG/1
0.5 TABLET ORAL EVERY 4 HOURS PRN
Status: DISCONTINUED | OUTPATIENT
Start: 2025-03-31 | End: 2025-03-31

## 2025-03-31 RX ORDER — HYOSCYAMINE SULFATE 0.12 MG/1
0.12 TABLET, ORALLY DISINTEGRATING ORAL EVERY 4 HOURS PRN
Status: DISCONTINUED | OUTPATIENT
Start: 2025-03-31 | End: 2025-04-02 | Stop reason: HOSPADM

## 2025-03-31 RX ORDER — HALOPERIDOL 2 MG/ML
1 SOLUTION ORAL EVERY 4 HOURS PRN
Status: DISCONTINUED | OUTPATIENT
Start: 2025-03-31 | End: 2025-04-02 | Stop reason: HOSPADM

## 2025-03-31 RX ORDER — DEXAMETHASONE 4 MG/1
4 TABLET ORAL EVERY 12 HOURS SCHEDULED
Status: DISCONTINUED | OUTPATIENT
Start: 2025-03-31 | End: 2025-03-31

## 2025-03-31 RX ORDER — OXYCODONE HCL 5 MG/5 ML
5 SOLUTION, ORAL ORAL EVERY 4 HOURS PRN
Status: DISCONTINUED | OUTPATIENT
Start: 2025-03-31 | End: 2025-03-31

## 2025-03-31 RX ORDER — LORAZEPAM 0.5 MG/1
0.5 TABLET ORAL
Status: DISCONTINUED | OUTPATIENT
Start: 2025-03-31 | End: 2025-04-02 | Stop reason: HOSPADM

## 2025-03-31 RX ORDER — LORAZEPAM 2 MG/ML
0.5 INJECTION INTRAMUSCULAR EVERY 2 HOUR PRN
Status: DISCONTINUED | OUTPATIENT
Start: 2025-03-31 | End: 2025-04-02 | Stop reason: HOSPADM

## 2025-03-31 RX ORDER — OXYCODONE HCL 5 MG/5 ML
5 SOLUTION, ORAL ORAL
Status: DISCONTINUED | OUTPATIENT
Start: 2025-03-31 | End: 2025-04-01

## 2025-03-31 RX ORDER — MORPHINE SULFATE 10 MG/.5ML
5 SOLUTION ORAL
Status: DISCONTINUED | OUTPATIENT
Start: 2025-03-31 | End: 2025-03-31

## 2025-03-31 RX ORDER — LORAZEPAM 0.5 MG/1
0.5 TABLET ORAL EVERY 4 HOURS PRN
Status: DISCONTINUED | OUTPATIENT
Start: 2025-03-31 | End: 2025-03-31 | Stop reason: HOSPADM

## 2025-03-31 RX ORDER — DEXAMETHASONE 4 MG/1
4 TABLET ORAL EVERY 12 HOURS SCHEDULED
Status: DISCONTINUED | OUTPATIENT
Start: 2025-03-31 | End: 2025-03-31 | Stop reason: HOSPADM

## 2025-03-31 RX ORDER — OXYCODONE HCL 5 MG/5 ML
5 SOLUTION, ORAL ORAL
Status: DISCONTINUED | OUTPATIENT
Start: 2025-03-31 | End: 2025-03-31 | Stop reason: HOSPADM

## 2025-03-31 RX ADMIN — HYDROMORPHONE HYDROCHLORIDE 0.4 MG: 1 INJECTION, SOLUTION INTRAMUSCULAR; INTRAVENOUS; SUBCUTANEOUS at 17:40

## 2025-03-31 RX ADMIN — DEXAMETHASONE SODIUM PHOSPHATE 4 MG: 4 INJECTION, SOLUTION INTRAMUSCULAR; INTRAVENOUS at 20:11

## 2025-03-31 RX ADMIN — HYDROMORPHONE HYDROCHLORIDE 0.4 MG: 1 INJECTION, SOLUTION INTRAMUSCULAR; INTRAVENOUS; SUBCUTANEOUS at 15:12

## 2025-03-31 RX ADMIN — HYDROMORPHONE HYDROCHLORIDE 0.4 MG: 1 INJECTION, SOLUTION INTRAMUSCULAR; INTRAVENOUS; SUBCUTANEOUS at 22:28

## 2025-03-31 RX ADMIN — HYDROMORPHONE HYDROCHLORIDE 0.4 MG: 1 INJECTION, SOLUTION INTRAMUSCULAR; INTRAVENOUS; SUBCUTANEOUS at 13:53

## 2025-03-31 RX ADMIN — DEXAMETHASONE SODIUM PHOSPHATE 8 MG: 4 INJECTION, SOLUTION INTRAMUSCULAR; INTRAVENOUS at 15:12

## 2025-03-31 RX ADMIN — MORPHINE SULFATE 5 MG: 10 SOLUTION ORAL at 06:44

## 2025-03-31 RX ADMIN — HYDROMORPHONE HYDROCHLORIDE 0.4 MG: 1 INJECTION, SOLUTION INTRAMUSCULAR; INTRAVENOUS; SUBCUTANEOUS at 20:11

## 2025-03-31 RX ADMIN — HYDROMORPHONE HYDROCHLORIDE 0.4 MG: 1 INJECTION, SOLUTION INTRAMUSCULAR; INTRAVENOUS; SUBCUTANEOUS at 23:59

## 2025-03-31 ASSESSMENT — PAIN - FUNCTIONAL ASSESSMENT
PAIN_FUNCTIONAL_ASSESSMENT: 0-10

## 2025-03-31 ASSESSMENT — COGNITIVE AND FUNCTIONAL STATUS - GENERAL
DRESSING REGULAR UPPER BODY CLOTHING: A LOT
DRESSING REGULAR LOWER BODY CLOTHING: A LOT
DAILY ACTIVITIY SCORE: 12
PERSONAL GROOMING: A LOT
PERSONAL GROOMING: A LOT
TURNING FROM BACK TO SIDE WHILE IN FLAT BAD: A LOT
MOVING FROM LYING ON BACK TO SITTING ON SIDE OF FLAT BED WITH BEDRAILS: A LITTLE
MOBILITY SCORE: 11
TOILETING: A LOT
WALKING IN HOSPITAL ROOM: TOTAL
STANDING UP FROM CHAIR USING ARMS: A LOT
WALKING IN HOSPITAL ROOM: TOTAL
DRESSING REGULAR LOWER BODY CLOTHING: A LOT
STANDING UP FROM CHAIR USING ARMS: A LOT
HELP NEEDED FOR BATHING: A LOT
STANDING UP FROM CHAIR USING ARMS: A LOT
CLIMB 3 TO 5 STEPS WITH RAILING: TOTAL
DRESSING REGULAR LOWER BODY CLOTHING: A LOT
MOVING FROM LYING ON BACK TO SITTING ON SIDE OF FLAT BED WITH BEDRAILS: A LITTLE
EATING MEALS: A LOT
WALKING IN HOSPITAL ROOM: TOTAL
DRESSING REGULAR LOWER BODY CLOTHING: A LOT
MOBILITY SCORE: 11
EATING MEALS: A LOT
MOVING TO AND FROM BED TO CHAIR: A LOT
HELP NEEDED FOR BATHING: A LOT
CLIMB 3 TO 5 STEPS WITH RAILING: TOTAL
TOILETING: A LOT
MOBILITY SCORE: 11
TURNING FROM BACK TO SIDE WHILE IN FLAT BAD: A LOT
EATING MEALS: A LOT
HELP NEEDED FOR BATHING: A LOT
DAILY ACTIVITIY SCORE: 12
WALKING IN HOSPITAL ROOM: TOTAL
PERSONAL GROOMING: A LOT
CLIMB 3 TO 5 STEPS WITH RAILING: TOTAL
PERSONAL GROOMING: A LOT
HELP NEEDED FOR BATHING: A LOT
DRESSING REGULAR UPPER BODY CLOTHING: A LOT
TOILETING: A LOT
CLIMB 3 TO 5 STEPS WITH RAILING: TOTAL
EATING MEALS: A LOT
MOVING TO AND FROM BED TO CHAIR: A LOT
CLIMB 3 TO 5 STEPS WITH RAILING: TOTAL
TURNING FROM BACK TO SIDE WHILE IN FLAT BAD: A LOT
PERSONAL GROOMING: A LOT
EATING MEALS: A LOT
STANDING UP FROM CHAIR USING ARMS: A LOT
TURNING FROM BACK TO SIDE WHILE IN FLAT BAD: A LOT
DRESSING REGULAR LOWER BODY CLOTHING: A LOT
DRESSING REGULAR UPPER BODY CLOTHING: A LOT
TOILETING: A LOT
TOILETING: A LOT
MOVING TO AND FROM BED TO CHAIR: A LOT
HELP NEEDED FOR BATHING: A LOT
TOILETING: A LOT
MOVING FROM LYING ON BACK TO SITTING ON SIDE OF FLAT BED WITH BEDRAILS: A LITTLE
DRESSING REGULAR LOWER BODY CLOTHING: A LOT
DAILY ACTIVITIY SCORE: 12
CLIMB 3 TO 5 STEPS WITH RAILING: TOTAL
DAILY ACTIVITIY SCORE: 12
PERSONAL GROOMING: A LOT
STANDING UP FROM CHAIR USING ARMS: A LOT
WALKING IN HOSPITAL ROOM: TOTAL
MOBILITY SCORE: 11
MOVING FROM LYING ON BACK TO SITTING ON SIDE OF FLAT BED WITH BEDRAILS: A LITTLE
TURNING FROM BACK TO SIDE WHILE IN FLAT BAD: A LOT
DRESSING REGULAR UPPER BODY CLOTHING: A LOT
MOVING TO AND FROM BED TO CHAIR: A LOT
MOVING FROM LYING ON BACK TO SITTING ON SIDE OF FLAT BED WITH BEDRAILS: A LITTLE
STANDING UP FROM CHAIR USING ARMS: A LOT
MOVING TO AND FROM BED TO CHAIR: A LOT
MOBILITY SCORE: 11
HELP NEEDED FOR BATHING: A LOT
MOBILITY SCORE: 11
MOVING FROM LYING ON BACK TO SITTING ON SIDE OF FLAT BED WITH BEDRAILS: A LITTLE
WALKING IN HOSPITAL ROOM: TOTAL
MOVING TO AND FROM BED TO CHAIR: A LOT
TURNING FROM BACK TO SIDE WHILE IN FLAT BAD: A LOT
EATING MEALS: A LOT

## 2025-03-31 ASSESSMENT — PAIN SCALES - WONG BAKER
WONGBAKER_NUMERICALRESPONSE: HURTS EVEN MORE
WONGBAKER_NUMERICALRESPONSE: HURTS LITTLE MORE
WONGBAKER_NUMERICALRESPONSE: HURTS EVEN MORE
WONGBAKER_NUMERICALRESPONSE: HURTS WHOLE LOT

## 2025-03-31 ASSESSMENT — PAIN SCALES - GENERAL
PAINLEVEL_OUTOF10: 7
PAINLEVEL_OUTOF10: 8
PAINLEVEL_OUTOF10: 2
PAINLEVEL_OUTOF10: 7
PAINLEVEL_OUTOF10: 4

## 2025-03-31 ASSESSMENT — PAIN DESCRIPTION - DESCRIPTORS
DESCRIPTORS: DISCOMFORT
DESCRIPTORS: DISCOMFORT

## 2025-03-31 ASSESSMENT — PAIN DESCRIPTION - ORIENTATION: ORIENTATION: RIGHT;LEFT

## 2025-03-31 ASSESSMENT — PAIN DESCRIPTION - LOCATION: LOCATION: GENERALIZED

## 2025-03-31 NOTE — NURSING NOTE
Rapid Response RN Note    The following patient has a RADAR score of 9. [facility] T: 36.4 °C (97.6 °F) (Temporal), P: 80, R: (!) 35, BP: (!) 72/40, PulseOx: 94%      This RN Reviewed above VS with bedside RN.  Bedside RN reaching out to provider. Pt suppose to be going hospice tomorrow.

## 2025-03-31 NOTE — PROGRESS NOTES
"Spiritual Care Visit  Spiritual Care Request    Reason for Visit:  Routine Visit: Follow-up  Continue Visiting: Yes  Crisis Visit: Critical care   Request Received From:  Referral From: Family  Focus of Care:  Visited With: Patient and family together   Care Provided for Crisis Visit: Supported family   Refer to :  Referral To:    Spiritual Care Assessment    Spiritual Assessment:  The spiritual suffering of the family was due to the critical condition of the pt.   Patient Spiritual Care Encounters  Suffering Severity: Substantial (The spritual suffering of the family is due the critical condition of the family.)  Dignified Life Closure: Consistently demonstrated  Family Spiritual Care Encounters  Family Coping: Accepting (\"We just want her to be comfortable\")  Family Participation in Care: Consistently demonstrated  Family Support During Treatment: Consistently demonstrated  Care Provided:  Intended Effects: Journeying with someone in the grief process  Methods: Explore cm and values  Interventions: Acknowledge response to difficult experience  Sense of Community and or Mormonism Affiliation:  [employer]    Addressed Needs/Concerns and/or José Miguel Through:  Mormonism Encounters  Mormonism Needs: Prayer  Outcome:  Outcome of Spiritual Care Visit: Identifying patient's strengths/source of hope, Trust in self/others/God   Advance Directives:  Living Will Received     Spiritual Care Annotation    Annotation:  1st attempt:  Dr. Francis and Dr. Nicole was in consultation with the niece and not inturrupted.  2nd attempt: (9:45)  There were five relatives (2 nieces, 2 cousins and a sister) of the pt at bedside. It was said that there were more on the way.   The family was asked if they would like to pray. The prayer was given after sharing with the family her desires from a previous visit. \"I just want my soul to be at peace.\" She also had stated that she wanted her family to be taken care of. After the " prayer the hospice nurse, Nandini Melendez had arrived for a consultation. The family was left in consultation.    Chaplain Rich Appleton     Addendum:  In collaboration with Sonia Dennis the family choose songs that the pt could relate to as a former . The family expressed their appreciation for the songs.

## 2025-03-31 NOTE — PROGRESS NOTES
Elizabeth Buckner is a 84 y.o. female on day 0 of admission presenting with No Principal Problem: There is no principal problem currently on the Problem List. Please update the Problem List and refresh..    Subjective   Symptoms (0 - 10, Best to Worst)       Interval history:   Patient w/ worsening abdominal distention and pain. Now with colonic pseudo-obstruction /ileus. Apparently there was a discussion over the weekend about changing focus of care to comfort or at least putting some limitations on treatment.   They had agreed to get information from hospice.     Update: Signed onto Hospice, would like him to go to [facility] w/ [facility]. They unfortunately do not have beds today so we will GIP here.        Objective     Physical Exam  Constitutional:       General: She is in acute distress.      Appearance: Normal appearance. She is obese. She is ill-appearing.   HENT:      Head:      Comments: Mild bitemporal muscle wasting.     Mouth/Throat:      Mouth: Mucous membranes are moist.   Eyes:      Extraocular Movements: Extraocular movements intact.      Pupils: Pupils are equal, round, and reactive to light.   Cardiovascular:      Rate and Rhythm: Normal rate and regular rhythm.      Heart sounds: Normal heart sounds.   Pulmonary:      Comments: Tachypneic.   Abdominal:      General: There is distension.      Tenderness: There is abdominal tenderness.      Comments: Hypoactive bowel sounds.    Musculoskeletal:         General: Swelling and tenderness present.      Cervical back: Normal range of motion and neck supple.      Comments: RT LE >LT LE  Tender RT ankle but not hot or significantly swollen, no skin defect.    Skin:     General: Skin is warm and dry.   Neurological:      Mental Status: She is alert. She is disoriented.   Psychiatric:      Comments: Restless, moaning.          Last Recorded Vitals  There were no vitals taken for this visit.  Intake/Output last 3 Shifts:  No intake/output data recorded.    Wt Readings from  Last 10 Encounters:   03/20/25 72.5 kg (159 lb 13.3 oz)   03/10/25 64.6 kg (142 lb 6.7 oz)   03/09/25 68 kg (150 lb)   03/05/25 68.9 kg (152 lb)   01/22/25 73.6 kg (162 lb 4.1 oz)   12/06/24 73.9 kg (163 lb)   11/14/24 73.9 kg (163 lb)   11/13/24 75 kg (165 lb 5.5 oz)   11/08/24 75.9 kg (167 lb 5.3 oz)   07/30/24 76.5 kg (168 lb 10.4 oz)         Relevant Results    Results for orders placed or performed during the hospital encounter of 03/10/25 (from the past 24 hours)   POCT GLUCOSE   Result Value Ref Range    POCT Glucose 81 74 - 99 mg/dL     *Note: Due to a large number of results and/or encounters for the requested time period, some results have not been displayed. A complete set of results can be found in Results Review.       XR chest 1 view    Addendum Date: 3/15/2025    Potentially critical findings are discussed with and acknowledged by Yael Garcia RN at 7:38 PM Eastern time on 3/15/2025. Signed by Shavonne Ye DO    Result Date: 3/15/2025  1.Endotracheal tube with the tip at or less than 1 cm above the level of the fabrizio. Repositioning is recommended.  This could be pulled back about 4 to 5 cm. 2.Enteric tube and right internal jugular line in place. 3.No pneumothorax. 4.Normal heart size with no radiographic signs of active pulmonary parenchymal infiltration.  Known pleural and parenchymal nodules worrisome for metastatic disease seen on CT are not well visualized radiographically. Signed by Shavonne eY DO    MR brain w and wo IV contrast    Result Date: 3/11/2025  1. No acute infarct, hemorrhage or mass is noted. No abnormalities of the 4th ventricle are noted.   2. 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   MACRO: None   Signed by: Pete Rice 3/11/2025 12:21 PM Dictation workstation:   [id number]    US abdomen complete    Result Date: 3/11/2025  Obscuration the spleen and right kidney by bowel gas. Previous cholecystectomy.   Mildly small left kidney.  The left kidney was otherwise sonographically unremarkable.   There were 3 identifiable hypoechoic solid lesions in the liver. These are nonspecific and could be atypical hemangiomas or metastatic lesions. Recommend liver MRI in follow-up.   MACRO: None   Signed by: Carroll Corley 3/11/2025 8:04 AM Dictation workstation:   [id number]    Lower extremity venous duplex right    Result Date: 3/10/2025  1. Acute deep vein thrombosis at the right lower extremity from the inguinal region to the popliteal region. The right posterior tibial and peroneal veins were not visualized on this exam.     MACRO: Noa Adamson discussed the significance and urgency of this critical finding by telephone with  CYNTHIA HOWARD on 3/10/2025 at 11:24 pm.  (**-RCF-**) Findings:  See findings.     Signed by: Noa Adamson 3/10/2025 11:39 PM Dictation workstation:   [id number]    CT abdomen pelvis wo IV contrast    Result Date: 3/10/2025  1.  Extensive retroperitoneal and bilateral pelvic lymphadenopathy as above concerning for lymphoma or metastatic disease. 2. Suspect acute deep venous thrombosis within the right common femoral vein and right external iliac vein. Correlation with ultrasound findings recommended. 3. Numerous liver masses suggestive of metastatic liver disease. Correlation with ultrasound findings can be performed as clinically warranted. 4. Bilateral renal lesions, nonspecific in etiology and may represent cysts but incompletely characterized in this unenhanced exam. Correlation with ultrasound findings can be performed as clinically warranted. 5. Air in the bladder which may be due to bladder instrumentation. Correlation with urinalysis recommended. 6. Additional detailed findings as above. Critical Finding:  See findings. Notification was initiated on 3/10/2025 at 7:54 pm by  Esvin Philippe.  (**-YCF-**) Instructions:   7.     Signed by: Esvin Philippe 3/10/2025 7:54 PM Dictation workstation:   [id number]    CT head wo IV  contrast    Result Date: 3/10/2025  Mass effect upon the 4th ventricle centrally. Further evaluation with contrast-enhanced MRI is recommended for better assessment.   No evidence for acute cortical infarction or acute intracranial hemorrhage is seen.   MACRO: Critical Finding:  See findings. Notification was initiated on 3/10/2025 at 7:46 pm by  Esvin Philippe.  (**-YCF-**)   Signed by: Esvin Philippe 3/10/2025 7:46 PM Dictation workstation:   [id number]    XR chest 2 views    Result Date: 3/10/2025  No acute process. Signed by Kamaljit Alex MD    CT chest wo IV contrast    Result Date: 2/18/2025  Previous bilateral mastectomy. Presumed mild post radiation scarring anteriorly in each upper lobe subjacent to each mastectomy site.   Development of multiple scattered bilateral lung pleural and parenchymal densities consistent with new metastatic lesions.   Stable right axillary surgical clips. No suspicious thoracic adenopathy in this unenhanced exam.   Mild cardiomegaly.   Calcified hepatic granulomas. Previous cholecystectomy.   Bilateral upper pole renal cysts.   Hepatic flexure colonic diverticulosis..   MACRO: None   Signed by: Carroll Corley 2/18/2025 2:05 PM Dictation workstation:   [id number]    CT head wo IV contrast    Result Date: 2/18/2025  No acute intracranial bleed or focal mass effect.   Mild volume loss.   Mild chronic white matter ischemic disease in the deep periventricular regions.   MACRO: None   Signed by: Carroll Corley 2/18/2025 1:50 PM Dictation workstation:   [id number]    CT soft tissue neck wo IV contrast    Result Date: 2/18/2025  There are surgical clips noted within the right supraclavicular region. There is nonspecific ill-defined intermediate attenuation noted surrounding the surgical clips with adjacent skin thickening which while nonspecific may be postsurgical in etiology.   There are scattered nonspecific abnormally enlarged lymph nodes noted within the neck. Specifically, there are  enlarged level 1B submandibular nodes bilaterally with the largest on the left measuring approximately 23 mm in greatest axial dimension and the largest on the right measuring approximately 20 mm in greatest axial dimension. There is an enlarged left superior clavicular lymph node measuring approximately 18 mm in greatest coronal dimension. There is an adjacent smaller but abnormally enlarged 13 mm left supraclavicular lymph node.   There are nonspecific prominent nodular foci of intermediate attenuation noted within and/or along the margins of the parotid glands bilaterally which while nonspecific raises the possibility of additional scattered prominent parotid and/or periparotid lymph nodes with the largest on the left measuring approximately 17 mm in greatest axial dimension and the largest on the right measuring approximately 13 mm in greatest axial dimension.   There is multilevel cervical spondylosis with the most pronounced posterior osteophytic spurring noted at the C5/6 level.   Please see the dedicated report of the CT of the chest for details of findings within the chest.   MACRO: None.   Signed by: Jefferson Rosales 2/18/2025 12:59 PM Dictation workstation:   [id number]     Scheduled medications  dexAMETHasone, 4 mg, oral, q12h GRADY      Continuous medications       PRN medications  PRN medications: haloperidol, hyoscyamine, LORazepam, oxyCODONE                   Assessment/Plan   IMP: 84-year-old female with history of right breast carcinoma first diagnosed in 1991 status post breast conserving surgery, radiation and tamoxifen who then went on to have a recurrence in 2012 and had complete mastectomy.  She then had breast cancer in her left breast and had a left mastectomy followed by radiation and trastuzumab.  Finally she had another recurrence in November 2021 and was on anastrozole and palbociclib.  She is presenting with confusion and weakness found to have a UTI.  She has been having recurrent falls and  is losing weight.  She likely has a large disease burden in her abdomen including diffuse LAD and several liver masses suspicious for malignancy.  She was due for a PET scan after an 11/20/2024 CT showed possible progression in her pelvic lymph nodes however she did not follow-up.  She had a sentinel event with hematemesis from Gary IIB ulcer in gastric fundus. Palliative consulted for goals of care and symptom management in the setting of likely disease progression.  Unfortunately since signing off, patient has developed a bowel obstruction likely 2/2 her abdominal metastatic disease. Family has elected for Hospice care so she is DNRCC and will be GIP here until a bed opens up for her at [facility].  That is the preferred venue b/c her  is currently admitted in the ICU at [facility].        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  DNRCC today  Coordinating with Nandini Melendez RN w/ [facility]. We will GIP patient here until bed becomes available.   D/w nursing. Getting another IV would help get symptoms under control.   NPO except fluids and meds for comfort.   Starting dexamethasone 4 mg BID PO until IV available  Starting Oxycodone liquid 5mg q1h prn since she has HASMUKH and do not want to provoke neurotoxicity.   Starting lorazepam 0.5 mg q1h PO  Will add IV medications once IV route available.   Will continue to follow while inpatient.        Patient/proxy preference for information  Prefers full information     Goals of Care  DNRCC     Is the patient hospice-eligible?   Yes  Was a discussion held re hospice services?   Yes  Was a decision made re hospice services?  Yes, hospice house admission for symptomatic malignant bowel obstruction.        I spent 50 minutes in the professional and overall care of this patient.  I spent 30 minutes in the professional and  overall care of this patient related to ACP in addition to other time spent in assessment, chart review, and coordination of care        Update:   Patient had IV placed. Started hydromorphone 0.4mg q1h for indicators of pain and respiratory rate over 20BPM. Also ativan 0.5mg IV q1h for restlessness, agitation, anxiety.   Patient received hydromorphone 0.8mg so far with some improvement but still looking somewhat uncomfortable and tacyhpneic.   Starting scheduled hydromorphone 0.4mg IV q4h based on current needs. I think she will likely still need PRNs during this time.   D/w family, they are in agreement.   D/w Nursing Manager Candace Chinchilla to have RN coverage so that patient's own nurse can administer meds.      Richar Francis MD

## 2025-03-31 NOTE — PROGRESS NOTES
PALLIATIVE CARE SOCIAL WORK NOTE     Hospice order was placed over the weekend. Referral was made to [facility] via [facility]. [facility] MIKE Delatorre met with pt's nieces this morning. Pt's  unavailable secondary to being inpatient at another hospital himself. Family made the decision to transition to hospice care. They talked about flipping to GIP vs transferring to Hospice House. [facility] the preferred [facility] secondary to being so close to [facility] where pt's  is. No beds currently available at [facility]. She has been flipped to GIP status with [facility]. [facility] will continue to follow and will transfer to [facility] when bed available. Thank you.     ZACKARY Shipley

## 2025-03-31 NOTE — PROGRESS NOTES
03/31/25 0934   Discharge Planning   Who is requesting discharge planning? Provider   Home or Post Acute Services Post acute facilities (Rehab/SNF/etc)   Type of Post Acute Facility Services Skilled nursing   Expected Discharge Disposition SNF   Does the patient need discharge transport arranged? Yes   RoundTrip coordination needed? Yes   Has discharge transport been arranged? No   Intensity of Service   Intensity of Service 0-30 min     3/31/25 0934  Patient with ileus and abdominal distention.  Refusing NG.  Family to meet with [facility] today at  0930 to discuss possible hospice.  Auth good for [facility] through 4/9/25.  Jaleesa Mahajan RN TCC    3/31/25 1046  Family signed with [facility].  Plan to switch patient to [facility] and transfer to hospice house when bed is available.  Jaleesa Mahajan RN TCC

## 2025-03-31 NOTE — PROGRESS NOTES
Music Therapy Note    Elizabeth Buckner was referred by REBECCA Francis MD    Therapy Session  Referral Type: New referral this admission  Visit Type: Follow-up visit  Session Start Time: 1317  Session End Time: 1350  Intervention Delivery: In-person  Conflict of Service: None  Number of family members present: 3  Family Participation: Supportive     Pre-assessment  Unable to Assess Reason: Physical limitation         Treatment/Interventions  Areas of Focus: End of life support, Family/caregiver support  Music Therapy Interventions: Live music listening, Song dedication    Post-assessment  Unable to Assess Reason: Physical limitation  Total Session Time (min): 33 minutes    Narrative  Assessment Detail: Pt was resting in bed with no signs of distress. Three family/visitors present at bedside were welcoming to the MT and  and agreed to session. Pt's family shared pt's preference for gospel music and her background as a singer in choir.  Plan: MT engaged pt and family in live/recorded music listening and song dedication for end of life support and family support.  Intervention: Pt's family were given the opportunity to each choose songs to dedicate to pt, as gospel music as been a large part of her life. MT facilitated chosen selections via live piano, voice, and iPad speaker.  Evaluation: Pt continued to rest throughout the music in no distress, with intermittent vocalizations which family attributed as responses to the music. Pt's family shared about the significance of songs selected and their importance to pt, and at the end of the session expressed gratitude.  Follow-up: Will follow up as needed.    Education Documentation  No documentation found.

## 2025-03-31 NOTE — SIGNIFICANT EVENT
Family decided to pursue hospice. Patient will be discharged and readmitted to [facility] hospice until hospice bed is available. GI will sign off, please call if any questions or further assistance needed.

## 2025-03-31 NOTE — DISCHARGE SUMMARY
Discharge Diagnosis  General weakness    Issues Requiring Follow-Up      Discharge Meds     Medication List      CONTINUE taking these medications     Elbow Compression Sleeve misc; Generic drug: arm brace; Lymphedema   Sleeve  and Gauntlet eval and fit 20-30 mmHG for right  arm     STOP taking these medications     anastrozole 1 mg tablet; Commonly known as: Arimidex   aspirin 81 mg EC tablet   azelastine 0.05 % ophthalmic solution; Commonly known as: Optivar   carvedilol 12.5 mg tablet; Commonly known as: Coreg   ciprofloxacin 250 mg tablet; Commonly known as: Cipro   diclofenac sodium 1 % gel; Commonly known as: Voltaren Arthritis Pain   FLAXSEED OIL ORAL   fluticasone 50 mcg/actuation nasal spray; Commonly known as: Flonase   furosemide 20 mg tablet; Commonly known as: Lasix   gabapentin 300 mg capsule; Commonly known as: Neurontin   hydrALAZINE 100 mg tablet; Commonly known as: Apresoline   nitrofurantoin (macrocrystal-monohydrate) 100 mg capsule; Commonly known   as: Macrobid   palbociclib 75 mg tablet; Commonly known as: Ibrance   TURMERIC ORAL   VITAMIN D3 ORAL       Test Results Pending At Discharge  Pending Labs       Order Current Status    Surgical Pathology Exam In process            Hospital Course     HASMUKH  -Cr elevated  -IV fluids  -recheck in am     Abdominal distension  -KUB shows colonic gaseous distention  - Dulcolax suppository given   - s/p soapsuds enema  -had bowel movement this morning  -CT abd /pel noted  -Discussed with GI team, recommended NGT placement  - patient could not tolerate NG tube     Pneumonia  -aspiration?  -started on ceftriaxone and doxycycline     GI bleed due to cratered ulcer in the fundus of the stomach  - EGD on 3/17 showed Single 8 mm cratered ulcer in the fundus of the stomach with adherent clot (Gary IIB); placed MRI conditional clips; injected 7 mL of epinephrine to address bleeding; hemostasis achieved. The mucosa surrounding the clot was injected with  epinephrine.  - EGD on 3/20 showed Single 7 mm x 8 mm cratered, benign-appearing ulcer in the fundus of the stomach with clean base (Gary III); performed cold forceps biopsy from ulcer edge to rule out malignancy; placed 3 clips successfully and 1 clip got dislodged after placement (clips are MRI conditional and through-the-scope); hemostasis achieved.   - H Pylori stool antigen ordered, awaiting RN to collect  - PPI bid for 3 months, then once daily; repeat EGD in 3 months     ABLA  Hypotension 2/2 acute hemorrhagic shock   - due to UGIB; s/p 4 units PRBCs and IVF resuscitation. Initially was refractory and needed vasopressor support. Now resolved, no longer needing vasopressor support; transferred out of ICU on 3/17     Acute RLE DVT  - LE Duplex with acute deep vein thrombosis at the right lower extremity from the inguinal region to the popliteal region  - Unable to anticoagulate due to severe bleeding from ulcer; IR consulted s/p IVC filter placement on 3/20  - will need OP follow up with vascular medicine     Acute RUE DVT  - likely provoked from recent CVC  - New onset RUE edema noted on 3/21. US showed cute non-occlusive deep vein thrombosis visualized in the right internal jugular vein. Discussed with GI, ok to challenge with heparin gtt. Hgb stable, no melena or BRBPR. Hgb 6.7, repeat 7.9. Transitioned from heparin gtt to OAC but Hgb labile, continue to monitor.  -transitioned to eliquis     Leukocytosis  - UCX negative. Blood culture negative. CXR without PNA. CT AP with concern for metastatic disease  - Persists despite 7d course of empiric abx --> suspect due to malignancy/stress dose steroids  - fever on 3/19 but no clinical signs or symptoms of infection. COVID/Flu negative. CXR unrevealing. No recurrence of fever. Monitor wbc/fever curve as work up thus far unrevealing.      PAF, new onset  - suspect triggered by acute illness; continue bb.  - back on heparin gtt due to RUE DVT but if bleeds again  will need to hold     HASMUKH on CKD2  - Baseline Cr 1-1.2; Cr peaked at 1.6, now improved to 1.3     HTN  - continue coreg; hydralazine, lasix held due to shock and BP now normal, will restart if BP rises     Stage IV breast cancer   - history of right breast carcinoma first diagnosed in 1991 status post breast conserving surgery, radiation and tamoxifen who then went on to have a recurrence in 2012 and had complete mastectomy. She then had breast cancer in her left breast and had a left mastectomy followed by radiation and trastuzumab. Finally she had another recurrence in November 2021 and was on anastrozole and palbociclib.   - a large disease burden in her abdomen including diffuse LAD and several liver masses suspicious for malignancy. She was due for a PET scan after an 11/20/2024 CT showed possible progression in her pelvic lymph nodes however she did not follow-up. Needs OP follow up with oncology vs hospice     Recent UTI  - Urine culture 3/1 with E Coli; Repeat Ucx on 3/11 negative.     Family decided to pursue hospice.  Patient will be discharged and readmitted to [facility] hospice until hospice services can be arranged.    Discharge time spent more than 30 minutes.    Pertinent Physical Exam At Time of Discharge  Physical Exam  HENT:      Mouth/Throat:      Mouth: Mucous membranes are moist.      Pharynx: Oropharynx is clear.   Cardiovascular:      Rate and Rhythm: Normal rate and regular rhythm.   Pulmonary:      Effort: Pulmonary effort is normal.   Abdominal:      General: There is distension.      Palpations: Abdomen is soft.      Tenderness: There is no abdominal tenderness.   Musculoskeletal:      Comments: RUE edema. No LUE Edema   Neurological:      Mental Status: She is alert.   Relevant Results  No results found. However, due to the size of the patient record, not all encounters were searched. Please check Results Review for a complete set of results.  Outpatient Follow-Up  Future Appointments   Date  Time Provider Department [place]   4/2/2025 12:00 PM OSITO FisherCNP [id number] [facility]   4/16/2025 10:00 AM DANETTE Fisher [id number] [facility]   6/2/2025 10:30 AM INF 12 MINOFF [id number] [facility]         Yonas Nicole MD

## 2025-03-31 NOTE — CARE PLAN
Problem: Fall/Injury  Goal: Use assistive devices by end of the shift  Outcome: Progressing     Problem: Skin  Goal: Promote skin healing  Outcome: Progressing  Flowsheets (Taken 3/30/2025 2319)  Promote skin healing:   Assess skin/pad under line(s)/device(s)   Protective dressings over bony prominences   Turn/reposition every 2 hours/use positioning/transfer devices     Problem: Discharge Planning  Goal: Discharge to home or other facility with appropriate resources  Outcome: Progressing     Problem: Chronic Conditions and Co-morbidities  Goal: Patient's chronic conditions and co-morbidity symptoms are monitored and maintained or improved  Outcome: Progressing     Problem: Nutrition  Goal: Nutrient intake appropriate for maintaining nutritional needs  Outcome: Progressing

## 2025-03-31 NOTE — H&P
History Of Present Illness  Elizabeth Buckner is a 84 y.o. female with PMHx s/f stage IV breast ca s/p bilateral mastectomy as well as treated with radiation and tamoxifen, anastrozole and palbociclib (2021), lymph node dissection, CRF cerebral, hepatic, pelvic, and brain mets who presented to [facility] with complaints of RLE edema and altered mental status with generalized weakness progressively worsening over the past few days prior to admission. Patient has a complicated hospital stay.  Eventually family decided to pursue hospice for this patient.     Past Medical History  Past Medical History:   Diagnosis Date    Anxiety     Breast cancer (Multi)     CKD (chronic kidney disease)     Depression     DVT (deep venous thrombosis) (Multi) 03/10/2025    Right leg    Endometrial cancer (Multi)     GERD (gastroesophageal reflux disease)     HTN (hypertension)     Hypothyroidism        Surgical History  Past Surgical History:   Procedure Laterality Date    AXILLARY NODE DISSECTION  2013    BREAST LUMPECTOMY Right 1991    CHOLECYSTECTOMY  2014    COLONOSCOPY      ESOPHAGOGASTRODUODENOSCOPY      MASTECTOMY Right 2012    TOTAL ABDOMINAL HYSTERECTOMY W/ BILATERAL SALPINGOOPHORECTOMY  2008    US GUIDED MEDIASTINUM PERCUTANEOUS BIOPSY  09/22/2021    US GUIDED MEDIASTINUM PERCUTANEOUS BIOPSY 9/22/2021 [facility] ANCILLARY LEGACY        Social History  She reports that she has quit smoking. Her smoking use included cigarettes. She has never used smokeless tobacco. She reports that she does not drink alcohol and does not use drugs.    Family History  Family History   Problem Relation Name Age of Onset    Cancer Other          Family History    Lung disease Other          Family History        Allergies  Penicillins and Ramipril         Physical Exam   HENT:      Mouth/Throat:      Mouth: Mucous membranes are moist.      Pharynx: Oropharynx is clear.   Cardiovascular:      Rate and Rhythm: Normal rate and regular rhythm.   Pulmonary:      Effort:  Pulmonary effort is normal.   Abdominal:      General: There is distension.      Palpations: Abdomen is soft.      Tenderness: There is no abdominal tenderness.   Musculoskeletal:      Comments: RUE edema. No LUE Edema   Neurological:      Mental Status: She is alert.  Last Recorded Vitals  There were no vitals taken for this visit.    Relevant Results             Assessment/Plan   Assessment & Plan      # [facility] hospice  -DNR comfort care  -comfort meds  -hospice consult      Plan to keep her [facility] hospice until hospice bed is available at [facility].        Yonas Nicole MD

## 2025-03-31 NOTE — NURSING NOTE
Met with family at the bedside, pts son Kamaljit via phone.  Pts son Kamaljit Stewart does not have a email, he deferred signing consents to his Aunt Geraldine Perez, pt's sister.  All family members agreed to move to hospice care, consents obtained.  Discussed GIP here at [facility].  No beds available at [facility] at this time.   Will GIP here until bed is available at [facility].  Dr Nicole and Tito agreeable with GIP at [facility]

## 2025-03-31 NOTE — PROGRESS NOTES
Nutrition Follow-up Note  Nutrition Assessment       Chart reviewed for follow-up, significant decline in medical condition noted and family has decided to transition to Hospice care. Diet not tolerated or appropriate at this time.  Nutrition services will sign off.      History:  Energy Intake: Poor < 50 %  Food and Nutrient History: abdominal distention & pain noted.  Defers NG placement.        Nutrition Diagnosis   Malnutrition Diagnosis  Patient has Malnutrition Diagnosis: Yes  Diagnosis Status: Active  Malnutrition Diagnosis: Severe malnutrition related to chronic disease or condition  Related to: catabolic illness  As Evidenced by: pt with 8% weight decrease over past four months, suspect oral intake less than 75% of estimated energy requirements for greater than one month, visualized areas of depleted adipose tissues and skeletal tissue wasting  Additional Assessment Information: suspect edema may be masking accurate wt    Patient has Nutrition Diagnosis: Yes  Nutrition Diagnosis 1: Inadequate protein energy intake  Diagnosis Status (1): Active  Related to (1): altered GI function  As Evidenced by (1): refusing intake, abdominal distention & pain.       Nutrition Interventions/Recommendations   Individualized Nutrition Prescription Provided for : No intervention appropriate at this time.        Last Date of Nutrition Visit: 03/31/25  Nutrition Follow-Up Needed?: Dietitian to reassess per policy  Follow up Comment: [facility]ice-[facility]

## 2025-03-31 NOTE — TELEPHONE ENCOUNTER
Returned call from patient kirby Uriostegui, we have reviewed continued worsening of clinical course and is going to meet with hospice team today. Active and supportive listening utilized. Reviewed I will see her tomorrow while I am Tan.

## 2025-04-01 ENCOUNTER — APPOINTMENT (OUTPATIENT)
Dept: HEMATOLOGY/ONCOLOGY | Facility: CLINIC | Age: 85
End: 2025-04-01
Payer: COMMERCIAL

## 2025-04-01 LAB
LAB AP ASR DISCLAIMER: NORMAL
LABORATORY COMMENT REPORT: NORMAL
PATH REPORT.COMMENTS IMP SPEC: NORMAL
PATH REPORT.FINAL DX SPEC: NORMAL
PATH REPORT.GROSS SPEC: NORMAL
PATH REPORT.TOTAL CANCER: NORMAL
RESIDENT REVIEW: NORMAL

## 2025-04-01 PROCEDURE — 99231 SBSQ HOSP IP/OBS SF/LOW 25: CPT | Performed by: INTERNAL MEDICINE

## 2025-04-01 PROCEDURE — 99233 SBSQ HOSP IP/OBS HIGH 50: CPT | Performed by: INTERNAL MEDICINE

## 2025-04-01 PROCEDURE — 2500000004 HC RX 250 GENERAL PHARMACY W/ HCPCS (ALT 636 FOR OP/ED): Performed by: INTERNAL MEDICINE

## 2025-04-01 PROCEDURE — 1150000001 HC HOSPICE PRIVATE ROOM DAILY

## 2025-04-01 RX ADMIN — HYDROMORPHONE HYDROCHLORIDE 0.5 MG: 1 INJECTION, SOLUTION INTRAMUSCULAR; INTRAVENOUS; SUBCUTANEOUS at 13:33

## 2025-04-01 RX ADMIN — LORAZEPAM 0.5 MG: 2 INJECTION INTRAMUSCULAR; INTRAVENOUS at 11:54

## 2025-04-01 RX ADMIN — DEXAMETHASONE SODIUM PHOSPHATE 4 MG: 4 INJECTION, SOLUTION INTRAMUSCULAR; INTRAVENOUS at 09:40

## 2025-04-01 RX ADMIN — HYDROMORPHONE HYDROCHLORIDE 0.5 MG: 1 INJECTION, SOLUTION INTRAMUSCULAR; INTRAVENOUS; SUBCUTANEOUS at 17:39

## 2025-04-01 RX ADMIN — HYDROMORPHONE HYDROCHLORIDE 0.4 MG: 1 INJECTION, SOLUTION INTRAMUSCULAR; INTRAVENOUS; SUBCUTANEOUS at 06:27

## 2025-04-01 RX ADMIN — HYDROMORPHONE HYDROCHLORIDE 0.5 MG: 1 INJECTION, SOLUTION INTRAMUSCULAR; INTRAVENOUS; SUBCUTANEOUS at 11:53

## 2025-04-01 RX ADMIN — HYDROMORPHONE HYDROCHLORIDE 0.5 MG: 1 INJECTION, SOLUTION INTRAMUSCULAR; INTRAVENOUS; SUBCUTANEOUS at 20:00

## 2025-04-01 RX ADMIN — HYDROMORPHONE HYDROCHLORIDE 0.5 MG: 1 INJECTION, SOLUTION INTRAMUSCULAR; INTRAVENOUS; SUBCUTANEOUS at 23:40

## 2025-04-01 RX ADMIN — DEXAMETHASONE SODIUM PHOSPHATE 4 MG: 4 INJECTION, SOLUTION INTRAMUSCULAR; INTRAVENOUS at 20:01

## 2025-04-01 RX ADMIN — HYDROMORPHONE HYDROCHLORIDE 0.4 MG: 1 INJECTION, SOLUTION INTRAMUSCULAR; INTRAVENOUS; SUBCUTANEOUS at 02:54

## 2025-04-01 RX ADMIN — HYDROMORPHONE HYDROCHLORIDE 0.4 MG: 1 INJECTION, SOLUTION INTRAMUSCULAR; INTRAVENOUS; SUBCUTANEOUS at 09:44

## 2025-04-01 RX ADMIN — LORAZEPAM 0.5 MG: 2 INJECTION INTRAMUSCULAR; INTRAVENOUS at 21:06

## 2025-04-01 ASSESSMENT — COGNITIVE AND FUNCTIONAL STATUS - GENERAL
DRESSING REGULAR UPPER BODY CLOTHING: A LOT
EATING MEALS: A LOT
DRESSING REGULAR LOWER BODY CLOTHING: A LOT
DAILY ACTIVITIY SCORE: 12
TURNING FROM BACK TO SIDE WHILE IN FLAT BAD: A LOT
STANDING UP FROM CHAIR USING ARMS: A LOT
CLIMB 3 TO 5 STEPS WITH RAILING: TOTAL
MOVING TO AND FROM BED TO CHAIR: A LOT
MOVING FROM LYING ON BACK TO SITTING ON SIDE OF FLAT BED WITH BEDRAILS: A LITTLE
PERSONAL GROOMING: A LOT
MOVING FROM LYING ON BACK TO SITTING ON SIDE OF FLAT BED WITH BEDRAILS: A LITTLE
WALKING IN HOSPITAL ROOM: TOTAL
MOBILITY SCORE: 11
HELP NEEDED FOR BATHING: A LOT
DAILY ACTIVITIY SCORE: 12
MOVING FROM LYING ON BACK TO SITTING ON SIDE OF FLAT BED WITH BEDRAILS: A LITTLE
TOILETING: A LOT
MOBILITY SCORE: 11
MOVING TO AND FROM BED TO CHAIR: A LOT
STANDING UP FROM CHAIR USING ARMS: A LOT
EATING MEALS: A LOT
DAILY ACTIVITIY SCORE: 12
PERSONAL GROOMING: A LOT
MOVING TO AND FROM BED TO CHAIR: A LOT
DAILY ACTIVITIY SCORE: 12
EATING MEALS: A LOT
WALKING IN HOSPITAL ROOM: TOTAL
HELP NEEDED FOR BATHING: A LOT
CLIMB 3 TO 5 STEPS WITH RAILING: TOTAL
CLIMB 3 TO 5 STEPS WITH RAILING: TOTAL
WALKING IN HOSPITAL ROOM: TOTAL
PERSONAL GROOMING: A LOT
HELP NEEDED FOR BATHING: A LOT
MOBILITY SCORE: 11
MOVING FROM LYING ON BACK TO SITTING ON SIDE OF FLAT BED WITH BEDRAILS: A LITTLE
STANDING UP FROM CHAIR USING ARMS: A LOT
DAILY ACTIVITIY SCORE: 12
STANDING UP FROM CHAIR USING ARMS: A LOT
TOILETING: A LOT
HELP NEEDED FOR BATHING: A LOT
EATING MEALS: A LOT
PERSONAL GROOMING: A LOT
DRESSING REGULAR UPPER BODY CLOTHING: A LOT
MOVING TO AND FROM BED TO CHAIR: A LOT
DRESSING REGULAR LOWER BODY CLOTHING: A LOT
MOBILITY SCORE: 11
EATING MEALS: A LOT
TURNING FROM BACK TO SIDE WHILE IN FLAT BAD: A LOT
TOILETING: A LOT
TURNING FROM BACK TO SIDE WHILE IN FLAT BAD: A LOT
DRESSING REGULAR UPPER BODY CLOTHING: A LOT
MOVING TO AND FROM BED TO CHAIR: A LOT
DRESSING REGULAR UPPER BODY CLOTHING: A LOT
TOILETING: A LOT
WALKING IN HOSPITAL ROOM: TOTAL
CLIMB 3 TO 5 STEPS WITH RAILING: TOTAL
PERSONAL GROOMING: A LOT
EATING MEALS: A LOT
WALKING IN HOSPITAL ROOM: TOTAL
CLIMB 3 TO 5 STEPS WITH RAILING: TOTAL
MOBILITY SCORE: 11
DAILY ACTIVITIY SCORE: 12
STANDING UP FROM CHAIR USING ARMS: A LOT
MOVING FROM LYING ON BACK TO SITTING ON SIDE OF FLAT BED WITH BEDRAILS: A LITTLE
DRESSING REGULAR LOWER BODY CLOTHING: A LOT
TURNING FROM BACK TO SIDE WHILE IN FLAT BAD: A LOT
PERSONAL GROOMING: A LOT
DRESSING REGULAR LOWER BODY CLOTHING: A LOT
TURNING FROM BACK TO SIDE WHILE IN FLAT BAD: A LOT
DRESSING REGULAR UPPER BODY CLOTHING: A LOT
DRESSING REGULAR LOWER BODY CLOTHING: A LOT
CLIMB 3 TO 5 STEPS WITH RAILING: TOTAL
DRESSING REGULAR UPPER BODY CLOTHING: A LOT
WALKING IN HOSPITAL ROOM: TOTAL
TOILETING: A LOT
TOILETING: A LOT
STANDING UP FROM CHAIR USING ARMS: A LOT
DRESSING REGULAR LOWER BODY CLOTHING: A LOT
HELP NEEDED FOR BATHING: A LOT
MOBILITY SCORE: 11
HELP NEEDED FOR BATHING: A LOT
MOVING TO AND FROM BED TO CHAIR: A LOT
MOVING FROM LYING ON BACK TO SITTING ON SIDE OF FLAT BED WITH BEDRAILS: A LITTLE
TURNING FROM BACK TO SIDE WHILE IN FLAT BAD: A LOT

## 2025-04-01 ASSESSMENT — PAIN SCALES - PAIN ASSESSMENT IN ADVANCED DEMENTIA (PAINAD)
CONSOLABILITY: NO NEED TO CONSOLE
NEGVOCALIZATION: OCCASIONAL MOAN/GROAN, LOW SPEECH, NEGATIVE/DISAPPROVING QUALITY
NEGVOCALIZATION: OCCASIONAL MOAN/GROAN, LOW SPEECH, NEGATIVE/DISAPPROVING QUALITY
TOTALSCORE: 1
CONSOLABILITY: NO NEED TO CONSOLE
BODYLANGUAGE: RELAXED
FACIALEXPRESSION: SMILING OR INEXPRESSIVE
BREATHING: OCCASIONAL LABORED BREATHING, SHORT PERIOD OF HYPERVENTILATION
BREATHING: NORMAL
FACIALEXPRESSION: SMILING OR INEXPRESSIVE
TOTALSCORE: 3
TOTALSCORE: MEDICATION (SEE MAR)
BODYLANGUAGE: TENSE, DISTRESSED PACING, FIDGETING

## 2025-04-01 ASSESSMENT — PAIN SCALES - WONG BAKER
WONGBAKER_NUMERICALRESPONSE: HURTS LITTLE MORE
WONGBAKER_NUMERICALRESPONSE: HURTS WHOLE LOT

## 2025-04-01 ASSESSMENT — PAIN - FUNCTIONAL ASSESSMENT: PAIN_FUNCTIONAL_ASSESSMENT: 0-10

## 2025-04-01 ASSESSMENT — PAIN SCALES - GENERAL
PAINLEVEL_OUTOF10: 5 - MODERATE PAIN
PAINLEVEL_OUTOF10: 7

## 2025-04-01 NOTE — NURSING NOTE
"GIP DAY 2-  Family at bedside.  Pt  is awake, she moans when she is touched, has facial grimacing.  LS CTA, RR 28,   Pt admits to abd pain but also pain \"all over\", unable to put a number on the pain.    Pt has received Dilaudid 3.6 MG IVP over 24 hrs.   Pts Sister Geraldine at Bedside, family wants her Tfr to [facility] as soon as a bed is available.  Currently, there is no bed at [facility]. Advised Geraldine as soon as we have a bed I will initiate a TFR, she is agreeable.  Dr Francis into assess and adjust meds accordingly.    "

## 2025-04-01 NOTE — PROGRESS NOTES
Elizabeth Buckner is a 84 y.o. female on day 1 of admission presenting with No Principal Problem: There is no principal problem currently on the Problem List. Please update the Problem List and refresh..    Subjective   Symptoms (0 - 10, Best to Worst)  [place] Symptom Assessment System  0-10 (Numeric) Pain Score: 7    Interval history:   Still waiting for bed at [facility].    Total OME/24h: 45OME  Morphine:   Oxycodone:   Hydromorphone: 9x 0.4mg IV 3.6mg  Fentanyl:            Objective     Physical Exam  Constitutional:       General: She is in acute distress.      Appearance: Normal appearance. She is obese. She is ill-appearing.   HENT:      Head:      Comments: Mild bitemporal muscle wasting.     Mouth/Throat:      Mouth: Mucous membranes are moist.   Eyes:      Extraocular Movements: Extraocular movements intact.      Pupils: Pupils are equal, round, and reactive to light.   Cardiovascular:      Rate and Rhythm: Normal rate and regular rhythm.      Heart sounds: Normal heart sounds.   Pulmonary:      Comments: Tachypneic.   Abdominal:      General: There is distension.      Tenderness: There is abdominal tenderness.      Comments: Hypoactive bowel sounds.    Musculoskeletal:         General: Swelling and tenderness present.      Cervical back: Normal range of motion and neck supple.      Comments: RT LE >LT LE  Tender RT ankle but not hot or significantly swollen, no skin defect.    Skin:     General: Skin is warm and dry.   Neurological:      Mental Status: She is alert. She is disoriented.   Psychiatric:      Comments: Restless, moaning.          Last Recorded Vitals  Blood pressure 95/54, pulse 79, temperature 36.9 °C (98.4 °F), resp. rate 17, SpO2 99%.  Intake/Output last 3 Shifts:  I/O last 3 completed shifts:  In: -   Out: 200 [Urine:200]    Wt Readings from Last 10 Encounters:   03/20/25 72.5 kg (159 lb 13.3 oz)   03/10/25 64.6 kg (142 lb 6.7 oz)   03/09/25 68 kg (150 lb)   03/05/25 68.9 kg  (152 lb)   01/22/25 73.6 kg (162 lb 4.1 oz)   12/06/24 73.9 kg (163 lb)   11/14/24 73.9 kg (163 lb)   11/13/24 75 kg (165 lb 5.5 oz)   11/08/24 75.9 kg (167 lb 5.3 oz)   07/30/24 76.5 kg (168 lb 10.4 oz)         Relevant Results    No results found. However, due to the size of the patient record, not all encounters were searched. Please check Results Review for a complete set of results.      XR chest 1 view    Addendum Date: 3/15/2025    Potentially critical findings are discussed with and acknowledged by Yael Garcia RN at 7:38 PM Eastern time on 3/15/2025. Signed by Shavonne Ye DO    Result Date: 3/15/2025  1.Endotracheal tube with the tip at or less than 1 cm above the level of the fabrizio. Repositioning is recommended.  This could be pulled back about 4 to 5 cm. 2.Enteric tube and right internal jugular line in place. 3.No pneumothorax. 4.Normal heart size with no radiographic signs of active pulmonary parenchymal infiltration.  Known pleural and parenchymal nodules worrisome for metastatic disease seen on CT are not well visualized radiographically. Signed by Shavonne Ye DO    MR brain w and wo IV contrast    Result Date: 3/11/2025  1. No acute infarct, hemorrhage or mass is noted. No abnormalities of the 4th ventricle are noted.   2. 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   MACRO: None   Signed by: Pete Rice 3/11/2025 12:21 PM Dictation workstation:   [id number]    US abdomen complete    Result Date: 3/11/2025  Obscuration the spleen and right kidney by bowel gas. Previous cholecystectomy.   Mildly small left kidney. The left kidney was otherwise sonographically unremarkable.   There were 3 identifiable hypoechoic solid lesions in the liver. These are nonspecific and could be atypical hemangiomas or metastatic lesions. Recommend liver MRI in follow-up.   MACRO: None   Signed by: Carroll Corley 3/11/2025 8:04 AM Dictation workstation:   [id number]    Lower  extremity venous duplex right    Result Date: 3/10/2025  1. Acute deep vein thrombosis at the right lower extremity from the inguinal region to the popliteal region. The right posterior tibial and peroneal veins were not visualized on this exam.     MACRO: Noa Adamson discussed the significance and urgency of this critical finding by telephone with  CYNTHIA HOWARD on 3/10/2025 at 11:24 pm.  (**-RCF-**) Findings:  See findings.     Signed by: Noa Adamson 3/10/2025 11:39 PM Dictation workstation:   [id number]    CT abdomen pelvis wo IV contrast    Result Date: 3/10/2025  1.  Extensive retroperitoneal and bilateral pelvic lymphadenopathy as above concerning for lymphoma or metastatic disease. 2. Suspect acute deep venous thrombosis within the right common femoral vein and right external iliac vein. Correlation with ultrasound findings recommended. 3. Numerous liver masses suggestive of metastatic liver disease. Correlation with ultrasound findings can be performed as clinically warranted. 4. Bilateral renal lesions, nonspecific in etiology and may represent cysts but incompletely characterized in this unenhanced exam. Correlation with ultrasound findings can be performed as clinically warranted. 5. Air in the bladder which may be due to bladder instrumentation. Correlation with urinalysis recommended. 6. Additional detailed findings as above. Critical Finding:  See findings. Notification was initiated on 3/10/2025 at 7:54 pm by  Esvin Philippe.  (**-YCF-**) Instructions:   7.     Signed by: Esvin Philippe 3/10/2025 7:54 PM Dictation workstation:   [id number]    CT head wo IV contrast    Result Date: 3/10/2025  Mass effect upon the 4th ventricle centrally. Further evaluation with contrast-enhanced MRI is recommended for better assessment.   No evidence for acute cortical infarction or acute intracranial hemorrhage is seen.   MACRO: Critical Finding:  See findings. Notification was initiated on 3/10/2025 at 7:46 pm  by  Esvin Philippe.  (**-YCF-**)   Signed by: Esvin Philippe 3/10/2025 7:46 PM Dictation workstation:   [id number]    XR chest 2 views    Result Date: 3/10/2025  No acute process. Signed by Kamaljit Alex MD    CT chest wo IV contrast    Result Date: 2/18/2025  Previous bilateral mastectomy. Presumed mild post radiation scarring anteriorly in each upper lobe subjacent to each mastectomy site.   Development of multiple scattered bilateral lung pleural and parenchymal densities consistent with new metastatic lesions.   Stable right axillary surgical clips. No suspicious thoracic adenopathy in this unenhanced exam.   Mild cardiomegaly.   Calcified hepatic granulomas. Previous cholecystectomy.   Bilateral upper pole renal cysts.   Hepatic flexure colonic diverticulosis..   MACRO: None   Signed by: Carroll Corley 2/18/2025 2:05 PM Dictation workstation:   [id number]    CT head wo IV contrast    Result Date: 2/18/2025  No acute intracranial bleed or focal mass effect.   Mild volume loss.   Mild chronic white matter ischemic disease in the deep periventricular regions.   MACRO: None   Signed by: Carroll Corley 2/18/2025 1:50 PM Dictation workstation:   [id number]    CT soft tissue neck wo IV contrast    Result Date: 2/18/2025  There are surgical clips noted within the right supraclavicular region. There is nonspecific ill-defined intermediate attenuation noted surrounding the surgical clips with adjacent skin thickening which while nonspecific may be postsurgical in etiology.   There are scattered nonspecific abnormally enlarged lymph nodes noted within the neck. Specifically, there are enlarged level 1B submandibular nodes bilaterally with the largest on the left measuring approximately 23 mm in greatest axial dimension and the largest on the right measuring approximately 20 mm in greatest axial dimension. There is an enlarged left superior clavicular lymph node measuring approximately 18 mm in greatest coronal dimension.  There is an adjacent smaller but abnormally enlarged 13 mm left supraclavicular lymph node.   There are nonspecific prominent nodular foci of intermediate attenuation noted within and/or along the margins of the parotid glands bilaterally which while nonspecific raises the possibility of additional scattered prominent parotid and/or periparotid lymph nodes with the largest on the left measuring approximately 17 mm in greatest axial dimension and the largest on the right measuring approximately 13 mm in greatest axial dimension.   There is multilevel cervical spondylosis with the most pronounced posterior osteophytic spurring noted at the C5/6 level.   Please see the dedicated report of the CT of the chest for details of findings within the chest.   MACRO: None.   Signed by: Jefferson Rosales 2/18/2025 12:59 PM Dictation workstation:   [id number]     Scheduled medications  dexAMETHasone, 4 mg, intravenous, q12h  HYDROmorphone, 0.4 mg, intravenous, q4h      Continuous medications       PRN medications  PRN medications: haloperidol, HYDROmorphone, hyoscyamine, LORazepam, LORazepam, oxyCODONE                  Assessment/Plan   IMP: 84-year-old female with history of right breast carcinoma first diagnosed in 1991 status post breast conserving surgery, radiation and tamoxifen who then went on to have a recurrence in 2012 and had complete mastectomy.  She then had breast cancer in her left breast and had a left mastectomy followed by radiation and trastuzumab.  Finally she had another recurrence in November 2021 and was on anastrozole and palbociclib.  She is presenting with confusion and weakness found to have a UTI.  She has been having recurrent falls and is losing weight.  She likely has a large disease burden in her abdomen including diffuse LAD and several liver masses suspicious for malignancy.  She was due for a PET scan after an 11/20/2024 CT showed possible progression in her pelvic lymph nodes however she did not  follow-up.  She had a sentinel event with hematemesis from Gary IIB ulcer in gastric fundus. Palliative consulted for goals of care and symptom management in the setting of likely disease progression.  Unfortunately since signing off, patient has developed a bowel obstruction likely 2/2 her abdominal metastatic disease. Family has elected for Hospice care so she is DNRCC and will be GIP here until a bed opens up for her at [facility].  That is the preferred venue b/c her  is currently admitted in the ICU at [place].        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  DNC today  Coordinating with Nandini Melendez RN w/ [facility]. We will GIP patient here until bed becomes available.   NPO except fluids and meds for comfort.   C/w dexamethasone 4 mg BID PO until IV available  C/w only IV meds for now. Still dose finding.  Starting lorazepam 0.5 mg q1h PO  Will continue to follow while inpatient.   Increasing dose and frequency to match current usage. C/w hydromorphone 0.5mg IV q3h based on current needs.  C/w hydromorphone 0.4mg q1h for indicators of pain and respiratory rate over 20BPM. Also ativan 0.5mg IV q1h for restlessness, agitation, anxiety.   D/w family, they are thankful. Will decrease nursing orders to allow for patient to be less bothered by cital checks, etc.   D/w Nursing Manager Candace Chinchilla to have RN coverage so that patient's own nurse can administer meds.      Patient/proxy preference for information  Prefers full information     Goals of Care  DNRCC     Is the patient hospice-eligible?   Yes  Was a discussion held re hospice services?   Yes  Was a decision made re hospice services?  Yes, hospice house admission for symptomatic malignant bowel obstruction.        I spent 30 minutes in the professional and overall care of this patient.  Billed high  complexity for use of IV opioid analgesics with monitoring.         Richar Francis MD

## 2025-04-01 NOTE — CARE PLAN
The patient's goals for the shift include      The clinical goals for the shift include keep comfortable during shift      Problem: Pain - Adult  Goal: Verbalizes/displays adequate comfort level or baseline comfort level  Outcome: Progressing     Problem: Safety - Adult  Goal: Free from fall injury  Outcome: Progressing     Problem: Discharge Planning  Goal: Discharge to home or other facility with appropriate resources  Outcome: Progressing     Problem: Chronic Conditions and Co-morbidities  Goal: Patient's chronic conditions and co-morbidity symptoms are monitored and maintained or improved  Outcome: Progressing     Problem: Nutrition  Goal: Nutrient intake appropriate for maintaining nutritional needs  Outcome: Progressing

## 2025-04-01 NOTE — PROGRESS NOTES
Elizabeth Buckner is a 84 y.o. female on day 1 of admission presenting with No Principal Problem: There is no principal problem currently on the Problem List. Please update the Problem List and refresh..      Subjective   Patient was seen and examined bedside this morning in company of sister, unclear if completely coherent, but on review of system did not have any symptom at that time.   According to sister, patient refusing to eat or drink, seem overtly to food and fluid.    Objective     Last Recorded Vitals  BP 92/53   Pulse 66   Temp 37.1 °C (98.7 °F)   Resp 16   SpO2 97%   Intake/Output last 3 Shifts:    Intake/Output Summary (Last 24 hours) at 4/1/2025 1940  Last data filed at 4/1/2025 0606  Gross per 24 hour   Intake --   Output 200 ml   Net -200 ml       Admission Weight       Daily Weight  03/20/25 : 72.5 kg (159 lb 13.3 oz)    Image Results  Electrocardiogram, 12-lead PRN ACS symptoms  Sinus tachycardia with Premature atrial complexes  Minimal voltage criteria for LVH, may be normal variant  Septal infarct , age undetermined  Abnormal ECG  When compared with ECG of 27-MAR-2025 00:45, (unconfirmed)  Premature atrial complexes are now Present  Septal infarct is now Present      Physical Exam  Constitutional: Elderly female, somnolent but respond to voice command, borderline conversant on O2 support via nasal cannula, not in acute distress  Eyes: PERRLA, clear sclera  ENMT: Moist mucosal membranes, no exudate  Head / Neck: Atraumatic, normocephalic, supple neck, JVP not visualized  Lungs: Patent airways, CTABL  Heart: RRR, S1S2, no murmurs appreciated, palpable pulses in all extremities  GI: Soft, NT, ND, bowel sounds present in all quadrants  MSK: Moves all extremities freely, no restriction  of ROM, no joint edema  Extremities: Bilateral lower extremities peripheral edema  : No Patel catheter inserted  Breast: Deferred  Neurological: AAO x 1 to person, incomplete exam, cannot follow command  Psychological:  Appropriate mood and behavior    Relevant Results             Scheduled medications  dexAMETHasone, 4 mg, intravenous, q12h  HYDROmorphone, 0.5 mg, intravenous, q3h      Continuous medications     PRN medications  PRN medications: haloperidol, HYDROmorphone, hyoscyamine, LORazepam, LORazepam    Assessment/Plan      84 y.o. female with PMHx s/f stage IV breast ca s/p bilateral mastectomy as well as treated with radiation and tamoxifen, anastrozole and palbociclib (2021), lymph node dissection, CRF cerebral, hepatic, pelvic, and brain mets who [place] [facility] [place] complaints of RLE edema and altered mental status with generalized weakness progressively worsening over the past few days prior to admission, and given comorbidities in the setting of metastatic cancer, decision made to pursue hospice care      GIP Hospice  - Recently discharged for GI bleed, hemorrhagic shock from GI bleed, acute right lower extremity DVT possible aspiration pneumonia  -Comfort care  -Awaiting placement to hospice facility                    Malnutrition Diagnosis Status: Active  Malnutrition Diagnosis: Severe malnutrition related to chronic disease or condition  Related to: catabolic illness  As Evidenced by: pt with 8% weight decrease over past four months, suspect oral intake less than 75% of estimated energy requirements for greater than one month, visualized areas of depleted adipose tissues and skeletal tissue wasting  I agree with the dietitian's malnutrition diagnosis.         Roddy Rodriguez, DO

## 2025-04-02 ENCOUNTER — APPOINTMENT (OUTPATIENT)
Dept: HEMATOLOGY/ONCOLOGY | Facility: CLINIC | Age: 85
End: 2025-04-02
Payer: OTHER MISCELLANEOUS

## 2025-04-02 VITALS
SYSTOLIC BLOOD PRESSURE: 106 MMHG | OXYGEN SATURATION: 100 % | HEART RATE: 82 BPM | DIASTOLIC BLOOD PRESSURE: 68 MMHG | TEMPERATURE: 98 F | RESPIRATION RATE: 16 BRPM

## 2025-04-02 LAB
ALBUMIN SERPL BCP-MCNC: 2.2 G/DL (ref 3.4–5)
ANION GAP SERPL CALC-SCNC: 19 MMOL/L (ref 10–20)
BUN SERPL-MCNC: 117 MG/DL (ref 6–23)
CALCIUM SERPL-MCNC: 5.5 MG/DL (ref 8.6–10.3)
CHLORIDE SERPL-SCNC: 111 MMOL/L (ref 98–107)
CO2 SERPL-SCNC: 15 MMOL/L (ref 21–32)
CREAT SERPL-MCNC: 2.83 MG/DL (ref 0.5–1.05)
EGFRCR SERPLBLD CKD-EPI 2021: 16 ML/MIN/1.73M*2
ERYTHROCYTE [DISTWIDTH] IN BLOOD BY AUTOMATED COUNT: 16.7 % (ref 11.5–14.5)
GLUCOSE SERPL-MCNC: 116 MG/DL (ref 74–99)
HCT VFR BLD AUTO: 21.7 % (ref 36–46)
HGB BLD-MCNC: 7 G/DL (ref 12–16)
MCH RBC QN AUTO: 27.7 PG (ref 26–34)
MCHC RBC AUTO-ENTMCNC: 32.3 G/DL (ref 32–36)
MCV RBC AUTO: 86 FL (ref 80–100)
NRBC BLD-RTO: 0.6 /100 WBCS (ref 0–0)
PHOSPHATE SERPL-MCNC: 5.3 MG/DL (ref 2.5–4.9)
PLATELET # BLD AUTO: 236 X10*3/UL (ref 150–450)
POTASSIUM SERPL-SCNC: 5.2 MMOL/L (ref 3.5–5.3)
RBC # BLD AUTO: 2.53 X10*6/UL (ref 4–5.2)
SODIUM SERPL-SCNC: 140 MMOL/L (ref 136–145)
WBC # BLD AUTO: 12.1 X10*3/UL (ref 4.4–11.3)

## 2025-04-02 PROCEDURE — 2500000004 HC RX 250 GENERAL PHARMACY W/ HCPCS (ALT 636 FOR OP/ED): Performed by: INTERNAL MEDICINE

## 2025-04-02 PROCEDURE — 80069 RENAL FUNCTION PANEL: CPT | Performed by: INTERNAL MEDICINE

## 2025-04-02 PROCEDURE — 85027 COMPLETE CBC AUTOMATED: CPT | Performed by: INTERNAL MEDICINE

## 2025-04-02 PROCEDURE — 36415 COLL VENOUS BLD VENIPUNCTURE: CPT | Performed by: INTERNAL MEDICINE

## 2025-04-02 PROCEDURE — 99233 SBSQ HOSP IP/OBS HIGH 50: CPT | Performed by: INTERNAL MEDICINE

## 2025-04-02 PROCEDURE — 99239 HOSP IP/OBS DSCHRG MGMT >30: CPT | Performed by: INTERNAL MEDICINE

## 2025-04-02 RX ORDER — HYOSCYAMINE SULFATE 0.12 MG/1
0.12 TABLET, ORALLY DISINTEGRATING ORAL EVERY 4 HOURS PRN
Start: 2025-04-02

## 2025-04-02 RX ORDER — LORAZEPAM 0.5 MG/1
0.5 TABLET ORAL EVERY 6 HOURS PRN
Start: 2025-04-02

## 2025-04-02 RX ORDER — HALOPERIDOL 2 MG/ML
1 SOLUTION ORAL EVERY 4 HOURS PRN
Start: 2025-04-02

## 2025-04-02 RX ORDER — LORAZEPAM 2 MG/ML
0.5 INJECTION INTRAMUSCULAR EVERY 2 HOUR PRN
Start: 2025-04-02

## 2025-04-02 RX ORDER — HYDROMORPHONE HYDROCHLORIDE 1 MG/ML
0.7 INJECTION, SOLUTION INTRAMUSCULAR; INTRAVENOUS; SUBCUTANEOUS
Status: DISCONTINUED | OUTPATIENT
Start: 2025-04-02 | End: 2025-04-02 | Stop reason: HOSPADM

## 2025-04-02 RX ORDER — HYDROMORPHONE HYDROCHLORIDE 1 MG/ML
0.7 INJECTION, SOLUTION INTRAMUSCULAR; INTRAVENOUS; SUBCUTANEOUS
Start: 2025-04-02

## 2025-04-02 RX ADMIN — DEXAMETHASONE SODIUM PHOSPHATE 4 MG: 4 INJECTION, SOLUTION INTRAMUSCULAR; INTRAVENOUS at 08:50

## 2025-04-02 RX ADMIN — LORAZEPAM 0.5 MG: 2 INJECTION INTRAMUSCULAR; INTRAVENOUS at 15:22

## 2025-04-02 RX ADMIN — HYDROMORPHONE HYDROCHLORIDE 0.4 MG: 1 INJECTION, SOLUTION INTRAMUSCULAR; INTRAVENOUS; SUBCUTANEOUS at 09:27

## 2025-04-02 RX ADMIN — HYDROMORPHONE HYDROCHLORIDE 0.4 MG: 1 INJECTION, SOLUTION INTRAMUSCULAR; INTRAVENOUS; SUBCUTANEOUS at 16:12

## 2025-04-02 RX ADMIN — HYDROMORPHONE HYDROCHLORIDE 0.5 MG: 1 INJECTION, SOLUTION INTRAMUSCULAR; INTRAVENOUS; SUBCUTANEOUS at 05:09

## 2025-04-02 RX ADMIN — HYDROMORPHONE HYDROCHLORIDE 0.5 MG: 1 INJECTION, SOLUTION INTRAMUSCULAR; INTRAVENOUS; SUBCUTANEOUS at 11:16

## 2025-04-02 RX ADMIN — LORAZEPAM 0.5 MG: 2 INJECTION INTRAMUSCULAR; INTRAVENOUS at 00:42

## 2025-04-02 RX ADMIN — HYDROMORPHONE HYDROCHLORIDE 0.5 MG: 1 INJECTION, SOLUTION INTRAMUSCULAR; INTRAVENOUS; SUBCUTANEOUS at 08:50

## 2025-04-02 RX ADMIN — HYDROMORPHONE HYDROCHLORIDE 0.5 MG: 1 INJECTION, SOLUTION INTRAMUSCULAR; INTRAVENOUS; SUBCUTANEOUS at 02:16

## 2025-04-02 RX ADMIN — HYDROMORPHONE HYDROCHLORIDE 0.7 MG: 1 INJECTION, SOLUTION INTRAMUSCULAR; INTRAVENOUS; SUBCUTANEOUS at 14:06

## 2025-04-02 ASSESSMENT — PAIN SCALES - PAIN ASSESSMENT IN ADVANCED DEMENTIA (PAINAD)
FACIALEXPRESSION: SAD, FRIGHTENED, FROWN
TOTALSCORE: 5
NEGVOCALIZATION: OCCASIONAL MOAN/GROAN, LOW SPEECH, NEGATIVE/DISAPPROVING QUALITY
CONSOLABILITY: DISTRACTED OR REASSURED BY VOICE/TOUCH
BODYLANGUAGE: TENSE, DISTRESSED PACING, FIDGETING
BREATHING: OCCASIONAL LABORED BREATHING, SHORT PERIOD OF HYPERVENTILATION
TOTALSCORE: MEDICATION (SEE MAR)

## 2025-04-02 ASSESSMENT — PAIN SCALES - WONG BAKER: WONGBAKER_NUMERICALRESPONSE: HURTS WHOLE LOT

## 2025-04-02 NOTE — DISCHARGE SUMMARY
Discharge Diagnosis  Gastrointestinal hemorrhage, renal failure, generalized weakness, mental status cancer    Issues Requiring Follow-Up  Follow-up with PCP    Discharge Meds     Medication List      START taking these medications     dexAMETHasone 4 mg/mL injection; Commonly known as: Decadron; Infuse 1   mL (4 mg) into a venous catheter every 12 hours.   haloperidol 2 mg/mL solution; Commonly known as: Haldol; Take 0.5 mL (1   mg) by mouth every 4 hours if needed for agitation (agitation, delirium).   * HYDROmorphone 0.5 mg/0.5 mL solution injection; Commonly known as:   Dilaudid; Infuse 0.4 mL (0.4 mg) into a venous catheter every 1 hour if   needed (restlessness, moaning, respiratory rate greater than 20   breaths/minute, any signs of pain.).   * HYDROmorphone 1 mg/mL injection; Commonly known as: Dilaudid; Infuse   0.7 mL (0.7 mg) into a venous catheter every 3 (three) hours.   hyoscyamine 0.125 mg disintegrating tablet; Commonly known as: Anaspaz;   Place 1 tablet (0.125 mg) under the tongue every 4 hours if needed (excess   secretions).   * LORazepam 2 mg/mL injection; Commonly known as: Ativan; Infuse 0.25 mL   (0.5 mg) into a venous catheter every 2 hours if needed for anxiety.   * LORazepam 0.5 mg tablet; Commonly known as: Ativan; Take 1 tablet (0.5   mg) by mouth every 6 hours if needed (anxiety, restlessness, insomnia).  * This list has 4 medication(s) that are the same as other medications   prescribed for you. Read the directions carefully, and ask your doctor or   other care provider to review them with you.     CONTINUE taking these medications     Elbow Compression Sleeve misc; Generic drug: arm brace; Lymphedema   Sleeve  and Gauntlet eval and fit 20-30 mmHG for right  arm       Test Results Pending At Discharge  Pending Labs       No current pending labs.            Hospital Course   Elizabeth Buckner is a 84 y.o. female with PMHx s/f stage IV breast ca s/p bilateral mastectomy as well as treated with  radiation and tamoxifen, anastrozole and palbociclib (2021), lymph node dissection, CRF cerebral, hepatic, pelvic, and brain mets who presented to [facility] with complaints of RLE edema and altered mental status with generalized weakness progressively worsening over the past few days prior to admission. Patient has a complicated hospital stay.  Eventually family decided to pursue hospice for this patient.     After discharge from the floor, patient was admitted to hospice, and followed up until bed was available at the [facility].  Hospital course notable for decreased oral intake, aspiration shows poor appetite, unwillingness to eat despite multiple attempt including family members encouragement.  On April 2, 2025, patient was transferred to [facility] in stable condition.    Greater than 30 minutes dedicated to this discharge that included discussing clinical state with family members and coordinating care with [facility].    Pertinent Physical Exam At Time of Discharge  Physical Exam  Constitutional: Elderly female, somnolent but respond to voice command, borderline conversant on O2 support via nasal cannula, not in acute distress  Eyes: PERRLA, clear sclera  ENMT: Not examined  Head / Neck: Atraumatic, normocephalic, supple neck, JVP not visualized  Lungs: Patent airways, CTABL  Heart: RRR, S1S2, no murmurs appreciated, palpable pulses in all extremities  GI: Soft, NT, ND, bowel sounds present in all quadrants  MSK: Not assessed, cannot follow command  Extremities: Bilateral lower extremities peripheral edema  : No Patel catheter inserted  Breast: Deferred  Neurological: AAO x 1 to person, incomplete exam, cannot follow command  Psychological: Somnolent, minimally verbal.  Outpatient Follow-Up  Future Appointments   Date Time Provider Department Center   4/16/2025 10:00 AM JOSELO Fisher-CNP [id number] [facility]   6/2/2025 10:30 AM INF 12 MINOFF [id number] [facility]  Michael, DO

## 2025-04-02 NOTE — PROGRESS NOTES
Elizabeth Buckner is a 84 y.o. female on day 1 of admission presenting with No Principal Problem: There is no principal problem currently on the Problem List. Please update the Problem List and refresh..    Interval history:   Better symptom control but still uncomfortable, even with IV meds. Still titrating to desired effect. Possible placement to [facility] today.     Subjective   Symptoms (0 - 10, Best to Worst)  [facility] [facility] Symptom Assessment System  0-10 (Numeric) Pain Score: 7      Total OME/24h: 55 OME  Morphine:   Oxycodone:   Hydromorphone: 4.4  Fentanyl:     Other PRN:  Lorazepam 0.5mg IV         Objective     Physical Exam  Constitutional:       General: She is in acute distress.      Appearance: Normal appearance. She is obese. She is ill-appearing.   HENT:      Head:      Comments: Mild bitemporal muscle wasting.     Mouth/Throat:      Mouth: Mucous membranes are moist.   Eyes:      Extraocular Movements: Extraocular movements intact.      Pupils: Pupils are equal, round, and reactive to light.   Cardiovascular:      Rate and Rhythm: Normal rate and regular rhythm.      Heart sounds: Normal heart sounds.   Pulmonary:      Comments: Tachypneic.   Abdominal:      General: There is distension.      Tenderness: There is abdominal tenderness.      Comments: Hypoactive bowel sounds.    Musculoskeletal:         General: Swelling and tenderness present.      Cervical back: Normal range of motion and neck supple.      Comments: RT LE >LT LE  Tender RT ankle but not hot or significantly swollen, no skin defect.    Skin:     General: Skin is warm and dry.   Neurological:      Mental Status: She is alert. She is disoriented.   Psychiatric:      Comments: Restless, moaning.          Last Recorded Vitals  Blood pressure 95/54, pulse 79, temperature 36.9 °C (98.4 °F), resp. rate 17, SpO2 99%.  Intake/Output last 3 Shifts:  I/O last 3 completed shifts:  In: -   Out: 200 [Urine:200]    Wt Readings from Last 10  Encounters:   03/20/25 72.5 kg (159 lb 13.3 oz)   03/10/25 64.6 kg (142 lb 6.7 oz)   03/09/25 68 kg (150 lb)   03/05/25 68.9 kg (152 lb)   01/22/25 73.6 kg (162 lb 4.1 oz)   12/06/24 73.9 kg (163 lb)   11/14/24 73.9 kg (163 lb)   11/13/24 75 kg (165 lb 5.5 oz)   11/08/24 75.9 kg (167 lb 5.3 oz)   07/30/24 76.5 kg (168 lb 10.4 oz)         Relevant Results    No results found. However, due to the size of the patient record, not all encounters were searched. Please check Results Review for a complete set of results.      XR chest 1 view    Addendum Date: 3/15/2025    Potentially critical findings are discussed with and acknowledged by Yael Garcia RN at 7:38 PM Eastern time on 3/15/2025. Signed by Shavonne Ye DO    Result Date: 3/15/2025  1.Endotracheal tube with the tip at or less than 1 cm above the level of the fabrizio. Repositioning is recommended.  This could be pulled back about 4 to 5 cm. 2.Enteric tube and right internal jugular line in place. 3.No pneumothorax. 4.Normal heart size with no radiographic signs of active pulmonary parenchymal infiltration.  Known pleural and parenchymal nodules worrisome for metastatic disease seen on CT are not well visualized radiographically. Signed by Shavonne Ye DO    MR brain w and wo IV contrast    Result Date: 3/11/2025  1. No acute infarct, hemorrhage or mass is noted. No abnormalities of the 4th ventricle are noted.   2. 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   MACRO: None   Signed by: Pete Rice 3/11/2025 12:21 PM Dictation workstation:   [id number]    US abdomen complete    Result Date: 3/11/2025  Obscuration the spleen and right kidney by bowel gas. Previous cholecystectomy.   Mildly small left kidney. The left kidney was otherwise sonographically unremarkable.   There were 3 identifiable hypoechoic solid lesions in the liver. These are nonspecific and could be atypical hemangiomas or metastatic lesions. Recommend  liver MRI in follow-up.   MACRO: None   Signed by: Carroll Corley 3/11/2025 8:04 AM Dictation workstation:   [id number]    Lower extremity venous duplex right    Result Date: 3/10/2025  1. Acute deep vein thrombosis at the right lower extremity from the inguinal region to the popliteal region. The right posterior tibial and peroneal veins were not visualized on this exam.     MACRO: Noa Adamson discussed the significance and urgency of this critical finding by telephone with  CYNTHIA HOWARD on 3/10/2025 at 11:24 pm.  (**-RCF-**) Findings:  See findings.     Signed by: Noa Adamson 3/10/2025 11:39 PM Dictation workstation:   [id number]    CT abdomen pelvis wo IV contrast    Result Date: 3/10/2025  1.  Extensive retroperitoneal and bilateral pelvic lymphadenopathy as above concerning for lymphoma or metastatic disease. 2. Suspect acute deep venous thrombosis within the right common femoral vein and right external iliac vein. Correlation with ultrasound findings recommended. 3. Numerous liver masses suggestive of metastatic liver disease. Correlation with ultrasound findings can be performed as clinically warranted. 4. Bilateral renal lesions, nonspecific in etiology and may represent cysts but incompletely characterized in this unenhanced exam. Correlation with ultrasound findings can be performed as clinically warranted. 5. Air in the bladder which may be due to bladder instrumentation. Correlation with urinalysis recommended. 6. Additional detailed findings as above. Critical Finding:  See findings. Notification was initiated on 3/10/2025 at 7:54 pm by  Esvin Philippe.  (**-YCF-**) Instructions:   7.     Signed by: Esvin Philippe 3/10/2025 7:54 PM Dictation workstation:   [id number]    CT head wo IV contrast    Result Date: 3/10/2025  Mass effect upon the 4th ventricle centrally. Further evaluation with contrast-enhanced MRI is recommended for better assessment.   No evidence for acute cortical infarction or  acute intracranial hemorrhage is seen.   MACRO: Critical Finding:  See findings. Notification was initiated on 3/10/2025 at 7:46 pm by  Esvin Philippe.  (**-YCF-**)   Signed by: Esvin Philippe 3/10/2025 7:46 PM Dictation workstation:   [id number]    XR chest 2 views    Result Date: 3/10/2025  No acute process. Signed by Kamaljit Alex MD    CT chest wo IV contrast    Result Date: 2/18/2025  Previous bilateral mastectomy. Presumed mild post radiation scarring anteriorly in each upper lobe subjacent to each mastectomy site.   Development of multiple scattered bilateral lung pleural and parenchymal densities consistent with new metastatic lesions.   Stable right axillary surgical clips. No suspicious thoracic adenopathy in this unenhanced exam.   Mild cardiomegaly.   Calcified hepatic granulomas. Previous cholecystectomy.   Bilateral upper pole renal cysts.   Hepatic flexure colonic diverticulosis..   MACRO: None   Signed by: Carroll Corley 2/18/2025 2:05 PM Dictation workstation:   [id number]    CT head wo IV contrast    Result Date: 2/18/2025  No acute intracranial bleed or focal mass effect.   Mild volume loss.   Mild chronic white matter ischemic disease in the deep periventricular regions.   MACRO: None   Signed by: Carroll Corley 2/18/2025 1:50 PM Dictation workstation:   [id number]    CT soft tissue neck wo IV contrast    Result Date: 2/18/2025  There are surgical clips noted within the right supraclavicular region. There is nonspecific ill-defined intermediate attenuation noted surrounding the surgical clips with adjacent skin thickening which while nonspecific may be postsurgical in etiology.   There are scattered nonspecific abnormally enlarged lymph nodes noted within the neck. Specifically, there are enlarged level 1B submandibular nodes bilaterally with the largest on the left measuring approximately 23 mm in greatest axial dimension and the largest on the right measuring approximately 20 mm in greatest axial  dimension. There is an enlarged left superior clavicular lymph node measuring approximately 18 mm in greatest coronal dimension. There is an adjacent smaller but abnormally enlarged 13 mm left supraclavicular lymph node.   There are nonspecific prominent nodular foci of intermediate attenuation noted within and/or along the margins of the parotid glands bilaterally which while nonspecific raises the possibility of additional scattered prominent parotid and/or periparotid lymph nodes with the largest on the left measuring approximately 17 mm in greatest axial dimension and the largest on the right measuring approximately 13 mm in greatest axial dimension.   There is multilevel cervical spondylosis with the most pronounced posterior osteophytic spurring noted at the C5/6 level.   Please see the dedicated report of the CT of the chest for details of findings within the chest.   MACRO: None.   Signed by: Jefferson Rosales 2/18/2025 12:59 PM Dictation workstation:   [id number]     Scheduled medications  dexAMETHasone, 4 mg, intravenous, q12h  HYDROmorphone, 0.4 mg, intravenous, q4h      Continuous medications       PRN medications  PRN medications: haloperidol, HYDROmorphone, hyoscyamine, LORazepam, LORazepam, oxyCODONE                  Assessment/Plan   IMP: 84-year-old female with history of right breast carcinoma first diagnosed in 1991 status post breast conserving surgery, radiation and tamoxifen who then went on to have a recurrence in 2012 and had complete mastectomy.  She then had breast cancer in her left breast and had a left mastectomy followed by radiation and trastuzumab.  Finally she had another recurrence in November 2021 and was on anastrozole and palbociclib.  She is presenting with confusion and weakness found to have a UTI.  She has been having recurrent falls and is losing weight.  She likely has a large disease burden in her abdomen including diffuse LAD and several liver masses suspicious for malignancy.   She was due for a PET scan after an 11/20/2024 CT showed possible progression in her pelvic lymph nodes however she did not follow-up.  She had a sentinel event with hematemesis from Gary IIB ulcer in gastric fundus. Palliative consulted for goals of care and symptom management in the setting of likely disease progression.  Unfortunately since signing off, patient has developed a bowel obstruction likely 2/2 her abdominal metastatic disease. Family has elected for Hospice care so she is DNRCC and will be GIP here until a bed opens up for her at [facility].  That is the preferred venue b/c her  is currently admitted in the ICU at [place].        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  DNRCC today  Coordinating with Nandini Melendez RN w/ [facility]. We will GIP patient here until bed becomes available.   NPO except fluids and meds for comfort.   C/w dexamethasone 4 mg BID PO until IV available  C/w only IV meds for now. Still dose finding.  Starting lorazepam 0.5 mg q1h PO  Will continue to follow while inpatient.   Increase hydromorphone 0.5mg  to 0.7mg IV q3h based on current needs.  C/w hydromorphone 0.4mg q1h for indicators of pain and respiratory rate over 20BPM. Also ativan 0.5mg IV q1h for restlessness, agitation, anxiety.   D/w family, they are thankful. Will decrease nursing orders to allow for patient to be less bothered by cital checks, etc.   D/w Nursing Manager Candace Chinchilla to have RN coverage so that patient's own nurse can administer meds.      Patient/proxy preference for information  Prefers full information     Goals of Care  DNRCC     Is the patient hospice-eligible?   Yes  Was a discussion held re hospice services?   Yes  Was a decision made re hospice services?  Yes, hospice house admission for symptomatic malignant bowel obstruction.         I spent 30 minutes in the professional and overall care of this patient.  Billed high complexity for use of IV opioid analgesics with monitoring.         Richar Francis MD

## 2025-04-02 NOTE — CARE PLAN
The patient's goals for the shift include      The clinical goals for the shift include pt will not show S&S of lenore or distress this shift      Problem: Pain - Adult  Goal: Verbalizes/displays adequate comfort level or baseline comfort level  4/1/2025 2115 by Venus Benson RN  Outcome: Progressing  4/1/2025 2115 by Venus Benson RN  Outcome: Progressing     Problem: Safety - Adult  Goal: Free from fall injury  4/1/2025 2115 by Venus Benson RN  Outcome: Progressing  4/1/2025 2115 by Venus Benson RN  Outcome: Progressing     Problem: Skin  Goal: Prevent/manage excess moisture  Flowsheets (Taken 4/1/2025 2117)  Prevent/manage excess moisture: Moisturize dry skin  Goal: Prevent/minimize sheer/friction injuries  Flowsheets (Taken 4/1/2025 2117)  Prevent/minimize sheer/friction injuries:   Use pull sheet   HOB 30 degrees or less   Turn/reposition every 2 hours/use positioning/transfer devices

## 2025-04-02 NOTE — NURSING NOTE
[facility]  Spoke with pts sister Geraldine, who is agreeable with move today.  Will alert team to PU time

## 2025-04-02 NOTE — PROGRESS NOTES
PALLIATIVE CARE SOCIAL WORK NOTE     Pt remains in GIP status with [facility] here at [facility]. Just notified that bed is available at [facility]. [facility] on-site MIKE Delatorre to work on transfer arrangements.     ZACKARY Shipley     Addendum 1:40 pm Transfer to [facility] this afternoon at 4:00 pm

## 2025-04-02 NOTE — PROGRESS NOTES
04/02/25 1347   Discharge Planning   Who is requesting discharge planning? Provider   Home or Post Acute Services Post acute facilities (Rehab/SNF/etc)   Type of Post Acute Facility Services Other (Comment)   Expected Discharge Disposition [facility]   Does the patient need discharge transport arranged? Yes   RoundTrip coordination needed? Yes   Has discharge transport been arranged? Yes   What day is the transport expected? 04/02/25   What time is the transport expected? 1600   Intensity of Service   Intensity of Service 0-30 min     4/2/25 1347  Bed available at [facility] today.  Transport arranged for 1600.  Jaleesa Mahajan RN TCC

## 2025-04-04 ENCOUNTER — TELEPHONE (OUTPATIENT)
Dept: HEMATOLOGY/ONCOLOGY | Facility: CLINIC | Age: 85
End: 2025-04-04

## 2025-04-08 LAB
ATRIAL RATE: 87 BPM
P AXIS: 13 DEGREES
P OFFSET: 200 MS
P ONSET: 148 MS
PR INTERVAL: 146 MS
Q ONSET: 221 MS
QRS COUNT: 14 BEATS
QRS DURATION: 84 MS
QT INTERVAL: 390 MS
QTC CALCULATION(BAZETT): 469 MS
QTC FREDERICIA: 441 MS
R AXIS: -14 DEGREES
T AXIS: 40 DEGREES
T OFFSET: 416 MS
VENTRICULAR RATE: 87 BPM

## 2025-04-09 ENCOUNTER — TELEPHONE (OUTPATIENT)
Dept: HEMATOLOGY/ONCOLOGY | Facility: HOSPITAL | Age: 85
End: 2025-04-09
Payer: OTHER MISCELLANEOUS

## 2025-04-12 LAB
ATRIAL RATE: 104 BPM
P AXIS: 14 DEGREES
P OFFSET: 190 MS
P ONSET: 140 MS
PR INTERVAL: 152 MS
Q ONSET: 216 MS
QRS COUNT: 17 BEATS
QRS DURATION: 90 MS
QT INTERVAL: 374 MS
QTC CALCULATION(BAZETT): 491 MS
QTC FREDERICIA: 449 MS
R AXIS: -15 DEGREES
T AXIS: 49 DEGREES
T OFFSET: 403 MS
VENTRICULAR RATE: 104 BPM

## 2025-04-16 ENCOUNTER — APPOINTMENT (OUTPATIENT)
Dept: HEMATOLOGY/ONCOLOGY | Facility: CLINIC | Age: 85
End: 2025-04-16
Payer: OTHER MISCELLANEOUS

## 2025-06-02 ENCOUNTER — APPOINTMENT (OUTPATIENT)
Dept: HEMATOLOGY/ONCOLOGY | Facility: CLINIC | Age: 85
End: 2025-06-02
Payer: OTHER MISCELLANEOUS